# Patient Record
Sex: FEMALE | Race: WHITE | NOT HISPANIC OR LATINO | Employment: OTHER | URBAN - METROPOLITAN AREA
[De-identification: names, ages, dates, MRNs, and addresses within clinical notes are randomized per-mention and may not be internally consistent; named-entity substitution may affect disease eponyms.]

---

## 2018-04-09 ENCOUNTER — LAB VISIT (OUTPATIENT)
Dept: LAB | Facility: HOSPITAL | Age: 48
End: 2018-04-09
Attending: FAMILY MEDICINE
Payer: MEDICAID

## 2018-04-09 ENCOUNTER — OFFICE VISIT (OUTPATIENT)
Dept: PODIATRY | Facility: CLINIC | Age: 48
End: 2018-04-09
Payer: MEDICAID

## 2018-04-09 VITALS
HEART RATE: 87 BPM | RESPIRATION RATE: 16 BRPM | DIASTOLIC BLOOD PRESSURE: 77 MMHG | TEMPERATURE: 98 F | SYSTOLIC BLOOD PRESSURE: 147 MMHG | WEIGHT: 267 LBS

## 2018-04-09 DIAGNOSIS — F32.9 MAJOR DEPRESSIVE DISORDER, SINGLE EPISODE, UNSPECIFIED: ICD-10-CM

## 2018-04-09 DIAGNOSIS — I10 ESSENTIAL (PRIMARY) HYPERTENSION: Primary | ICD-10-CM

## 2018-04-09 DIAGNOSIS — E11.40 TYPE 2 DIABETES MELLITUS WITH DIABETIC NEUROPATHY: ICD-10-CM

## 2018-04-09 DIAGNOSIS — S91.302D OPEN WOUND OF LEFT FOOT, SUBSEQUENT ENCOUNTER: Primary | ICD-10-CM

## 2018-04-09 LAB
ALBUMIN SERPL BCP-MCNC: 4.5 G/DL
ALP SERPL-CCNC: 82 U/L
ALT SERPL W/O P-5'-P-CCNC: 23 U/L
ANION GAP SERPL CALC-SCNC: 9 MMOL/L
AST SERPL-CCNC: 20 U/L
BILIRUB SERPL-MCNC: 0.3 MG/DL
BUN SERPL-MCNC: 19 MG/DL
CALCIUM SERPL-MCNC: 9.8 MG/DL
CHLORIDE SERPL-SCNC: 102 MMOL/L
CHOLEST SERPL-MCNC: 190 MG/DL
CHOLEST/HDLC SERPL: 5 {RATIO}
CO2 SERPL-SCNC: 28 MMOL/L
CREAT SERPL-MCNC: 0.5 MG/DL
CREAT UR-MCNC: 27 MG/DL
ERYTHROCYTE [DISTWIDTH] IN BLOOD BY AUTOMATED COUNT: 13 %
EST. GFR  (AFRICAN AMERICAN): >60 ML/MIN/1.73 M^2
EST. GFR  (NON AFRICAN AMERICAN): >60 ML/MIN/1.73 M^2
ESTIMATED AVG GLUCOSE: 160 MG/DL
GLUCOSE SERPL-MCNC: 98 MG/DL
HBA1C MFR BLD HPLC: 7.2 %
HCT VFR BLD AUTO: 36.4 %
HDLC SERPL-MCNC: 38 MG/DL
HDLC SERPL: 20 %
HGB BLD-MCNC: 11.8 G/DL
LDLC SERPL CALC-MCNC: 92 MG/DL
MCH RBC QN AUTO: 29.4 PG
MCHC RBC AUTO-ENTMCNC: 32.4 G/DL
MCV RBC AUTO: 91 FL
MICROALBUMIN UR DL<=1MG/L-MCNC: 8 UG/ML
MICROALBUMIN/CREATININE RATIO: 29.6 UG/MG
NONHDLC SERPL-MCNC: 152 MG/DL
PLATELET # BLD AUTO: 246 K/UL
PMV BLD AUTO: 10.3 FL
POTASSIUM SERPL-SCNC: 3.6 MMOL/L
PROT SERPL-MCNC: 8.3 G/DL
RBC # BLD AUTO: 4.01 M/UL
SODIUM SERPL-SCNC: 139 MMOL/L
TRIGL SERPL-MCNC: 300 MG/DL
WBC # BLD AUTO: 7.24 K/UL

## 2018-04-09 PROCEDURE — 99999 PR PBB SHADOW E&M-EST. PATIENT-LVL III: CPT | Mod: PBBFAC,,, | Performed by: PODIATRIST

## 2018-04-09 PROCEDURE — 82043 UR ALBUMIN QUANTITATIVE: CPT

## 2018-04-09 PROCEDURE — 36415 COLL VENOUS BLD VENIPUNCTURE: CPT

## 2018-04-09 PROCEDURE — 99213 OFFICE O/P EST LOW 20 MIN: CPT | Mod: S$PBB,,, | Performed by: PODIATRIST

## 2018-04-09 PROCEDURE — 85027 COMPLETE CBC AUTOMATED: CPT

## 2018-04-09 PROCEDURE — 99213 OFFICE O/P EST LOW 20 MIN: CPT | Mod: PBBFAC | Performed by: PODIATRIST

## 2018-04-09 PROCEDURE — 83036 HEMOGLOBIN GLYCOSYLATED A1C: CPT

## 2018-04-09 PROCEDURE — 80061 LIPID PANEL: CPT

## 2018-04-09 PROCEDURE — 80053 COMPREHEN METABOLIC PANEL: CPT

## 2018-04-09 RX ORDER — POTASSIUM CHLORIDE 20 MEQ/1
TABLET, EXTENDED RELEASE ORAL
COMMUNITY
Start: 2018-01-08 | End: 2019-04-18 | Stop reason: SDUPTHER

## 2018-04-09 RX ORDER — HYDROCODONE BITARTRATE AND ACETAMINOPHEN 10; 325 MG/1; MG/1
TABLET ORAL
COMMUNITY
End: 2019-11-19

## 2018-04-09 RX ORDER — ZOLPIDEM TARTRATE 10 MG/1
TABLET ORAL
COMMUNITY
End: 2018-04-10 | Stop reason: SDUPTHER

## 2018-04-09 RX ORDER — ASPIRIN 81 MG/1
TABLET ORAL
COMMUNITY

## 2018-04-09 RX ORDER — BUPROPION HYDROCHLORIDE 150 MG/1
TABLET ORAL
COMMUNITY
Start: 2018-03-12 | End: 2018-07-13 | Stop reason: SDUPTHER

## 2018-04-09 RX ORDER — PANTOPRAZOLE SODIUM 40 MG/1
TABLET, DELAYED RELEASE ORAL
COMMUNITY
End: 2019-03-20 | Stop reason: SDUPTHER

## 2018-04-09 RX ORDER — SUCRALFATE 1 G/1
TABLET ORAL
COMMUNITY
End: 2018-07-13 | Stop reason: SDUPTHER

## 2018-04-09 RX ORDER — MELOXICAM 15 MG/1
15 TABLET ORAL DAILY
Refills: 10 | COMMUNITY
Start: 2018-01-08 | End: 2019-01-07 | Stop reason: ALTCHOICE

## 2018-04-09 RX ORDER — DOCUSATE SODIUM 100 MG/1
CAPSULE, LIQUID FILLED ORAL
COMMUNITY
End: 2021-04-07 | Stop reason: SDUPTHER

## 2018-04-09 RX ORDER — LISINOPRIL 20 MG/1
TABLET ORAL
COMMUNITY
End: 2019-03-15 | Stop reason: SDUPTHER

## 2018-04-09 RX ORDER — MONTELUKAST SODIUM 10 MG/1
TABLET ORAL
COMMUNITY
Start: 2018-03-12 | End: 2018-08-17 | Stop reason: SDUPTHER

## 2018-04-09 RX ORDER — PREGABALIN 200 MG/1
CAPSULE ORAL
COMMUNITY
End: 2018-09-14 | Stop reason: SDUPTHER

## 2018-04-09 RX ORDER — FUROSEMIDE 40 MG/1
40 TABLET ORAL 2 TIMES DAILY
Refills: 3 | COMMUNITY
Start: 2018-01-08 | End: 2019-01-16 | Stop reason: SDUPTHER

## 2018-04-09 RX ORDER — ALBUTEROL SULFATE 90 UG/1
AEROSOL, METERED RESPIRATORY (INHALATION)
COMMUNITY
End: 2018-10-11 | Stop reason: SDUPTHER

## 2018-04-09 RX ORDER — GLUCOSAM/CHONDRO/HERB 149/HYAL 750-100 MG
TABLET ORAL
COMMUNITY

## 2018-04-09 RX ORDER — VILAZODONE HYDROCHLORIDE 40 MG/1
TABLET ORAL
COMMUNITY
End: 2019-06-13 | Stop reason: SDUPTHER

## 2018-04-09 RX ORDER — INSULIN GLARGINE 100 [IU]/ML
INJECTION, SOLUTION SUBCUTANEOUS
COMMUNITY
End: 2018-06-06 | Stop reason: SDUPTHER

## 2018-04-09 RX ORDER — FLUTICASONE PROPIONATE AND SALMETEROL 500; 50 UG/1; UG/1
POWDER RESPIRATORY (INHALATION)
COMMUNITY
End: 2019-02-25 | Stop reason: SDUPTHER

## 2018-04-09 RX ORDER — ATORVASTATIN CALCIUM 40 MG/1
TABLET, FILM COATED ORAL
COMMUNITY
End: 2018-07-13 | Stop reason: SDUPTHER

## 2018-04-09 RX ORDER — INSULIN GLARGINE 100 [IU]/ML
INJECTION, SOLUTION SUBCUTANEOUS
COMMUNITY
Start: 2018-03-12 | End: 2018-11-15 | Stop reason: SDUPTHER

## 2018-04-09 RX ORDER — BUSPIRONE HYDROCHLORIDE 10 MG/1
TABLET ORAL
COMMUNITY

## 2018-04-09 NOTE — LETTER
April 10, 2018      Khanh Soriano MD  [address] [facility] [address]  [facility] [place] [address]           [facility] - Podiatry/Wound Care  [address] [facility] [place] [address]  Phone: [phone]  Fax: [phone]          Patient: Shweta Gupta   MR Number: [id number]   YOB: 1970   Date of Visit: 4/9/2018       Dear Dr. Khanh Soriano:    Thank you for referring Shweta Gupta to me for evaluation. Attached you will find relevant portions of my assessment and plan of care.    If you have questions, please do not hesitate to call me. I look forward to following Shweta Gupta along with you.    Sincerely,    Vijay Ramon, DPANTHONY    Enclosure  CC:  No Recipients    If you would like to receive this communication electronically, please contact [email or web address] or [phone] to request more information on [employer] Link access.    For providers and/or their staff who would like to refer a patient to [facility], please contact us through our one-stop-shop provider referral line, [facility], at [phone].    If you feel you have received this communication in error or would no longer like to receive these types of communications, please e-mail [email or web address]

## 2018-04-10 ENCOUNTER — TELEPHONE (OUTPATIENT)
Dept: FAMILY MEDICINE | Facility: CLINIC | Age: 48
End: 2018-04-10

## 2018-04-10 RX ORDER — ZOLPIDEM TARTRATE 5 MG/1
5 TABLET ORAL NIGHTLY PRN
Qty: 30 TABLET | Refills: 3 | Status: SHIPPED | OUTPATIENT
Start: 2018-04-10 | End: 2018-07-24 | Stop reason: DRUGHIGH

## 2018-04-10 NOTE — PROGRESS NOTES
Subjective:       Patient ID: Shweta Gupta is a 47 y.o. female.    Chief Complaint: Follow-up    HPI   patient presents for follow-up wound posterior left lower extremity.  Patient states the drainage has decreased quite a bit and it is feeling better.  Review of Systems    Objective:      Physical Exam  On evaluation the wound posterior aspect of the patient's left foot where she previously had Achilles tendon surgery looks very good it is well healing with well-formed scab overlying area previously noted infection appears resolved at this time.  Assessment:       1. Open wound of left foot, subsequent encounter        Plan:        The patient presents status post Achilles tendon repair lengthening and excision of posterior calcaneal exostosis left. on evaluation the patient's left heel looks much better it is not draining as much as it had been previously it's not as red not as swollen.  Patient has finished taking her antibiotics as prescribed she states the area feels better her left leg is still somewhat stiff but is less painful than it had been there is a well-formed scab overlying the area of previous infection no debridement was necessary today the place area was thoroughly cleaned with Dakin solution and Betadine soaked 2 x 2's placed over the area to maintain a dry environment plan follow-up will be 2 weeks I will likely allow the patient to start getting the area wet in 2 weeks and changes to the area the patient is to contact me immediately.  Patient's wound is not related to previous surgery the patient is outside the global period of treatment following Achilles tendon repair.  Total face-to-face time including discussion evaluation treatment and wound care equal 15 minutes.

## 2018-04-10 NOTE — TELEPHONE ENCOUNTER
----- Message from Klelee Caceres sent at 4/10/2018  8:24 AM CDT -----  Contact: Shweta  Calling to refill her Rx zolpidem (AMBIEN) 10 mg Tab 30 day qty  Please send to [facility] in [place]  Call her to advise if it can be escribed or not [phone] (home)   Thanks!

## 2018-04-11 ENCOUNTER — TELEPHONE (OUTPATIENT)
Dept: FAMILY MEDICINE | Facility: CLINIC | Age: 48
End: 2018-04-11

## 2018-04-18 ENCOUNTER — OFFICE VISIT (OUTPATIENT)
Dept: FAMILY MEDICINE | Facility: CLINIC | Age: 48
End: 2018-04-18
Payer: MEDICAID

## 2018-04-18 VITALS
RESPIRATION RATE: 18 BRPM | TEMPERATURE: 97 F | DIASTOLIC BLOOD PRESSURE: 73 MMHG | OXYGEN SATURATION: 96 % | HEART RATE: 80 BPM | SYSTOLIC BLOOD PRESSURE: 126 MMHG | HEIGHT: 64 IN | WEIGHT: 265 LBS | BODY MASS INDEX: 45.24 KG/M2

## 2018-04-18 DIAGNOSIS — F32.A DEPRESSIVE DISORDER: Primary | ICD-10-CM

## 2018-04-18 DIAGNOSIS — E11.9 TYPE 2 DIABETES MELLITUS WITHOUT COMPLICATION, WITH LONG-TERM CURRENT USE OF INSULIN: ICD-10-CM

## 2018-04-18 DIAGNOSIS — I10 ESSENTIAL HYPERTENSION: ICD-10-CM

## 2018-04-18 DIAGNOSIS — G47.00 INSOMNIA, UNSPECIFIED TYPE: ICD-10-CM

## 2018-04-18 DIAGNOSIS — E66.9 OBESITY, UNSPECIFIED CLASSIFICATION, UNSPECIFIED OBESITY TYPE, UNSPECIFIED WHETHER SERIOUS COMORBIDITY PRESENT: ICD-10-CM

## 2018-04-18 DIAGNOSIS — Z79.4 TYPE 2 DIABETES MELLITUS WITHOUT COMPLICATION, WITH LONG-TERM CURRENT USE OF INSULIN: ICD-10-CM

## 2018-04-18 PROBLEM — K57.92 DIVERTICULITIS: Status: ACTIVE | Noted: 2018-04-18

## 2018-04-18 PROCEDURE — 99214 OFFICE O/P EST MOD 30 MIN: CPT | Mod: S$GLB,,, | Performed by: FAMILY MEDICINE

## 2018-04-18 RX ORDER — PIOGLITAZONEHYDROCHLORIDE 45 MG/1
45 TABLET ORAL DAILY
Qty: 90 TABLET | Refills: 3 | Status: SHIPPED | OUTPATIENT
Start: 2018-04-18 | End: 2019-05-17 | Stop reason: SDUPTHER

## 2018-04-18 RX ORDER — ZOLPIDEM TARTRATE 6.25 MG/1
6.25 TABLET, FILM COATED, EXTENDED RELEASE ORAL NIGHTLY PRN
Qty: 90 TABLET | Refills: 3 | Status: SHIPPED | OUTPATIENT
Start: 2018-04-18 | End: 2018-07-17

## 2018-04-18 NOTE — PROGRESS NOTES
Subjective:       Patient ID: Shweta Gupta is a 47 y.o. female.    Chief Complaint: No chief complaint on file.    Anxiety/Depression  Reported by patient.  Quality: doesn't want to go outside, doesn't take care of herself well. crying often   Severity: denies suicidal ideations; able to maintain relationships; does not interfere with activities of daily living   Duration: symptoms lasting over 2 weeks   Onset/Timing: gradual; worsening   Context: major life stressors  Associated Symptoms: denies homicidal ideations; no significant weight gain; no significant weight loss; no visual/auditory hallucinations; no delusions; no shortness of breath; mood good; no anxiety; no crying spells; no panic; no isolation; sleeping well; appetite good; energy good; no apathy; maintaining functionality  Notes: buspar helps with the anxiety. Tolerating the paxil well.      Diabetes   She presents for her follow-up diabetic visit. She has type 2 diabetes mellitus. Pertinent negatives for hypoglycemia include no confusion, dizziness, headaches, pallor or tremors. Associated symptoms include blurred vision. Pertinent negatives for diabetes include no chest pain, no fatigue and no weakness. (History of retinalschisis.  Seeing her opthamologist every3 months. ) There are no hypoglycemic complications. Symptoms are worsening. Diabetic complications include nephropathy. Pertinent negatives for diabetic complications include no CVA, PVD or retinopathy. Risk factors for coronary artery disease include obesity and sedentary lifestyle. Current diabetic treatment includes diet, oral agent (dual therapy) and insulin pump. She is compliant with treatment all of the time. Her weight is increasing steadily. She has not had a previous visit with a dietitian. She participates in exercise weekly. Her home blood glucose trend is increasing steadily. Her breakfast blood glucose range is generally >200 mg/dl. She sees a podiatrist.Eye exam is current.    Hypertension   This is a chronic problem. The current episode started more than 1 year ago. The problem is unchanged. The problem is controlled. Associated symptoms include blurred vision. Pertinent negatives include no chest pain, headaches, neck pain, palpitations or shortness of breath. Risk factors for coronary artery disease include diabetes mellitus, obesity and sedentary lifestyle. Past treatments include ACE inhibitors. There are no compliance problems.  There is no history of angina, kidney disease, CAD/MI, CVA, heart failure, left ventricular hypertrophy, PVD or retinopathy.     Review of Systems   Constitutional: Negative for activity change, appetite change, chills and fatigue.   HENT: Negative for congestion, ear discharge, ear pain, rhinorrhea, sinus pain, sore throat and trouble swallowing.    Eyes: Positive for blurred vision. Negative for photophobia, pain, redness, itching and visual disturbance.   Respiratory: Negative for cough, chest tightness, shortness of breath and wheezing.    Cardiovascular: Negative for chest pain, palpitations and leg swelling.   Gastrointestinal: Negative for abdominal distention, abdominal pain, blood in stool, diarrhea, nausea and vomiting.   Genitourinary: Negative for dysuria, pelvic pain, vaginal bleeding, vaginal discharge and vaginal pain.   Musculoskeletal: Negative for arthralgias, back pain, gait problem and neck pain.   Skin: Negative for color change, pallor and rash.   Neurological: Negative for dizziness, tremors, weakness, light-headedness, numbness and headaches.   Psychiatric/Behavioral: Negative for agitation, behavioral problems, confusion and sleep disturbance.       Objective:      Physical Exam   Constitutional: She appears well-developed and well-nourished.   HENT:   Head: Normocephalic.   Right Ear: External ear normal.   Left Ear: External ear normal.   Mouth/Throat: Oropharynx is clear and moist.   Eyes: Conjunctivae and EOM are normal. Pupils  are equal, round, and reactive to light.   Neck: Normal range of motion. Neck supple.   Cardiovascular: Normal rate, regular rhythm, normal heart sounds and intact distal pulses.    Pulmonary/Chest: Effort normal and breath sounds normal.   Neurological: She is alert.   Skin: Skin is warm and dry.   Psychiatric: Her behavior is normal. Judgment normal.       Assessment:       1. Depressive disorder    2. Essential hypertension    3. Type 2 diabetes mellitus without complication, with long-term current use of insulin    4. Obesity, unspecified classification, unspecified obesity type, unspecified whether serious comorbidity present        Plan:         Depressive disorder    Essential hypertension    Type 2 diabetes mellitus without complication, with long-term current use of insulin  -     pioglitazone (ACTOS) 45 MG tablet; Take 1 tablet (45 mg total) by mouth once daily.  Dispense: 90 tablet; Refill: 3  -     Comprehensive metabolic panel; Future; Expected date: 07/18/2018  -     Hemoglobin A1c; Future; Expected date: 07/18/2018  -     Lipid panel; Future; Expected date: 07/18/2018  -     CBC auto differential; Future; Expected date: 07/18/2018  -adding actos today to bring sugars down a little further.  I would like to consider jardiance, but that will take a PA and failure of 2 medications.  Doesn't tolerate metformin.     Obesity, unspecified classification, unspecified obesity type, unspecified whether serious comorbidity present    Insomnia, unspecified type  -     zolpidem (AMBIEN CR) 6.25 MG CR tablet; Take 1 tablet (6.25 mg total) by mouth nightly as needed for Insomnia.  Dispense: 90 tablet; Refill: 3

## 2018-04-23 ENCOUNTER — OFFICE VISIT (OUTPATIENT)
Dept: PODIATRY | Facility: CLINIC | Age: 48
End: 2018-04-23
Payer: MEDICAID

## 2018-04-23 VITALS — TEMPERATURE: 97 F | SYSTOLIC BLOOD PRESSURE: 117 MMHG | HEART RATE: 78 BPM | DIASTOLIC BLOOD PRESSURE: 76 MMHG

## 2018-04-23 DIAGNOSIS — M76.62 ACHILLES TENDINITIS OF LEFT LOWER EXTREMITY: ICD-10-CM

## 2018-04-23 DIAGNOSIS — S91.302S WOUND, OPEN, FOOT, LEFT, SEQUELA: Primary | ICD-10-CM

## 2018-04-23 PROCEDURE — 99213 OFFICE O/P EST LOW 20 MIN: CPT | Mod: S$PBB,,, | Performed by: PODIATRIST

## 2018-04-23 PROCEDURE — 99999 PR PBB SHADOW E&M-EST. PATIENT-LVL III: CPT | Mod: PBBFAC,,, | Performed by: PODIATRIST

## 2018-04-23 PROCEDURE — 99213 OFFICE O/P EST LOW 20 MIN: CPT | Mod: PBBFAC | Performed by: PODIATRIST

## 2018-04-23 PROCEDURE — 87070 CULTURE OTHR SPECIMN AEROBIC: CPT

## 2018-04-23 NOTE — LETTER
April 23, 2018      Khanh Soriano MD  [address] [facility] [address]  [facility] [place] [address]           [facility] - Podiatry/Wound Care  [address] [facility] [place] [address]  Phone: [phone]  Fax: [phone]          Patient: Shweta Gupta   MR Number: [id number]   YOB: 1970   Date of Visit: 4/23/2018       Dear Dr. Khanh Soriano:    Thank you for referring Shweta Gupta to me for evaluation. Attached you will find relevant portions of my assessment and plan of care.    If you have questions, please do not hesitate to call me. I look forward to following Shweta Gupta along with you.    Sincerely,    Vijay Ramon, DPANTHONY    Enclosure  CC:  No Recipients    If you would like to receive this communication electronically, please contact [email or web address] or [phone] to request more information on [employer] Link access.    For providers and/or their staff who would like to refer a patient to [facility], please contact us through our one-stop-shop provider referral line, [facility], at [phone].    If you feel you have received this communication in error or would no longer like to receive these types of communications, please e-mail [email or web address]

## 2018-04-23 NOTE — PROGRESS NOTES
Subjective:       Patient ID: Shweta Gupta is a 47 y.o. female.    Chief Complaint: Follow-up    HPI   patient presents for follow-up wound posterior left lower extremity.  Patient states the drainage has decreased quite a bit and it is feeling better.  Review of Systems    Objective:      Physical Exam  On evaluation the wound posterior aspect of the patient's left foot where she previously had Achilles tendon surgery looks very good it is well healing with well-formed scab overlying area previously noted infection appears resolved at this time.  Assessment:       1. Wound, open, foot, left, sequela    2. Achilles tendinitis of left lower extremity        Plan:        The patient presents status post Achilles tendon repair lengthening and excision of posterior calcaneal exostosis left. on evaluation the patient's left heel looks much better it is not draining as much as it had been previously it's not as red not as swollen.  Patient has finished taking her antibiotics as prescribed she states the area feels better her left leg is still somewhat stiff but is less painful than it had been there is a well-formed scab overlying the area of previous infection no debridement was necessary today the place area was thoroughly cleaned with Dakin solution and silver alginate placed over the area to maintain a dry environment plan follow-up will be 10 days. Any changes to the area the patient is to contact me immediately.  Patient's wound is not related to previous surgery the patient is outside the global period of treatment following Achilles tendon repair.  Total face-to-face time including discussion evaluation treatment and wound care equal 15 minutes.  Patient was advised the area does continue to improve the opening is the size of a pinhole with only some minor drainage I did culture this area to ensure that there is no bacteria growing in the area and we'll place the patient on appropriate antibiotics pending the  culture.  I will consider application of Promogran to the area to facilitate healing as long as there is no bacterial contaminant in the area.

## 2018-04-26 ENCOUNTER — TELEPHONE (OUTPATIENT)
Dept: PODIATRY | Facility: CLINIC | Age: 48
End: 2018-04-26

## 2018-04-26 LAB — BACTERIA SPEC AEROBE CULT: NORMAL

## 2018-04-26 NOTE — TELEPHONE ENCOUNTER
----- Message from Vijay Ramon DPM sent at 4/25/2018  9:30 AM CDT -----  Please call the patient regarding her normal result.No active bacterial growth noted on C&S.

## 2018-04-26 NOTE — TELEPHONE ENCOUNTER
----- Message from Vijay Ramon DPM sent at 4/26/2018 11:18 AM CDT -----  Please call the patient regarding her normal result. No active bacterial growth.

## 2018-05-02 ENCOUNTER — OFFICE VISIT (OUTPATIENT)
Dept: PODIATRY | Facility: CLINIC | Age: 48
End: 2018-05-02
Payer: MEDICAID

## 2018-05-02 VITALS
HEART RATE: 78 BPM | DIASTOLIC BLOOD PRESSURE: 79 MMHG | SYSTOLIC BLOOD PRESSURE: 142 MMHG | RESPIRATION RATE: 18 BRPM | TEMPERATURE: 98 F

## 2018-05-02 DIAGNOSIS — S91.302S WOUND, OPEN, FOOT, LEFT, SEQUELA: Primary | ICD-10-CM

## 2018-05-02 PROCEDURE — 99213 OFFICE O/P EST LOW 20 MIN: CPT | Mod: S$PBB,,, | Performed by: PODIATRIST

## 2018-05-02 PROCEDURE — 99213 OFFICE O/P EST LOW 20 MIN: CPT | Mod: PBBFAC | Performed by: PODIATRIST

## 2018-05-02 PROCEDURE — 99999 PR PBB SHADOW E&M-EST. PATIENT-LVL III: CPT | Mod: PBBFAC,,, | Performed by: PODIATRIST

## 2018-05-02 NOTE — LETTER
May 5, 2018      Khanh Soriano MD  [address] [facility] [address]  [facility] [place] [address]           [facility] - Podiatry/Wound Care  [address] [facility] [place] [address]  Phone: [phone]  Fax: [phone]          Patient: Shweta Gupta   MR Number: [id number]   YOB: 1970   Date of Visit: 5/2/2018       Dear Dr. Khanh Soriano:    Thank you for referring Shweta Gupta to me for evaluation. Attached you will find relevant portions of my assessment and plan of care.    If you have questions, please do not hesitate to call me. I look forward to following Shweta Gupta along with you.    Sincerely,        Enclosure  CC:  No Recipients    If you would like to receive this communication electronically, please contact [email or web address] or [phone] to request more information on [employer] Link access.    For providers and/or their staff who would like to refer a patient to [facility], please contact us through our one-stop-shop provider referral line, [facility], at [phone].    If you feel you have received this communication in error or would no longer like to receive these types of communications, please e-mail [email or web address]

## 2018-05-06 NOTE — PROGRESS NOTES
Subjective:       Patient ID: Shweta Gupta is a 47 y.o. female.    Chief Complaint: Follow-up    HPI   patient presents for follow-up wound posterior left lower extremity.  Patient states the drainage has decreased quite a bit and it is feeling better.  Review of Systems   Skin: Positive for wound.   All other systems reviewed and are negative.      Objective:      Physical Exam   Constitutional: She appears well-developed and well-nourished.   Cardiovascular:   Pulses:       Dorsalis pedis pulses are 2+ on the right side, and 2+ on the left side.        Posterior tibial pulses are 2+ on the right side, and 2+ on the left side.   Musculoskeletal:        Left foot: There is decreased range of motion, tenderness and swelling.        Feet:    Feet:   Left Foot:   Skin Integrity: Positive for ulcer.   Neurological: She is alert.   Skin: Capillary refill takes 2 to 3 seconds.   Psychiatric: She has a normal mood and affect.     On evaluation the wound posterior aspect of the patient's left foot where she previously had Achilles tendon surgery looks very good it is well healing with well-formed scab overlying area previously noted infection appears resolved at this time.  Assessment:       1. Wound, open, foot, left, sequela        Plan:        The patient presents status post Achilles tendon repair lengthening and excision of posterior calcaneal exostosis left. on evaluation the patient's left heel looks much better it is not draining as much as it had been previously it's not as red not as swollen.  Patient has finished taking her antibiotics as prescribed she states the area feels better her left leg is still somewhat stiff but is less painful than it had been there is a well-formed scab overlying the area of previous infection no debridement was necessary today the place area was thoroughly cleaned with Dakin solution and silver alginate placed over the area to maintain a dry environment plan follow-up will be 10  days. Any changes to the area the patient is to contact me immediately.  Patient's wound is not related to previous surgery the patient is outside the global period of treatment following Achilles tendon repair.  Total face-to-face time including discussion evaluation treatment and wound care equal 15 minutes.  Patient was advised the area does continue to improve the opening is the size of a pinhole with only some minor drainage I did culture this area to ensure that there is no bacteria growing in the area and we'll place the patient on appropriate antibiotics pending the culture.  I will consider application of Promogran to the area to facilitate healing as long as there is no bacterial contaminant in the area.  Patient advised there is increased granular tissue noted so we'll continue our current care plan and possibly change it at the next visit.

## 2018-05-16 ENCOUNTER — OFFICE VISIT (OUTPATIENT)
Dept: PODIATRY | Facility: CLINIC | Age: 48
End: 2018-05-16
Payer: MEDICAID

## 2018-05-16 VITALS
WEIGHT: 267 LBS | BODY MASS INDEX: 45.58 KG/M2 | DIASTOLIC BLOOD PRESSURE: 60 MMHG | HEART RATE: 83 BPM | HEIGHT: 64 IN | TEMPERATURE: 97 F | SYSTOLIC BLOOD PRESSURE: 115 MMHG

## 2018-05-16 DIAGNOSIS — S91.302S WOUND, OPEN, FOOT, LEFT, SEQUELA: Primary | ICD-10-CM

## 2018-05-16 PROCEDURE — 99213 OFFICE O/P EST LOW 20 MIN: CPT | Mod: PBBFAC | Performed by: PODIATRIST

## 2018-05-16 PROCEDURE — 99213 OFFICE O/P EST LOW 20 MIN: CPT | Mod: S$PBB,,, | Performed by: PODIATRIST

## 2018-05-16 PROCEDURE — 99999 PR PBB SHADOW E&M-EST. PATIENT-LVL III: CPT | Mod: PBBFAC,,, | Performed by: PODIATRIST

## 2018-05-16 NOTE — LETTER
May 20, 2018      Khanh Soriano MD  [address] [facility] [address]  [facility] [place] [address]           [facility] - Podiatry/Wound Care  [address] [facility] [place] [address]  Phone: [phone]  Fax: [phone]          Patient: Shweta Gupta   MR Number: [id number]   YOB: 1970   Date of Visit: 5/16/2018       Dear Dr. Khanh Soriano:    Thank you for referring Shweta Gupta to me for evaluation. Attached you will find relevant portions of my assessment and plan of care.    If you have questions, please do not hesitate to call me. I look forward to following Shweta Gupta along with you.    Sincerely,    Vijay Ramon, DPANTHONY    Enclosure  CC:  No Recipients    If you would like to receive this communication electronically, please contact [email or web address] or [phone] to request more information on [employer] Link access.    For providers and/or their staff who would like to refer a patient to [facility], please contact us through our one-stop-shop provider referral line, [facility], at [phone].    If you feel you have received this communication in error or would no longer like to receive these types of communications, please e-mail [email or web address]

## 2018-05-20 NOTE — PROGRESS NOTES
Subjective:       Patient ID: Shweta Gupta is a 47 y.o. female.    Chief Complaint: Follow-up    HPI   patient presents for follow-up wound posterior left lower extremity.  Patient states the drainage has decreased quite a bit and it is feeling better.  Review of Systems   Skin: Positive for wound.   All other systems reviewed and are negative.      Objective:      Physical Exam   Constitutional: She appears well-developed and well-nourished.   Cardiovascular:   Pulses:       Dorsalis pedis pulses are 2+ on the right side, and 2+ on the left side.        Posterior tibial pulses are 2+ on the right side, and 2+ on the left side.   Musculoskeletal:        Left foot: There is decreased range of motion, tenderness and swelling.        Feet:    Feet:   Left Foot:   Skin Integrity: Positive for ulcer.   Neurological: She is alert.   Skin: Capillary refill takes 2 to 3 seconds.   Psychiatric: She has a normal mood and affect.     On evaluation the wound posterior aspect of the patient's left foot where she previously had Achilles tendon surgery looks very good it is well healing with well-formed scab overlying area previously noted infection appears resolved at this time.  Assessment:       1. Wound, open, foot, left, sequela        Plan:        The patient presents status post Achilles tendon repair lengthening and excision of posterior calcaneal exostosis left. on evaluation the patient's left heel looks much better it is not draining as much as it had been previously it's not as red not as swollen.  Patient has finished taking her antibiotics as prescribed she states the area feels better her left leg is still somewhat stiff but is less painful than it had been there is a well-formed scab overlying the area of previous infection no debridement was necessary today the place area was thoroughly cleaned with Dakin solution.  Any changes to the area the patient is to contact me immediately.  Patient's wound is not related  to previous surgery the patient is outside the global period of treatment following Achilles tendon repair.  Total face-to-face time including discussion evaluation treatment and wound care equal 15 minutes.  Patient was advised the area does continue to improve the opening is the size of a pinhole with only some minor drainage.  No signs of infection noted patient will begin application of medi honey which the patient was dispensed every 48-72 hours depending upon moisture content of the area with a light dressing over the area follow-up 3 weeks.

## 2018-06-06 ENCOUNTER — OFFICE VISIT (OUTPATIENT)
Dept: PODIATRY | Facility: CLINIC | Age: 48
End: 2018-06-06
Payer: MEDICAID

## 2018-06-06 VITALS
HEIGHT: 64 IN | DIASTOLIC BLOOD PRESSURE: 74 MMHG | TEMPERATURE: 98 F | SYSTOLIC BLOOD PRESSURE: 119 MMHG | HEART RATE: 74 BPM | WEIGHT: 278 LBS | RESPIRATION RATE: 16 BRPM | BODY MASS INDEX: 47.46 KG/M2

## 2018-06-06 DIAGNOSIS — E11.9 TYPE 2 DIABETES MELLITUS WITHOUT COMPLICATION, WITH LONG-TERM CURRENT USE OF INSULIN: Primary | ICD-10-CM

## 2018-06-06 DIAGNOSIS — S91.302S WOUND, OPEN, FOOT, LEFT, SEQUELA: ICD-10-CM

## 2018-06-06 DIAGNOSIS — Z79.4 TYPE 2 DIABETES MELLITUS WITHOUT COMPLICATION, WITH LONG-TERM CURRENT USE OF INSULIN: Primary | ICD-10-CM

## 2018-06-06 PROCEDURE — 99213 OFFICE O/P EST LOW 20 MIN: CPT | Mod: S$PBB,,, | Performed by: PODIATRIST

## 2018-06-06 PROCEDURE — 99999 PR PBB SHADOW E&M-EST. PATIENT-LVL III: CPT | Mod: PBBFAC,,, | Performed by: PODIATRIST

## 2018-06-06 PROCEDURE — 99213 OFFICE O/P EST LOW 20 MIN: CPT | Mod: PBBFAC | Performed by: PODIATRIST

## 2018-06-10 NOTE — PROGRESS NOTES
Subjective:       Patient ID: Shweta Gupta is a 48 y.o. female.    Chief Complaint: No chief complaint on file.    HPI   patient presents for follow-up wound posterior left lower extremity.  Patient states the drainage has decreased quite a bit and it is feeling better.  Review of Systems   Skin: Positive for wound.   All other systems reviewed and are negative.      Objective:      Physical Exam   Constitutional: She appears well-developed and well-nourished.   Cardiovascular:   Pulses:       Dorsalis pedis pulses are 2+ on the right side, and 2+ on the left side.        Posterior tibial pulses are 2+ on the right side, and 2+ on the left side.   Musculoskeletal:        Left foot: There is decreased range of motion, tenderness and swelling.        Feet:    Feet:   Left Foot:   Skin Integrity: Positive for ulcer.   Neurological: She is alert.   Skin: Capillary refill takes 2 to 3 seconds.   Psychiatric: She has a normal mood and affect.     On evaluation the wound posterior aspect of the patient's left foot where she previously had Achilles tendon surgery looks very good it is well healing with well-formed scab overlying area previously noted infection appears resolved at this time.  Assessment:       1. Type 2 diabetes mellitus without complication, with long-term current use of insulin    2. Wound, open, foot, left, sequela        Plan:        The patient presents status post Achilles tendon repair lengthening and excision of posterior calcaneal exostosis left. on evaluation the patient's left heel looks much better it is not draining as much as it had been previously it's not as red not as swollen.  Patient has finished taking her antibiotics as prescribed she states the area feels better her left leg is still somewhat stiff but is less painful than it had been there is a well-formed scab overlying the area of previous infection no debridement was necessary today the place area was thoroughly cleaned with Dakin  solution.  Any changes to the area the patient is to contact me immediately.  Patient's wound is not related to previous surgery the patient is outside the global period of treatment following Achilles tendon repair.  Total face-to-face time including discussion evaluation treatment and wound care equal 15 minutes.  Patient was advised the area does continue to improve the opening is the size of a pinhole with only some minor drainage.  No signs of infection noted patient will begin application of medi honey which the patient was dispensed every 48-72 hours depending upon moisture content of the area with a light dressing over the area follow-up 4 weeks.  Patient will continue the current treatment plan and dressing changes I am good allow her to get the area wet but she needs to clean the area immediately with Dakin solution after bathing area wound is getting progressively smaller and is now only the size of a pinhole with no signs of infection.

## 2018-06-25 ENCOUNTER — TELEPHONE (OUTPATIENT)
Dept: PODIATRY | Facility: CLINIC | Age: 48
End: 2018-06-25

## 2018-06-25 NOTE — TELEPHONE ENCOUNTER
Pt states she has been experiencing leg cramps at night.  Pt states she only experiences them on the side she had her foot surgery on and they keep her up at night because they hurt so bad. Pt states she is currently rx'd potassium and this is no longer helping. Please advised.

## 2018-06-26 NOTE — TELEPHONE ENCOUNTER
She is going to need to do some stretching exercises to keep the calf muscles stretched out. Pulling her toes to her nose and hold 30 second 3 sets 3 x / day will likely take care of the cramping hs.

## 2018-07-09 ENCOUNTER — OFFICE VISIT (OUTPATIENT)
Dept: PODIATRY | Facility: CLINIC | Age: 48
End: 2018-07-09
Payer: MEDICAID

## 2018-07-09 VITALS
OXYGEN SATURATION: 96 % | WEIGHT: 275 LBS | BODY MASS INDEX: 46.95 KG/M2 | DIASTOLIC BLOOD PRESSURE: 68 MMHG | HEIGHT: 64 IN | HEART RATE: 70 BPM | RESPIRATION RATE: 18 BRPM | TEMPERATURE: 97 F | SYSTOLIC BLOOD PRESSURE: 114 MMHG

## 2018-07-09 DIAGNOSIS — M76.62 ACHILLES TENDINITIS OF LEFT LOWER EXTREMITY: Primary | ICD-10-CM

## 2018-07-09 PROCEDURE — 99213 OFFICE O/P EST LOW 20 MIN: CPT | Mod: S$PBB,,, | Performed by: PODIATRIST

## 2018-07-09 PROCEDURE — 99213 OFFICE O/P EST LOW 20 MIN: CPT | Mod: PBBFAC | Performed by: PODIATRIST

## 2018-07-09 PROCEDURE — 99999 PR PBB SHADOW E&M-EST. PATIENT-LVL III: CPT | Mod: PBBFAC,,, | Performed by: PODIATRIST

## 2018-07-13 ENCOUNTER — TELEPHONE (OUTPATIENT)
Dept: FAMILY MEDICINE | Facility: CLINIC | Age: 48
End: 2018-07-13

## 2018-07-13 RX ORDER — ATORVASTATIN CALCIUM 40 MG/1
TABLET, FILM COATED ORAL
Qty: 30 TABLET | Refills: 2 | Status: SHIPPED | OUTPATIENT
Start: 2018-07-13 | End: 2018-12-05 | Stop reason: SDUPTHER

## 2018-07-13 RX ORDER — BUPROPION HYDROCHLORIDE 150 MG/1
150 TABLET ORAL DAILY
Qty: 30 TABLET | Refills: 2 | Status: SHIPPED | OUTPATIENT
Start: 2018-07-13 | End: 2019-02-25 | Stop reason: SDUPTHER

## 2018-07-13 RX ORDER — SUCRALFATE 1 G/1
TABLET ORAL
Qty: 120 TABLET | Refills: 2 | Status: SHIPPED | OUTPATIENT
Start: 2018-07-13 | End: 2019-09-23 | Stop reason: SDUPTHER

## 2018-07-13 NOTE — TELEPHONE ENCOUNTER
----- Message from Joy Reyes sent at 7/13/2018  9:41 AM CDT -----  Contact: Patient  Shweta, patient [phone] calling because she needs a refill on buPROPion (WELLBUTRIN XL) 150 MG TB24 tablet, atorvastatin (LIPITOR) 40 MG tablet, and sucralfate (CARAFATE) 1 gram tablet. Please advise. Thanks.    [facility]  [address] [place] [address]  Phone: [phone] Fax: [phone]

## 2018-07-13 NOTE — TELEPHONE ENCOUNTER
----- Message from Guerline Escobar sent at 7/13/2018 11:11 AM CDT -----   Type:  Pharmacy Calling to Clarify an RX    Name of Caller: haseeb Pharmacy Name:    [facility] - [place], [place] - [address]  [address]  [place] [address]  Phone: [phone] Fax: [phone]    Prescription Name: freddy   What do they need to clarify?: na

## 2018-07-14 NOTE — PROGRESS NOTES
Subjective:       Patient ID: Shweta Gupta is a 48 y.o. female.    Chief Complaint: Follow-up    HPI   patient presents for follow-up wound posterior left lower extremity.  Patient states the drainage has decreased quite a bit and it is feeling better.  Review of Systems   Skin: Positive for wound.   All other systems reviewed and are negative.      Objective:      Physical Exam   Constitutional: She appears well-developed and well-nourished.   Cardiovascular:   Pulses:       Dorsalis pedis pulses are 2+ on the right side, and 2+ on the left side.        Posterior tibial pulses are 2+ on the right side, and 2+ on the left side.   Musculoskeletal:        Left foot: There is decreased range of motion, tenderness and swelling.        Feet:    Feet:   Left Foot:   Skin Integrity: Positive for ulcer.   Neurological: She is alert.   Skin: Capillary refill takes 2 to 3 seconds.   Psychiatric: She has a normal mood and affect.     On evaluation the wound posterior aspect of the patient's left foot where she previously had Achilles tendon surgery looks very good it is well healing with well-formed scab overlying area previously noted infection appears resolved at this time.  Assessment:       1. Achilles tendinitis of left lower extremity        Plan:        The patient presents status post Achilles tendon repair lengthening and excision of posterior calcaneal exostosis left. on evaluation the patient's left heel looks much better it is not draining as much as it had been previously it's not as red not as swollen.  Patient has finished taking her antibiotics as prescribed she states the area feels better her left leg is still somewhat stiff but is less painful than it had been there is a well-formed scab overlying the area of previous infection no debridement was necessary today the place area was thoroughly cleaned with Dakin solution.  Any changes to the area the patient is to contact me immediately.  Patient's wound is  not related to previous surgery the patient is outside the global period of treatment following Achilles tendon repair.  Total face-to-face time including discussion evaluation treatment and wound care equal 15 minutes.   Previously noted ulceration is now completely closed and healed there is no open area no sign of infection noted patient does have significant tightness of the Achilles tendon I have advised the patient that I would recommend she start physical therapy going to rip her written referred the patient to physical therapy at this time I want to see her for follow-up in 1 month the physical therapy is to help stretch out the Achilles tendon breakdown scar tissue and restore range of motion without discomfort or tightness patient currently has very good range of motion but there is some tightness still follow-up 1 month.

## 2018-07-23 DIAGNOSIS — Z12.39 SCREENING BREAST EXAMINATION: Primary | ICD-10-CM

## 2018-07-24 ENCOUNTER — OFFICE VISIT (OUTPATIENT)
Dept: FAMILY MEDICINE | Facility: CLINIC | Age: 48
End: 2018-07-24
Payer: MEDICAID

## 2018-07-24 ENCOUNTER — LAB VISIT (OUTPATIENT)
Dept: LAB | Facility: HOSPITAL | Age: 48
End: 2018-07-24
Attending: FAMILY MEDICINE
Payer: MEDICAID

## 2018-07-24 VITALS
HEART RATE: 73 BPM | HEIGHT: 64 IN | DIASTOLIC BLOOD PRESSURE: 71 MMHG | BODY MASS INDEX: 46.95 KG/M2 | TEMPERATURE: 98 F | OXYGEN SATURATION: 98 % | WEIGHT: 275 LBS | SYSTOLIC BLOOD PRESSURE: 134 MMHG | RESPIRATION RATE: 18 BRPM

## 2018-07-24 DIAGNOSIS — G47.00 INSOMNIA, UNSPECIFIED TYPE: ICD-10-CM

## 2018-07-24 DIAGNOSIS — Z79.4 TYPE 2 DIABETES MELLITUS WITHOUT COMPLICATION, WITH LONG-TERM CURRENT USE OF INSULIN: Primary | ICD-10-CM

## 2018-07-24 DIAGNOSIS — E11.9 TYPE 2 DIABETES MELLITUS WITHOUT COMPLICATION, WITH LONG-TERM CURRENT USE OF INSULIN: Primary | ICD-10-CM

## 2018-07-24 DIAGNOSIS — E11.9 TYPE 2 DIABETES MELLITUS WITHOUT COMPLICATION, WITH LONG-TERM CURRENT USE OF INSULIN: ICD-10-CM

## 2018-07-24 DIAGNOSIS — Z79.4 TYPE 2 DIABETES MELLITUS WITHOUT COMPLICATION, WITH LONG-TERM CURRENT USE OF INSULIN: ICD-10-CM

## 2018-07-24 LAB
ALBUMIN SERPL BCP-MCNC: 4.4 G/DL
ALP SERPL-CCNC: 76 U/L
ALT SERPL W/O P-5'-P-CCNC: 22 U/L
ANION GAP SERPL CALC-SCNC: 7 MMOL/L
AST SERPL-CCNC: 18 U/L
BILIRUB SERPL-MCNC: 0.6 MG/DL
BUN SERPL-MCNC: 21 MG/DL
CALCIUM SERPL-MCNC: 9.5 MG/DL
CHLORIDE SERPL-SCNC: 105 MMOL/L
CO2 SERPL-SCNC: 29 MMOL/L
CREAT SERPL-MCNC: 0.8 MG/DL
EST. GFR  (AFRICAN AMERICAN): >60 ML/MIN/1.73 M^2
EST. GFR  (NON AFRICAN AMERICAN): >60 ML/MIN/1.73 M^2
ESTIMATED AVG GLUCOSE: 146 MG/DL
GLUCOSE SERPL-MCNC: 144 MG/DL
HBA1C MFR BLD HPLC: 6.7 %
POTASSIUM SERPL-SCNC: 3.7 MMOL/L
PROT SERPL-MCNC: 7.9 G/DL
SODIUM SERPL-SCNC: 141 MMOL/L

## 2018-07-24 PROCEDURE — 36415 COLL VENOUS BLD VENIPUNCTURE: CPT

## 2018-07-24 PROCEDURE — 99214 OFFICE O/P EST MOD 30 MIN: CPT | Mod: S$GLB,,, | Performed by: FAMILY MEDICINE

## 2018-07-24 PROCEDURE — 80053 COMPREHEN METABOLIC PANEL: CPT

## 2018-07-24 PROCEDURE — 83036 HEMOGLOBIN GLYCOSYLATED A1C: CPT

## 2018-07-24 RX ORDER — RANOLAZINE 500 MG/1
500 TABLET, EXTENDED RELEASE ORAL 2 TIMES DAILY
COMMUNITY
End: 2020-07-17 | Stop reason: SDUPTHER

## 2018-07-24 RX ORDER — ZOLPIDEM TARTRATE 6.25 MG/1
6.25 TABLET, FILM COATED, EXTENDED RELEASE ORAL NIGHTLY PRN
Qty: 30 TABLET | Refills: 2 | Status: SHIPPED | OUTPATIENT
Start: 2018-07-24 | End: 2018-08-07 | Stop reason: SDUPTHER

## 2018-07-24 NOTE — PROGRESS NOTES
Subjective:       Patient ID: Shweta Gupta is a 48 y.o. female.    Chief Complaint: Follow-up    Diabetes   She presents for her follow-up diabetic visit. She has type 2 diabetes mellitus. Pertinent negatives for hypoglycemia include no dizziness, headaches, seizures or tremors. Pertinent negatives for diabetes include no chest pain and no fatigue. There are no hypoglycemic complications. Risk factors for coronary artery disease include obesity and post-menopausal. Current diabetic treatment includes oral agent (monotherapy). She is compliant with treatment all of the time. Her weight is stable. An ACE inhibitor/angiotensin II receptor blocker is being taken.   Was changed to actos at her last visit approx 3 months ago. Due for A1c.    Also struggles with insomnia and needs refill of ambien; was supposed to try controlled release but did not get the rx filled since her pcp has since moved.    Review of Systems   Constitutional: Negative for chills, fatigue, fever and unexpected weight change.   Respiratory: Negative for cough, chest tightness, shortness of breath and wheezing.    Cardiovascular: Negative for chest pain, palpitations and leg swelling.   Gastrointestinal: Negative for abdominal pain, constipation, diarrhea, nausea and vomiting.   Neurological: Negative for dizziness, tremors, seizures and headaches.   Psychiatric/Behavioral: Negative for decreased concentration, dysphoric mood, hallucinations and suicidal ideas.       Past Medical History:   Diagnosis Date    Allergy     Anxiety     Asthma     Depression     Diabetes mellitus, type 2     GERD (gastroesophageal reflux disease)     Hyperlipidemia     Hypertension      Past Surgical History:   Procedure Laterality Date    ACHILLES TENDON SURGERY Left     CARPAL TUNNEL RELEASE Left      SECTION      CHOLECYSTECTOMY      COLON SURGERY      HERNIA REPAIR      HYSTERECTOMY      partial    KIDNEY STONE SURGERY      TRIGGER FINGER  "RELEASE Left      Social History     Social History    Marital status:      Spouse name: N/A    Number of children: N/A    Years of education: N/A     Occupational History    Not on file.     Social History Main Topics    Smoking status: Never Smoker    Smokeless tobacco: Never Used    Alcohol use No    Drug use: No    Sexual activity: Not Currently     Partners: Male     Other Topics Concern    Not on file     Social History Narrative    No narrative on file     History reviewed. No pertinent family history.    Objective:      /71 (BP Location: Left arm, Patient Position: Sitting)   Pulse 73   Temp 97.7 °F (36.5 °C) (Tympanic)   Resp 18   Ht 5' 4" (1.626 m)   Wt 124.7 kg (275 lb)   SpO2 98%   BMI 47.20 kg/m²   Physical Exam   Constitutional: She is oriented to person, place, and time. She appears well-developed and well-nourished. No distress.   obese   Eyes: Conjunctivae and EOM are normal. Pupils are equal, round, and reactive to light. No scleral icterus.   Cardiovascular: Normal rate, regular rhythm and normal heart sounds.    No murmur heard.  Pulmonary/Chest: Effort normal and breath sounds normal. No respiratory distress. She has no wheezes.   Neurological: She is alert and oriented to person, place, and time.   Skin: Skin is warm and dry. No rash noted. She is not diaphoretic.   Psychiatric: She has a normal mood and affect. Her behavior is normal. Judgment and thought content normal.   Vitals reviewed.      Assessment:       1. Type 2 diabetes mellitus without complication, with long-term current use of insulin    2. Insomnia, unspecified type        Plan:       Type 2 diabetes mellitus without complication, with long-term current use of insulin  -     Comprehensive metabolic panel; Future; Expected date: 07/24/2018  -     Hemoglobin A1c; Future; Expected date: 07/24/2018    Insomnia, unspecified type  -     zolpidem (AMBIEN CR) 6.25 MG CR tablet; Take 1 tablet (6.25 mg " total) by mouth nightly as needed for Insomnia.  Dispense: 30 tablet; Refill: 2            Risks, benefits, and side effects were discussed with the patient. All questions were answered to the fullest satisfaction of the patient, and pt verbalized understanding and agreement to treatment plan. Pt was to call with any new or worsening symptoms, or present to the ER.

## 2018-07-25 ENCOUNTER — TELEPHONE (OUTPATIENT)
Dept: FAMILY MEDICINE | Facility: CLINIC | Age: 48
End: 2018-07-25

## 2018-08-06 ENCOUNTER — OFFICE VISIT (OUTPATIENT)
Dept: PODIATRY | Facility: CLINIC | Age: 48
End: 2018-08-06
Payer: MEDICAID

## 2018-08-06 VITALS
WEIGHT: 280 LBS | HEIGHT: 64 IN | DIASTOLIC BLOOD PRESSURE: 57 MMHG | BODY MASS INDEX: 47.8 KG/M2 | HEART RATE: 83 BPM | SYSTOLIC BLOOD PRESSURE: 107 MMHG | TEMPERATURE: 97 F

## 2018-08-06 DIAGNOSIS — M76.61 ACHILLES TENDINITIS OF RIGHT LOWER EXTREMITY: Primary | ICD-10-CM

## 2018-08-06 DIAGNOSIS — M76.62 ACHILLES TENDINITIS OF LEFT LOWER EXTREMITY: ICD-10-CM

## 2018-08-06 DIAGNOSIS — Z79.4 TYPE 2 DIABETES MELLITUS WITHOUT COMPLICATION, WITH LONG-TERM CURRENT USE OF INSULIN: ICD-10-CM

## 2018-08-06 DIAGNOSIS — E11.9 TYPE 2 DIABETES MELLITUS WITHOUT COMPLICATION, WITH LONG-TERM CURRENT USE OF INSULIN: ICD-10-CM

## 2018-08-06 PROCEDURE — 99213 OFFICE O/P EST LOW 20 MIN: CPT | Mod: PBBFAC | Performed by: PODIATRIST

## 2018-08-06 PROCEDURE — 99213 OFFICE O/P EST LOW 20 MIN: CPT | Mod: S$PBB,,, | Performed by: PODIATRIST

## 2018-08-06 PROCEDURE — 99999 PR PBB SHADOW E&M-EST. PATIENT-LVL III: CPT | Mod: PBBFAC,,, | Performed by: PODIATRIST

## 2018-08-07 DIAGNOSIS — G47.00 INSOMNIA, UNSPECIFIED TYPE: ICD-10-CM

## 2018-08-07 NOTE — TELEPHONE ENCOUNTER
----- Message from Miriam Morales sent at 8/7/2018  4:19 PM CDT -----  Contact: Patient  Type:  RX Refill Request    Who Called:  Patient  Refill or New Rx:  Refill   RX Name and Strength:    zolpidem (AMBIEN CR) 6.25 MG CR tablet  How is the patient currently taking it? (ex. 1XDay):    Is this a 30 day or 90 day RX:    Preferred Pharmacy with phone number:    [facility]  [place], [place] - [address]  [address]  [place] [address]  Phone: [phone] Fax: [phone]  Local or Mail Order:  Local  Ordering Provider:  Be  Best Call Back Number:    Additional Information:  Patient called to let the office know that [facility] never received her refill request.

## 2018-08-08 ENCOUNTER — TELEPHONE (OUTPATIENT)
Dept: FAMILY MEDICINE | Facility: CLINIC | Age: 48
End: 2018-08-08

## 2018-08-08 DIAGNOSIS — G47.00 INSOMNIA, UNSPECIFIED TYPE: Primary | ICD-10-CM

## 2018-08-08 RX ORDER — ZOLPIDEM TARTRATE 6.25 MG/1
6.25 TABLET, FILM COATED, EXTENDED RELEASE ORAL NIGHTLY PRN
Qty: 30 TABLET | Refills: 2 | Status: SHIPPED | OUTPATIENT
Start: 2018-08-08 | End: 2018-08-08 | Stop reason: ALTCHOICE

## 2018-08-08 RX ORDER — ZOLPIDEM TARTRATE 10 MG/1
10 TABLET ORAL NIGHTLY PRN
Qty: 30 TABLET | Refills: 1 | Status: SHIPPED | OUTPATIENT
Start: 2018-08-08 | End: 2018-10-05 | Stop reason: SDUPTHER

## 2018-08-08 NOTE — TELEPHONE ENCOUNTER
Sure, I will send in that prescription for her now. Please cancel the ambien cr prescription and the PA for it. Thanks

## 2018-08-08 NOTE — TELEPHONE ENCOUNTER
Pt notified ambien 10 mg was called into [facility], pt notified I confirmed ambien 10mg was at [facility]

## 2018-08-08 NOTE — TELEPHONE ENCOUNTER
We have no knowledge of this, and no record that we were notified that a PA was required.  We will start one today.

## 2018-08-08 NOTE — TELEPHONE ENCOUNTER
Pt called and is asking if she can please get her 10mg Ambien back, she has tried 5 mg and only sleeps for about 3-4 hours a night. She believes since she was on the 10mg  For so long that the 5 mg is not effective Pa for the Ambien cr 6.25 is still pending and she feels like she really needs the 10 mg back because the 5mg is just uneffective.  Please advise thank you

## 2018-08-08 NOTE — TELEPHONE ENCOUNTER
[facility] for her ambien states she isnt sure if one was started can you ask Hanna if she knows anything about

## 2018-08-10 RX ORDER — MONTELUKAST SODIUM 10 MG/1
TABLET ORAL
Qty: 30 TABLET | Refills: 3 | OUTPATIENT
Start: 2018-08-10

## 2018-08-10 NOTE — TELEPHONE ENCOUNTER
----- Message from Aleida Ruiz sent at 8/10/2018  9:05 AM CDT -----  Contact: Patient  Type:  RX Refill Request    Who Called:  patient  Refill or New Rx:  refill  RX Name and Strength:  montelukast (SINGULAIR) 10 mg tablet  How is the patient currently taking it? (ex. 1XDay): 1x day  Is this a 30 day or 90 day RX:  30 day  Preferred Pharmacy with phone number:    [facility] - [place], [place] - [address]  [address]  [place] [address]  Phone: [phone] Fax: [phone]    Local or Mail Order:  local  Ordering Provider:  Be Mckoy Call Back Number:  [phone] (home)

## 2018-08-11 NOTE — PROGRESS NOTES
Subjective:       Patient ID: Shweta Gupta is a 48 y.o. female.    Chief Complaint: Follow-up; Foot Problem; and Diabetes Mellitus    HPI   patient presents for follow-up wound posterior left lower extremity.  Patient states the drainage has decreased quite a bit and it is feeling better.  Review of Systems   Skin: Positive for wound.   All other systems reviewed and are negative.      Objective:      Physical Exam   Constitutional: She appears well-developed and well-nourished.   Cardiovascular:   Pulses:       Dorsalis pedis pulses are 2+ on the right side, and 2+ on the left side.        Posterior tibial pulses are 2+ on the right side, and 2+ on the left side.   Musculoskeletal:        Left foot: There is decreased range of motion, tenderness and swelling.        Feet:    Feet:   Left Foot:   Skin Integrity: Positive for ulcer.   Neurological: She is alert.   Skin: Capillary refill takes 2 to 3 seconds.   Psychiatric: She has a normal mood and affect.     On evaluation the wound posterior aspect of the patient's left foot where she previously had Achilles tendon surgery looks very good it is well healing with well-formed scab overlying area previously noted infection appears resolved at this time.  Patient has findings consistent with Achilles tendinitis right.  Assessment:       1. Achilles tendinitis of right lower extremity    2. Type 2 diabetes mellitus without complication, with long-term current use of insulin    3. Achilles tendinitis of left lower extremity        Plan:          Patient presents for follow-up of excessive tightness of the Achilles tendon left patient had a previous Achilles tendon surgical procedure performed on this area she had an slow healing wound overlying this area this has been completely resolved since her last visit she is experiencing some tightness along the course of the Achilles tendon however she states physical therapy has helped quite a bit.  I have advised the patient  I would recommend continuing physical therapy ultimately I am going to recommend that they do some ultrasound to help break down scar tissue on the area and gave the patient a new referral to physical therapy she ciscurrently being seen by [facility] physical therapy in [facility].  Patient has a significant reduction in previously noted scar tissue patient will continue to gradually return to activity as tolerated plan follow-up will be 1 month.  Patient is having some symptoms of Achilles tendinitis right I have strongly encouraged the patient to pursue physical therapy on the right foot also orders have been written for physical therapy right foot additionally.  An evaluation was performed regarding Achilles tendinitis right patient was made aware she has no where near needing surgical intervention on the right foot it is not near severe as the left when she required surgical intervention and treatment.

## 2018-08-13 ENCOUNTER — TELEPHONE (OUTPATIENT)
Dept: FAMILY MEDICINE | Facility: CLINIC | Age: 48
End: 2018-08-13

## 2018-08-13 ENCOUNTER — TELEPHONE (OUTPATIENT)
Dept: SURGERY | Facility: CLINIC | Age: 48
End: 2018-08-13

## 2018-08-13 NOTE — TELEPHONE ENCOUNTER
----- Message from Mary Beth Connors sent at 8/13/2018 12:14 PM CDT -----  Type:  Same Day Appointment Request    Caller is requesting a same day appointment.      Name of Caller:  Shweta Gupta  When is the first available appointment?  Wednesday, August 15th  Symptoms:  Abdomen pain  Best Call Back Number:  [phone]  Additional Information:   Patient is experiencing Diverticulitis flare up with some blood in urine

## 2018-08-13 NOTE — TELEPHONE ENCOUNTER
Phoned pt. Pt states that she is having a flare up of her diverticulitis and would like to be seen by Dr. Soriano today.  No available appts today or Thursday this week.  Next available is Tuesday, 8-21-18.  Pt does not wish to wait this long.  Offered to schedule appt with Dr. Olivas for Wednesday.  Pt does not wish to do this and feels she needs to be seen sooner.  Instructed pt that she may need to go to the Emergency Room.  Pt verbalizes understanding.

## 2018-08-13 NOTE — TELEPHONE ENCOUNTER
----- Message from Mary Beth Connors sent at 8/13/2018 10:24 AM CDT -----  Type:  Same Day Appointment Request    Caller is requesting a same day appointment.     Name of Caller:  Shweta Gupta  When is the first available appointment?    Symptoms:  Abdomen pain  Best Call Back Number:  [phone]  Additional Information:   Diverticulitis flare up  Additional Information: Patient requesting appointment today for diverticulitis flare up.

## 2018-08-13 NOTE — TELEPHONE ENCOUNTER
Pt was contacted. Pt stated she is in a lot of pain and would need to see someone today. I told the pt we have nothing open that if she is in that much pain go to the Er or Urgent Care. Pt verbalized understanding.

## 2018-08-17 RX ORDER — MONTELUKAST SODIUM 10 MG/1
10 TABLET ORAL DAILY
Qty: 90 TABLET | Refills: 1 | Status: SHIPPED | OUTPATIENT
Start: 2018-08-17 | End: 2019-02-07 | Stop reason: SDUPTHER

## 2018-08-20 ENCOUNTER — TELEPHONE (OUTPATIENT)
Dept: FAMILY MEDICINE | Facility: CLINIC | Age: 48
End: 2018-08-20

## 2018-08-20 NOTE — TELEPHONE ENCOUNTER
----- Message from RT Zari sent at 8/20/2018 10:17 AM CDT -----  Contact: pt    pt , requesting to check the status of her diabetic supply form, she test twice daily and her handicapped tag renewal, thanks.

## 2018-08-20 NOTE — TELEPHONE ENCOUNTER
Pt contacted the office asking if you can send in refills for her diabetic supplies. She tests her sugar 3x daily. She uses [employer]. Pt also needs her disabled parking paper filled out that is in your box. Please advise

## 2018-09-10 ENCOUNTER — OFFICE VISIT (OUTPATIENT)
Dept: PODIATRY | Facility: CLINIC | Age: 48
End: 2018-09-10
Payer: MEDICAID

## 2018-09-10 ENCOUNTER — HOSPITAL ENCOUNTER (OUTPATIENT)
Dept: RADIOLOGY | Facility: HOSPITAL | Age: 48
Discharge: HOME | End: 2018-09-10
Attending: OBSTETRICS & GYNECOLOGY
Payer: MEDICAID

## 2018-09-10 VITALS — WEIGHT: 280 LBS | HEIGHT: 64 IN | BODY MASS INDEX: 47.8 KG/M2

## 2018-09-10 VITALS
DIASTOLIC BLOOD PRESSURE: 66 MMHG | SYSTOLIC BLOOD PRESSURE: 100 MMHG | BODY MASS INDEX: 48.32 KG/M2 | HEART RATE: 72 BPM | HEIGHT: 64 IN | WEIGHT: 283 LBS | TEMPERATURE: 97 F

## 2018-09-10 DIAGNOSIS — M72.2 PLANTAR FASCIITIS: ICD-10-CM

## 2018-09-10 DIAGNOSIS — Z12.39 SCREENING BREAST EXAMINATION: ICD-10-CM

## 2018-09-10 DIAGNOSIS — S90.415A: ICD-10-CM

## 2018-09-10 DIAGNOSIS — M76.62 ACHILLES TENDINITIS OF LEFT LOWER EXTREMITY: Primary | ICD-10-CM

## 2018-09-10 PROCEDURE — 99213 OFFICE O/P EST LOW 20 MIN: CPT | Mod: S$PBB,,, | Performed by: PODIATRIST

## 2018-09-10 PROCEDURE — 99213 OFFICE O/P EST LOW 20 MIN: CPT | Mod: PBBFAC | Performed by: PODIATRIST

## 2018-09-10 PROCEDURE — 77067 SCR MAMMO BI INCL CAD: CPT | Mod: TC

## 2018-09-10 PROCEDURE — 77067 SCR MAMMO BI INCL CAD: CPT | Mod: 26,,, | Performed by: RADIOLOGY

## 2018-09-10 PROCEDURE — 99999 PR PBB SHADOW E&M-EST. PATIENT-LVL III: CPT | Mod: PBBFAC,,, | Performed by: PODIATRIST

## 2018-09-11 ENCOUNTER — HOSPITAL ENCOUNTER (EMERGENCY)
Facility: HOSPITAL | Age: 48
Discharge: HOME | End: 2018-09-12
Attending: FAMILY MEDICINE
Payer: MEDICAID

## 2018-09-11 VITALS
SYSTOLIC BLOOD PRESSURE: 137 MMHG | OXYGEN SATURATION: 98 % | BODY MASS INDEX: 48.65 KG/M2 | DIASTOLIC BLOOD PRESSURE: 102 MMHG | WEIGHT: 285 LBS | HEIGHT: 64 IN | RESPIRATION RATE: 20 BRPM | TEMPERATURE: 98 F | HEART RATE: 95 BPM

## 2018-09-11 DIAGNOSIS — N20.1 URETERAL STONE: Primary | ICD-10-CM

## 2018-09-11 LAB
ALBUMIN SERPL BCP-MCNC: 4.3 G/DL
ALP SERPL-CCNC: 82 U/L
ALT SERPL W/O P-5'-P-CCNC: 24 U/L
ANION GAP SERPL CALC-SCNC: 9 MMOL/L
AST SERPL-CCNC: 22 U/L
BACTERIA #/AREA URNS HPF: ABNORMAL /HPF
BASOPHILS # BLD AUTO: 0.05 K/UL
BASOPHILS NFR BLD: 0.5 %
BILIRUB SERPL-MCNC: 0.5 MG/DL
BILIRUB UR QL STRIP: NEGATIVE
BUN SERPL-MCNC: 24 MG/DL
CALCIUM SERPL-MCNC: 9.4 MG/DL
CHLORIDE SERPL-SCNC: 107 MMOL/L
CLARITY UR: ABNORMAL
CO2 SERPL-SCNC: 27 MMOL/L
COLOR UR: YELLOW
CREAT SERPL-MCNC: 1.1 MG/DL
DIFFERENTIAL METHOD: ABNORMAL
EOSINOPHIL # BLD AUTO: 0.2 K/UL
EOSINOPHIL NFR BLD: 1.6 %
ERYTHROCYTE [DISTWIDTH] IN BLOOD BY AUTOMATED COUNT: 13.2 %
EST. GFR  (AFRICAN AMERICAN): >60 ML/MIN/1.73 M^2
EST. GFR  (NON AFRICAN AMERICAN): 59.1 ML/MIN/1.73 M^2
GLUCOSE SERPL-MCNC: 176 MG/DL
GLUCOSE UR QL STRIP: NEGATIVE
HCT VFR BLD AUTO: 36.3 %
HGB BLD-MCNC: 11.7 G/DL
HGB UR QL STRIP: ABNORMAL
HYALINE CASTS #/AREA URNS LPF: 0 /LPF
IMM GRANULOCYTES # BLD AUTO: 0.03 K/UL
IMM GRANULOCYTES NFR BLD AUTO: 0.3 %
KETONES UR QL STRIP: NEGATIVE
LEUKOCYTE ESTERASE UR QL STRIP: ABNORMAL
LIPASE SERPL-CCNC: 28 U/L
LYMPHOCYTES # BLD AUTO: 3.1 K/UL
LYMPHOCYTES NFR BLD: 28.6 %
MCH RBC QN AUTO: 29 PG
MCHC RBC AUTO-ENTMCNC: 32.2 G/DL
MCV RBC AUTO: 90 FL
MICROSCOPIC COMMENT: ABNORMAL
MONOCYTES # BLD AUTO: 0.7 K/UL
MONOCYTES NFR BLD: 6.2 %
NEUTROPHILS # BLD AUTO: 6.8 K/UL
NEUTROPHILS NFR BLD: 62.8 %
NITRITE UR QL STRIP: NEGATIVE
NRBC BLD-RTO: 0 /100 WBC
PH UR STRIP: 7 [PH] (ref 5–8)
PLATELET # BLD AUTO: 274 K/UL
PMV BLD AUTO: 10.2 FL
POTASSIUM SERPL-SCNC: 4.3 MMOL/L
PROT SERPL-MCNC: 7.9 G/DL
PROT UR QL STRIP: ABNORMAL
RBC # BLD AUTO: 4.03 M/UL
RBC #/AREA URNS HPF: 60 /HPF (ref 0–4)
SODIUM SERPL-SCNC: 143 MMOL/L
SP GR UR STRIP: 1.02 (ref 1–1.03)
URN SPEC COLLECT METH UR: ABNORMAL
UROBILINOGEN UR STRIP-ACNC: NEGATIVE EU/DL
WBC # BLD AUTO: 10.83 K/UL
WBC #/AREA URNS HPF: 5 /HPF (ref 0–5)

## 2018-09-11 PROCEDURE — 85025 COMPLETE CBC W/AUTO DIFF WBC: CPT

## 2018-09-11 PROCEDURE — 74176 CT ABD & PELVIS W/O CONTRAST: CPT | Mod: 26,,, | Performed by: RADIOLOGY

## 2018-09-11 PROCEDURE — 83690 ASSAY OF LIPASE: CPT

## 2018-09-11 PROCEDURE — 96374 THER/PROPH/DIAG INJ IV PUSH: CPT

## 2018-09-11 PROCEDURE — 63600175 PHARM REV CODE 636 W HCPCS: Performed by: FAMILY MEDICINE

## 2018-09-11 PROCEDURE — 96361 HYDRATE IV INFUSION ADD-ON: CPT

## 2018-09-11 PROCEDURE — 25000003 PHARM REV CODE 250: Performed by: FAMILY MEDICINE

## 2018-09-11 PROCEDURE — 99284 EMERGENCY DEPT VISIT MOD MDM: CPT | Mod: 25

## 2018-09-11 PROCEDURE — 96375 TX/PRO/DX INJ NEW DRUG ADDON: CPT

## 2018-09-11 PROCEDURE — 81000 URINALYSIS NONAUTO W/SCOPE: CPT

## 2018-09-11 PROCEDURE — 74176 CT ABD & PELVIS W/O CONTRAST: CPT | Mod: TC

## 2018-09-11 PROCEDURE — 80053 COMPREHEN METABOLIC PANEL: CPT

## 2018-09-11 RX ORDER — ONDANSETRON 2 MG/ML
4 INJECTION INTRAMUSCULAR; INTRAVENOUS
Status: COMPLETED | OUTPATIENT
Start: 2018-09-11 | End: 2018-09-11

## 2018-09-11 RX ORDER — HYDROMORPHONE HYDROCHLORIDE 2 MG/ML
1 INJECTION, SOLUTION INTRAMUSCULAR; INTRAVENOUS; SUBCUTANEOUS
Status: COMPLETED | OUTPATIENT
Start: 2018-09-11 | End: 2018-09-11

## 2018-09-11 RX ORDER — KETOROLAC TROMETHAMINE 30 MG/ML
30 INJECTION, SOLUTION INTRAMUSCULAR; INTRAVENOUS
Status: COMPLETED | OUTPATIENT
Start: 2018-09-11 | End: 2018-09-11

## 2018-09-11 RX ADMIN — KETOROLAC TROMETHAMINE 30 MG: 30 INJECTION, SOLUTION INTRAMUSCULAR; INTRAVENOUS at 10:09

## 2018-09-11 RX ADMIN — SODIUM CHLORIDE 1000 ML: 9 INJECTION, SOLUTION INTRAVENOUS at 10:09

## 2018-09-11 RX ADMIN — HYDROMORPHONE HYDROCHLORIDE 1 MG: 2 INJECTION, SOLUTION INTRAMUSCULAR; INTRAVENOUS; SUBCUTANEOUS at 10:09

## 2018-09-11 RX ADMIN — ONDANSETRON HYDROCHLORIDE 4 MG: 2 INJECTION, SOLUTION INTRAMUSCULAR; INTRAVENOUS at 10:09

## 2018-09-12 ENCOUNTER — TELEPHONE (OUTPATIENT)
Dept: FAMILY MEDICINE | Facility: CLINIC | Age: 48
End: 2018-09-12

## 2018-09-12 PROCEDURE — 63600175 PHARM REV CODE 636 W HCPCS: Performed by: FAMILY MEDICINE

## 2018-09-12 PROCEDURE — 96376 TX/PRO/DX INJ SAME DRUG ADON: CPT

## 2018-09-12 RX ORDER — HYDROCODONE BITARTRATE AND ACETAMINOPHEN 5; 325 MG/1; MG/1
2 TABLET ORAL EVERY 8 HOURS PRN
Qty: 18 TABLET | Refills: 0 | Status: SHIPPED | OUTPATIENT
Start: 2018-09-12 | End: 2018-11-01 | Stop reason: SDUPTHER

## 2018-09-12 RX ORDER — ONDANSETRON 4 MG/1
4 TABLET, FILM COATED ORAL EVERY 12 HOURS PRN
Qty: 12 TABLET | Refills: 0 | Status: SHIPPED | OUTPATIENT
Start: 2018-09-12 | End: 2019-09-03

## 2018-09-12 RX ORDER — HYDROMORPHONE HYDROCHLORIDE 2 MG/ML
0.5 INJECTION, SOLUTION INTRAMUSCULAR; INTRAVENOUS; SUBCUTANEOUS
Status: COMPLETED | OUTPATIENT
Start: 2018-09-12 | End: 2018-09-12

## 2018-09-12 RX ADMIN — HYDROMORPHONE HYDROCHLORIDE 0.5 MG: 2 INJECTION INTRAMUSCULAR; INTRAVENOUS; SUBCUTANEOUS at 12:09

## 2018-09-12 NOTE — TELEPHONE ENCOUNTER
Referral request.  Please advise.  Thank you.      ----- Message from Aleida Ruiz sent at 9/12/2018  8:46 AM CDT -----  Contact: Patient  Type:  Patient Requesting Referral    Who Called:  patient  Does the patient already have the specialty appointment scheduled?: no  If yes, what is the date of that appointment?:na  Referral to What Specialty:  Urology  Reason for Referral: Kidney stones  Does the patient want the referral with a specific physician?:  Dr. Edmonds  [facility]  Patient Requesting a Call Back?:  yes  Best Call Back Number:  [phone] (home)

## 2018-09-12 NOTE — ED PROVIDER NOTES
Encounter Date: 2018       History     Chief Complaint   Patient presents with    Abdominal Pain     diverticulitis     HPI  Review of patient's allergies indicates:  No Known Allergies  Past Medical History:   Diagnosis Date    Allergy     Anxiety     Asthma     Depression     Diabetes mellitus, type 2     GERD (gastroesophageal reflux disease)     Hyperlipidemia     Hypertension     Morbid obesity      Past Surgical History:   Procedure Laterality Date    ACHILLES TENDON SURGERY Left     CARPAL TUNNEL RELEASE Left      SECTION      CHOLECYSTECTOMY      COLON SURGERY      HERNIA REPAIR      HYSTERECTOMY      partial    KIDNEY STONE SURGERY      OOPHORECTOMY      TRIGGER FINGER RELEASE Left      Family History   Problem Relation Age of Onset    Breast cancer Neg Hx      Social History     Tobacco Use    Smoking status: Never Smoker    Smokeless tobacco: Never Used   Substance Use Topics    Alcohol use: No    Drug use: No     Review of Systems    Physical Exam     Initial Vitals [18 2155]   BP Pulse Resp Temp SpO2   (!) 137/102 95 20 98.3 °F (36.8 °C) 98 %      MAP       --         Physical Exam    ED Course   Procedures  Labs Reviewed   CBC W/ AUTO DIFFERENTIAL - Abnormal; Notable for the following components:       Result Value    Hemoglobin 11.7 (*)     Hematocrit 36.3 (*)     All other components within normal limits   COMPREHENSIVE METABOLIC PANEL - Abnormal; Notable for the following components:    Glucose 176 (*)     BUN, Bld 24 (*)     eGFR if non  59.1 (*)     All other components within normal limits   URINALYSIS, REFLEX TO URINE CULTURE - Abnormal; Notable for the following components:    Appearance, UA Hazy (*)     Protein, UA 1+ (*)     Occult Blood UA 3+ (*)     Leukocytes, UA 1+ (*)     All other components within normal limits    Narrative:     Preferred Collection Type->Urine, Clean Catch   URINALYSIS MICROSCOPIC - Abnormal; Notable for the  following components:    RBC, UA 60 (*)     All other components within normal limits    Narrative:     Preferred Collection Type->Urine, Clean Catch   LIPASE          Imaging Results          CT Renal Stone Study ABD Pelvis WO (In process)                                       Clinical Impression:   The encounter diagnosis was Ureteral stone.                             Fabrizio Petersen MD  09/12/18 0155

## 2018-09-12 NOTE — ED NOTES
DISCHARGE INSTRUCTIONS GIVEN, DISCUSSED MEDICATIONS AND FOLLOW UP CARE, PATIENT AND SPOUSE VERBALIZED UNDERSTANDING, NO QUESTIONS OR COMPLAINTS AT TIME, ENCOURAGED TO RETURN FOR NEW OR WORSENING SYMPTOMS. PATIENT ASSISTED TO CAR VIA GREYSON/JOYCE PEÑA RN

## 2018-09-12 NOTE — DISCHARGE INSTRUCTIONS
You are to strain the urine and retained stone you are to follow up with Dr. Edmonds the urologist here in [place] call [phone] for an appointment

## 2018-09-14 PROBLEM — M72.2 PLANTAR FASCIITIS: Status: ACTIVE | Noted: 2018-09-14

## 2018-09-14 PROBLEM — S90.415A: Status: ACTIVE | Noted: 2018-09-14

## 2018-09-14 PROBLEM — M76.62 ACHILLES TENDINITIS OF LEFT LOWER EXTREMITY: Status: ACTIVE | Noted: 2018-09-14

## 2018-09-14 RX ORDER — PREGABALIN 200 MG/1
200 CAPSULE ORAL 3 TIMES DAILY
Qty: 90 CAPSULE | Refills: 2 | Status: SHIPPED | OUTPATIENT
Start: 2018-09-14 | End: 2019-01-16 | Stop reason: SDUPTHER

## 2018-09-14 NOTE — TELEPHONE ENCOUNTER
Patient requesting refill.  consistent. Please advise. Thank you!        ----- Message from Keesha Hutchins sent at 9/14/2018  9:25 AM CDT -----  Contact: self  Patient [phone] is requesting a refill on Lyrica/please advise patient when sent to pharmacy      [facility] - [place], [place] - [address]  [address]  [place] [address]  Phone: [phone] Fax: [phone]

## 2018-09-15 NOTE — PROGRESS NOTES
Subjective:       Patient ID: Shweta Gupta is a 48 y.o. female.    Chief Complaint: Follow-up; Foot Pain; Foot Problem; and Diabetes Mellitus    HPI   patient presents for follow-up   Achilles tendon pain she has a new abrasion 3rd digit left.  Patient also relates heel pain.  Review of Systems   Skin: Positive for wound.   All other systems reviewed and are negative.      Objective:      Physical Exam   Constitutional: She appears well-developed and well-nourished.   Cardiovascular:   Pulses:       Dorsalis pedis pulses are 2+ on the right side, and 2+ on the left side.        Posterior tibial pulses are 2+ on the right side, and 2+ on the left side.   Musculoskeletal:        Left foot: There is decreased range of motion, tenderness and swelling.        Feet:    Feet:   Left Foot:   Skin Integrity: Positive for ulcer.   Neurological: She is alert.   Skin: Capillary refill takes 2 to 3 seconds.   Psychiatric: She has a normal mood and affect.     On evaluation the wound posterior aspect of the patient's left foot where she previously had Achilles tendon surgery looks very good it is well healing with well-formed scab overlying area previously noted infection appears resolved at this time.  Patient has findings consistent with Achilles tendinitis right.  Patient displays an abrasion 3rd digit left secondary to injury.  Assessment:       1. Achilles tendinitis of left lower extremity    2. Abrasion of third toe of left foot, initial encounter    3. Plantar fasciitis        Plan:          Patient presents for follow-up of excessive tightness of the Achilles tendon left patient had a previous Achilles tendon surgical procedure performed on this area she had an slow healing wound overlying this area this has been completely resolved since her last visit she is experiencing some tightness along the course of the Achilles tendon however she states physical therapy has helped quite a bit.  I have advised the patient I  would recommend continuing physical therapy ultimately I am going to recommend that they do some ultrasound to help break down scar tissue on the area and gave the patient a new referral to physical therapy she ciscurrently being seen by [facility] physical therapy in [facility].  Patient has a significant reduction in previously noted scar tissue patient will continue to gradually return to activity as tolerated plan follow-up will be 1 month.  Patient is having some symptoms of Achilles tendinitis right I have strongly encouraged the patient to pursue physical therapy on the right foot also orders have been written for physical therapy right foot additionally.  An evaluation was performed regarding Achilles tendinitis right patient was made aware she has no where near needing surgical intervention on the right foot it is not near severe as the left when she required surgical intervention and treatment.  Patient indicates that physical therapy has helped her quite a bit she is going to continue to do physical therapy I have also provided the patient some additional arch support for her left heel she is having heel pain consistent with plantar fasciitis.  Patient is to make sure that she wears her tennis shoes at all times I plan to see the patient for follow-up in 1 month she needs to monitor the 3rd toe on the left foot she has an abrasion where the dog scratched her certainly because of her history of diabetes this puts her at increased risk for infection.  Patient was very positive as to the result she has gotten from physical therapy.  Plantar fasciitis discussed with the patient follow-up 1 month additional support can be applied depending upon how the patient responds to the support provided today.

## 2018-09-19 ENCOUNTER — OFFICE VISIT (OUTPATIENT)
Dept: UROLOGY | Facility: CLINIC | Age: 48
End: 2018-09-19
Payer: MEDICAID

## 2018-09-19 ENCOUNTER — HOSPITAL ENCOUNTER (OUTPATIENT)
Dept: RADIOLOGY | Facility: HOSPITAL | Age: 48
Discharge: HOME | End: 2018-09-19
Attending: UROLOGY
Payer: MEDICAID

## 2018-09-19 VITALS
SYSTOLIC BLOOD PRESSURE: 135 MMHG | HEIGHT: 64 IN | DIASTOLIC BLOOD PRESSURE: 84 MMHG | HEART RATE: 83 BPM | BODY MASS INDEX: 47.8 KG/M2 | WEIGHT: 280 LBS

## 2018-09-19 DIAGNOSIS — N20.0 KIDNEY STONES: ICD-10-CM

## 2018-09-19 DIAGNOSIS — N20.0 KIDNEY STONES: Primary | ICD-10-CM

## 2018-09-19 PROCEDURE — 74018 RADEX ABDOMEN 1 VIEW: CPT | Mod: TC,FY

## 2018-09-19 PROCEDURE — 99204 OFFICE O/P NEW MOD 45 MIN: CPT | Mod: S$PBB,,, | Performed by: UROLOGY

## 2018-09-19 PROCEDURE — 99213 OFFICE O/P EST LOW 20 MIN: CPT | Mod: PBBFAC,25 | Performed by: UROLOGY

## 2018-09-19 PROCEDURE — 74018 RADEX ABDOMEN 1 VIEW: CPT | Mod: 26,,, | Performed by: RADIOLOGY

## 2018-09-19 PROCEDURE — 81002 URINALYSIS NONAUTO W/O SCOPE: CPT | Mod: PBBFAC | Performed by: UROLOGY

## 2018-09-19 PROCEDURE — 99999 PR PBB SHADOW E&M-EST. PATIENT-LVL III: CPT | Mod: PBBFAC,,, | Performed by: UROLOGY

## 2018-09-19 NOTE — PROGRESS NOTES
Subjective:       Patient ID: Shweta Gupta is a 48 y.o. female.    Chief Complaint:   DATE OF CONSULTATION:  09/19/2018.    This is a consultation with  ____ from the Emergency Room.    CHIEF COMPLAINT:  Kidney stones.    Ms. Gupta is a 48-year-old female who went to the Emergency Room on  09/12/2018 with a renal colic.  The patient was found to have a 6 mm stone  in the mid ureter on the left.  During her visit in the Emergency Room, the  pain subsided and have not recur.  Despite that she is straining the urine.   She has not found a kidney stone.  It is to be noted that she is a stone  former.  This is the third time to have kidney stones.  She refers that  approximately 10 years ago, underwent an extracorporeal shock wave  lithotripsy in [place], [place].  He had another episode of kidney  stones after the lithotripsy, but that stone passed spontaneously.  The  patient at the present time is free of any symptoms and her urinalysis is  completely clear.    FAMILY HISTORY:  Negative for kidney stones.    PAST MEDICAL AND SURGICAL HISTORY:  Very significant.  The patient has a  history of diabetes.  The patient is morbid obese and has multiple surgical  procedures.  The past medical and surgical history are well documented in  the medical record and all these were reviewed by me during this visit  including medications and allergies.    As noted before, the urinalysis is completely clear.        EOR/HN dd: 09/19/2018 16:22:38 (CDT)   td: 09/19/2018 22:30:45 (CDT)  Doc ID #[id number]   Job ID #[id number]    CC:            HPI  Review of Systems   Constitutional: Negative.  Negative for activity change.   HENT: Negative.  Negative for facial swelling.    Eyes: Negative for discharge.   Respiratory: Negative for cough and shortness of breath.    Cardiovascular: Negative for chest pain and palpitations.   Gastrointestinal: Negative for abdominal distention, blood in stool and constipation.   Genitourinary:  Negative for dysuria, flank pain, frequency, hematuria, urgency and vaginal pain.   Musculoskeletal: Positive for arthralgias.   Skin: Negative.    Neurological: Negative.  Negative for dizziness.   Hematological: Negative for adenopathy.   Psychiatric/Behavioral: The patient is not nervous/anxious.        Objective:      Physical Exam   Constitutional: She appears well-developed.   HENT:   Head: Normocephalic.   Eyes: Pupils are equal, round, and reactive to light.   Neck: Normal range of motion.   Cardiovascular: Normal rate.    Pulmonary/Chest: Effort normal.   Abdominal: Soft. She exhibits no distension and no mass. There is no tenderness.   Musculoskeletal: Normal range of motion.   Neurological: She is alert.   Skin: Skin is warm.     Psychiatric: She has a normal mood and affect.       Assessment:       1. Kidney stones        Plan:       Kidney stones  -     POCT URINE DIPSTICK WITHOUT MICROSCOPE  -     X-Ray Abdomen AP 1 View; Future; Expected date: 09/19/2018    KUB to be done today and patient will be informed of the results. 24 h urine chemistry analysis, RTC 6 weeks.

## 2018-09-20 LAB
BILIRUB SERPL-MCNC: NEGATIVE MG/DL
BLOOD URINE, POC: NEGATIVE
COLOR, POC UA: NORMAL
GLUCOSE UR QL STRIP: NEGATIVE
KETONES UR QL STRIP: NEGATIVE
LEUKOCYTE ESTERASE URINE, POC: NEGATIVE
NITRITE, POC UA: NEGATIVE
PH, POC UA: 8
PROTEIN, POC: NEGATIVE
SPECIFIC GRAVITY, POC UA: 1005
UROBILINOGEN, POC UA: NEGATIVE

## 2018-10-05 DIAGNOSIS — G47.00 INSOMNIA, UNSPECIFIED TYPE: ICD-10-CM

## 2018-10-05 RX ORDER — ZOLPIDEM TARTRATE 10 MG/1
10 TABLET ORAL NIGHTLY PRN
Qty: 30 TABLET | Refills: 1 | Status: SHIPPED | OUTPATIENT
Start: 2018-10-05 | End: 2018-12-05 | Stop reason: SDUPTHER

## 2018-10-05 NOTE — TELEPHONE ENCOUNTER
++Notified pt that rx was sent to pharm.      +Refill request(s).  Please advise.  Thank you.       ----- Message from Denia Street sent at 10/5/2018 11:01 AM CDT -----  Contact: self  Patient need a refill on zolpidem please send to [facility] pharmacy, any questions please call back at [phone] (home)     [facility] [place], [place] - [address]  [address]  [place] [address]  Phone: [phone] Fax: [phone]

## 2018-10-10 ENCOUNTER — TELEPHONE (OUTPATIENT)
Dept: FAMILY MEDICINE | Facility: CLINIC | Age: 48
End: 2018-10-10

## 2018-10-10 ENCOUNTER — OFFICE VISIT (OUTPATIENT)
Dept: FAMILY MEDICINE | Facility: CLINIC | Age: 48
End: 2018-10-10
Payer: MEDICAID

## 2018-10-10 VITALS
TEMPERATURE: 98 F | DIASTOLIC BLOOD PRESSURE: 81 MMHG | RESPIRATION RATE: 20 BRPM | SYSTOLIC BLOOD PRESSURE: 150 MMHG | BODY MASS INDEX: 48.14 KG/M2 | WEIGHT: 282 LBS | OXYGEN SATURATION: 97 % | HEIGHT: 64 IN | HEART RATE: 80 BPM

## 2018-10-10 DIAGNOSIS — J06.9 UPPER RESPIRATORY TRACT INFECTION, UNSPECIFIED TYPE: Primary | ICD-10-CM

## 2018-10-10 DIAGNOSIS — J02.9 ACUTE PHARYNGITIS, UNSPECIFIED ETIOLOGY: ICD-10-CM

## 2018-10-10 PROCEDURE — 96372 THER/PROPH/DIAG INJ SC/IM: CPT | Mod: PBBFAC,PN

## 2018-10-10 PROCEDURE — 99999 PR PBB SHADOW E&M-EST. PATIENT-LVL IV: CPT | Mod: PBBFAC,,, | Performed by: NURSE PRACTITIONER

## 2018-10-10 PROCEDURE — 99214 OFFICE O/P EST MOD 30 MIN: CPT | Mod: 25,S$PBB,, | Performed by: NURSE PRACTITIONER

## 2018-10-10 PROCEDURE — 99214 OFFICE O/P EST MOD 30 MIN: CPT | Mod: PBBFAC,PN,25 | Performed by: NURSE PRACTITIONER

## 2018-10-10 RX ORDER — BENZONATATE 100 MG/1
100 CAPSULE ORAL 3 TIMES DAILY PRN
Qty: 30 CAPSULE | Refills: 0 | Status: SHIPPED | OUTPATIENT
Start: 2018-10-10 | End: 2018-10-18 | Stop reason: ALTCHOICE

## 2018-10-10 RX ORDER — DEXAMETHASONE SODIUM PHOSPHATE 4 MG/ML
4 INJECTION, SOLUTION INTRA-ARTICULAR; INTRALESIONAL; INTRAMUSCULAR; INTRAVENOUS; SOFT TISSUE ONCE
Status: DISCONTINUED | OUTPATIENT
Start: 2018-10-10 | End: 2018-10-10

## 2018-10-10 RX ORDER — CEFTRIAXONE 1 G/1
1 INJECTION, POWDER, FOR SOLUTION INTRAMUSCULAR; INTRAVENOUS
Status: COMPLETED | OUTPATIENT
Start: 2018-10-12 | End: 2018-10-11

## 2018-10-10 RX ORDER — AMPICILLIN 500 MG/1
500 CAPSULE ORAL 3 TIMES DAILY
COMMUNITY
End: 2018-11-01 | Stop reason: ALTCHOICE

## 2018-10-10 RX ORDER — CEFTRIAXONE 1 G/1
1 INJECTION, POWDER, FOR SOLUTION INTRAMUSCULAR; INTRAVENOUS
Status: COMPLETED | OUTPATIENT
Start: 2018-10-10 | End: 2018-10-10

## 2018-10-10 RX ORDER — CLARITHROMYCIN 500 MG/1
500 TABLET, FILM COATED ORAL 2 TIMES DAILY
Qty: 20 TABLET | Refills: 0 | Status: SHIPPED | OUTPATIENT
Start: 2018-10-10 | End: 2018-10-10 | Stop reason: CLARIF

## 2018-10-10 RX ORDER — CEFTRIAXONE 1 G/1
1 INJECTION, POWDER, FOR SOLUTION INTRAMUSCULAR; INTRAVENOUS
Status: COMPLETED | OUTPATIENT
Start: 2018-10-11 | End: 2018-10-12

## 2018-10-10 RX ORDER — DEXAMETHASONE SODIUM PHOSPHATE 4 MG/ML
8 INJECTION, SOLUTION INTRA-ARTICULAR; INTRALESIONAL; INTRAMUSCULAR; INTRAVENOUS; SOFT TISSUE ONCE
Status: COMPLETED | OUTPATIENT
Start: 2018-10-10 | End: 2018-10-10

## 2018-10-10 RX ORDER — FLUCONAZOLE 150 MG/1
150 TABLET ORAL DAILY
Qty: 1 TABLET | Refills: 1 | Status: SHIPPED | OUTPATIENT
Start: 2018-10-10 | End: 2018-10-11

## 2018-10-10 RX ADMIN — DEXAMETHASONE SODIUM PHOSPHATE 8 MG: 4 INJECTION, SOLUTION INTRAMUSCULAR; INTRAVENOUS at 03:10

## 2018-10-10 RX ADMIN — CEFTRIAXONE SODIUM 1 G: 1 INJECTION, POWDER, FOR SOLUTION INTRAMUSCULAR; INTRAVENOUS at 03:10

## 2018-10-10 NOTE — TELEPHONE ENCOUNTER
Please let patient know she could possibly see Dr. Warren here tomorrow, who does a lot of our urgent care, or she could see Hanna Castellanos in [place] tomorrow. I have no other suggestions at this point. Thanks

## 2018-10-10 NOTE — PROGRESS NOTES
Subjective:       Patient ID: Shweta Gupta is a 48 y.o. female.    Chief Complaint: Sinus Problem; Chest Congestion; and Cough    Shweta Gupta is a 48 year old female who presents to the clinic today with complaints of upper respiratory infection symptoms and hx of asthma. She reports her symptoms started 2 days ago when she noticed she was short of breath and not feeling well. She reports she has had respiratory infections in the past and see Dr Aragon who has prescribed her Roceph IM 1g x3 doses outpatient and has been successful. Today, Shweta complains of her ears aching, a sore throat, intermittent shortness of breath, intermittent fever, and a continuous hacking cough.  She reports she has been giving herself breathing treatments 3x daily and taking her Ventolin inhaler PRN for shortness of breath.        Review of Systems   Constitutional: Positive for activity change, fatigue and fever. Negative for chills and diaphoresis.   HENT: Positive for congestion, ear pain, postnasal drip and sore throat. Negative for ear discharge, facial swelling, hearing loss, nosebleeds, sinus pressure, sinus pain, sneezing, tinnitus, trouble swallowing and voice change.    Eyes: Negative for photophobia, pain, redness, itching and visual disturbance.   Respiratory: Positive for cough and shortness of breath. Negative for apnea, choking, chest tightness, wheezing and stridor.    Cardiovascular: Negative for chest pain, palpitations and leg swelling.   Gastrointestinal: Negative for abdominal distention, abdominal pain, constipation, diarrhea, nausea and vomiting.   Musculoskeletal: Negative for arthralgias, back pain, gait problem, joint swelling, myalgias, neck pain and neck stiffness.   Skin: Negative for color change, pallor, rash and wound.   Neurological: Negative for dizziness, tremors, syncope, weakness, light-headedness, numbness and headaches.   Psychiatric/Behavioral: Negative.          Reviewed family, medical,  "surgical, and social history.    Objective:      BP (!) 150/81 (BP Location: Right arm, Patient Position: Sitting)   Pulse 80   Temp 98.4 °F (36.9 °C)   Resp 20   Ht 5' 4" (1.626 m)   Wt 127.9 kg (282 lb)   LMP 06/10/2002   SpO2 97%   BMI 48.41 kg/m²   Physical Exam   Constitutional: She is oriented to person, place, and time. She appears well-developed and well-nourished. She is not intubated.   HENT:   Head: Normocephalic.   Right Ear: Hearing, external ear and ear canal normal. Tympanic membrane is erythematous.   Left Ear: Hearing, external ear and ear canal normal. Tympanic membrane is not erythematous.   Nose: Rhinorrhea present. No sinus tenderness. Right sinus exhibits no maxillary sinus tenderness and no frontal sinus tenderness. Left sinus exhibits no maxillary sinus tenderness and no frontal sinus tenderness.   Mouth/Throat: Uvula is midline. Posterior oropharyngeal erythema present.       Eyes: Conjunctivae, EOM and lids are normal. Pupils are equal, round, and reactive to light.   Neck: Trachea normal and normal range of motion. No JVD present. Carotid bruit is not present.   Cardiovascular: Normal rate, regular rhythm, S1 normal, S2 normal, normal heart sounds and intact distal pulses. Exam reveals no gallop and no friction rub.   No murmur heard.  Pulmonary/Chest: Effort normal. No accessory muscle usage or stridor. No apnea, no tachypnea and no bradypnea. She is not intubated. No respiratory distress. She has no wheezes. She has rales (Noted to left lower quadrant - cleared with cough).         She exhibits no tenderness.   Abdominal: Soft. Bowel sounds are normal. She exhibits no distension and no mass. There is no tenderness. There is no guarding. No hernia.   Musculoskeletal: Normal range of motion.   Lymphadenopathy:        Head (right side): Submandibular adenopathy present.        Head (left side): Submandibular adenopathy present.   Neurological: She is alert and oriented to person, " place, and time.   Skin: Skin is warm and dry. Capillary refill takes less than 2 seconds. No cyanosis. Nails show no clubbing.   Psychiatric: She has a normal mood and affect. Her speech is normal and behavior is normal. Thought content normal.       Assessment:       1. Upper respiratory tract infection, unspecified type    2. Acute pharyngitis, unspecified etiology        Plan:       Upper respiratory tract infection, unspecified type  -     Ambulatory referral to Allergy  -     Discontinue: clarithromycin (BIAXIN) 500 MG tablet; Take 1 tablet (500 mg total) by mouth 2 (two) times daily. for 10 days  Dispense: 20 tablet; Refill: 0    Acute pharyngitis, unspecified etiology  -     Discontinue: clarithromycin (BIAXIN) 500 MG tablet; Take 1 tablet (500 mg total) by mouth 2 (two) times daily. for 10 days  Dispense: 20 tablet; Refill: 0    Other orders  -     benzonatate (TESSALON) 100 MG capsule; Take 1 capsule (100 mg total) by mouth 3 (three) times daily as needed for Cough.  Dispense: 30 capsule; Refill: 0  -     Discontinue: dexamethasone injection 4 mg; Inject 1 mL (4 mg total) into the muscle once.  -     dexamethasone injection 8 mg; Inject 2 mLs (8 mg total) into the muscle once.  -     fluconazole (DIFLUCAN) 150 MG Tab; Take 1 tablet (150 mg total) by mouth once daily. for 1 day  Dispense: 1 tablet; Refill: 1  -     cefTRIAXone injection 1 g; Inject 1 g into the muscle one time.  -     cefTRIAXone injection 1 g; Inject 1 g into the muscle one time.  -     cefTRIAXone injection 1 g; Inject 1 g into the muscle one time.          - Discussed with patient plan of care  - 8mg Decadron injection given IM in clinic.   - 1 G of Rocephin given IM in clinic today.   - Patient to return to clinic Thursday and Friday for Rocephin 1 g IM injection, which patient has helped her with similar symptoms in the past.  - Encouraged patient to continue breathing treatments at home and also use inhaler PRN for SOB.  - Medications  reviewed. Medication side effects discussed. Patient has no questions or concerns at this time. Informed patient to notify me regarding any concerns.   - Continue monitoring and documenting BP and CBGS at home.  - Diflucan sent in to pharmacy as patient reports she frequently gets yeast infection  -  Patient requesting referral to allergist at this time.   - Informed patient to please notify me with any questions or concerns at anytime  - Follow up as previously ordered by PCP and PRN for any concerns              Risks, benefits, and side effects were discussed with the patient. All questions were answered to the fullest satisfaction of the patient, and pt verbalized understanding and agreement to treatment plan. Pt was to call with any new or worsening symptoms, or present to the ER.

## 2018-10-10 NOTE — TELEPHONE ENCOUNTER
Spoke with pt, pt c/o SOB, ear pain, throat pain x 2 days.  Pt denies chest pain, fever.  Pt encouraged to go to ER if any worsening of ss.  Pt agree but request to be seen.  Pt offered appointments with Nps but declined.  Please advise.  Thank you,  Randi        ---- Message from Miriam Morales sent at 10/10/2018  7:51 AM CDT -----  Contact: Patient  Type:  Same Day Appointment Request    Caller is requesting a same day appointment.  Caller declined first available appointment listed below.      Name of Caller:  Patient   When is the first available appointment?  November  Symptoms:  Possible chest congestion, ears hurt, throat hurts and hard to breath  Best Call Back Number:    Additional Information:

## 2018-10-11 ENCOUNTER — CLINICAL SUPPORT (OUTPATIENT)
Dept: FAMILY MEDICINE | Facility: CLINIC | Age: 48
End: 2018-10-11
Payer: MEDICAID

## 2018-10-11 PROCEDURE — 96372 THER/PROPH/DIAG INJ SC/IM: CPT | Mod: PBBFAC,PN

## 2018-10-11 RX ORDER — ALBUTEROL SULFATE 90 UG/1
2 AEROSOL, METERED RESPIRATORY (INHALATION) EVERY 6 HOURS PRN
Qty: 18 G | Refills: 3 | Status: SHIPPED | OUTPATIENT
Start: 2018-10-11

## 2018-10-11 RX ADMIN — CEFTRIAXONE SODIUM 1 G: 1 INJECTION, POWDER, FOR SOLUTION INTRAMUSCULAR; INTRAVENOUS at 11:10

## 2018-10-11 NOTE — PROGRESS NOTES
Pt given 1 gram Rocephin diluted with 2.1 cc of 1% Xylocaine into right ventrogluteal area. Pt tolerated well. Pt will return tomorrow for the 3rd injection.  Opal

## 2018-10-11 NOTE — TELEPHONE ENCOUNTER
++Notified pt that Rx was sent to pharmacy. Pt verbalized an understanding.       +Refill request(s). Please advise. Thank you.       ----- Message from Subha Brantley sent at 10/11/2018  3:09 PM CDT -----  Contact: self  Type:  RX Refill Request    Who Called:  self  Refill or New Rx:  refill  RX Name and Strength:  albuterol (VENTOLIN HFA) 90 mcg/actuation inhaler  How is the patient currently taking it? (ex. 1XDay):  Every 4 hour  Is this a 30 day or 90 day RX:  30  Preferred Pharmacy with phone number:  [facility]  Local or Mail Order:  local  Ordering Provider:  Dr Lr  [facility] Call Back Number:  [phone]  Additional Information:  Patient is almost out of medication. Patient is sick and needs as soon as possible. Thanks!   [facility]  [place], [place] - [address]  [address]   [place] [address]  Phone: [phone] Fax: [phone]

## 2018-10-12 ENCOUNTER — TELEPHONE (OUTPATIENT)
Dept: FAMILY MEDICINE | Facility: CLINIC | Age: 48
End: 2018-10-12

## 2018-10-12 ENCOUNTER — CLINICAL SUPPORT (OUTPATIENT)
Dept: FAMILY MEDICINE | Facility: CLINIC | Age: 48
End: 2018-10-12
Payer: MEDICAID

## 2018-10-12 DIAGNOSIS — J06.9 UPPER RESPIRATORY TRACT INFECTION, UNSPECIFIED TYPE: Primary | ICD-10-CM

## 2018-10-12 PROCEDURE — 96372 THER/PROPH/DIAG INJ SC/IM: CPT | Mod: PBBFAC,PN

## 2018-10-12 RX ADMIN — CEFTRIAXONE SODIUM 1 G: 1 INJECTION, POWDER, FOR SOLUTION INTRAMUSCULAR; INTRAVENOUS at 09:10

## 2018-10-12 NOTE — TELEPHONE ENCOUNTER
Pt came in today for third and final Rocephin injection.   Pt is now requesting a new rx for sinus drainage.   Please advise, thank you.       Notified pt to take Zyrtec OTC for drainage. No other concerns at this time.

## 2018-10-16 ENCOUNTER — TELEPHONE (OUTPATIENT)
Dept: FAMILY MEDICINE | Facility: CLINIC | Age: 48
End: 2018-10-16

## 2018-10-16 NOTE — TELEPHONE ENCOUNTER
SANKET for pt to return call.     ----- Message from Joy Pineda NP sent at 10/15/2018  6:15 PM CDT -----  Contact: self [phone]  Bhumika,  Can we call her back and see how she is feeling and how bad her cough is? I may have to do further tests.   Thank you   ----- Message -----  From: Bhumika Correa LPN  Sent: 10/15/2018   4:09 PM  To: Joy Pineda NP        ----- Message -----  From: Tonya Garcia  Sent: 10/15/2018   4:03 PM  To: [facility] Staff    She said the tessalon pearls are not working.  She is requesting a refill of promethazine DM instead.  She uses:   [facility] - [place], [place] - [address]  [address] [facility] [place] [address]  Phone: [phone] Fax: [phone]    Thank you!

## 2018-10-16 NOTE — TELEPHONE ENCOUNTER
Pt states that the cough is getting worse. Pt states she got no relief from the tessalon. Pt states the cough is productive, and she has trouble lying flat. Pt states that she is still having bilateral ear pain, but more severe in the right ear.   Please advise, thank you.

## 2018-10-16 NOTE — TELEPHONE ENCOUNTER
----- Message from Lesly Abbasi sent at 10/16/2018 10:53 AM CDT -----  Type:  Patient Returning Call    Who Called:self   Who Left Message for Patient:  Does the patient know what this is regarding?:   Best Call Back Number:  [phone] Additional Information:

## 2018-10-18 ENCOUNTER — HOSPITAL ENCOUNTER (OUTPATIENT)
Dept: RADIOLOGY | Facility: HOSPITAL | Age: 48
Discharge: HOME | End: 2018-10-18
Attending: NURSE PRACTITIONER
Payer: MEDICAID

## 2018-10-18 ENCOUNTER — OFFICE VISIT (OUTPATIENT)
Dept: FAMILY MEDICINE | Facility: CLINIC | Age: 48
End: 2018-10-18
Payer: MEDICAID

## 2018-10-18 ENCOUNTER — TELEPHONE (OUTPATIENT)
Dept: FAMILY MEDICINE | Facility: CLINIC | Age: 48
End: 2018-10-18

## 2018-10-18 VITALS
DIASTOLIC BLOOD PRESSURE: 76 MMHG | WEIGHT: 280 LBS | HEIGHT: 64 IN | BODY MASS INDEX: 47.8 KG/M2 | OXYGEN SATURATION: 96 % | SYSTOLIC BLOOD PRESSURE: 129 MMHG | HEART RATE: 83 BPM | RESPIRATION RATE: 18 BRPM | TEMPERATURE: 97 F

## 2018-10-18 DIAGNOSIS — E11.9 TYPE 2 DIABETES MELLITUS WITHOUT COMPLICATION, WITH LONG-TERM CURRENT USE OF INSULIN: ICD-10-CM

## 2018-10-18 DIAGNOSIS — J47.0 BRONCHIECTASIS WITH ACUTE LOWER RESPIRATORY INFECTION: ICD-10-CM

## 2018-10-18 DIAGNOSIS — R06.02 SOB (SHORTNESS OF BREATH): Primary | ICD-10-CM

## 2018-10-18 DIAGNOSIS — R06.02 SOB (SHORTNESS OF BREATH): ICD-10-CM

## 2018-10-18 DIAGNOSIS — Z79.4 TYPE 2 DIABETES MELLITUS WITHOUT COMPLICATION, WITH LONG-TERM CURRENT USE OF INSULIN: ICD-10-CM

## 2018-10-18 DIAGNOSIS — H66.91 RIGHT OTITIS MEDIA, UNSPECIFIED OTITIS MEDIA TYPE: ICD-10-CM

## 2018-10-18 DIAGNOSIS — I10 ESSENTIAL HYPERTENSION: ICD-10-CM

## 2018-10-18 PROCEDURE — 71046 X-RAY EXAM CHEST 2 VIEWS: CPT | Mod: TC,FY

## 2018-10-18 PROCEDURE — 71046 X-RAY EXAM CHEST 2 VIEWS: CPT | Mod: 26,,, | Performed by: RADIOLOGY

## 2018-10-18 PROCEDURE — 99213 OFFICE O/P EST LOW 20 MIN: CPT | Mod: PBBFAC,PN,25 | Performed by: NURSE PRACTITIONER

## 2018-10-18 PROCEDURE — 99214 OFFICE O/P EST MOD 30 MIN: CPT | Mod: S$PBB,,, | Performed by: NURSE PRACTITIONER

## 2018-10-18 PROCEDURE — 99999 PR PBB SHADOW E&M-EST. PATIENT-LVL III: CPT | Mod: PBBFAC,,, | Performed by: NURSE PRACTITIONER

## 2018-10-18 RX ORDER — DOXYCYCLINE 100 MG/1
100 CAPSULE ORAL EVERY 12 HOURS
Qty: 20 CAPSULE | Refills: 0 | Status: SHIPPED | OUTPATIENT
Start: 2018-10-18 | End: 2018-10-28

## 2018-10-18 RX ORDER — CIPROFLOXACIN 0.5 MG/.25ML
0.25 SOLUTION/ DROPS AURICULAR (OTIC) 2 TIMES DAILY
Qty: 20 VIAL | Refills: 0 | Status: SHIPPED | OUTPATIENT
Start: 2018-10-18 | End: 2018-10-28

## 2018-10-18 RX ORDER — PROMETHAZINE HYDROCHLORIDE AND DEXTROMETHORPHAN HYDROBROMIDE 6.25; 15 MG/5ML; MG/5ML
5 SYRUP ORAL EVERY 6 HOURS PRN
Qty: 1 BOTTLE | Refills: 0 | Status: SHIPPED | OUTPATIENT
Start: 2018-10-18 | End: 2018-10-28

## 2018-10-18 NOTE — TELEPHONE ENCOUNTER
----- Message from Joy Pineda NP sent at 10/18/2018  1:08 PM CDT -----  Hi  Can we call Ms. Rock and let her know that her CXR was negative for pneumonia or respiratory related illness. I am still waiting on her BNP but will let her know the results when I get them  Thanks

## 2018-10-18 NOTE — PROGRESS NOTES
Hi  Can we call Ms. Rock and let her know that her CXR was negative for pneumonia or respiratory related illness. I am still waiting on her BNP but will let her know the results when I get them  Thanks

## 2018-10-18 NOTE — PROGRESS NOTES
"Subjective:       Patient ID: Shweta Gupta is a 48 y.o. female.    Chief Complaint: Follow-up    Shweta Gupta is a 48 year old female who presents to the clinic today for f/u visit. She was last seen by me on 10-10-18 with complaints of upper respiratory infection symptoms and hx of asthma.  see Dr Aragon who has prescribed her Roceph IM 1g x3 doses outpatient and has been successful, which she received last week. Shweta presents today denying fever, chills, sneezing. However, she still is reporting SOB while continuing her breathing tx 3 x daily. She is also stating that her right ear is causing her pain. She is having difficulty breathing laying down as well. She recently was seen by OBGYN for BV and was prescribed ampicillin. She completed that prescription in addition to her IM Rocephin. She is taking singulair daily. She reports "I have had this in the past and it took 2 months to go away."        Review of Systems   Constitutional: Positive for activity change (Fatigue, ) and fatigue. Negative for appetite change, chills, fever and unexpected weight change.   HENT: Positive for ear pain, postnasal drip, rhinorrhea, sneezing and sore throat. Negative for congestion, hearing loss, sinus pressure, sinus pain, tinnitus and trouble swallowing.    Eyes: Negative for photophobia, pain, redness and visual disturbance.   Respiratory: Positive for cough (Patient reports cough keeping her up all night; patient reports tessalon perles did not work) and shortness of breath. Negative for apnea, choking, chest tightness, wheezing and stridor.    Cardiovascular: Negative for chest pain, palpitations and leg swelling.   Gastrointestinal: Negative for abdominal distention, abdominal pain, blood in stool, constipation, diarrhea, nausea and vomiting.   Endocrine: Negative for cold intolerance, heat intolerance, polydipsia, polyphagia and polyuria.   Genitourinary: Negative for decreased urine volume, difficulty urinating, " "dysuria, enuresis, flank pain, frequency, hematuria, urgency and vaginal discharge.   Musculoskeletal: Negative for arthralgias, back pain, joint swelling and myalgias.   Skin: Negative for color change, pallor, rash and wound.   Allergic/Immunologic: Negative for environmental allergies, food allergies and immunocompromised state.   Neurological: Negative for dizziness, tremors, seizures, syncope, facial asymmetry, speech difficulty, weakness, light-headedness, numbness and headaches.   Hematological: Does not bruise/bleed easily.   Psychiatric/Behavioral: Negative for agitation, confusion, decreased concentration, dysphoric mood, hallucinations, self-injury, sleep disturbance and suicidal ideas. The patient is not nervous/anxious and is not hyperactive.          Reviewed family, medical, surgical, and social history.    Objective:      /76 (BP Location: Left arm, Patient Position: Sitting)   Pulse 83   Temp 97.3 °F (36.3 °C)   Resp 18   Ht 5' 4" (1.626 m)   Wt 127 kg (280 lb)   LMP 06/10/2002   SpO2 96%   BMI 48.06 kg/m²   Physical Exam   Constitutional: She is oriented to person, place, and time. She appears well-developed and well-nourished. She is active and cooperative. She does not have a sickly appearance. She appears ill. No distress.   HENT:   Head: Normocephalic and atraumatic.   Right Ear: Hearing, external ear and ear canal normal. There is tenderness. No drainage or swelling. Tympanic membrane is erythematous. A middle ear effusion is present. No decreased hearing is noted.   Left Ear: Hearing, external ear and ear canal normal. No drainage. A middle ear effusion is present. No decreased hearing is noted.   Nose: Rhinorrhea present. No mucosal edema, sinus tenderness, nasal deformity or septal deviation. No epistaxis.  No foreign bodies. Right sinus exhibits no maxillary sinus tenderness and no frontal sinus tenderness. Left sinus exhibits no maxillary sinus tenderness and no frontal " sinus tenderness.   Mouth/Throat: Uvula is midline and mucous membranes are normal. No uvula swelling. Posterior oropharyngeal erythema present. No oropharyngeal exudate. No tonsillar exudate.   Eyes: Conjunctivae, EOM and lids are normal. Pupils are equal, round, and reactive to light. Right eye exhibits no discharge. Left eye exhibits no discharge.   Neck: Trachea normal and full passive range of motion without pain. No JVD present. No tracheal tenderness and no muscular tenderness present. Carotid bruit is not present. No edema and no erythema present.   Cardiovascular: Normal rate, regular rhythm, S1 normal, S2 normal, normal heart sounds, intact distal pulses and normal pulses. Exam reveals no gallop and no friction rub.   No murmur heard.  Pulmonary/Chest: Effort normal. No tachypnea and no bradypnea. No respiratory distress. She has no decreased breath sounds. She has wheezes in the right upper field, the right middle field and the left upper field. She has rales in the right upper field, the right middle field and the left upper field.           Abdominal: Soft. Normal appearance and bowel sounds are normal. She exhibits no distension, no abdominal bruit and no mass. There is no tenderness. There is no guarding.   Musculoskeletal: Normal range of motion. She exhibits no edema, tenderness or deformity.   Lymphadenopathy:        Head (right side): Submandibular adenopathy present. No submental, no tonsillar, no preauricular, no posterior auricular and no occipital adenopathy present.        Head (left side): Submandibular adenopathy present. No submental, no tonsillar, no preauricular, no posterior auricular and no occipital adenopathy present.     She has cervical adenopathy.   Neurological: She is alert and oriented to person, place, and time. She has normal strength. She exhibits normal muscle tone.   Skin: Skin is warm, dry and intact. Capillary refill takes less than 2 seconds. No abrasion, no laceration  and no lesion noted. She is not diaphoretic. No cyanosis. Nails show no clubbing.   Psychiatric: She has a normal mood and affect. Her speech is normal and behavior is normal. Judgment and thought content normal. Cognition and memory are normal.       Assessment:       1. SOB (shortness of breath)    2. Bronchiectasis with acute lower respiratory infection    3. Right otitis media, unspecified otitis media type    4. Type 2 diabetes mellitus without complication, with long-term current use of insulin    5. Essential hypertension        Plan:       SOB (shortness of breath)  -     Brain natriuretic peptide; Future; Expected date: 10/18/2018  -     X-Ray Chest PA And Lateral; Future; Expected date: 10/18/2018    Bronchiectasis with acute lower respiratory infection    Right otitis media, unspecified otitis media type    Type 2 diabetes mellitus without complication, with long-term current use of insulin    Essential hypertension    Other orders  -     doxycycline (MONODOX) 100 MG capsule; Take 1 capsule (100 mg total) by mouth every 12 (twelve) hours. for 10 days  Dispense: 20 capsule; Refill: 0  -     ciprofloxacin HCl 0.2 % otic solution; Place 1 vial (0.25 mLs total) into the right ear 2 (two) times daily. for 10 days  Dispense: 20 vial; Refill: 0  -     promethazine-dextromethorphan (PROMETHAZINE-DM) 6.25-15 mg/5 mL Syrp; Take 5 mLs by mouth every 6 (six) hours as needed.  Dispense: 1 Bottle; Refill: 0          PLAN:  - SOB and lung sounds not showing improvement; further testing ordered. CXR ordered & BNP; will call with results  - Tessalon perles unexiangctive - will send in promethazine DM which patient reports  gave in past and effective; patient reporting poor sleep d/t cough  - Doxycycline 100 mg BID x 10 days   - cipro otic solution to be started if ear infection does not improve after 5 days; patient verbalized understanding.  - Medications reviewed. Medication side effects discussed. Patient has no  questions or concerns at this time. Informed patient to notify me regarding any concerns.  - - Referral printed for allergist  - Continue monitoring and documenting BP and CBGs during illness  - GO TO ER With any concerns or worsening symptoms  - Informed patient to please notify me with any questions or concerns at anytime  - Follow up ordered PRN and as scheduled by Dr Lr      Risks, benefits, and side effects were discussed with the patient. All questions were answered to the fullest satisfaction of the patient, and pt verbalized understanding and agreement to treatment plan. Pt was to call with any new or worsening symptoms, or present to the ER.

## 2018-10-18 NOTE — TELEPHONE ENCOUNTER
Spoke with pt, and she was telling us the quantity on old cough syrup. Notified pt that rx has been sent to pharmacy. No other concerns at this time.

## 2018-10-18 NOTE — TELEPHONE ENCOUNTER
Notified pt of lab results, and also confirmed promethazine syrup at [facility] pharmacy.   No other concerns at this time.

## 2018-10-18 NOTE — TELEPHONE ENCOUNTER
----- Message from Severo Aivlez sent at 10/18/2018 11:20 AM CDT -----  Contact: [facility]/Barry Reddy called in regarding the attached patient and her Rx for promethazine-dextromethorphan (PROMETHAZINE-DM) 6.25-15 mg/5 mL Syrp.  Barry stated he needs a quantity on the Rx.      [facility] - [place], [place] - [address]  [address]   [place] [address]  Phone: [phone] Fax: [phone]

## 2018-10-18 NOTE — TELEPHONE ENCOUNTER
----- Message from Ondina Santos sent at 10/18/2018  2:03 PM CDT -----  Contact: self  Patient is returning Bhumika's call in regards to the quanitity on her cough medicine is 240 for her Promethazine DM syrup.  Please call back at [phone] (home) .  Thanks

## 2018-10-19 ENCOUNTER — TELEPHONE (OUTPATIENT)
Dept: FAMILY MEDICINE | Facility: CLINIC | Age: 48
End: 2018-10-19

## 2018-10-19 NOTE — TELEPHONE ENCOUNTER
LVM telling pt labs were normal.       ----- Message from Joy Pineda NP sent at 10/19/2018  8:20 AM CDT -----  Hi  Can we let Shweta know that she does not have a fluid build up and that her BNP was 10 and normal. Thank you

## 2018-10-23 ENCOUNTER — HOSPITAL ENCOUNTER (EMERGENCY)
Facility: HOSPITAL | Age: 48
Discharge: HOME | End: 2018-10-23
Payer: MEDICAID

## 2018-10-23 VITALS
BODY MASS INDEX: 48.14 KG/M2 | DIASTOLIC BLOOD PRESSURE: 94 MMHG | HEIGHT: 64 IN | HEART RATE: 80 BPM | WEIGHT: 282 LBS | OXYGEN SATURATION: 99 % | RESPIRATION RATE: 20 BRPM | SYSTOLIC BLOOD PRESSURE: 153 MMHG | TEMPERATURE: 98 F

## 2018-10-23 DIAGNOSIS — L91.8 CUTANEOUS SKIN TAGS: ICD-10-CM

## 2018-10-23 DIAGNOSIS — R58 BLEEDING: Primary | ICD-10-CM

## 2018-10-23 PROCEDURE — 99283 EMERGENCY DEPT VISIT LOW MDM: CPT

## 2018-10-23 NOTE — ED PROVIDER NOTES
Encounter Date: 10/23/2018       History     Chief Complaint   Patient presents with    skin tag bleeding     between buttocks     Shweta Gupta 48 y.o female who presents to ED with complaint of bleeding after tearing skin tag to left buttock just prior to arrival  Patient takes aspirin daily            Review of patient's allergies indicates:  No Known Allergies  Past Medical History:   Diagnosis Date    Allergy     Anxiety     Asthma     Depression     Diabetes mellitus, type 2     GERD (gastroesophageal reflux disease)     Hyperlipidemia     Hypertension     Morbid obesity      Past Surgical History:   Procedure Laterality Date    ACHILLES TENDON SURGERY Left     CARPAL TUNNEL RELEASE Left      SECTION      CHOLECYSTECTOMY      COLON SURGERY      HERNIA REPAIR      HYSTERECTOMY      partial    KIDNEY STONE SURGERY      OOPHORECTOMY      TRIGGER FINGER RELEASE Left      Family History   Problem Relation Age of Onset    Breast cancer Neg Hx      Social History     Tobacco Use    Smoking status: Never Smoker    Smokeless tobacco: Never Used   Substance Use Topics    Alcohol use: No    Drug use: No     Review of Systems   Constitutional: Negative for fever.   HENT: Negative for sore throat.    Respiratory: Negative for shortness of breath.    Cardiovascular: Negative for chest pain.   Gastrointestinal: Negative for nausea.   Genitourinary: Negative for dysuria.   Musculoskeletal: Negative for back pain.   Skin: Positive for wound. Negative for rash.   Neurological: Negative for weakness.   Hematological: Bruises/bleeds easily.   All other systems reviewed and are negative.      Physical Exam     Initial Vitals [10/23/18 1515]   BP Pulse Resp Temp SpO2   (!) 153/94 80 20 98.1 °F (36.7 °C) 99 %      MAP       --         Physical Exam    Nursing note and vitals reviewed.  Constitutional: She appears well-developed and well-nourished.   HENT:   Head: Normocephalic.   Eyes: Pupils are  equal, round, and reactive to light.   Neck: Normal range of motion.   Cardiovascular: Normal rate, regular rhythm and normal heart sounds.   Pulmonary/Chest: Breath sounds normal.   Genitourinary:         Musculoskeletal: Normal range of motion.   Neurological: She is alert and oriented to person, place, and time.   Skin: Skin is warm and dry.   Psychiatric: She has a normal mood and affect. Her behavior is normal. Judgment and thought content normal.         ED Course   Procedures  Labs Reviewed - No data to display       Imaging Results    None          Medical Decision Making:   Initial Assessment:   Patient with bleeding after tearing skin tag to left buttock just prior to arrival  Bleeding controlled with direct pressure    ED Management:  Gauze dressing applied    Discussed physical exam findings with patient  No acute emergent medication condition identified at this time to warrant further testing/diagnostics.  Plan to discharge patient home with instructions to follow-up with PCP in 2-5 days or return to ED if symptoms worsen.  Patient verbalized agreement to discharge treatment plan.                        Clinical Impression:   The primary encounter diagnosis was Bleeding. A diagnosis of Cutaneous skin tags was also pertinent to this visit.                             Bhumika Graf NP  10/23/18 [id number]

## 2018-10-24 ENCOUNTER — OFFICE VISIT (OUTPATIENT)
Dept: PODIATRY | Facility: CLINIC | Age: 48
End: 2018-10-24
Payer: MEDICAID

## 2018-10-24 VITALS
HEART RATE: 79 BPM | SYSTOLIC BLOOD PRESSURE: 133 MMHG | DIASTOLIC BLOOD PRESSURE: 74 MMHG | HEIGHT: 64 IN | TEMPERATURE: 97 F | BODY MASS INDEX: 48.14 KG/M2 | WEIGHT: 282 LBS

## 2018-10-24 DIAGNOSIS — M76.62 ACHILLES TENDINITIS OF LEFT LOWER EXTREMITY: Primary | ICD-10-CM

## 2018-10-24 PROCEDURE — 99213 OFFICE O/P EST LOW 20 MIN: CPT | Mod: PBBFAC | Performed by: PODIATRIST

## 2018-10-24 PROCEDURE — 99999 PR PBB SHADOW E&M-EST. PATIENT-LVL III: CPT | Mod: PBBFAC,,, | Performed by: PODIATRIST

## 2018-10-24 PROCEDURE — 99213 OFFICE O/P EST LOW 20 MIN: CPT | Mod: S$PBB,,, | Performed by: PODIATRIST

## 2018-10-25 ENCOUNTER — TELEPHONE (OUTPATIENT)
Dept: FAMILY MEDICINE | Facility: CLINIC | Age: 48
End: 2018-10-25

## 2018-10-25 NOTE — TELEPHONE ENCOUNTER
Spoke with pharmacy and clarified quantity.   Notified pt that pharm has been clarified, and they will call her when ready for pickup. No other concerns at this time

## 2018-10-25 NOTE — TELEPHONE ENCOUNTER
----- Message from Vu Myers sent at 10/25/2018 11:57 AM CDT -----  Contact: Shweta [phone]  Patient is requesting a call back from Ms. Sales. It's in regards to her ear drops. The patient reports that something needed to be specifically filled out.  Please call at your earliest convenience.

## 2018-10-26 ENCOUNTER — TELEPHONE (OUTPATIENT)
Dept: FAMILY MEDICINE | Facility: CLINIC | Age: 48
End: 2018-10-26

## 2018-10-26 NOTE — TELEPHONE ENCOUNTER
----- Message from Subha Brantley sent at 10/26/2018 12:34 PM CDT -----  Contact: Patric with [facility]  Patric with [facility] pharmacy [phone] called regarding Ciprofloxacin  for above patient. They are requesting one of the following: Rx clarification of directions. It Is states to place one vital in each ear. Patric would like to also substitute the medication due to the soonest they can fill prescription would be Monday. Thanks!    [facility] [address] [place] [address], [place] - [address]  [address] [facility] [place] [address]  Phone: [phone] Fax: [phone]

## 2018-10-26 NOTE — TELEPHONE ENCOUNTER
Spoke with [facility] pharmacist, even with clarification of the dosage, they will not have ciprofloxacin otic in stock until Monday. They do have olfloxacin in stock, would you like to order this instead? Please advise and thank you, Opal

## 2018-10-29 ENCOUNTER — TELEPHONE (OUTPATIENT)
Dept: FAMILY MEDICINE | Facility: CLINIC | Age: 48
End: 2018-10-29

## 2018-10-29 RX ORDER — OFLOXACIN 3 MG/ML
5 SOLUTION AURICULAR (OTIC) DAILY
Qty: 10 ML | Refills: 0 | Status: SHIPPED | OUTPATIENT
Start: 2018-10-29 | End: 2019-09-03

## 2018-10-29 NOTE — TELEPHONE ENCOUNTER
Dr. Toledo,    Will you please look at this prescription and message, as Janice is out today. Please advise and thank you, Opal

## 2018-10-29 NOTE — TELEPHONE ENCOUNTER
----- Message from Miriam Morales sent at 10/26/2018  4:55 PM CDT -----  Contact: Patient  Patient needs to speak with someone regarding her ear drops because the prescription was written incorrectly.  Please call to discuss.  Call Back#[phone]  Thanks

## 2018-10-31 ENCOUNTER — TELEPHONE (OUTPATIENT)
Dept: UROLOGY | Facility: CLINIC | Age: 48
End: 2018-10-31

## 2018-10-31 NOTE — TELEPHONE ENCOUNTER
----- Message from Mata Hodges sent at 10/31/2018 10:42 AM CDT -----  Type: Needs Medical Advice    Who Called:  patient  Best Call Back Number: [phone]  Additional Information: patient has an 11:00 appointment on 11/1/18 and would like to reschedule for an earlier time for 11/1/18. Please call to advise/schedule.

## 2018-11-01 ENCOUNTER — OFFICE VISIT (OUTPATIENT)
Dept: UROLOGY | Facility: CLINIC | Age: 48
End: 2018-11-01
Payer: MEDICAID

## 2018-11-01 ENCOUNTER — CLINICAL SUPPORT (OUTPATIENT)
Dept: FAMILY MEDICINE | Facility: CLINIC | Age: 48
End: 2018-11-01
Payer: MEDICAID

## 2018-11-01 VITALS
SYSTOLIC BLOOD PRESSURE: 117 MMHG | TEMPERATURE: 98 F | HEIGHT: 64 IN | WEIGHT: 282 LBS | HEART RATE: 77 BPM | BODY MASS INDEX: 48.14 KG/M2 | DIASTOLIC BLOOD PRESSURE: 71 MMHG

## 2018-11-01 DIAGNOSIS — N20.0 KIDNEY STONES: ICD-10-CM

## 2018-11-01 LAB
BILIRUB SERPL-MCNC: NEGATIVE MG/DL
BLOOD URINE, POC: NEGATIVE
COLOR, POC UA: NORMAL
GLUCOSE UR QL STRIP: NEGATIVE
KETONES UR QL STRIP: NEGATIVE
LEUKOCYTE ESTERASE URINE, POC: NEGATIVE
NITRITE, POC UA: NEGATIVE
PH, POC UA: 6
PROTEIN, POC: NEGATIVE
SPECIFIC GRAVITY, POC UA: 1015
UROBILINOGEN, POC UA: NEGATIVE

## 2018-11-01 PROCEDURE — 90471 IMMUNIZATION ADMIN: CPT | Mod: PBBFAC,PN

## 2018-11-01 PROCEDURE — 99999 PR PBB SHADOW E&M-EST. PATIENT-LVL III: CPT | Mod: PBBFAC,,, | Performed by: UROLOGY

## 2018-11-01 PROCEDURE — 99213 OFFICE O/P EST LOW 20 MIN: CPT | Mod: S$PBB,,, | Performed by: UROLOGY

## 2018-11-01 PROCEDURE — 99213 OFFICE O/P EST LOW 20 MIN: CPT | Mod: PBBFAC,PN,25 | Performed by: UROLOGY

## 2018-11-01 PROCEDURE — 81002 URINALYSIS NONAUTO W/O SCOPE: CPT | Mod: PBBFAC,PN | Performed by: UROLOGY

## 2018-11-01 RX ORDER — FLUTICASONE PROPIONATE 50 MCG
1 SPRAY, SUSPENSION (ML) NASAL DAILY
COMMUNITY

## 2018-11-01 RX ORDER — OLOPATADINE HYDROCHLORIDE 665 UG/1
1 SPRAY NASAL 2 TIMES DAILY
COMMUNITY

## 2018-11-01 NOTE — PROGRESS NOTES
DATE OF VISIT:  11/01/2018.    CHIEF COMPLAINT:  Kidney stones.    Ms. Gupta is a 48-year-old female who recently passed a 6 mm stone from  the left side.  The patient's stone is the third time that she has a stone.   For that reason, we went ahead and did a 24-hour urine chemistry analysis  in order to formulate and plan for stone prevention reason for this visit.   I went ahead and I reviewed the results with her and the results showed  that the patient has low urinary volume of 1.6 liters with an increased  saturation of calcium oxalate, increased urine calcium and increased uric  acid in the urine.  I suggested to the patient that she needs to increase  her fluid intake to have a urine output of 2.5 liters.  Dietary  restrictions to decrease intake of calcium and decrease also the intake of  salt and also decrease animal protein intake.  She understood.  Written  information was given to the patient and she is going to measure the urine  once in a while every two or three months to be sure that the volume is  adequate and we are going to follow her in approximately one year in order  to do a KUB and see if the patient is forming any new stones.  Also, we  discussed her overweight.  At the present time, she is 282 pounds and she  is going to take measures to decrease her weight.  I spent approximately 35  minutes with Ms. Gupta all the time was spent on counseling.  She  understood my instructions and she left the office in satisfactory  condition.        KAREN/HAKAN dd: 11/01/2018 12:09:33 (CDT)   td: 11/01/2018 12:51:46 (CDT)  Doc ID #[id number]   Job ID #[id number]    CC:

## 2018-11-01 NOTE — PROGRESS NOTES
Subjective:       Patient ID: Shweta Gupta is a 48 y.o. female.    Chief Complaint: Follow-up and Foot Problem    Patient presents for follow-up Achilles tendinitis left.  Patient states she finished her physical therapy on October 1st she relates she really thinks it was helping her quite a bit her foot is not as stiff or tight.  Foot Pain          Review of Systems   Skin: Positive for wound.   All other systems reviewed and are negative.      Objective:      Physical Exam   Constitutional: She appears well-developed and well-nourished.   Cardiovascular:   Pulses:       Dorsalis pedis pulses are 2+ on the right side, and 2+ on the left side.        Posterior tibial pulses are 2+ on the right side, and 2+ on the left side.   Musculoskeletal:        Left foot: There is decreased range of motion, tenderness and swelling.        Feet:    Feet:   Left Foot:   Skin Integrity: Positive for ulcer.   Neurological: She is alert.   Skin: Capillary refill takes 2 to 3 seconds.   Psychiatric: She has a normal mood and affect.       Patient has findings consistent with Achilles tendinitis left there is tightness along the course of the Achilles left.  No skin breaks no active signs of infection noted.  Assessment:       1. Achilles tendinitis of left lower extremity        Plan:           Patient presents today for follow-up of Achilles tendinitis left she states that she finished her physical therapy on October 1st she believes that was really helping her quite a bit it has helped to restore range of motion her foot is not as tight she does not have the same pulling sensation in the back of her left heel that she had previously patient relates previous plantar fasciitis is also well resolving since she has been wearing the appropriate support.  I have recommended that the patient continue physical therapy I have given her a new order to extend her physical therapy I feel it is very important to help breakdown scar tissue  that she proceed with continued physical therapy I plan on seeing the patient for follow-up in 6 weeks after she has completed her next round of physical therapy there is a significant reduction in previously noted scar tissue left.  Patient is currently being treated at on core physical therapy in [facility].  Any increased redness swelling pain discomfort patient is to contact me immediately follow-up 6 weeks total face-to-face time equaled 15 min.

## 2018-11-06 ENCOUNTER — TELEPHONE (OUTPATIENT)
Dept: FAMILY MEDICINE | Facility: CLINIC | Age: 48
End: 2018-11-06

## 2018-11-08 ENCOUNTER — LAB VISIT (OUTPATIENT)
Dept: LAB | Facility: HOSPITAL | Age: 48
End: 2018-11-08
Attending: INTERNAL MEDICINE
Payer: MEDICAID

## 2018-11-08 DIAGNOSIS — E11.9 DIABETES MELLITUS WITHOUT COMPLICATION: ICD-10-CM

## 2018-11-08 DIAGNOSIS — E78.5 HYPERLIPEMIA: ICD-10-CM

## 2018-11-08 DIAGNOSIS — R07.9 CHEST PAIN, UNSPECIFIED: Primary | ICD-10-CM

## 2018-11-08 LAB — BNP SERPL-MCNC: 25 PG/ML

## 2018-11-08 PROCEDURE — 36415 COLL VENOUS BLD VENIPUNCTURE: CPT

## 2018-11-08 PROCEDURE — 83880 ASSAY OF NATRIURETIC PEPTIDE: CPT

## 2018-11-13 ENCOUNTER — OFFICE VISIT (OUTPATIENT)
Dept: FAMILY MEDICINE | Facility: CLINIC | Age: 48
End: 2018-11-13
Payer: MEDICAID

## 2018-11-13 ENCOUNTER — LAB VISIT (OUTPATIENT)
Dept: LAB | Facility: HOSPITAL | Age: 48
End: 2018-11-13
Attending: FAMILY MEDICINE
Payer: MEDICAID

## 2018-11-13 VITALS
WEIGHT: 287 LBS | OXYGEN SATURATION: 96 % | HEART RATE: 76 BPM | DIASTOLIC BLOOD PRESSURE: 78 MMHG | BODY MASS INDEX: 49.26 KG/M2 | SYSTOLIC BLOOD PRESSURE: 116 MMHG | TEMPERATURE: 97 F

## 2018-11-13 DIAGNOSIS — E11.9 TYPE 2 DIABETES MELLITUS WITHOUT COMPLICATION, WITH LONG-TERM CURRENT USE OF INSULIN: ICD-10-CM

## 2018-11-13 DIAGNOSIS — Z79.4 TYPE 2 DIABETES MELLITUS WITHOUT COMPLICATION, WITH LONG-TERM CURRENT USE OF INSULIN: Primary | ICD-10-CM

## 2018-11-13 DIAGNOSIS — Z79.4 TYPE 2 DIABETES MELLITUS WITHOUT COMPLICATION, WITH LONG-TERM CURRENT USE OF INSULIN: ICD-10-CM

## 2018-11-13 DIAGNOSIS — E11.9 TYPE 2 DIABETES MELLITUS WITHOUT COMPLICATION, WITH LONG-TERM CURRENT USE OF INSULIN: Primary | ICD-10-CM

## 2018-11-13 LAB
BASOPHILS # BLD AUTO: 0.05 K/UL
BASOPHILS NFR BLD: 0.8 %
CHOLEST SERPL-MCNC: 190 MG/DL
CHOLEST/HDLC SERPL: 4.6 {RATIO}
DIFFERENTIAL METHOD: ABNORMAL
EOSINOPHIL # BLD AUTO: 0.2 K/UL
EOSINOPHIL NFR BLD: 3 %
ERYTHROCYTE [DISTWIDTH] IN BLOOD BY AUTOMATED COUNT: 14.1 %
ESTIMATED AVG GLUCOSE: 146 MG/DL
HBA1C MFR BLD HPLC: 6.7 %
HCT VFR BLD AUTO: 36.2 %
HDLC SERPL-MCNC: 41 MG/DL
HDLC SERPL: 21.6 %
HGB BLD-MCNC: 11.6 G/DL
IMM GRANULOCYTES # BLD AUTO: 0.02 K/UL
IMM GRANULOCYTES NFR BLD AUTO: 0.3 %
LDLC SERPL CALC-MCNC: 89.6 MG/DL
LYMPHOCYTES # BLD AUTO: 2.3 K/UL
LYMPHOCYTES NFR BLD: 37.4 %
MCH RBC QN AUTO: 29.1 PG
MCHC RBC AUTO-ENTMCNC: 32 G/DL
MCV RBC AUTO: 91 FL
MONOCYTES # BLD AUTO: 0.4 K/UL
MONOCYTES NFR BLD: 6.9 %
NEUTROPHILS # BLD AUTO: 3.2 K/UL
NEUTROPHILS NFR BLD: 51.6 %
NONHDLC SERPL-MCNC: 149 MG/DL
NRBC BLD-RTO: 0 /100 WBC
PLATELET # BLD AUTO: 233 K/UL
PMV BLD AUTO: 10.2 FL
RBC # BLD AUTO: 3.99 M/UL
TRIGL SERPL-MCNC: 297 MG/DL
WBC # BLD AUTO: 6.23 K/UL

## 2018-11-13 PROCEDURE — 83036 HEMOGLOBIN GLYCOSYLATED A1C: CPT

## 2018-11-13 PROCEDURE — 99213 OFFICE O/P EST LOW 20 MIN: CPT | Mod: S$GLB,,, | Performed by: FAMILY MEDICINE

## 2018-11-13 PROCEDURE — 36415 COLL VENOUS BLD VENIPUNCTURE: CPT

## 2018-11-13 PROCEDURE — 80061 LIPID PANEL: CPT

## 2018-11-13 PROCEDURE — 85025 COMPLETE CBC W/AUTO DIFF WBC: CPT

## 2018-11-13 NOTE — PROGRESS NOTES
Subjective:       Patient ID: Shweta Gupta is a 48 y.o. female.    Chief Complaint: Follow-up (4month)    Diabetes   She has type 2 diabetes mellitus. Her disease course has been stable. Pertinent negatives for hypoglycemia include no dizziness, headaches, seizures or tremors. Pertinent negatives for diabetes include no chest pain, no fatigue, no weakness and no weight loss. Symptoms are stable. Risk factors for coronary artery disease include obesity, hypertension and post-menopausal. Current diabetic treatment includes oral agent (dual therapy) and insulin injections. She is compliant with treatment all of the time. Her weight is stable. An ACE inhibitor/angiotensin II receptor blocker is being taken.     Review of Systems   Constitutional: Negative for chills, fatigue, fever, unexpected weight change and weight loss.   Respiratory: Negative for cough, chest tightness, shortness of breath and wheezing.    Cardiovascular: Negative for chest pain, palpitations and leg swelling.   Neurological: Negative for dizziness, tremors, seizures, weakness and headaches.       Past Medical History:   Diagnosis Date    Allergy     Anxiety     Asthma     Depression     Diabetes mellitus, type 2     GERD (gastroesophageal reflux disease)     Hyperlipidemia     Hypertension     Morbid obesity      Past Surgical History:   Procedure Laterality Date    ACHILLES TENDON SURGERY Left     CARPAL TUNNEL RELEASE Left      SECTION      CHOLECYSTECTOMY      COLON SURGERY      HERNIA REPAIR      HYSTERECTOMY      partial    KIDNEY STONE SURGERY      OOPHORECTOMY      TRIGGER FINGER RELEASE Left      Social History     Socioeconomic History    Marital status:      Spouse name: Not on file    Number of children: Not on file    Years of education: Not on file    Highest education level: Not on file   Social Needs    Financial resource strain: Not on file    Food insecurity - worry: Not on file    Food  insecurity - inability: Not on file    Transportation needs - medical: Not on file    Transportation needs - non-medical: Not on file   Occupational History    Not on file   Tobacco Use    Smoking status: Never Smoker    Smokeless tobacco: Never Used   Substance and Sexual Activity    Alcohol use: No    Drug use: No    Sexual activity: Not Currently     Partners: Male   Other Topics Concern    Not on file   Social History Narrative    Not on file     Family History   Problem Relation Age of Onset    Breast cancer Neg Hx        Objective:      /78 (BP Location: Right arm, Patient Position: Sitting, BP Method: Large (Automatic))   Pulse 76   Temp 97 °F (36.1 °C) (Tympanic)   Wt 130.2 kg (287 lb)   LMP 06/10/2002   SpO2 96%   BMI 49.26 kg/m²   Physical Exam   Constitutional: She is oriented to person, place, and time. She appears well-developed and well-nourished. No distress.   obese   Cardiovascular: Normal rate, regular rhythm and normal heart sounds.   No murmur heard.  Pulmonary/Chest: Effort normal and breath sounds normal. No stridor. No respiratory distress.   Neurological: She is alert and oriented to person, place, and time.   Skin: She is not diaphoretic.   Psychiatric: She has a normal mood and affect. Her behavior is normal. Judgment and thought content normal.   Vitals reviewed.      Assessment:       1. Type 2 diabetes mellitus without complication, with long-term current use of insulin        Plan:       Type 2 diabetes mellitus without complication, with long-term current use of insulin  -     Hemoglobin A1c; Future; Expected date: 11/13/2018  -     Continue regimen of metformin, actos, and lantus            Risks, benefits, and side effects were discussed with the patient. All questions were answered to the fullest satisfaction of the patient, and pt verbalized understanding and agreement to treatment plan. Pt was to call with any new or worsening symptoms, or present to the  WH[facility]NEY

## 2018-11-15 RX ORDER — INSULIN GLARGINE 100 [IU]/ML
INJECTION, SOLUTION SUBCUTANEOUS
Qty: 18 ML | Refills: 3 | Status: SHIPPED | OUTPATIENT
Start: 2018-11-15 | End: 2019-11-19

## 2018-11-15 NOTE — TELEPHONE ENCOUNTER
Pt notified.      Pt request refill.  Please advise.  Thank you,  Randi      ----- Message from Pako Mujica sent at 11/15/2018 12:04 PM [facility] -----  Contact: BOB PATRICK [[id number]]      Can the clinic reply in [facility]: BOB PATRICK [[id number]]      Please refill the medication(s) listed below. Please call the patient when the prescription(s) is ready for  at this phone number   [phone]       Medication #1 LANTUS SOLOSTAR U-100 INSULIN 100 unit/mL (3 mL) InPn pen      Preferred Pharmacy: [facility] [address] [place] [address], [place] - [address]

## 2018-12-05 ENCOUNTER — OFFICE VISIT (OUTPATIENT)
Dept: PODIATRY | Facility: CLINIC | Age: 48
End: 2018-12-05
Payer: MEDICAID

## 2018-12-05 VITALS
TEMPERATURE: 98 F | BODY MASS INDEX: 49 KG/M2 | HEIGHT: 64 IN | HEART RATE: 75 BPM | SYSTOLIC BLOOD PRESSURE: 135 MMHG | WEIGHT: 287 LBS | DIASTOLIC BLOOD PRESSURE: 87 MMHG

## 2018-12-05 DIAGNOSIS — M76.62 ACHILLES TENDINITIS OF LEFT LOWER EXTREMITY: Primary | ICD-10-CM

## 2018-12-05 DIAGNOSIS — G47.00 INSOMNIA, UNSPECIFIED TYPE: ICD-10-CM

## 2018-12-05 PROCEDURE — 99999 PR PBB SHADOW E&M-EST. PATIENT-LVL III: CPT | Mod: PBBFAC,,, | Performed by: PODIATRIST

## 2018-12-05 PROCEDURE — 99213 OFFICE O/P EST LOW 20 MIN: CPT | Mod: S$PBB,,, | Performed by: PODIATRIST

## 2018-12-05 PROCEDURE — 99213 OFFICE O/P EST LOW 20 MIN: CPT | Mod: PBBFAC | Performed by: PODIATRIST

## 2018-12-05 RX ORDER — ZOLPIDEM TARTRATE 10 MG/1
10 TABLET ORAL NIGHTLY PRN
Qty: 30 TABLET | Refills: 1 | Status: SHIPPED | OUTPATIENT
Start: 2018-12-05 | End: 2019-02-07 | Stop reason: SDUPTHER

## 2018-12-05 RX ORDER — ATORVASTATIN CALCIUM 40 MG/1
TABLET, FILM COATED ORAL
Qty: 30 TABLET | Refills: 2 | Status: SHIPPED | OUTPATIENT
Start: 2018-12-05 | End: 2019-02-25 | Stop reason: SDUPTHER

## 2018-12-05 NOTE — TELEPHONE ENCOUNTER
Pt request refill.  Please advise.  Thank you,  Randi      ----- Message from Chelly Randolph sent at 12/5/2018  3:13 PM CST -----  Contact: [phone]  Patient requesting a refill on atorvastatin and zolpidem.    Patient will be using   [facility] [address] [facility] [place] [facility] [address]  [address]  [place] [address]  Phone: [phone] Fax: [phone]    Please call patient at [phone]. Thanks!

## 2018-12-06 ENCOUNTER — TELEPHONE (OUTPATIENT)
Dept: FAMILY MEDICINE | Facility: CLINIC | Age: 48
End: 2018-12-06

## 2018-12-06 NOTE — TELEPHONE ENCOUNTER
1 qd?  Please advise, thanks.      ----- Message from Keesha Randolph sent at 12/6/2018 11:14 AM CST -----  Contact:  from [facility] can be reached at [phone]  Pharmacy is calling for directions for atorvastatin (LIPITOR) 40 MG tablet      Thank you!

## 2018-12-09 NOTE — PROGRESS NOTES
Subjective:       Patient ID: Shweta Gupta is a 48 y.o. female.    Chief Complaint: Follow-up and Foot Problem    Patient presents for follow-up Achilles tendinitis left.  Patient states she finished her physical therapy on October 1st she relates she really thinks it was helping her quite a bit her foot is not as stiff or tight.  Foot Pain          Review of Systems   Skin: Positive for wound.   All other systems reviewed and are negative.      Objective:      Physical Exam   Constitutional: She appears well-developed and well-nourished.   Cardiovascular:   Pulses:       Dorsalis pedis pulses are 2+ on the right side, and 2+ on the left side.        Posterior tibial pulses are 2+ on the right side, and 2+ on the left side.   Musculoskeletal:        Left foot: There is decreased range of motion, tenderness and swelling.        Feet:    Feet:   Left Foot:   Skin Integrity: Positive for ulcer.   Neurological: She is alert.   Skin: Capillary refill takes 2 to 3 seconds.   Psychiatric: She has a normal mood and affect.       Patient has findings consistent with Achilles tendinitis left there is tightness along the course of the Achilles left.  No skin breaks no active signs of infection noted.  Assessment:       1. Achilles tendinitis of left lower extremity        Plan:       Patient indicates she is continuing to do a lot better but she feels as if she has plateaued with physical therapy she states initially it helped a lot now it is not really helping at this point I have advised her I am okay with her discontinuing physical therapy if she does not feel that it is helping her.  I did dispense diabetic insole to help give the patient better support in her shoes I believe this will help to make her left heel and Achilles tendon area feel better I am going to plan to see her for follow-up in about a month she is going to continue to do her physical therapy exercises at home.  Total face-to-face time equaled 15 min.

## 2018-12-13 ENCOUNTER — TELEPHONE (OUTPATIENT)
Dept: FAMILY MEDICINE | Facility: CLINIC | Age: 48
End: 2018-12-13

## 2018-12-13 NOTE — TELEPHONE ENCOUNTER
[facility] call stating there is a drug interaction between the Lyrica, Ambien, and the Herminie patient gets from Dr. Morgan. Please advise if it's ok to fill    ----- Message from Tonya Garcia sent at 12/13/2018  9:31 AM CST -----  Contact: Sa w/[facility] [phone]  Please call her regarding a drug interaction with lyrica, ambien and norco.  Thank you!

## 2019-01-07 ENCOUNTER — TELEPHONE (OUTPATIENT)
Dept: FAMILY MEDICINE | Facility: CLINIC | Age: 49
End: 2019-01-07

## 2019-01-07 ENCOUNTER — OFFICE VISIT (OUTPATIENT)
Dept: PODIATRY | Facility: CLINIC | Age: 49
End: 2019-01-07
Payer: MEDICAID

## 2019-01-07 VITALS
DIASTOLIC BLOOD PRESSURE: 66 MMHG | TEMPERATURE: 98 F | WEIGHT: 287 LBS | HEIGHT: 64 IN | SYSTOLIC BLOOD PRESSURE: 108 MMHG | BODY MASS INDEX: 49 KG/M2 | HEART RATE: 85 BPM

## 2019-01-07 DIAGNOSIS — M72.2 PLANTAR FASCIITIS: ICD-10-CM

## 2019-01-07 DIAGNOSIS — M76.62 ACHILLES TENDINITIS OF LEFT LOWER EXTREMITY: Primary | ICD-10-CM

## 2019-01-07 PROCEDURE — 99213 OFFICE O/P EST LOW 20 MIN: CPT | Mod: PBBFAC | Performed by: PODIATRIST

## 2019-01-07 PROCEDURE — 99999 PR PBB SHADOW E&M-EST. PATIENT-LVL III: ICD-10-PCS | Mod: PBBFAC,,, | Performed by: PODIATRIST

## 2019-01-07 PROCEDURE — 99999 PR PBB SHADOW E&M-EST. PATIENT-LVL III: CPT | Mod: PBBFAC,,, | Performed by: PODIATRIST

## 2019-01-07 PROCEDURE — 99213 PR OFFICE/OUTPT VISIT, EST, LEVL III, 20-29 MIN: ICD-10-PCS | Mod: S$PBB,,, | Performed by: PODIATRIST

## 2019-01-07 PROCEDURE — 99213 OFFICE O/P EST LOW 20 MIN: CPT | Mod: S$PBB,,, | Performed by: PODIATRIST

## 2019-01-07 RX ORDER — DICLOFENAC SODIUM 75 MG/1
75 TABLET, DELAYED RELEASE ORAL 2 TIMES DAILY
Qty: 60 TABLET | Refills: 6 | Status: SHIPPED | OUTPATIENT
Start: 2019-01-07 | End: 2019-02-06

## 2019-01-07 RX ORDER — PEN NEEDLE, DIABETIC 31 GX5/16"
NEEDLE, DISPOSABLE MISCELLANEOUS
Refills: 11 | COMMUNITY
Start: 2018-12-26

## 2019-01-07 NOTE — TELEPHONE ENCOUNTER
"+Spoke with pharmacist. States "She is taking ambien and Norco together. It can increas repertory depression. I just want to make sure it is okay to fill".   Please advise.     ----- Message from RT Zari sent at 1/7/2019  1:39 PM CST -----  Contact: Lita,[phone], [facility]  Lita,[phone], [facility], requesting to inform ambien was prescribed for the pt and the pt is also taking medication Bivins from another doctor,pt is currently at pharmacy, thanks.    "

## 2019-01-08 ENCOUNTER — LAB VISIT (OUTPATIENT)
Dept: LAB | Facility: HOSPITAL | Age: 49
End: 2019-01-08
Attending: INTERNAL MEDICINE
Payer: MEDICAID

## 2019-01-08 DIAGNOSIS — E78.5 HYPERLIPEMIA: Primary | ICD-10-CM

## 2019-01-08 LAB
CHOLEST SERPL-MCNC: 179 MG/DL
CHOLEST/HDLC SERPL: 4.5 {RATIO}
HDLC SERPL-MCNC: 40 MG/DL
HDLC SERPL: 22.3 %
LDLC SERPL CALC-MCNC: 93.8 MG/DL
NONHDLC SERPL-MCNC: 139 MG/DL
TRIGL SERPL-MCNC: 226 MG/DL

## 2019-01-08 PROCEDURE — 80061 LIPID PANEL: CPT

## 2019-01-08 PROCEDURE — 36415 COLL VENOUS BLD VENIPUNCTURE: CPT

## 2019-01-12 NOTE — PROGRESS NOTES
Subjective:       Patient ID: Shweta Gupta is a 48 y.o. female.    Chief Complaint: Follow-up; Foot Pain; and Foot Problem    Patient presents for follow-up Achilles tendinitis left.    Foot Pain          Review of Systems   Skin: Positive for wound.   All other systems reviewed and are negative.      Objective:      Physical Exam   Constitutional: She appears well-developed and well-nourished.   Cardiovascular:   Pulses:       Dorsalis pedis pulses are 2+ on the right side, and 2+ on the left side.        Posterior tibial pulses are 2+ on the right side, and 2+ on the left side.   Musculoskeletal:        Left foot: There is decreased range of motion, tenderness and swelling.        Feet:    Feet:   Left Foot:   Skin Integrity: Positive for ulcer.   Neurological: She is alert.   Skin: Capillary refill takes 2 to 3 seconds.   Psychiatric: She has a normal mood and affect.       Patient has findings consistent with Achilles tendinitis left there is tightness along the course of the Achilles left.  No skin breaks no active signs of infection noted.  Assessment:       1. Achilles tendinitis of left lower extremity    2. Plantar fasciitis        Plan:         Patient presents today for followup Achilles tendinitis left patient also has a history of plantar fasciitis she states she has worn the insoles that I dispensed to her however she recently purchased some new shoes that she feels has enough support and she does not have to use the support in these new shoes she relates she is having a lot less heel pain with the new support and overall she is doing better the patient had questioned me about some exercises she can do at home she has completed her physical therapy.  Patient was given a series of stretching exercises that she really needs to do 3 times a day breakfast lunch and dinner to help stretch her Achilles especially after she has been sitting for any period of time she was shown how to do these I have  recommended she do these as directed and get the habit of doing these on a daily basis I have also change the patient from Mobic to diclofenac to better help with inflammation patient also currently takes Lyrica 200 mg 3 times a day which she states helps quite a bit she has been experiencing some cramping at night I feel that the stretching and the changed anti-inflammatory will help her significantly I plan to follow up with the patient as needed she should continue to improve with the stretching if not her she if she starts to have increased discomfort she is to contact me immediately.  Patient has no open wounds no signs of infection.  Total face-to-face time including discussion evaluation and treatment equaled 20 min.

## 2019-01-16 RX ORDER — FUROSEMIDE 40 MG/1
40 TABLET ORAL 2 TIMES DAILY
Qty: 60 TABLET | Refills: 1 | Status: SHIPPED | OUTPATIENT
Start: 2019-01-16 | End: 2019-02-25 | Stop reason: SDUPTHER

## 2019-01-16 RX ORDER — FUROSEMIDE 40 MG/1
TABLET ORAL
Qty: 60 TABLET | Refills: 3 | OUTPATIENT
Start: 2019-01-16

## 2019-01-16 RX ORDER — PREGABALIN 200 MG/1
200 CAPSULE ORAL 3 TIMES DAILY
Qty: 90 CAPSULE | Refills: 1 | Status: SHIPPED | OUTPATIENT
Start: 2019-01-16

## 2019-01-16 NOTE — TELEPHONE ENCOUNTER
----- Message from Richard Kolb sent at 1/16/2019 12:49 PM CST -----  Contact: Patient  Type:  RX Refill Request    Who Called:  Patient  Refill or New Rx:  Refill  RX Name and Strength:    1. furosemide (LASIX) 40 MG tablet  2. lyrica (unlisted)  How is the patient currently taking it? (ex. 1XDay):  As ordered  Is this a 30 day or 90 day RX:  90  Preferred Pharmacy with phone number:    [facility], [place] - [address]  [address]  [place] [address]  Phone: [phone] Fax: [phone]  Local or Mail Order:  Local  Ordering Provider:  Dr. Be Mckoy Call Back Number:  [phone]  Additional Information:  [place]

## 2019-01-17 NOTE — TELEPHONE ENCOUNTER
"+Spoke with pt. Stated "I used to see Dr. MOTLEY before Dr. MARIN went into office. On my insurance I only get 6 for medicaid. I am on more medications than 6. Is there something that can be done on your end? To get me more slots so they can pay for my medication? I might have to call the insurance company and you might have to call too. It is hard to explain. Just let her know that we have done that before and Dr. MOTLEY approved it. Whatever she can do to help me get more slots.".  Informed pt I would relay information to DO. Verbalized an understanding.     ----- Message from Yessi Brower sent at 1/17/2019  4:10 PM CST -----  Contact: pt  ...Type: Needs Medical Advice    Who Called:  pt  Best Call Back Number:   Additional Information: Pt would like to speak with a nurse in regards to her medications  Please call to advise  Thanks    "

## 2019-01-18 NOTE — TELEPHONE ENCOUNTER
"+++Spoke with pt. Informed of DO communication. Verbalized an understanding. States "I will call my insurance company".       ++[id number] to call pt. LVM x2      +@[phone] Attempted to call pt. LVM x1    "

## 2019-02-07 ENCOUNTER — TELEPHONE (OUTPATIENT)
Dept: FAMILY MEDICINE | Facility: CLINIC | Age: 49
End: 2019-02-07

## 2019-02-07 DIAGNOSIS — G47.00 INSOMNIA, UNSPECIFIED TYPE: ICD-10-CM

## 2019-02-07 RX ORDER — MONTELUKAST SODIUM 10 MG/1
10 TABLET ORAL DAILY
Qty: 90 TABLET | Refills: 1 | Status: SHIPPED | OUTPATIENT
Start: 2019-02-07 | End: 2019-08-23 | Stop reason: SDUPTHER

## 2019-02-07 RX ORDER — ZOLPIDEM TARTRATE 10 MG/1
10 TABLET ORAL NIGHTLY PRN
Qty: 30 TABLET | Refills: 1 | Status: SHIPPED | OUTPATIENT
Start: 2019-02-07 | End: 2019-04-18 | Stop reason: SDUPTHER

## 2019-02-07 NOTE — TELEPHONE ENCOUNTER
Pt notified      Pt request refill.  Pt last seen 11/13/2019.  Please advise.  Thank you,  Randi      ----- Message from Severo Avilez sent at 2/7/2019 10:07 AM [facility] [[email or web address]] -----  Contact: same  Patient called in and is requesting refills on the following Rx's:    1.  zolpidem (AMBIEN) 10 mg Tab, Take 1 tablet (10 mg total) by mouth nightly as needed. - Oral, 30 tablets with 1 refill last filled on 12/5/18    2.  montelukast (SINGULAIR) 10 mg tablet, Take 1 tablet (10 mg total) by mouth once daily. - Oral, 90 tablets with 1 refill last filled on 8/17/18      [facility] [place], [place] - [address]  [address]  [place] [address]  Phone: [phone] Fax: [phone]    Patient call back number is [phone]

## 2019-02-07 NOTE — TELEPHONE ENCOUNTER
"+Spoke with pt. States "I don't know if it is a PA. This could be a new thing. They said they sent something". Informed pt that we will received a fax from pharmacy and it will be given to DO. Pt states "Okay, great. I don't know how it was sent but they told me they sent it".       ----- Message from Arlette Romero sent at 2/7/2019 12:36 PM CST -----  Contact: pt  Calling in regards to [facility] pharmacy need authorization on medicine Zolpidem  to make sure it is okay for it to interact with one another and please advise [phone]    "

## 2019-02-08 ENCOUNTER — TELEPHONE (OUTPATIENT)
Dept: FAMILY MEDICINE | Facility: CLINIC | Age: 49
End: 2019-02-08

## 2019-02-08 NOTE — TELEPHONE ENCOUNTER
++Informed pharmacy we have not received PA. States they will fax it to clinic.     +Informed pt we have not received PA for Rx. Informed pt that clinic will call pharmacy to confirm fax number; without PA request form we cannot start PA. Verbalized an understanding.     ----- Message from Morro Koo sent at 2/8/2019 10:50 AM CST -----  Contact: Patient  Advised pharmacy is requiring authorization for her ambien prescription since she also is prescribed hydrocodone. This is her second request and she is completely out of medication.  Please call [phone]

## 2019-02-11 ENCOUNTER — TELEPHONE (OUTPATIENT)
Dept: FAMILY MEDICINE | Facility: CLINIC | Age: 49
End: 2019-02-11

## 2019-02-11 NOTE — TELEPHONE ENCOUNTER
Attempted to call pt, no answer.      Spoke with pharmacist assistant, told pt pays cash for medication.  Pharmacist report med is $43.      Spoke with pt, pt reports she pays cash for her ambien.  No PA is needed.    Pt ask staff to notify pharmacy so she may obtain her medication.        ----- Message from Tonya Garcia sent at 2/11/2019  4:39 PM CST -----  Contact: self [phone]  She would like to know what the delay is for the prior authorization for the Zopidem?  She is completely out of the medication. Please call her.  Thank you!

## 2019-02-12 ENCOUNTER — TELEPHONE (OUTPATIENT)
Dept: FAMILY MEDICINE | Facility: CLINIC | Age: 49
End: 2019-02-12

## 2019-02-12 NOTE — TELEPHONE ENCOUNTER
Information shared with Dr. Espinosa:    yes the allergy department at [facility] is [phone], Mary Rolle , Dr Mariana Lester is our lead physcian and Mami Ruiz is the lead nurse for the injections. We saved this conversation. You'll see it soon in the Conversations tab in [employer] and in the Conversation History folder in [employer].   [2/12/2019 1:43 PM]    Thank you           [2/12/2019 1:00 PM]    Dr. Espinosa called the clinic for Dr. Lr which is away at a meeting.  Dr. Espinosa from [place] ask if we administer allergy shot in clinic.  I informed Dr. Espinosa we are not allowed to keep pts medication in our refrigerator and pts are not allowed to bring outside medication into clinic for administration.  Or do we administer allergy shots?  Dr. Espinosa is concerned with travel for pt and evaluation for allergic reaction if one were to happen after administration.  Please advise.     To office management      ----- Message from Joselin Romero sent at 2/12/2019 12:46 PM CST -----  Please call Dr Espinosa / allergy  in [place] ...or nurse Talisha [phone] asking for  /  or nurse

## 2019-02-19 ENCOUNTER — TELEPHONE (OUTPATIENT)
Dept: FAMILY MEDICINE | Facility: CLINIC | Age: 49
End: 2019-02-19

## 2019-02-19 NOTE — TELEPHONE ENCOUNTER
Pt and brother scheduled.        ----- Message from Morro Koo sent at 2/19/2019 11:17 AM CST -----  Contact: Patient  Advised needs to speak with nurse regarding her upcoming appnt on 02-21-19. Would like to swap appnts woth her brother Alex MRN [id number]  Please call [phone]

## 2019-02-22 ENCOUNTER — TELEPHONE (OUTPATIENT)
Dept: FAMILY MEDICINE | Facility: CLINIC | Age: 49
End: 2019-02-22

## 2019-02-22 NOTE — TELEPHONE ENCOUNTER
----- Message from Joselin Romero sent at 2/22/2019  7:57 AM CST -----  Pt was sent to an allergy Dr / he is prescribing allergy injections/ instead of going all the way to [place] / asking if Dr can send meds to office and have nurse administer injections ?   Call pt at [phone]

## 2019-02-22 NOTE — TELEPHONE ENCOUNTER
I returned patient's call and advised that she should go to the allergist for the allergy shots.  Opal

## 2019-02-22 NOTE — TELEPHONE ENCOUNTER
Can we do this, or should I call pt and notify that she needs to let the allergist do this for their documentation and monitoring?  Please advise and thank you, Opal

## 2019-02-25 ENCOUNTER — OFFICE VISIT (OUTPATIENT)
Dept: FAMILY MEDICINE | Facility: CLINIC | Age: 49
End: 2019-02-25
Payer: MEDICAID

## 2019-02-25 VITALS
HEIGHT: 64 IN | BODY MASS INDEX: 48.42 KG/M2 | HEART RATE: 79 BPM | RESPIRATION RATE: 18 BRPM | SYSTOLIC BLOOD PRESSURE: 109 MMHG | TEMPERATURE: 97 F | DIASTOLIC BLOOD PRESSURE: 80 MMHG | WEIGHT: 283.63 LBS | OXYGEN SATURATION: 95 %

## 2019-02-25 DIAGNOSIS — Z88.9 MULTIPLE ALLERGIES: ICD-10-CM

## 2019-02-25 DIAGNOSIS — I10 ESSENTIAL HYPERTENSION: ICD-10-CM

## 2019-02-25 DIAGNOSIS — R06.02 SOB (SHORTNESS OF BREATH): ICD-10-CM

## 2019-02-25 DIAGNOSIS — Z79.4 TYPE 2 DIABETES MELLITUS WITHOUT COMPLICATION, WITH LONG-TERM CURRENT USE OF INSULIN: ICD-10-CM

## 2019-02-25 DIAGNOSIS — Z09 FOLLOW UP: Primary | ICD-10-CM

## 2019-02-25 DIAGNOSIS — E11.9 TYPE 2 DIABETES MELLITUS WITHOUT COMPLICATION, WITH LONG-TERM CURRENT USE OF INSULIN: ICD-10-CM

## 2019-02-25 DIAGNOSIS — E66.9 OBESITY, UNSPECIFIED CLASSIFICATION, UNSPECIFIED OBESITY TYPE, UNSPECIFIED WHETHER SERIOUS COMORBIDITY PRESENT: ICD-10-CM

## 2019-02-25 PROCEDURE — 99213 PR OFFICE/OUTPT VISIT, EST, LEVL III, 20-29 MIN: ICD-10-PCS | Mod: S$PBB,,, | Performed by: NURSE PRACTITIONER

## 2019-02-25 PROCEDURE — 99213 OFFICE O/P EST LOW 20 MIN: CPT | Mod: S$PBB,,, | Performed by: NURSE PRACTITIONER

## 2019-02-25 PROCEDURE — 99999 PR PBB SHADOW E&M-EST. PATIENT-LVL III: CPT | Mod: PBBFAC,,, | Performed by: NURSE PRACTITIONER

## 2019-02-25 PROCEDURE — 99213 OFFICE O/P EST LOW 20 MIN: CPT | Mod: PBBFAC,PN | Performed by: NURSE PRACTITIONER

## 2019-02-25 PROCEDURE — 99999 PR PBB SHADOW E&M-EST. PATIENT-LVL III: ICD-10-PCS | Mod: PBBFAC,,, | Performed by: NURSE PRACTITIONER

## 2019-02-25 RX ORDER — FLUTICASONE PROPIONATE AND SALMETEROL 500; 50 UG/1; UG/1
1 POWDER RESPIRATORY (INHALATION) 2 TIMES DAILY
Qty: 1 INHALER | Refills: 2 | Status: SHIPPED | OUTPATIENT
Start: 2019-02-25 | End: 2023-05-12 | Stop reason: SDUPTHER

## 2019-02-25 RX ORDER — BUPROPION HYDROCHLORIDE 150 MG/1
150 TABLET ORAL DAILY
Qty: 90 TABLET | Refills: 2 | Status: SHIPPED | OUTPATIENT
Start: 2019-02-25 | End: 2019-06-13 | Stop reason: SDUPTHER

## 2019-02-25 RX ORDER — FUROSEMIDE 40 MG/1
40 TABLET ORAL 2 TIMES DAILY
Qty: 120 TABLET | Refills: 2 | Status: SHIPPED | OUTPATIENT
Start: 2019-02-25 | End: 2019-05-17 | Stop reason: SDUPTHER

## 2019-02-25 RX ORDER — ATORVASTATIN CALCIUM 40 MG/1
TABLET, FILM COATED ORAL
Qty: 90 TABLET | Refills: 2 | Status: SHIPPED | OUTPATIENT
Start: 2019-02-25 | End: 2019-03-20 | Stop reason: SDUPTHER

## 2019-02-25 NOTE — PROGRESS NOTES
Subjective:       Patient ID: Shweta Gupta is a 48 y.o. female.    Chief Complaint: Follow-up (3 month)    Shweta Gupta is a 48 year old female who presents to the clinic today for 3 month follow up. She has been seen by Allergist and scheduled to have weekly allergen injections. Overall, she reports her allergies and asthma is taking a toil on her. She is currently taking the albuterol inhaler PRN, and not taking her advair because she stated the nose sprays were helping, but now she feels like they are not as effective. Overall, she is requesting 3 month refills on her medication for insurance purposes. She denies any cp, fever, chills, edema, n/v/d. She does report intermittent SOB at rest and with activity, and allergy related symptoms.   In regards to the patient's hypertension, patient denies chest pain/sob, and has been compliant with the medication regimen.  Hemoglobin A1C       Date                     Value               Ref Range           Status                11/13/2018               6.7 (H)             4.5 - 6.2 %         Final              Comment:    According to ADA guidelines, hemoglobin A1C <7.0% represents  optimal control in non-pregnant diabetic patients.  Different  metrics may apply to specific populations.   Standards of Medical Care in Diabetes - 2016.  For the purpose of screening for the presence of diabetes:  <5.7%     Consistent with the absence of diabetes  5.7-6.4%  Consistent with increasing risk for diabetes   (prediabetes)  >or=6.5%  Consistent with diabetes  Currently no consensus exists for use of hemoglobin A1C  for diagnosis of diabetes for children.         07/24/2018               6.7 (H)             4.5 - 6.2 %         Final              Comment:    According to ADA guidelines, hemoglobin A1C <7.0% represents  optimal control in non-pregnant diabetic patients.  Different  metrics may apply to specific populations.   Standards of Medical Care in Diabetes - 2016.  For the  purpose of screening for the presence of diabetes:  <5.7%     Consistent with the absence of diabetes  5.7-6.4%  Consistent with increasing risk for diabetes   (prediabetes)  >or=6.5%  Consistent with diabetes  Currently no consensus exists for use of hemoglobin A1C  for diagnosis of diabetes for children.         04/09/2018               7.2 (H)             4.5 - 6.2 %         Final              Comment:    According to ADA guidelines, hemoglobin A1C <7.0% represents  optimal control in non-pregnant diabetic patients.  Different  metrics may apply to specific populations.   Standards of Medical Care in Diabetes - 2016.  For the purpose of screening for the presence of diabetes:  <5.7%     Consistent with the absence of diabetes  5.7-6.4%  Consistent with increasing risk for diabetes   (prediabetes)  >or=6.5%  Consistent with diabetes  Currently no consensus exists for use of hemoglobin A1C  for diagnosis of diabetes for children.    ----------  Goal of < 7  Meds and labs as per orders           Review of Systems   Constitutional: Negative for activity change, appetite change, chills, fatigue, fever and unexpected weight change.   HENT: Positive for postnasal drip, rhinorrhea and sneezing. Negative for congestion, drooling, ear discharge, ear pain, facial swelling, hearing loss, mouth sores, sinus pressure, sinus pain, sore throat and tinnitus.    Eyes: Negative for photophobia, pain, redness and visual disturbance.   Respiratory: Positive for cough and shortness of breath. Negative for apnea, choking, chest tightness, wheezing and stridor.    Cardiovascular: Negative for chest pain, palpitations and leg swelling.   Gastrointestinal: Negative for abdominal distention, abdominal pain, blood in stool, constipation, diarrhea, nausea and vomiting.   Endocrine: Negative for cold intolerance, heat intolerance, polydipsia, polyphagia and polyuria.   Genitourinary: Negative for decreased urine volume, difficulty urinating,  "dysuria, enuresis, flank pain, frequency, hematuria and urgency.   Musculoskeletal: Negative for arthralgias, back pain, gait problem, joint swelling, myalgias, neck pain and neck stiffness.   Skin: Negative for color change, pallor, rash and wound.   Allergic/Immunologic: Negative for environmental allergies, food allergies and immunocompromised state.   Neurological: Negative for dizziness, tremors, seizures, syncope, facial asymmetry, speech difficulty, weakness, light-headedness, numbness and headaches.   Hematological: Does not bruise/bleed easily.   Psychiatric/Behavioral: Negative for agitation, confusion, decreased concentration, dysphoric mood, hallucinations, self-injury, sleep disturbance and suicidal ideas. The patient is not nervous/anxious and is not hyperactive.          Reviewed family, medical, surgical, and social history.    Objective:      /80 (BP Location: Right arm, Patient Position: Sitting, BP Method: Medium (Automatic))   Pulse 79   Temp 97.4 °F (36.3 °C) (Tympanic)   Resp 18   Ht 5' 4" (1.626 m)   Wt 128.6 kg (283 lb 9.6 oz)   LMP 06/10/2002   SpO2 95%   BMI 48.68 kg/m²   Physical Exam   Constitutional: She is oriented to person, place, and time. She appears well-developed and well-nourished. She is not intubated.   HENT:   Head: Normocephalic.   Right Ear: Hearing, external ear and ear canal normal. No swelling or tenderness. Tympanic membrane is not erythematous.   Left Ear: Hearing, external ear and ear canal normal. No swelling or tenderness. Tympanic membrane is not erythematous.   Nose: Rhinorrhea present. No sinus tenderness. Right sinus exhibits no maxillary sinus tenderness and no frontal sinus tenderness. Left sinus exhibits no maxillary sinus tenderness and no frontal sinus tenderness.   Mouth/Throat: Uvula is midline. No uvula swelling. No posterior oropharyngeal edema or posterior oropharyngeal erythema.       Eyes: Conjunctivae, EOM and lids are normal. Pupils " are equal, round, and reactive to light.   Neck: Trachea normal and normal range of motion. No JVD present. Carotid bruit is not present.   Cardiovascular: Normal rate, regular rhythm, S1 normal, S2 normal, normal heart sounds and intact distal pulses. Exam reveals no gallop and no friction rub.   No murmur heard.  Pulmonary/Chest: Effort normal. No accessory muscle usage or stridor. No apnea, no tachypnea and no bradypnea. She is not intubated. No respiratory distress. She has no decreased breath sounds. She has no wheezes. She has no rhonchi. She has no rales (Noted to left lower quadrant - cleared with cough).         She exhibits no tenderness.   Abdominal: Soft. Bowel sounds are normal. She exhibits no distension and no mass. There is no tenderness. There is no guarding. No hernia.   Musculoskeletal: Normal range of motion.   Lymphadenopathy:        Head (right side): No submandibular, no tonsillar, no preauricular and no posterior auricular adenopathy present.        Head (left side): No submandibular, no tonsillar, no preauricular and no posterior auricular adenopathy present.     She has no cervical adenopathy.   Neurological: She is alert and oriented to person, place, and time.   Skin: Skin is warm and dry. Capillary refill takes less than 2 seconds. No cyanosis. Nails show no clubbing.   Psychiatric: She has a normal mood and affect. Her speech is normal and behavior is normal. Thought content normal.       Assessment:       1. Follow up    2. Type 2 diabetes mellitus without complication, with long-term current use of insulin    3. Essential hypertension    4. Obesity, unspecified classification, unspecified obesity type, unspecified whether serious comorbidity present    5. SOB (shortness of breath)    6. Multiple allergies        Plan:       Follow up    Type 2 diabetes mellitus without complication, with long-term current use of insulin    Essential hypertension    Obesity, unspecified classification,  unspecified obesity type, unspecified whether serious comorbidity present    SOB (shortness of breath)    Multiple allergies    Other orders  -     atorvastatin (LIPITOR) 40 MG tablet; atorvastatin 40 mg tablet  Dispense: 90 tablet; Refill: 2  -     buPROPion (WELLBUTRIN XL) 150 MG TB24 tablet; Take 1 tablet (150 mg total) by mouth once daily.  Dispense: 90 tablet; Refill: 2  -     fluticasone-salmeterol 500-50 mcg/dose (ADVAIR) 500-50 mcg/dose DsDv diskus inhaler; Inhale 1 puff into the lungs 2 (two) times daily. Controller  Dispense: 1 Inhaler; Refill: 2  -     furosemide (LASIX) 40 MG tablet; Take 1 tablet (40 mg total) by mouth 2 (two) times daily.  Dispense: 120 tablet; Refill: 2        PLAN:  - Discussed with patient the plan of care  - Medications reviewed. Medication side effects discussed. Patient has no questions or concerns at this time. Informed patient to notify me regarding any concerns.   - Patient to notify me when she needs ambein  - Continue seeing allergist as ordered  - Patient seeing pain management as ordered   - Continue monitoring and documenting BP and CBG in log book   - Informed patient to please notify me with any questions or concerns at anytime  - Follow up ordered for 3 months                  Risks, benefits, and side effects were discussed with the patient. All questions were answered to the fullest satisfaction of the patient, and pt verbalized understanding and agreement to treatment plan. Pt was to call with any new or worsening symptoms, or present to the ER.

## 2019-03-12 ENCOUNTER — TELEPHONE (OUTPATIENT)
Dept: FAMILY MEDICINE | Facility: CLINIC | Age: 49
End: 2019-03-12

## 2019-03-12 NOTE — TELEPHONE ENCOUNTER
----- Message from Ondina Santos sent at 3/12/2019  4:32 PM CDT -----  Contact: Self  Type:  Same Day Appointment Request    Caller is requesting a same day appointment.  Caller declined first available appointment listed below.      Name of Caller:  patient  When is the first available appointment?  3/18  Symptoms: Rash in fold of her stomach, hurts, infected and has it smells  Best Call Back Number: [phone] (home)   Additional Information:   Needs to be seen tomorrow    
I returned patient's call.  I advised that there is no availability tomorrow and urged pt to go to urgent care or the ER, which pt has agreed to.  Opal    
never intubated

## 2019-03-15 RX ORDER — LISINOPRIL 20 MG/1
20 TABLET ORAL DAILY
Qty: 90 TABLET | Refills: 3 | Status: SHIPPED | OUTPATIENT
Start: 2019-03-15 | End: 2020-04-23 | Stop reason: SDUPTHER

## 2019-03-15 NOTE — TELEPHONE ENCOUNTER
----- Message from Darya Lomax sent at 3/15/2019 10:47 AM CDT -----  Contact: self   Type:  RX Refill Request    Who Called: patient   Refill or New Rx:  Refill   RX Name and Strength:  lisinopril (PRINIVIL,ZESTRIL) 20 MG tablet  Preferred Pharmacy with phone number:    [facility] - [place] - [address]  [address]  [place] [address]  Phone: [phone] Fax: [phone]  Best Call Back Number: [phone] (home)

## 2019-03-16 RX ORDER — PANTOPRAZOLE SODIUM 40 MG/1
TABLET, DELAYED RELEASE ORAL
Qty: 90 TABLET | Refills: 3 | OUTPATIENT
Start: 2019-03-16

## 2019-03-20 RX ORDER — PANTOPRAZOLE SODIUM 40 MG/1
40 TABLET, DELAYED RELEASE ORAL DAILY
Qty: 90 TABLET | Refills: 3 | Status: SHIPPED | OUTPATIENT
Start: 2019-03-20 | End: 2019-06-13 | Stop reason: SDUPTHER

## 2019-03-20 RX ORDER — ATORVASTATIN CALCIUM 40 MG/1
TABLET, FILM COATED ORAL
Qty: 90 TABLET | Refills: 3 | Status: SHIPPED | OUTPATIENT
Start: 2019-03-20 | End: 2020-03-17 | Stop reason: SDUPTHER

## 2019-03-20 NOTE — TELEPHONE ENCOUNTER
----- Message from Keesha Hutchins sent at 3/20/2019  8:29 AM CDT -----  Contact: self  Patient [phone] is requesting refills on Atorvastatin 40mg - takes one daily/Pantoprazole 40mg - takes one daily/patient did call to pharmacy      [facility] - [place] - [address]  [address]  [place] [address]  Phone: [phone] Fax: [phone]

## 2019-03-22 ENCOUNTER — TELEPHONE (OUTPATIENT)
Dept: FAMILY MEDICINE | Facility: CLINIC | Age: 49
End: 2019-03-22

## 2019-03-22 NOTE — TELEPHONE ENCOUNTER
----- Message from Chelly Randolph sent at 3/22/2019  9:10 AM CDT -----  Contact: [phone]  Patient is requesting a call back from the nurse stated pharmacy need directions on prescription that was sent over.  Please call the patient upon request at phone number [phone].

## 2019-03-22 NOTE — TELEPHONE ENCOUNTER
I have spoken with pharmacist, Brit, and given directions for atorvastatin of take one tablet hs.  Opal

## 2019-04-18 ENCOUNTER — TELEPHONE (OUTPATIENT)
Dept: FAMILY MEDICINE | Facility: CLINIC | Age: 49
End: 2019-04-18

## 2019-04-18 DIAGNOSIS — G47.00 INSOMNIA, UNSPECIFIED TYPE: ICD-10-CM

## 2019-04-18 LAB
LEFT EYE DM RETINOPATHY: NEGATIVE
RIGHT EYE DM RETINOPATHY: NEGATIVE

## 2019-04-18 RX ORDER — ZOLPIDEM TARTRATE 10 MG/1
10 TABLET ORAL NIGHTLY PRN
Qty: 30 TABLET | Refills: 1 | Status: SHIPPED | OUTPATIENT
Start: 2019-04-18 | End: 2019-06-13 | Stop reason: SDUPTHER

## 2019-04-18 RX ORDER — POTASSIUM CHLORIDE 20 MEQ/1
20 TABLET, EXTENDED RELEASE ORAL DAILY
Qty: 30 TABLET | Refills: 11 | Status: SHIPPED | OUTPATIENT
Start: 2019-04-18 | End: 2019-05-29 | Stop reason: SDUPTHER

## 2019-04-18 NOTE — TELEPHONE ENCOUNTER
PA's will be completed as received.         ----- Message from Naomie Red sent at 4/18/2019 10:19 AM CDT -----  Contact: Patient  Type: Needs Medical Advice    Who Called:  Patient  Best Call Back Number:   Additional Information: Calling to request a prior authorization for the zolpidem (AMBIEN) 10 mg Tab. She will be completely out of medication after tomorrow. Please advise.

## 2019-04-18 NOTE — TELEPHONE ENCOUNTER
----- Message from Joselin Romero sent at 4/18/2019  8:08 AM CDT -----  Type:  RX Refill Request    Who Called:  self  Refill or New Rx:  refill  RX Name and Strength:  Potassium / Zolpidem   How is the patient currently taking it? (ex. 1XDay):     Is this a 30 day or 90 day RX:     Preferred Pharmacy with phone number:    [facility] - [place], [place] - [address]  [address]  [place] [address]  Phone: [phone] Fax: [phone]    Local or Mail Order:  local  Ordering Provider:  Edwin Mckoy Call Back Number:  [phone]   Additional Information:

## 2019-05-08 ENCOUNTER — TELEPHONE (OUTPATIENT)
Dept: PODIATRY | Facility: CLINIC | Age: 49
End: 2019-05-08

## 2019-05-08 NOTE — TELEPHONE ENCOUNTER
----- Message from Aleida Ruiz sent at 5/8/2019  2:42 PM CDT -----  Contact: Patient  Type:  Sooner Apoointment Request    Caller is requesting a sooner appointment.  Caller declined first available appointment listed below.  Caller will not accept being placed on the waitlist and is requesting a message be sent to doctor.    Name of Caller:  Patient  When is the first available appointment?  5/20/19  Symptoms:  Feet pain-both feet  Best Call Back Number:  [phone] (home)    Additional Information:  na

## 2019-05-09 LAB
LEFT EYE DM RETINOPATHY: NEGATIVE
RIGHT EYE DM RETINOPATHY: NEGATIVE

## 2019-05-17 DIAGNOSIS — E11.9 TYPE 2 DIABETES MELLITUS WITHOUT COMPLICATION, WITH LONG-TERM CURRENT USE OF INSULIN: ICD-10-CM

## 2019-05-17 DIAGNOSIS — Z79.4 TYPE 2 DIABETES MELLITUS WITHOUT COMPLICATION, WITH LONG-TERM CURRENT USE OF INSULIN: ICD-10-CM

## 2019-05-17 RX ORDER — PIOGLITAZONEHYDROCHLORIDE 45 MG/1
45 TABLET ORAL DAILY
Qty: 90 TABLET | Refills: 3 | Status: SHIPPED | OUTPATIENT
Start: 2019-05-17 | End: 2019-05-21 | Stop reason: SDUPTHER

## 2019-05-17 RX ORDER — FUROSEMIDE 40 MG/1
40 TABLET ORAL 2 TIMES DAILY
Qty: 120 TABLET | Refills: 2 | Status: SHIPPED | OUTPATIENT
Start: 2019-05-17 | End: 2020-08-24 | Stop reason: SDUPTHER

## 2019-05-17 NOTE — TELEPHONE ENCOUNTER
----- Message from Richard Kolb sent at 5/17/2019  9:43 AM CDT -----  Contact: Patient  Type:  RX Refill Request    Who Called:  Patient  Refill or New Rx:  Refill  RX Name and Strength:    1. pioglitazone (ACTOS) 45 MG tablet  2. furosemide (LASIX) 40 MG tablet  How is the patient currently taking it? (ex. 1XDay):  As ordered  Is this a 30 day or 90 day RX:  90  Preferred Pharmacy with phone number:    [facility] - [place], [place] - [address]  [address]  [place] [address]  Phone: [phone] Fax: [phone]  Local or Mail Order:  Local  Ordering Provider:  Dr. Be Mckoy Call Back Number:  [phone]  Additional Information:  [place]

## 2019-05-21 ENCOUNTER — TELEPHONE (OUTPATIENT)
Dept: SURGERY | Facility: CLINIC | Age: 49
End: 2019-05-21

## 2019-05-21 DIAGNOSIS — Z79.4 TYPE 2 DIABETES MELLITUS WITHOUT COMPLICATION, WITH LONG-TERM CURRENT USE OF INSULIN: ICD-10-CM

## 2019-05-21 DIAGNOSIS — E11.9 TYPE 2 DIABETES MELLITUS WITHOUT COMPLICATION, WITH LONG-TERM CURRENT USE OF INSULIN: ICD-10-CM

## 2019-05-21 RX ORDER — PIOGLITAZONEHYDROCHLORIDE 45 MG/1
45 TABLET ORAL DAILY
Qty: 90 TABLET | Refills: 3 | Status: SHIPPED | OUTPATIENT
Start: 2019-05-21 | End: 2019-05-29 | Stop reason: SDUPTHER

## 2019-05-21 NOTE — TELEPHONE ENCOUNTER
----- Message from [place] Vazquez sent at 5/21/2019  1:03 PM CDT -----  Contact: Self/[phone]  Type: RX Refill Request    Who Called: patient    Refill or New Rx:refill    RX Name and Strength:pioglitazone (ACTOS) 45 MG tablet    Preferred Pharmacy with phone number:[facility] [address] [place] [address], [place] - [address] [phone] (Phone)  [phone] (Fax)    Local or Mail Order:Local    Ordering Provider:Dr. Soriano    Would the patient rather a call back or a response via [facility]? Call back    Best Call Back Number:[phone]      Thank you

## 2019-05-21 NOTE — TELEPHONE ENCOUNTER
----- Message from Joellen Wheeler RN sent at 5/21/2019  1:31 PM CDT -----  Contact: Self/[phone]  Good afternoon,     This message was sent to our office by mistake. I called Ms. Rock, and she is requesting a refill for her Actos.  I advised her that I would be sending this message to your office.     Thanks,     Joellen :)    ----- Message -----  From: [place] Vazquez  Sent: 5/21/2019   1:03 PM  To: Bo Cassidy Staff    Type: RX Refill Request    Who Called: patient    Refill or New Rx:refill    RX Name and Strength:pioglitazone (ACTOS) 45 MG tablet    Preferred Pharmacy with phone number:[facility] [address] [place] [address], [place] - [address] [phone] (Phone)  [phone] (Fax)    Local or Mail Order:Local    Ordering Provider:Dr. Soriano    Would the patient rather a call back or a response via [facility]? Call back    Best Call Back Number:[phone]      Thank you

## 2019-05-21 NOTE — TELEPHONE ENCOUNTER
Phoned pt and advised her that her PCP would need to refill this prescription.  She was aware of this and states the message was sent to our office by mistake.  Forwarded message with refill request to KARI Pineda NP's office.

## 2019-05-22 ENCOUNTER — TELEPHONE (OUTPATIENT)
Dept: FAMILY MEDICINE | Facility: CLINIC | Age: 49
End: 2019-05-22

## 2019-05-22 NOTE — TELEPHONE ENCOUNTER
Confirmed the pharmacy received rx. Patient notified        ----- Message from Keesha Hutchins sent at 5/22/2019  1:30 PM CDT -----  Contact: self  Patient [phone] has been calling since last week about having her Actos sent to her pharmacy and as of today 05 22 19 [facility] still does not have this prescription/please send to [facility] as soon as possible and advise patient when sent      [facility] - [place] - [address]  [address]  [place] [address]  Phone: [phone] Fax: [phone]

## 2019-05-29 ENCOUNTER — OFFICE VISIT (OUTPATIENT)
Dept: FAMILY MEDICINE | Facility: CLINIC | Age: 49
End: 2019-05-29
Payer: MEDICAID

## 2019-05-29 VITALS
HEART RATE: 81 BPM | BODY MASS INDEX: 46.18 KG/M2 | RESPIRATION RATE: 18 BRPM | SYSTOLIC BLOOD PRESSURE: 133 MMHG | HEIGHT: 64 IN | WEIGHT: 270.5 LBS | OXYGEN SATURATION: 96 % | DIASTOLIC BLOOD PRESSURE: 75 MMHG | TEMPERATURE: 98 F

## 2019-05-29 DIAGNOSIS — E66.9 OBESITY, UNSPECIFIED CLASSIFICATION, UNSPECIFIED OBESITY TYPE, UNSPECIFIED WHETHER SERIOUS COMORBIDITY PRESENT: ICD-10-CM

## 2019-05-29 DIAGNOSIS — E78.5 HYPERLIPIDEMIA, UNSPECIFIED HYPERLIPIDEMIA TYPE: ICD-10-CM

## 2019-05-29 DIAGNOSIS — E11.9 TYPE 2 DIABETES MELLITUS WITHOUT COMPLICATION, WITH LONG-TERM CURRENT USE OF INSULIN: Primary | ICD-10-CM

## 2019-05-29 DIAGNOSIS — Z79.4 TYPE 2 DIABETES MELLITUS WITHOUT COMPLICATION, WITH LONG-TERM CURRENT USE OF INSULIN: Primary | ICD-10-CM

## 2019-05-29 DIAGNOSIS — F32.A DEPRESSIVE DISORDER: ICD-10-CM

## 2019-05-29 DIAGNOSIS — I10 ESSENTIAL HYPERTENSION: ICD-10-CM

## 2019-05-29 DIAGNOSIS — Z88.9 MULTIPLE ALLERGIES: ICD-10-CM

## 2019-05-29 PROCEDURE — 99999 PR PBB SHADOW E&M-EST. PATIENT-LVL III: CPT | Mod: PBBFAC,,, | Performed by: NURSE PRACTITIONER

## 2019-05-29 PROCEDURE — 99213 OFFICE O/P EST LOW 20 MIN: CPT | Mod: PBBFAC,PN | Performed by: NURSE PRACTITIONER

## 2019-05-29 PROCEDURE — 99214 OFFICE O/P EST MOD 30 MIN: CPT | Mod: S$PBB,,, | Performed by: NURSE PRACTITIONER

## 2019-05-29 PROCEDURE — 99999 PR PBB SHADOW E&M-EST. PATIENT-LVL III: ICD-10-PCS | Mod: PBBFAC,,, | Performed by: NURSE PRACTITIONER

## 2019-05-29 PROCEDURE — 99214 PR OFFICE/OUTPT VISIT, EST, LEVL IV, 30-39 MIN: ICD-10-PCS | Mod: S$PBB,,, | Performed by: NURSE PRACTITIONER

## 2019-05-29 RX ORDER — POTASSIUM CHLORIDE 20 MEQ/1
20 TABLET, EXTENDED RELEASE ORAL DAILY
Qty: 90 TABLET | Refills: 3 | Status: SHIPPED | OUTPATIENT
Start: 2019-05-29 | End: 2019-06-13 | Stop reason: SDUPTHER

## 2019-05-29 RX ORDER — PIOGLITAZONEHYDROCHLORIDE 45 MG/1
45 TABLET ORAL DAILY
Qty: 90 TABLET | Refills: 3 | Status: SHIPPED | OUTPATIENT
Start: 2019-05-29 | End: 2019-06-13 | Stop reason: SDUPTHER

## 2019-05-29 RX ORDER — BACLOFEN 20 MG/1
20 TABLET ORAL 3 TIMES DAILY
COMMUNITY
End: 2019-11-19

## 2019-05-29 NOTE — PROGRESS NOTES
Subjective:       Patient ID: Shweta Gupta is a 49 y.o. female.    Chief Complaint: Follow-up (3 month)    Ms. Gupta is a 49 year old female who presents to the clinic today for a 3 month follow up. Today, she has no questions or concerns. She is still seeing allergist and having injections 2x a week. She reports they are helping and improving her life. In regards to the patient's hypertension, patient denies chest pain/sob, and has been compliant with the medication regimen. hypertension well controlled. Goal of <130/80. Meds and labs as per orders. In regards to the patient's dyslipidemia, patient reports has been compliant with the medication regimen  without side effects. In regard to the patient's diabetes, patient reports that blood sugars have been Good control. Reports CBGs around 124.  Patient denies hypoglycemia. Patient is currently on insulin therapy. Takes 60 units in evening of lantus.  Patient is on ACE/ARB and is on statin. Last a1c was Hemoglobin A1C       Date                     Value               Ref Range           Status                11/13/2018               6.7 (H)             4.5 - 6.2 %         Final                  07/24/2018               6.7 (H)             4.5 - 6.2 %         Final                  04/09/2018               7.2 (H)             4.5 - 6.2 %         Final            Comment:  According to ADA guidelines, hemoglobin A1C <7.0% represents optimal control in non-pregnant diabetic patients.  Different metrics may apply to specific populations. Standards of Medical Care in Diabetes - 2016.For the purpose of screening for the presence of diabetes:<5.7%   Consistent with the absence of diabetes 5.7-6.4%  Consistent with increasing risk for diabetes (prediabetes)>or=6.5%  Consistent with diabetes Currently no consensus exists for use of hemoglobin A1C for diagnosis of diabetes for children.    ----------      Review of Systems   Constitutional: Negative for activity change,  appetite change, chills, fatigue, fever and unexpected weight change.   HENT: Negative for congestion, drooling, ear discharge, ear pain, facial swelling, hearing loss, mouth sores, postnasal drip, rhinorrhea, sinus pressure, sinus pain, sneezing, sore throat and tinnitus.         Seeing allergist; injections 2x weekly   Eyes: Negative for photophobia, pain, redness and visual disturbance.   Respiratory: Negative for apnea, cough, choking, chest tightness, shortness of breath, wheezing and stridor.    Cardiovascular: Negative for chest pain, palpitations and leg swelling.   Gastrointestinal: Negative for abdominal distention, abdominal pain, blood in stool, constipation, diarrhea, nausea and vomiting.   Endocrine: Negative for cold intolerance, heat intolerance, polydipsia, polyphagia and polyuria.        CBGS approx 124 per patient   Genitourinary: Negative for decreased urine volume, difficulty urinating, dysuria, enuresis, flank pain, frequency, hematuria and urgency.   Musculoskeletal: Negative for arthralgias, back pain, gait problem, joint swelling, myalgias, neck pain and neck stiffness.   Skin: Negative for color change, pallor, rash and wound.   Allergic/Immunologic: Negative for environmental allergies, food allergies and immunocompromised state.   Neurological: Negative for dizziness, tremors, seizures, syncope, facial asymmetry, speech difficulty, weakness, light-headedness, numbness and headaches.   Hematological: Does not bruise/bleed easily.   Psychiatric/Behavioral: Negative for agitation, confusion, decreased concentration, dysphoric mood, hallucinations, self-injury, sleep disturbance and suicidal ideas. The patient is not nervous/anxious and is not hyperactive.          Reviewed family, medical, surgical, and social history.    Objective:      /75 (BP Location: Left arm, Patient Position: Sitting, BP Method: Large (Automatic))   Pulse 81   Temp 97.6 °F (36.4 °C) (Tympanic)   Resp 18   Ht  "5' 4" (1.626 m)   Wt 122.7 kg (270 lb 8 oz)   LMP 06/10/2002   SpO2 96%   BMI 46.43 kg/m²   Physical Exam   Constitutional: She is oriented to person, place, and time. She appears well-developed and well-nourished. She is not intubated.   HENT:   Head: Normocephalic.   Right Ear: Hearing, external ear and ear canal normal. No swelling or tenderness. Tympanic membrane is not erythematous.   Left Ear: Hearing, external ear and ear canal normal. No swelling or tenderness. Tympanic membrane is not erythematous.   Nose: Rhinorrhea present. No sinus tenderness. Right sinus exhibits no maxillary sinus tenderness and no frontal sinus tenderness. Left sinus exhibits no maxillary sinus tenderness and no frontal sinus tenderness.   Mouth/Throat: Uvula is midline. No uvula swelling. No posterior oropharyngeal edema or posterior oropharyngeal erythema.       Eyes: Pupils are equal, round, and reactive to light. Conjunctivae, EOM and lids are normal.   Neck: Trachea normal and normal range of motion. No JVD present. Carotid bruit is not present.   Cardiovascular: Normal rate, regular rhythm, S1 normal, S2 normal, normal heart sounds and intact distal pulses. Exam reveals no gallop and no friction rub.   No murmur heard.  Pulmonary/Chest: Effort normal. No accessory muscle usage or stridor. No apnea, no tachypnea and no bradypnea. She is not intubated. No respiratory distress. She has no decreased breath sounds. She has no wheezes. She has no rhonchi. She has no rales (Noted to left lower quadrant - cleared with cough).         She exhibits no tenderness.   Abdominal: Soft. Bowel sounds are normal. She exhibits no distension and no mass. There is no tenderness. There is no guarding. No hernia.   Musculoskeletal: Normal range of motion.   Lymphadenopathy:        Head (right side): No submandibular, no tonsillar, no preauricular and no posterior auricular adenopathy present.        Head (left side): No submandibular, no " tonsillar, no preauricular and no posterior auricular adenopathy present.     She has no cervical adenopathy.   Neurological: She is alert and oriented to person, place, and time.   Skin: Skin is warm and dry. Capillary refill takes less than 2 seconds. No cyanosis. Nails show no clubbing.   Psychiatric: She has a normal mood and affect. Her speech is normal and behavior is normal. Thought content normal.       Assessment:       1. Type 2 diabetes mellitus without complication, with long-term current use of insulin    2. Hyperlipidemia, unspecified hyperlipidemia type    3. Depressive disorder    4. Multiple allergies    5. Obesity, unspecified classification, unspecified obesity type, unspecified whether serious comorbidity present    6. Essential hypertension        Plan:       Type 2 diabetes mellitus without complication, with long-term current use of insulin  -     CBC auto differential; Future; Expected date: 05/29/2019  -     Comprehensive metabolic panel; Future; Expected date: 05/29/2019  -     Hemoglobin A1c; Future; Expected date: 05/29/2019  -     pioglitazone (ACTOS) 45 MG tablet; Take 1 tablet (45 mg total) by mouth once daily.  Dispense: 90 tablet; Refill: 3    Hyperlipidemia, unspecified hyperlipidemia type  -     Lipid panel; Future; Expected date: 05/29/2019    Depressive disorder    Multiple allergies    Obesity, unspecified classification, unspecified obesity type, unspecified whether serious comorbidity present    Essential hypertension  -     potassium chloride SA (K-DUR,KLOR-CON) 20 MEQ tablet; Take 1 tablet (20 mEq total) by mouth once daily.  Dispense: 90 tablet; Refill: 3          PLAN:  - Discussed with patient the plan of care  - Medications reviewed. Medication side effects discussed. Patient has no questions or concerns at this time. Informed patient to notify me regarding any concerns.   - Continue monitoring CBGs and BP and documenting in log book   - patient lost 13 lbs; continue  diet/exercise  - F/U with Dr Govea as scheduled   - Labs to be completed; will notify of results  - Continue to see allergist as ordered  - Informed patient to please notify me with any questions or concerns at anytime  - Follow up ordered for 3 months          Risks, benefits, and side effects were discussed with the patient. All questions were answered to the fullest satisfaction of the patient, and pt verbalized understanding and agreement to treatment plan. Pt was to call with any new or worsening symptoms, or present to the ER.

## 2019-06-13 DIAGNOSIS — Z79.4 TYPE 2 DIABETES MELLITUS WITHOUT COMPLICATION, WITH LONG-TERM CURRENT USE OF INSULIN: ICD-10-CM

## 2019-06-13 DIAGNOSIS — K21.9 GASTROESOPHAGEAL REFLUX DISEASE, ESOPHAGITIS PRESENCE NOT SPECIFIED: Primary | ICD-10-CM

## 2019-06-13 DIAGNOSIS — I10 ESSENTIAL HYPERTENSION: ICD-10-CM

## 2019-06-13 DIAGNOSIS — E11.9 TYPE 2 DIABETES MELLITUS WITHOUT COMPLICATION, WITH LONG-TERM CURRENT USE OF INSULIN: ICD-10-CM

## 2019-06-13 DIAGNOSIS — G47.00 INSOMNIA, UNSPECIFIED TYPE: ICD-10-CM

## 2019-06-13 RX ORDER — VILAZODONE HYDROCHLORIDE 40 MG/1
TABLET ORAL
Qty: 90 TABLET | Refills: 0 | Status: SHIPPED | OUTPATIENT
Start: 2019-06-13 | End: 2019-06-13 | Stop reason: SDUPTHER

## 2019-06-13 RX ORDER — PIOGLITAZONEHYDROCHLORIDE 45 MG/1
45 TABLET ORAL DAILY
Qty: 90 TABLET | Refills: 0 | Status: SHIPPED | OUTPATIENT
Start: 2019-06-13 | End: 2020-03-17 | Stop reason: SDUPTHER

## 2019-06-13 RX ORDER — ZOLPIDEM TARTRATE 10 MG/1
10 TABLET ORAL NIGHTLY PRN
Qty: 30 TABLET | Refills: 0 | Status: SHIPPED | OUTPATIENT
Start: 2019-06-13 | End: 2019-07-11 | Stop reason: SDUPTHER

## 2019-06-13 RX ORDER — VILAZODONE HYDROCHLORIDE 40 MG/1
40 TABLET ORAL DAILY
Qty: 90 TABLET | Refills: 0 | Status: SHIPPED | OUTPATIENT
Start: 2019-06-13 | End: 2019-11-19

## 2019-06-13 RX ORDER — PANTOPRAZOLE SODIUM 40 MG/1
40 TABLET, DELAYED RELEASE ORAL DAILY
Qty: 90 TABLET | Refills: 0 | Status: SHIPPED | OUTPATIENT
Start: 2019-06-13 | End: 2020-06-25

## 2019-06-13 RX ORDER — POTASSIUM CHLORIDE 20 MEQ/1
20 TABLET, EXTENDED RELEASE ORAL DAILY
Qty: 90 TABLET | Refills: 0 | Status: SHIPPED | OUTPATIENT
Start: 2019-06-13 | End: 2019-08-23 | Stop reason: SDUPTHER

## 2019-06-13 RX ORDER — BUPROPION HYDROCHLORIDE 150 MG/1
150 TABLET ORAL DAILY
Qty: 90 TABLET | Refills: 0 | Status: SHIPPED | OUTPATIENT
Start: 2019-06-13 | End: 2020-08-24 | Stop reason: SDUPTHER

## 2019-06-13 NOTE — TELEPHONE ENCOUNTER
----- Message from Darya Lomax sent at 6/13/2019 11:47 AM CDT -----  Contact: self   Type:  RX Refill Request    Who Called:  Self   Refill or New Rx: refill   RX Name and Strength: pioglitazone (ACTOS) 45 MG tablet, pantoprazole (PROTONIX) 40 MG tablet   Preferred Pharmacy with phone number:   [facility]  [place], [place] - [address]  [address]  [place] [address]  Phone: [phone] Fax: [phone]  Best Call Back Number: [phone] (home)   Patient is requesting a 90 day supply

## 2019-06-13 NOTE — TELEPHONE ENCOUNTER
----- Message from Shelby Grande sent at 6/13/2019 11:39 AM CDT -----  Contact: Patient  Type:  RX Refill Request    Who Called:  Patient  Refill or New Rx:  refill  RX Name and Strength:  zolpidem (AMBIEN) 10 mg Tab,buPROPion (WELLBUTRIN XL) 150 MG TB24 tablet,vilazodone (VIIBRYD) 40 mg Tab tablet,and potassium chloride SA (K-DUR,KLOR-CON) 20 MEQ tablet  How is the patient currently taking it? (ex. 1XDay):  na  Is this a 30 day or 90 day RX:  90 day  Preferred Pharmacy with phone number:   [facility] - [place], [place] - [address]  [address]  [place] [address]  Phone: [phone] Fax: [phone]      Local or Mail Order:  local  Ordering Provider:  Edwin Mckoy Call Back Number:  [phone]

## 2019-06-13 NOTE — TELEPHONE ENCOUNTER
----- Message from Chelly Randolph sent at 6/13/2019  2:21 PM CDT -----  Contact: Mima amato/ [facility] ph#[phone]  Mima amato/ [facility] ph#[phone]  Need directions on the prescription vilazodone (VIIBRYD)

## 2019-06-14 ENCOUNTER — TELEPHONE (OUTPATIENT)
Dept: FAMILY MEDICINE | Facility: CLINIC | Age: 49
End: 2019-06-14

## 2019-06-14 NOTE — TELEPHONE ENCOUNTER
----- Message from Roxanne Redman sent at 6/14/2019  2:05 PM CDT -----  [facility]  Type:  Pharmacy Calling to Clarify an RX    Name of Caller:    Pharmacy Name:    [facility] - [place] - [address]  [address]  [place] [address]  Phone: [phone] Fax: [phone]      Prescription Name:  vilazodone (VIIBRYD) 40 mg Tab tablet  What do they need to clarify?:  Clarify directions

## 2019-06-14 NOTE — TELEPHONE ENCOUNTER
I have returned pharmacy's call and spoke with Sa.  Clarified sig:  Take one tablet by mouth once daily.  Opal

## 2019-07-05 ENCOUNTER — LAB VISIT (OUTPATIENT)
Dept: LAB | Facility: HOSPITAL | Age: 49
End: 2019-07-05
Attending: NURSE PRACTITIONER
Payer: MEDICAID

## 2019-07-05 DIAGNOSIS — E11.9 TYPE 2 DIABETES MELLITUS WITHOUT COMPLICATION, WITH LONG-TERM CURRENT USE OF INSULIN: ICD-10-CM

## 2019-07-05 DIAGNOSIS — E78.5 HYPERLIPIDEMIA, UNSPECIFIED HYPERLIPIDEMIA TYPE: ICD-10-CM

## 2019-07-05 DIAGNOSIS — Z79.4 TYPE 2 DIABETES MELLITUS WITHOUT COMPLICATION, WITH LONG-TERM CURRENT USE OF INSULIN: ICD-10-CM

## 2019-07-05 LAB
ALBUMIN SERPL BCP-MCNC: 4.4 G/DL (ref 3.5–5.2)
ALP SERPL-CCNC: 84 U/L (ref 55–135)
ALT SERPL W/O P-5'-P-CCNC: 19 U/L (ref 10–44)
ANION GAP SERPL CALC-SCNC: 9 MMOL/L (ref 8–16)
AST SERPL-CCNC: 16 U/L (ref 10–40)
BASOPHILS # BLD AUTO: 0.06 K/UL (ref 0–0.2)
BASOPHILS NFR BLD: 0.8 % (ref 0–1.9)
BILIRUB SERPL-MCNC: 0.7 MG/DL (ref 0.1–1)
BUN SERPL-MCNC: 22 MG/DL (ref 6–20)
CALCIUM SERPL-MCNC: 9.3 MG/DL (ref 8.7–10.5)
CHLORIDE SERPL-SCNC: 103 MMOL/L (ref 95–110)
CHOLEST SERPL-MCNC: 180 MG/DL (ref 120–199)
CHOLEST/HDLC SERPL: 5.3 {RATIO} (ref 2–5)
CO2 SERPL-SCNC: 25 MMOL/L (ref 23–29)
CREAT SERPL-MCNC: 0.7 MG/DL (ref 0.5–1.4)
DIFFERENTIAL METHOD: ABNORMAL
EOSINOPHIL # BLD AUTO: 0.2 K/UL (ref 0–0.5)
EOSINOPHIL NFR BLD: 2.2 % (ref 0–8)
ERYTHROCYTE [DISTWIDTH] IN BLOOD BY AUTOMATED COUNT: 13.7 % (ref 11.5–14.5)
EST. GFR  (AFRICAN AMERICAN): >60 ML/MIN/1.73 M^2
EST. GFR  (NON AFRICAN AMERICAN): >60 ML/MIN/1.73 M^2
ESTIMATED AVG GLUCOSE: 134 MG/DL (ref 68–131)
GLUCOSE SERPL-MCNC: 144 MG/DL (ref 70–110)
HBA1C MFR BLD HPLC: 6.3 % (ref 4.5–6.2)
HCT VFR BLD AUTO: 37.2 % (ref 37–48.5)
HDLC SERPL-MCNC: 34 MG/DL (ref 40–75)
HDLC SERPL: 18.9 % (ref 20–50)
HGB BLD-MCNC: 11.8 G/DL (ref 12–16)
IMM GRANULOCYTES # BLD AUTO: 0.01 K/UL (ref 0–0.04)
IMM GRANULOCYTES NFR BLD AUTO: 0.1 % (ref 0–0.5)
LDLC SERPL CALC-MCNC: 101 MG/DL (ref 63–159)
LYMPHOCYTES # BLD AUTO: 2.6 K/UL (ref 1–4.8)
LYMPHOCYTES NFR BLD: 35.1 % (ref 18–48)
MCH RBC QN AUTO: 29.4 PG (ref 27–31)
MCHC RBC AUTO-ENTMCNC: 31.7 G/DL (ref 32–36)
MCV RBC AUTO: 93 FL (ref 82–98)
MONOCYTES # BLD AUTO: 0.6 K/UL (ref 0.3–1)
MONOCYTES NFR BLD: 7.7 % (ref 4–15)
NEUTROPHILS # BLD AUTO: 4 K/UL (ref 1.8–7.7)
NEUTROPHILS NFR BLD: 54.1 % (ref 38–73)
NONHDLC SERPL-MCNC: 146 MG/DL
NRBC BLD-RTO: 0 /100 WBC
PLATELET # BLD AUTO: 255 K/UL (ref 150–350)
PMV BLD AUTO: 10.3 FL (ref 9.2–12.9)
POTASSIUM SERPL-SCNC: 4.2 MMOL/L (ref 3.5–5.1)
PROT SERPL-MCNC: 8 G/DL (ref 6–8.4)
RBC # BLD AUTO: 4.02 M/UL (ref 4–5.4)
SODIUM SERPL-SCNC: 137 MMOL/L (ref 136–145)
TRIGL SERPL-MCNC: 225 MG/DL (ref 30–150)
WBC # BLD AUTO: 7.3 K/UL (ref 3.9–12.7)

## 2019-07-05 PROCEDURE — 85025 COMPLETE CBC W/AUTO DIFF WBC: CPT

## 2019-07-05 PROCEDURE — 36415 COLL VENOUS BLD VENIPUNCTURE: CPT

## 2019-07-05 PROCEDURE — 80061 LIPID PANEL: CPT

## 2019-07-05 PROCEDURE — 83036 HEMOGLOBIN GLYCOSYLATED A1C: CPT

## 2019-07-05 PROCEDURE — 80053 COMPREHEN METABOLIC PANEL: CPT

## 2019-07-08 ENCOUNTER — TELEPHONE (OUTPATIENT)
Dept: FAMILY MEDICINE | Facility: CLINIC | Age: 49
End: 2019-07-08

## 2019-07-08 NOTE — PROGRESS NOTES
Please let Ms. Rock know I received her labs. Her a1c has decreased to 6.3, which is very good. Her triglycerides have decreased to 225, but still remain elevated. My suggestion is to change to crestor to see if this improves. I am sending in prescription. Take at night. It is a stronger statin. Anemia noted on labs. I recommend she start take Multivitamin with iron daily. Recheck anemia and cholesterol in 3 months  She has appt with me in 4 weeks  Thanks

## 2019-07-08 NOTE — TELEPHONE ENCOUNTER
See lab result note.     ----- Message from Morro Koo sent at 7/8/2019  9:13 AM CDT -----  Contact: Patient [phone]  Type:  Test Results    Who Called:  Patient [phone]    Name of Test (Lab/Mammo/Etc):  Lab  Date of Test:  07-05-19    Ordering Provider:  Joy santana    Where the test was performed:  [address] [place] [address].    Best Call Back Number:  Patient [phone]    Additional Information:  Advised returning call to Rosa bush regarding lab results.

## 2019-07-11 DIAGNOSIS — G47.00 INSOMNIA, UNSPECIFIED TYPE: ICD-10-CM

## 2019-07-11 RX ORDER — ZOLPIDEM TARTRATE 10 MG/1
10 TABLET ORAL NIGHTLY PRN
Qty: 30 TABLET | Refills: 1 | Status: SHIPPED | OUTPATIENT
Start: 2019-07-11 | End: 2019-09-13 | Stop reason: SDUPTHER

## 2019-07-11 NOTE — TELEPHONE ENCOUNTER
----- Message from Mary Beth Tillman sent at 7/11/2019 10:37 AM CDT -----  Type:  RX Refill Request    Who Called:  Patient   Refill or New Rx:  refill  RX Name and Strength:  zolpidem (AMBIEN) 10 mg Tab  How is the patient currently taking it? (ex. 1XDay):    Is this a 30 day or 90 day RX:  30  Preferred Pharmacy with phone number:    [facility] - [place] - [address]  [address]  [place] [address]  Phone: [phone] Fax: [phone]  Local or Mail Order:  Local  Ordering Provider:  Joy Pineda  Best Call Back Number:  [phone]  Additional Information:  She is also requesting additional refills

## 2019-08-01 ENCOUNTER — TELEPHONE (OUTPATIENT)
Dept: FAMILY MEDICINE | Facility: CLINIC | Age: 49
End: 2019-08-01

## 2019-08-01 NOTE — TELEPHONE ENCOUNTER
----- Message from Nakia Rao sent at 8/1/2019  3:00 PM CDT -----  Type: Needs Medical Advice    Who Called: Lashon with [facility]     Best Call Back Number:  [phone] just ask for Lashon   Additional Information: Lashon is calling to check on the status of the fax they sent on 7/25 requesting refill on her diabetic supplies - just checking to see if you got the fax - please contact Lashon directly

## 2019-08-01 NOTE — TELEPHONE ENCOUNTER
I have returned Lashon's call and informed that we have not received the fax.  They were given the correct fax number of [phone].   Opal

## 2019-08-06 DIAGNOSIS — Z12.39 SCREENING BREAST EXAMINATION: Primary | ICD-10-CM

## 2019-08-08 ENCOUNTER — TELEPHONE (OUTPATIENT)
Dept: FAMILY MEDICINE | Facility: CLINIC | Age: 49
End: 2019-08-08

## 2019-08-08 NOTE — TELEPHONE ENCOUNTER
Fax number given for paperwork to be sent again, as we have not received it yet.      ----- Message from Dannielle Antonio sent at 8/8/2019  2:25 PM CDT -----  Contact: Lashon with [facility]  Type: Needs Medical Advice    Who Called:  Lashon  Best Call Back Number:   Additional Information: Wanted to make sure that fax sent regarding a diabetic script was received for all diabetic supplies. Please have provider sign and fax back. Please return call. Thank you

## 2019-08-13 ENCOUNTER — PATIENT OUTREACH (OUTPATIENT)
Dept: ADMINISTRATIVE | Facility: HOSPITAL | Age: 49
End: 2019-08-13

## 2019-08-13 NOTE — LETTER
August 22, 2019    Shweta Gupta  [address] [place] [address]             [facility]  [address]  [facility] [address]  Phone: [phone] Dear Lisa [facility] is committed to your overall health and would like to ensure that you are up to date on your recommended test and/or procedures.   Joy Pineda NP  has found that your chart shows you may be due for the following:     YEARLY DIABETIC EYE EXAM   COLORECTAL CANCER SCREENING     If you have had any of the above done at another facility, please let us know so that we may obtain copies from that facility.  If you have a copy of these records, please provide a copy for us to scan into your chart.  You are welcome to request that the report be faxed to us at  ([phone]).     Otherwise, please schedule these appointments at your earliest convenience by calling [phone] or going to [email or web address].     If you have an upcoming scheduled appointment for the item above, please disregard this letter.     Sincerely,   Your [facility]   ASHVIN Beatty L.P.N. Clinical Care Coordinator   [address] [place] [address], [place] [address] [phone]

## 2019-08-15 ENCOUNTER — TELEPHONE (OUTPATIENT)
Dept: FAMILY MEDICINE | Facility: CLINIC | Age: 49
End: 2019-08-15

## 2019-08-15 NOTE — TELEPHONE ENCOUNTER
----- Message from Ewelina Zuleta sent at 8/15/2019  1:34 PM CDT -----  Type: Needs Medical Advice    Who Called:  [facility]/Lashon Mckoy Call Back Number: [phone]  Additional Information: Needs to talk to office concerning patient's diabetic and testing supplies. Please call for details.   '

## 2019-08-15 NOTE — TELEPHONE ENCOUNTER
Lashon from [facility] is calling to f/u on diabetic prescription that was faxed over on the pt. On August 1, 2019 and August 8, 2019 for pt diabetic supplies. This nurse verified the fax number with Lashon. Nurse gave her [phone], to fax the paperwork to this office.

## 2019-08-21 ENCOUNTER — PATIENT OUTREACH (OUTPATIENT)
Dept: ADMINISTRATIVE | Facility: HOSPITAL | Age: 49
End: 2019-08-21

## 2019-08-22 ENCOUNTER — PATIENT OUTREACH (OUTPATIENT)
Dept: ADMINISTRATIVE | Facility: HOSPITAL | Age: 49
End: 2019-08-22

## 2019-08-22 NOTE — PROGRESS NOTES
"Attempted to outreach patient for pre-visit via "[employer]", no answer after a week. Sending outreach via Mail Out Letter now.    "

## 2019-08-23 ENCOUNTER — TELEPHONE (OUTPATIENT)
Dept: FAMILY MEDICINE | Facility: CLINIC | Age: 49
End: 2019-08-23

## 2019-08-23 DIAGNOSIS — I10 ESSENTIAL HYPERTENSION: ICD-10-CM

## 2019-08-23 RX ORDER — MONTELUKAST SODIUM 10 MG/1
TABLET ORAL
Qty: 90 TABLET | Refills: 1 | Status: SHIPPED | OUTPATIENT
Start: 2019-08-23 | End: 2019-08-23 | Stop reason: SDUPTHER

## 2019-08-23 NOTE — TELEPHONE ENCOUNTER
Rx sent to pharmacy as pt requested.        ----- Message from Mirta Ennis sent at 8/23/2019 11:49 AM CDT -----  Contact: Shweta pt  Type:  RX Refill Request    Who Called:  Shweta  Refill or New Rx:  refill  RX Name and Strength:  montelukast (SINGULAIR) 10 mg tablet  How is the patient currently taking it? (ex. 1XDay):  qd  Is this a 30 day or 90 day RX:  90  Preferred Pharmacy with phone number:    [facility] - [place], [place] - [address]  [address]  [place] [address]  Phone: [phone] Fax: [phone]      Local or Mail Order:  local  Ordering Provider:  Edwin Mckoy Call Back Number:  [phone]  Additional Information:  Pls call pt if any issues w/ refill

## 2019-08-26 RX ORDER — MONTELUKAST SODIUM 10 MG/1
10 TABLET ORAL DAILY
Qty: 90 TABLET | Refills: 3 | Status: SHIPPED | OUTPATIENT
Start: 2019-08-26 | End: 2020-02-28 | Stop reason: SDUPTHER

## 2019-08-26 RX ORDER — POTASSIUM CHLORIDE 20 MEQ/1
20 TABLET, EXTENDED RELEASE ORAL 2 TIMES DAILY
Qty: 180 TABLET | Refills: 3 | Status: SHIPPED | OUTPATIENT
Start: 2019-08-26 | End: 2020-10-19

## 2019-09-03 ENCOUNTER — OFFICE VISIT (OUTPATIENT)
Dept: FAMILY MEDICINE | Facility: CLINIC | Age: 49
End: 2019-09-03
Payer: MEDICAID

## 2019-09-03 VITALS
SYSTOLIC BLOOD PRESSURE: 127 MMHG | HEART RATE: 88 BPM | BODY MASS INDEX: 47.8 KG/M2 | DIASTOLIC BLOOD PRESSURE: 76 MMHG | OXYGEN SATURATION: 96 % | RESPIRATION RATE: 19 BRPM | WEIGHT: 280 LBS | HEIGHT: 64 IN

## 2019-09-03 DIAGNOSIS — I10 ESSENTIAL HYPERTENSION: ICD-10-CM

## 2019-09-03 DIAGNOSIS — E78.5 HYPERLIPIDEMIA, UNSPECIFIED HYPERLIPIDEMIA TYPE: ICD-10-CM

## 2019-09-03 DIAGNOSIS — E11.9 TYPE 2 DIABETES MELLITUS WITHOUT COMPLICATION, WITH LONG-TERM CURRENT USE OF INSULIN: Primary | ICD-10-CM

## 2019-09-03 DIAGNOSIS — Z79.4 TYPE 2 DIABETES MELLITUS WITHOUT COMPLICATION, WITH LONG-TERM CURRENT USE OF INSULIN: Primary | ICD-10-CM

## 2019-09-03 PROCEDURE — 99213 PR OFFICE/OUTPT VISIT, EST, LEVL III, 20-29 MIN: ICD-10-PCS | Mod: S$PBB,,, | Performed by: NURSE PRACTITIONER

## 2019-09-03 PROCEDURE — 99213 OFFICE O/P EST LOW 20 MIN: CPT | Mod: PBBFAC,PN | Performed by: NURSE PRACTITIONER

## 2019-09-03 PROCEDURE — 99999 PR PBB SHADOW E&M-EST. PATIENT-LVL III: CPT | Mod: PBBFAC,,, | Performed by: NURSE PRACTITIONER

## 2019-09-03 PROCEDURE — 99999 PR PBB SHADOW E&M-EST. PATIENT-LVL III: ICD-10-PCS | Mod: PBBFAC,,, | Performed by: NURSE PRACTITIONER

## 2019-09-03 PROCEDURE — 99213 OFFICE O/P EST LOW 20 MIN: CPT | Mod: S$PBB,,, | Performed by: NURSE PRACTITIONER

## 2019-09-03 RX ORDER — EPINEPHRINE 0.3 MG/.3ML
0.3 INJECTION INTRAMUSCULAR
COMMUNITY
Start: 2019-04-11 | End: 2024-02-26

## 2019-09-03 RX ORDER — CETIRIZINE HYDROCHLORIDE 10 MG/1
10 TABLET ORAL DAILY
Refills: 5 | COMMUNITY
Start: 2019-08-29

## 2019-09-03 RX ORDER — IPRATROPIUM BROMIDE 42 UG/1
2 SPRAY, METERED NASAL
COMMUNITY
Start: 2019-03-12 | End: 2020-03-11

## 2019-09-03 RX ORDER — BUDESONIDE AND FORMOTEROL FUMARATE DIHYDRATE 160; 4.5 UG/1; UG/1
2 AEROSOL RESPIRATORY (INHALATION)
COMMUNITY
Start: 2019-03-12 | End: 2024-02-26

## 2019-09-03 NOTE — PROGRESS NOTES
Subjective:       Patient ID: Shweta Gupta is a 49 y.o. female.    Chief Complaint: Follow-up (3 month)    Ms. Gupta is a 49 year old female who presents to the clinic today for a 3 month follow up. Today, she has no questions or concerns. She continues to see allergist 2x weekly for injections. She reports they are helping and improving her life. She has no new concerns today. Reports she is feeling much better.   In regards to the patient's hypertension, patient denies chest pain/sob, and has been compliant with the medication regimen. hypertension well controlled. Goal of <130/80. Meds and labs as per orders. In regards to the patient's dyslipidemia, patient reports has been compliant with the medication regimen  without side effects. In regard to the patient's diabetes, patient reports that blood sugars have been Good control. Reports CBGs around 124.   A1c results        Date                     Value               Ref Range           Status                07/05/2019               6.3 (H)             4.5 - 6.2 %         Final                     11/13/2018               6.7 (H)             4.5 - 6.2 %         Final                    07/24/2018               6.7 (H)             4.5 - 6.2 %         Final            Comment:  According to ADA guidelines, hemoglobin A1C <7.0% represents optimal control in non-pregnant diabetic patients.  Different metrics may apply to specific populations.  Standards of Medical Care in Diabetes - 2016. For the purpose of screening for the presence of diabetes: <5.7%     Consistent with the absence of diabetes 5.7-6.4%  Consistent with increasing risk for diabetes (prediabetes) >or=6.5%  Consistent with diabetes Currently no consensus exists for use of hemoglobin A1C for diagnosis of diabetes for children.    ----------. Goal of < 7. Meds and labs as per orders         Review of Systems   Constitutional: Negative for activity change, appetite change, chills, fatigue, fever and  "unexpected weight change.   HENT: Negative for congestion, drooling, ear discharge, ear pain, facial swelling, hearing loss, mouth sores, postnasal drip, rhinorrhea, sinus pressure, sinus pain, sneezing, sore throat and tinnitus.         Seeing allergist; injections 2x weekly   Eyes: Negative for photophobia, pain, redness and visual disturbance.   Respiratory: Negative for apnea, cough, choking, chest tightness, shortness of breath, wheezing and stridor.    Cardiovascular: Negative for chest pain, palpitations and leg swelling.   Gastrointestinal: Negative for abdominal distention, abdominal pain, blood in stool, constipation, diarrhea, nausea and vomiting.   Endocrine: Negative for cold intolerance, heat intolerance, polydipsia, polyphagia and polyuria.        CBGS approx 124 per patient   Genitourinary: Negative for decreased urine volume, difficulty urinating, dysuria, enuresis, flank pain, frequency, hematuria and urgency.   Musculoskeletal: Negative for arthralgias, back pain, gait problem, joint swelling, myalgias, neck pain and neck stiffness.   Skin: Negative for color change, pallor, rash and wound.   Allergic/Immunologic: Negative for environmental allergies, food allergies and immunocompromised state.   Neurological: Negative for dizziness, tremors, seizures, syncope, facial asymmetry, speech difficulty, weakness, light-headedness, numbness and headaches.   Hematological: Does not bruise/bleed easily.   Psychiatric/Behavioral: Negative for agitation, confusion, decreased concentration, dysphoric mood, hallucinations, self-injury, sleep disturbance and suicidal ideas. The patient is not nervous/anxious and is not hyperactive.          Reviewed family, medical, surgical, and social history.    Objective:      /76 (BP Location: Left arm, Patient Position: Sitting, BP Method: Large (Automatic))   Pulse 88   Resp 19   Ht 5' 4" (1.626 m)   Wt 127 kg (280 lb)   LMP 06/10/2002   SpO2 96%   BMI 48.06 " kg/m²   Physical Exam   Constitutional: She is oriented to person, place, and time. She appears well-developed and well-nourished. She is cooperative. No distress.   HENT:   Head: Normocephalic and atraumatic.   Right Ear: Hearing, tympanic membrane, external ear and ear canal normal.   Left Ear: Hearing, tympanic membrane, external ear and ear canal normal.   Nose: Nose normal.   Mouth/Throat: Uvula is midline, oropharynx is clear and moist and mucous membranes are normal. No uvula swelling.   Eyes: Pupils are equal, round, and reactive to light. Conjunctivae, EOM and lids are normal.   Neck: Trachea normal and full passive range of motion without pain. No tracheal tenderness present. No neck rigidity. No thyroid mass present.   Cardiovascular: Normal rate, regular rhythm, S1 normal, S2 normal, normal heart sounds, intact distal pulses and normal pulses.   No murmur heard.  Pulmonary/Chest: Effort normal and breath sounds normal. No respiratory distress. She has no decreased breath sounds. She has no wheezes.   Abdominal: Soft. Normal appearance and bowel sounds are normal. She exhibits no distension and no abdominal bruit. There is no guarding and no CVA tenderness.   Musculoskeletal: She exhibits no edema, tenderness or deformity.   Lymphadenopathy:     She has no cervical adenopathy.   Neurological: She is alert and oriented to person, place, and time. She has normal strength. She exhibits normal muscle tone.   Skin: Skin is warm, dry and intact. Capillary refill takes less than 2 seconds. No abrasion, no laceration, no lesion and no rash noted. She is not diaphoretic. No cyanosis. Nails show no clubbing.   Psychiatric: She has a normal mood and affect. Her speech is normal and behavior is normal. Judgment and thought content normal. Cognition and memory are normal.       Assessment:       1. Type 2 diabetes mellitus without complication, with long-term current use of insulin    2. Hyperlipidemia, unspecified  hyperlipidemia type    3. Essential hypertension        Plan:       Type 2 diabetes mellitus without complication, with long-term current use of insulin    Hyperlipidemia, unspecified hyperlipidemia type    Essential hypertension          PLAN:  - Discussed with patient the plan of care  - labs to be repeated nov 2019  - flu shot oct 2019 nurse visit per patient   - Medications reviewed. Medication side effects discussed. Patient has no questions or concerns at this time. Informed patient to notify me regarding any concerns.   - Continue monitoring bp and cbg  - Informed patient to please notify me with any questions or concerns at anytime  - Follow up ordered for 3 months        Risks, benefits, and side effects were discussed with the patient. All questions were answered to the fullest satisfaction of the patient, and pt verbalized understanding and agreement to treatment plan. Pt was to call with any new or worsening symptoms, or present to the ER.

## 2019-09-11 ENCOUNTER — TELEPHONE (OUTPATIENT)
Dept: FAMILY MEDICINE | Facility: CLINIC | Age: 49
End: 2019-09-11

## 2019-09-11 DIAGNOSIS — E11.9 TYPE 2 DIABETES MELLITUS WITHOUT COMPLICATION, WITH LONG-TERM CURRENT USE OF INSULIN: Primary | ICD-10-CM

## 2019-09-11 DIAGNOSIS — Z79.4 TYPE 2 DIABETES MELLITUS WITHOUT COMPLICATION, WITH LONG-TERM CURRENT USE OF INSULIN: Primary | ICD-10-CM

## 2019-09-11 NOTE — TELEPHONE ENCOUNTER
----- Message from Ewelina Zuleta sent at 9/11/2019  2:12 PM CDT -----  Type: Needs Medical Advice    Who Called:  Patient     Best Call Back Number: [phone] (home)     Additional Information: Patient needs a new glucose meter. She does not want one of those new ones with a digital touch screen. She just wants a basic meter. Please call to advise.

## 2019-09-13 DIAGNOSIS — Z79.4 TYPE 2 DIABETES MELLITUS WITHOUT COMPLICATION, WITH LONG-TERM CURRENT USE OF INSULIN: Primary | ICD-10-CM

## 2019-09-13 DIAGNOSIS — G47.00 INSOMNIA, UNSPECIFIED TYPE: ICD-10-CM

## 2019-09-13 DIAGNOSIS — E11.9 TYPE 2 DIABETES MELLITUS WITHOUT COMPLICATION, WITH LONG-TERM CURRENT USE OF INSULIN: Primary | ICD-10-CM

## 2019-09-13 NOTE — TELEPHONE ENCOUNTER
----- Message from Ondina Santos sent at 9/13/2019 11:04 AM CDT -----  Contact: Self  Type:  RX Refill Request    Who Called:  patient  Refill or New Rx:  refill  RX Name and Strength:  zolpidem (AMBIEN) 10 mg Tab  How is the patient currently taking it? (ex. 1XDay):  1Xday  Is this a 30 day or 90 day RX:  30  Preferred Pharmacy with phone number:    [facility] [place] [facility] [address]  [address]  [place] [address]  Phone: [phone] Fax: [phone]  Local or Mail Order:  local  Ordering Provider:  Joy Pineda  Best Call Back Number:  [phone] (home)   Additional Information:  She is out of them completely please get it done today please.

## 2019-09-13 NOTE — TELEPHONE ENCOUNTER
"----- Message from Severo MUNSON Frisard sent at 9/13/2019 11:25 AM CDT -----  Contact: Patric/[facility]  Patric stated they received an "order" for diabetic supplies yesterday 9/12/19 but needs an actual Rx.      Patient is also requesting a refill on her Rx for zolpidem (AMBIEN) 10 mg Tab      [facility] [place], [place] - [address]  [address]  [place] [address]  Phone: [phone] Fax: [phone]      "

## 2019-09-13 NOTE — TELEPHONE ENCOUNTER
----- Message from Chelly Randolph sent at 9/13/2019 11:55 AM CDT -----  Contact: [phone]  Patient is requesting a call back from the nurse stated she need a glucose meter, please call so patient can tell the nurse where to send prescription/order to.  Do not send [facility] to [facility].  Patient stated she been trying to get this resolved all week.  Please call the patient upon request at phone number [phone].

## 2019-09-13 NOTE — TELEPHONE ENCOUNTER
Spoke with pt, she needs the Glucose meter and supplies sent [facility], in [place], [place].

## 2019-09-15 RX ORDER — INSULIN PUMP SYRINGE, 3 ML
EACH MISCELLANEOUS
Qty: 1 EACH | Refills: 0 | Status: SHIPPED | OUTPATIENT
Start: 2019-09-15 | End: 2019-11-19

## 2019-09-15 RX ORDER — LANCETS
EACH MISCELLANEOUS
Qty: 100 EACH | Refills: 0 | Status: SHIPPED | OUTPATIENT
Start: 2019-09-15 | End: 2020-12-03 | Stop reason: SDUPTHER

## 2019-09-15 RX ORDER — ZOLPIDEM TARTRATE 10 MG/1
10 TABLET ORAL NIGHTLY PRN
Qty: 30 TABLET | Refills: 0 | Status: SHIPPED | OUTPATIENT
Start: 2019-09-15 | End: 2019-10-16 | Stop reason: SDUPTHER

## 2019-09-23 RX ORDER — SUCRALFATE 1 G/1
TABLET ORAL
Qty: 120 TABLET | Refills: 2 | Status: SHIPPED | OUTPATIENT
Start: 2019-09-23 | End: 2020-08-24 | Stop reason: SDUPTHER

## 2019-09-30 ENCOUNTER — PATIENT OUTREACH (OUTPATIENT)
Dept: ADMINISTRATIVE | Facility: HOSPITAL | Age: 49
End: 2019-09-30

## 2019-10-01 ENCOUNTER — HOSPITAL ENCOUNTER (OUTPATIENT)
Dept: RADIOLOGY | Facility: HOSPITAL | Age: 49
Discharge: HOME | End: 2019-10-01
Attending: NURSE PRACTITIONER
Payer: MEDICAID

## 2019-10-01 VITALS — HEIGHT: 64 IN | BODY MASS INDEX: 47.8 KG/M2 | WEIGHT: 280 LBS

## 2019-10-01 DIAGNOSIS — Z12.39 SCREENING BREAST EXAMINATION: ICD-10-CM

## 2019-10-01 PROCEDURE — 77067 SCR MAMMO BI INCL CAD: CPT | Mod: 26,,, | Performed by: RADIOLOGY

## 2019-10-01 PROCEDURE — 77067 MAMMO DIGITAL SCREENING BILAT WITH TOMOSYNTHESIS_CAD: ICD-10-PCS | Mod: 26,,, | Performed by: RADIOLOGY

## 2019-10-01 PROCEDURE — 77063 MAMMO DIGITAL SCREENING BILAT WITH TOMOSYNTHESIS_CAD: ICD-10-PCS | Mod: 26,,, | Performed by: RADIOLOGY

## 2019-10-01 PROCEDURE — 77067 SCR MAMMO BI INCL CAD: CPT | Mod: TC

## 2019-10-01 PROCEDURE — 77063 BREAST TOMOSYNTHESIS BI: CPT | Mod: 26,,, | Performed by: RADIOLOGY

## 2019-10-15 ENCOUNTER — OFFICE VISIT (OUTPATIENT)
Dept: FAMILY MEDICINE | Facility: CLINIC | Age: 49
End: 2019-10-15
Payer: MEDICAID

## 2019-10-15 VITALS
DIASTOLIC BLOOD PRESSURE: 76 MMHG | HEART RATE: 73 BPM | BODY MASS INDEX: 45.55 KG/M2 | SYSTOLIC BLOOD PRESSURE: 122 MMHG | OXYGEN SATURATION: 97 % | HEIGHT: 64 IN | TEMPERATURE: 98 F | RESPIRATION RATE: 17 BRPM | WEIGHT: 266.81 LBS

## 2019-10-15 DIAGNOSIS — B37.2 YEAST DERMATITIS: ICD-10-CM

## 2019-10-15 DIAGNOSIS — H65.193 ACUTE EFFUSION OF BOTH MIDDLE EARS: Primary | ICD-10-CM

## 2019-10-15 PROCEDURE — 99213 OFFICE O/P EST LOW 20 MIN: CPT | Mod: S$PBB,,, | Performed by: NURSE PRACTITIONER

## 2019-10-15 PROCEDURE — 99999 PR PBB SHADOW E&M-EST. PATIENT-LVL III: ICD-10-PCS | Mod: PBBFAC,,, | Performed by: NURSE PRACTITIONER

## 2019-10-15 PROCEDURE — 99213 PR OFFICE/OUTPT VISIT, EST, LEVL III, 20-29 MIN: ICD-10-PCS | Mod: S$PBB,,, | Performed by: NURSE PRACTITIONER

## 2019-10-15 PROCEDURE — 90471 IMMUNIZATION ADMIN: CPT | Mod: PBBFAC,PN

## 2019-10-15 PROCEDURE — 99213 OFFICE O/P EST LOW 20 MIN: CPT | Mod: PBBFAC,PN,25 | Performed by: NURSE PRACTITIONER

## 2019-10-15 PROCEDURE — 99999 PR PBB SHADOW E&M-EST. PATIENT-LVL III: CPT | Mod: PBBFAC,,, | Performed by: NURSE PRACTITIONER

## 2019-10-15 RX ORDER — NYSTATIN 100000 [USP'U]/G
POWDER TOPICAL 2 TIMES DAILY
Qty: 1 BOTTLE | Refills: 2 | Status: SHIPPED | OUTPATIENT
Start: 2019-10-15 | End: 2019-11-19

## 2019-10-15 RX ORDER — METHYLPREDNISOLONE 4 MG/1
TABLET ORAL
Qty: 1 PACKAGE | Refills: 0 | Status: SHIPPED | OUTPATIENT
Start: 2019-10-15 | End: 2019-11-05

## 2019-10-15 NOTE — PROGRESS NOTES
Subjective:       Patient ID: Shweta Gupta is a 49 y.o. female.    Chief Complaint: Otalgia (right ear pain. Pain comes and goes.) and Flu Vaccine    Mrs. Shweta Gupta is a 49 year old female who presents to the clinic today for right ear pressure and pain. Reports it started approx 2 months ago. Patient continues to see allergist and had last injection yesterday. Patient denies fever, chills, cough or chills. Reports nothing is improving her symptoms.   Patient requesting refill of nystatin powder as this helps prevent yeast under her breasts and groin. Patient has hx of type 2 diabetes, sugars well controlled. Denies any hypoglycemia.     Review of Systems   Constitutional: Negative for activity change, appetite change, chills, diaphoresis, fatigue, fever and unexpected weight change.   HENT: Positive for congestion and ear pain. Negative for drooling, ear discharge, facial swelling, hearing loss, mouth sores, postnasal drip, rhinorrhea, sinus pressure, sinus pain, sneezing, sore throat, tinnitus and trouble swallowing.         Seeing allergist; injections 2x weekly   Eyes: Negative for photophobia, pain, redness, itching and visual disturbance.   Respiratory: Negative for apnea, cough, choking, chest tightness, shortness of breath, wheezing and stridor.    Cardiovascular: Negative for chest pain, palpitations and leg swelling.   Gastrointestinal: Negative for abdominal distention, abdominal pain, blood in stool, constipation, diarrhea, nausea and vomiting.   Endocrine: Negative for cold intolerance, heat intolerance, polydipsia, polyphagia and polyuria.   Genitourinary: Negative for decreased urine volume, difficulty urinating, dysuria, enuresis, flank pain, frequency, hematuria and urgency.   Musculoskeletal: Negative for arthralgias, back pain, gait problem, joint swelling, myalgias, neck pain and neck stiffness.   Skin: Negative for color change, pallor, rash and wound.   Allergic/Immunologic: Negative for  "environmental allergies, food allergies and immunocompromised state.   Neurological: Negative for dizziness, tremors, seizures, syncope, facial asymmetry, speech difficulty, weakness, light-headedness, numbness and headaches.   Hematological: Does not bruise/bleed easily.   Psychiatric/Behavioral: Negative for agitation, behavioral problems, confusion, decreased concentration, dysphoric mood, hallucinations, self-injury, sleep disturbance and suicidal ideas. The patient is not nervous/anxious and is not hyperactive.          Reviewed family, medical, surgical, and social history.    Objective:      /76 (BP Location: Left arm, Patient Position: Sitting, BP Method: Large (Automatic))   Pulse 73   Temp 98.3 °F (36.8 °C) (Tympanic)   Resp 17   Ht 5' 4" (1.626 m)   Wt 121 kg (266 lb 12.8 oz)   LMP 06/10/2002   SpO2 97%   BMI 45.80 kg/m²   Physical Exam   Constitutional: She is oriented to person, place, and time. She appears well-developed and well-nourished. She is cooperative. No distress.   HENT:   Head: Normocephalic and atraumatic.   Right Ear: Hearing, external ear and ear canal normal. A middle ear effusion is present.   Left Ear: Hearing, external ear and ear canal normal. A middle ear effusion is present.   Nose: Nose normal.   Mouth/Throat: Uvula is midline, oropharynx is clear and moist and mucous membranes are normal. No uvula swelling.   Eyes: Pupils are equal, round, and reactive to light. Conjunctivae, EOM and lids are normal.   Neck: Trachea normal and full passive range of motion without pain. No tracheal tenderness present. No neck rigidity. No thyroid mass present.   Cardiovascular: Normal rate, regular rhythm, S1 normal, S2 normal, normal heart sounds, intact distal pulses and normal pulses.   No murmur heard.  Pulmonary/Chest: Effort normal and breath sounds normal. No respiratory distress. She has no decreased breath sounds. She has no wheezes.   Abdominal: Soft. Normal appearance and " bowel sounds are normal. She exhibits no distension and no abdominal bruit. There is no guarding and no CVA tenderness.   Musculoskeletal: She exhibits no edema, tenderness or deformity.   Lymphadenopathy:     She has no cervical adenopathy.   Neurological: She is alert and oriented to person, place, and time. She has normal strength. She exhibits normal muscle tone.   Skin: Skin is warm, dry and intact. Capillary refill takes less than 2 seconds. No abrasion, no laceration, no lesion and no rash noted. She is not diaphoretic. No cyanosis. Nails show no clubbing.   Psychiatric: She has a normal mood and affect. Her speech is normal and behavior is normal. Judgment and thought content normal. Cognition and memory are normal.       Assessment:       1. Acute effusion of both middle ears    2. Yeast dermatitis        Plan:       Acute effusion of both middle ears  -     methylPREDNISolone (MEDROL DOSEPACK) 4 mg tablet; use as directed  Dispense: 1 Package; Refill: 0    Yeast dermatitis  -     nystatin (MYCOSTATIN) powder; Apply topically 2 (two) times daily.  Dispense: 1 Bottle; Refill: 2    Other orders  -     Influenza - Quadrivalent (6 months+) (PF)          PLAN:  - Discussed with patient the plan of care  - Flu shot given  - Possibly refer to ENT if no improvement   - Medications reviewed. Medication side effects discussed. Patient has no questions or concerns at this time. Informed patient to notify me regarding any concerns.   - Informed patient to please notify me with any questions or concerns at anytime  - Follow up as ordered     Risks, benefits, and side effects were discussed with the patient. All questions were answered to the fullest satisfaction of the patient, and pt verbalized understanding and agreement to treatment plan. Pt was to call with any new or worsening symptoms, or present to the ER.

## 2019-10-16 DIAGNOSIS — G47.00 INSOMNIA, UNSPECIFIED TYPE: ICD-10-CM

## 2019-10-16 NOTE — TELEPHONE ENCOUNTER
----- Message from Ewelina Zuleta sent at 10/16/2019 10:06 AM CDT -----  Type:  RX Refill Request    Who Called:  Patient  Refill or New Rx:  Refill  RX Name and Strength: zolpidem (AMBIEN) 10 mg Tab    How is the patient currently taking it? (ex. 1XDay):  1xday  Is this a 30 day or 90 day RX:  30 pills  Preferred Pharmacy with phone number:    [facility] - [place], [place] - [address]  [address]  [place] [address]  Phone: [phone] Fax: [phone]  Local or Mail Order:  local  Ordering Provider:  Tre Mckoy Call Back Number:  [phone] (home)     Additional Information:  Please call when completed.

## 2019-10-17 RX ORDER — ZOLPIDEM TARTRATE 10 MG/1
10 TABLET ORAL NIGHTLY PRN
Qty: 30 TABLET | Refills: 0 | Status: SHIPPED | OUTPATIENT
Start: 2019-10-18 | End: 2019-11-17

## 2019-11-04 ENCOUNTER — TELEPHONE (OUTPATIENT)
Dept: INTERNAL MEDICINE | Facility: CLINIC | Age: 49
End: 2019-11-04

## 2019-11-04 DIAGNOSIS — H65.193 ACUTE EFFUSION OF BOTH MIDDLE EARS: Primary | ICD-10-CM

## 2019-11-04 NOTE — TELEPHONE ENCOUNTER
----- Message from Ondina Santos sent at 11/4/2019  8:10 AM CST -----  Contact: Self  Type: Needs Medical Advice    Who Called:  patient  Best Call Back Number: [phone] (home)   Additional Information: Janice asked her to let her know if the medicine worked and it has not, she was going to refer her to an ENT.  Call back at advise.

## 2019-11-07 ENCOUNTER — TELEPHONE (OUTPATIENT)
Dept: FAMILY MEDICINE | Facility: CLINIC | Age: 49
End: 2019-11-07

## 2019-11-07 DIAGNOSIS — H66.91 RIGHT OTITIS MEDIA, UNSPECIFIED OTITIS MEDIA TYPE: Primary | ICD-10-CM

## 2019-11-07 NOTE — TELEPHONE ENCOUNTER
Pt is requesting ENT referral changed to be sent to Dr. Mullins in [place].  Referral pended as requested.  (Fax: [phone])  Please advise, thank you.      ----- Message from Mirta Ennis sent at 11/7/2019  9:46 AM CST -----  Contact: Shweta kelly  Type: Needs Medical Advice    Who Called:  Shweta Mckoy Call Back Number: [phone]  Additional Information: Pls call pt regarding referral to  in [place].

## 2019-11-18 DIAGNOSIS — G47.00 INSOMNIA, UNSPECIFIED TYPE: ICD-10-CM

## 2019-11-18 RX ORDER — ZOLPIDEM TARTRATE 10 MG/1
TABLET ORAL
Qty: 30 TABLET | Refills: 2 | Status: SHIPPED | OUTPATIENT
Start: 2019-11-18 | End: 2020-02-12 | Stop reason: SDUPTHER

## 2019-11-18 NOTE — TELEPHONE ENCOUNTER
----- Message from Mirta Ennis sent at 2019 12:05 PM CST -----  Contact: Shweta pt  Type:  RX Refill Request    Who Called:  Shweta  Refill or New Rx:  Refill  RX Name and Strength:  zolpidem (AMBIEN) 10 mg Tab ()  How is the patient currently taking it? (ex. 1XDay):  qd  Is this a 30 day or 90 day RX:  90  Preferred Pharmacy with phone number:    [facility] [place], [place] - [address]  [address]  [place] [address]  Phone: [phone] Fax: [phone]    Local or Mail Order:  local  Ordering Provider:  Edwin Mckoy Call Back Number:  [phone]  Additional Information:  Pls call pt to adv if any issues

## 2019-11-19 ENCOUNTER — OFFICE VISIT (OUTPATIENT)
Dept: FAMILY MEDICINE | Facility: CLINIC | Age: 49
End: 2019-11-19
Payer: MEDICAID

## 2019-11-19 VITALS
BODY MASS INDEX: 46.61 KG/M2 | WEIGHT: 273 LBS | DIASTOLIC BLOOD PRESSURE: 83 MMHG | OXYGEN SATURATION: 97 % | HEIGHT: 64 IN | SYSTOLIC BLOOD PRESSURE: 135 MMHG | HEART RATE: 70 BPM

## 2019-11-19 DIAGNOSIS — G47.00 INSOMNIA, UNSPECIFIED TYPE: ICD-10-CM

## 2019-11-19 DIAGNOSIS — E11.9 TYPE 2 DIABETES MELLITUS WITHOUT COMPLICATION, WITH LONG-TERM CURRENT USE OF INSULIN: Primary | ICD-10-CM

## 2019-11-19 DIAGNOSIS — Z79.4 TYPE 2 DIABETES MELLITUS WITHOUT COMPLICATION, WITH LONG-TERM CURRENT USE OF INSULIN: Primary | ICD-10-CM

## 2019-11-19 DIAGNOSIS — H65.193 ACUTE EFFUSION OF BOTH MIDDLE EARS: ICD-10-CM

## 2019-11-19 DIAGNOSIS — E78.5 HYPERLIPIDEMIA, UNSPECIFIED HYPERLIPIDEMIA TYPE: ICD-10-CM

## 2019-11-19 DIAGNOSIS — I10 ESSENTIAL HYPERTENSION: ICD-10-CM

## 2019-11-19 DIAGNOSIS — F32.A DEPRESSIVE DISORDER: ICD-10-CM

## 2019-11-19 PROCEDURE — 99999 PR PBB SHADOW E&M-EST. PATIENT-LVL III: CPT | Mod: PBBFAC,,, | Performed by: NURSE PRACTITIONER

## 2019-11-19 PROCEDURE — 90471 IMMUNIZATION ADMIN: CPT | Mod: PBBFAC,PN

## 2019-11-19 PROCEDURE — 99213 OFFICE O/P EST LOW 20 MIN: CPT | Mod: PBBFAC,PN | Performed by: NURSE PRACTITIONER

## 2019-11-19 PROCEDURE — 99999 PR PBB SHADOW E&M-EST. PATIENT-LVL III: ICD-10-PCS | Mod: PBBFAC,,, | Performed by: NURSE PRACTITIONER

## 2019-11-19 PROCEDURE — 99214 PR OFFICE/OUTPT VISIT, EST, LEVL IV, 30-39 MIN: ICD-10-PCS | Mod: S$PBB,,, | Performed by: NURSE PRACTITIONER

## 2019-11-19 PROCEDURE — 99214 OFFICE O/P EST MOD 30 MIN: CPT | Mod: S$PBB,,, | Performed by: NURSE PRACTITIONER

## 2019-11-19 RX ORDER — ISOPROPYL ALCOHOL 70 ML/100ML
SWAB TOPICAL
COMMUNITY
Start: 2019-09-26

## 2019-11-19 RX ORDER — DOCUSATE SODIUM 100 MG/1
CAPSULE, LIQUID FILLED ORAL
COMMUNITY

## 2019-11-19 RX ORDER — INSULIN PUMP SYRINGE, 3 ML
EACH MISCELLANEOUS
COMMUNITY
Start: 2019-09-15 | End: 2024-02-26

## 2019-11-19 RX ORDER — BACLOFEN 10 MG/1
TABLET ORAL
COMMUNITY
Start: 2019-09-23

## 2019-11-19 RX ORDER — INSULIN GLARGINE 100 [IU]/ML
INJECTION, SOLUTION SUBCUTANEOUS
COMMUNITY
Start: 2018-11-15 | End: 2019-12-31

## 2019-11-19 RX ORDER — HYDROCODONE BITARTRATE AND ACETAMINOPHEN 5; 325 MG/1; MG/1
TABLET ORAL
Refills: 0 | COMMUNITY
Start: 2019-10-26 | End: 2024-02-13 | Stop reason: ALTCHOICE

## 2019-11-19 RX ORDER — NYSTATIN 100000 [USP'U]/G
POWDER TOPICAL
COMMUNITY
Start: 2019-10-15 | End: 2020-05-21 | Stop reason: SDUPTHER

## 2019-11-19 RX ORDER — VILAZODONE HYDROCHLORIDE 40 MG/1
40 TABLET ORAL DAILY
Refills: 0 | COMMUNITY
Start: 2019-10-25 | End: 2020-08-24 | Stop reason: SDUPTHER

## 2019-11-19 NOTE — PROGRESS NOTES
Subjective:       Patient ID: Shweta Gupta is a 49 y.o. female.    Chief Complaint: No chief complaint on file.    Ms. Gupta is a 49 year old female who presents to the clinic today for a 3 month follow up. Today, she has no questions or concerns. She has decreased her visits to 1 x  weekly for injections. She reports they are helping and improving her life. She has no new concerns today. Reports she is feeling much better. She is scheduled to see ENT for acute effusions up coming.   In regards to the patient's hypertension, patient denies chest pain/sob, and has been compliant with the medication regimen. hypertension well controlled. Goal of <130/80. Meds and labs as per orders. In regards to the patient's dyslipidemia, patient reports has been compliant with the medication regimen  without side effects. Last labs 7/2019.  In regard to the patient's diabetes, patient reports that blood sugars have been Good control. In regard to the patient's diabetes, patient reports that blood sugars have been Good control. Patient denieshypoglycemia. Patient is not currently on insulin therapy. Patient is on ACE/ARB and is on statin. Last a1c was Hemoglobin A1C       Date                     Value               Ref Range           Status                07/05/2019               6.3 (H)             4.5 - 6.2 %         Final                  11/13/2018               6.7 (H)             4.5 - 6.2 %         Final                 07/24/2018               6.7 (H)             4.5 - 6.2 %         Final            Ms. Rock reports her mood is great. She takes her medication as ordered, which is effective. She is compliant and denies any s/e. She denies any si/hi.   She continues to take her protonix every morning for GERD, which helps control her symptoms and improve her life.   In regards to her sleep, she takes ambien for many years which is effective and controlled. She continues to see pain management for chronic pain.     -  "TDAP: 11-19-19    Review of Systems   Constitutional: Negative for activity change, appetite change, chills, fatigue, fever and unexpected weight change.   HENT: Negative for congestion, drooling, ear discharge, ear pain, facial swelling, hearing loss, mouth sores, postnasal drip, rhinorrhea, sinus pressure, sinus pain, sneezing, sore throat and tinnitus.         Seeing allergist; injections 1x weekly  Dizziness and pressure in ears   Eyes: Negative for photophobia, pain, redness and visual disturbance.   Respiratory: Negative for apnea, cough, choking, chest tightness, shortness of breath, wheezing and stridor.    Cardiovascular: Negative for chest pain, palpitations and leg swelling.   Gastrointestinal: Negative for abdominal distention, abdominal pain, blood in stool, constipation, diarrhea, nausea and vomiting.   Endocrine: Negative for cold intolerance, heat intolerance, polydipsia, polyphagia and polyuria.   Genitourinary: Negative for decreased urine volume, difficulty urinating, dysuria, enuresis, flank pain, frequency, hematuria and urgency.   Musculoskeletal: Negative for arthralgias, back pain, gait problem, joint swelling, myalgias, neck pain and neck stiffness.   Skin: Negative for color change, pallor, rash and wound.   Allergic/Immunologic: Negative for environmental allergies, food allergies and immunocompromised state.   Neurological: Negative for dizziness, tremors, seizures, syncope, facial asymmetry, speech difficulty, weakness, light-headedness, numbness and headaches.   Hematological: Does not bruise/bleed easily.   Psychiatric/Behavioral: Negative for agitation, confusion, decreased concentration, dysphoric mood, hallucinations, self-injury, sleep disturbance and suicidal ideas. The patient is not nervous/anxious and is not hyperactive.          Reviewed family, medical, surgical, and social history.    Objective:      /83   Pulse 70   Ht 5' 4" (1.626 m)   Wt 123.8 kg (273 lb)   LMP " 06/10/2002   SpO2 97%   BMI 46.86 kg/m²   Physical Exam   Constitutional: She is oriented to person, place, and time. She appears well-developed and well-nourished. She is cooperative. No distress.   HENT:   Head: Normocephalic and atraumatic.   Right Ear: Hearing, external ear and ear canal normal. A middle ear effusion is present.   Left Ear: Hearing, external ear and ear canal normal. A middle ear effusion is present.   Nose: Nose normal.   Mouth/Throat: Uvula is midline, oropharynx is clear and moist and mucous membranes are normal. No uvula swelling.   Eyes: Pupils are equal, round, and reactive to light. Conjunctivae, EOM and lids are normal.   Neck: Trachea normal and full passive range of motion without pain. No tracheal tenderness present. No neck rigidity. No thyroid mass present.   Cardiovascular: Normal rate, regular rhythm, S1 normal, S2 normal, normal heart sounds, intact distal pulses and normal pulses.   No murmur heard.  Pulmonary/Chest: Effort normal and breath sounds normal. No respiratory distress. She has no decreased breath sounds. She has no wheezes.   Abdominal: Soft. Normal appearance and bowel sounds are normal. She exhibits no distension and no abdominal bruit. There is no guarding and no CVA tenderness.   Musculoskeletal: She exhibits no edema, tenderness or deformity.   Lymphadenopathy:     She has no cervical adenopathy.   Neurological: She is alert and oriented to person, place, and time. She has normal strength. She exhibits normal muscle tone.   Skin: Skin is warm, dry and intact. Capillary refill takes less than 2 seconds. No abrasion, no laceration, no lesion and no rash noted. She is not diaphoretic. No cyanosis. Nails show no clubbing.   Psychiatric: She has a normal mood and affect. Her speech is normal and behavior is normal. Judgment and thought content normal. Cognition and memory are normal.       Assessment:       1. Type 2 diabetes mellitus without complication, with  long-term current use of insulin    2. Hyperlipidemia, unspecified hyperlipidemia type    3. Acute effusion of both middle ears    4. Essential hypertension    5. Depressive disorder    6. Insomnia, unspecified type        Plan:       Type 2 diabetes mellitus without complication, with long-term current use of insulin  -     Hemoglobin A1c; Future; Expected date: 11/19/2019    Hyperlipidemia, unspecified hyperlipidemia type  -     Lipid panel; Future; Expected date: 11/19/2019    Acute effusion of both middle ears    Essential hypertension    Depressive disorder    Insomnia, unspecified type    Other orders  -     (In Office Administered) Tdap Vaccine          PLAN:  - Discussed with patient the plan of care  - Labs ordered and printed  - TDAP given  - Medications reviewed. Medication side effects discussed. Patient has no questions or concerns at this time. Informed patient to notify me regarding any concerns.   - Informed patient to please notify me with any questions or concerns at anytime  - Follow up ordered for 3 months       Risks, benefits, and side effects were discussed with the patient. All questions were answered to the fullest satisfaction of the patient, and pt verbalized understanding and agreement to treatment plan. Pt was to call with any new or worsening symptoms, or present to the ER.

## 2019-11-25 RX ORDER — CALCIUM CITRATE/VITAMIN D3 200MG-6.25
TABLET ORAL
Qty: 2 STRIP | Refills: 10 | Status: SHIPPED | OUTPATIENT
Start: 2019-11-25 | End: 2020-12-03 | Stop reason: SDUPTHER

## 2019-11-25 RX ORDER — LANCETS 30 GAUGE
EACH MISCELLANEOUS
Qty: 90 EACH | Refills: 10 | Status: SHIPPED | OUTPATIENT
Start: 2019-11-25 | End: 2021-06-07 | Stop reason: SDUPTHER

## 2019-12-30 DIAGNOSIS — Z79.4 TYPE 2 DIABETES MELLITUS WITHOUT COMPLICATION, WITH LONG-TERM CURRENT USE OF INSULIN: Primary | ICD-10-CM

## 2019-12-30 DIAGNOSIS — E11.9 TYPE 2 DIABETES MELLITUS WITHOUT COMPLICATION, WITH LONG-TERM CURRENT USE OF INSULIN: Primary | ICD-10-CM

## 2019-12-30 RX ORDER — INSULIN GLARGINE 100 [IU]/ML
60 INJECTION, SOLUTION SUBCUTANEOUS DAILY
Qty: 15 ML | Refills: 6 | Status: SHIPPED | OUTPATIENT
Start: 2019-12-30 | End: 2021-04-15 | Stop reason: SDUPTHER

## 2019-12-30 NOTE — TELEPHONE ENCOUNTER
----- Message from Jovany Martinez sent at 12/30/2019  3:41 PM CST -----  Contact: pt  Type:  RX Refill Request    Who Called:  pt  Refill or New Rx:  refill  RX Name and Strength:  [place] solostar   How is the patient currently taking it? (ex. 1XDay):  60 units daily  Is this a 30 day or 90 day RX:  90  Preferred Pharmacy with phone number:    [facility] - [place], [place] - [address]  [address]  [place] [address]  Phone: [phone] Fax: [phone]    Local or Mail Order:    Ordering Provider:    Best Call Back Number:  [phone]  Additional Information:  Pt is almost out

## 2019-12-31 RX ORDER — INSULIN GLARGINE 100 [IU]/ML
INJECTION, SOLUTION SUBCUTANEOUS
Qty: 18 ML | Refills: 0 | Status: SHIPPED | OUTPATIENT
Start: 2019-12-31 | End: 2021-04-07 | Stop reason: SDUPTHER

## 2020-01-07 ENCOUNTER — OFFICE VISIT (OUTPATIENT)
Dept: SURGERY | Facility: CLINIC | Age: 50
End: 2020-01-07
Payer: MEDICAID

## 2020-01-07 ENCOUNTER — TELEPHONE (OUTPATIENT)
Dept: FAMILY MEDICINE | Facility: CLINIC | Age: 50
End: 2020-01-07

## 2020-01-07 VITALS
WEIGHT: 281 LBS | OXYGEN SATURATION: 98 % | SYSTOLIC BLOOD PRESSURE: 133 MMHG | DIASTOLIC BLOOD PRESSURE: 82 MMHG | HEIGHT: 64 IN | RESPIRATION RATE: 16 BRPM | BODY MASS INDEX: 47.97 KG/M2 | TEMPERATURE: 99 F | HEART RATE: 77 BPM

## 2020-01-07 DIAGNOSIS — Z12.11 ENCOUNTER FOR SCREENING COLONOSCOPY: Primary | ICD-10-CM

## 2020-01-07 DIAGNOSIS — D64.9 NORMOCHROMIC NORMOCYTIC ANEMIA: ICD-10-CM

## 2020-01-07 PROCEDURE — 99205 PR OFFICE/OUTPT VISIT, NEW, LEVL V, 60-74 MIN: ICD-10-PCS | Mod: S$GLB,,, | Performed by: SURGERY

## 2020-01-07 PROCEDURE — 99205 OFFICE O/P NEW HI 60 MIN: CPT | Mod: S$GLB,,, | Performed by: SURGERY

## 2020-01-07 RX ORDER — SODIUM, POTASSIUM,MAG SULFATES 17.5-3.13G
SOLUTION, RECONSTITUTED, ORAL ORAL
Qty: 2 BOTTLE | Refills: 0 | Status: SHIPPED | OUTPATIENT
Start: 2020-01-07 | End: 2021-09-30

## 2020-01-07 RX ORDER — PIOGLITAZONEHYDROCHLORIDE 45 MG/1
45 TABLET ORAL
COMMUNITY
End: 2020-03-18 | Stop reason: SDUPTHER

## 2020-01-07 RX ORDER — LIDOCAINE HYDROCHLORIDE 10 MG/ML
1 INJECTION, SOLUTION EPIDURAL; INFILTRATION; INTRACAUDAL; PERINEURAL ONCE
Status: CANCELLED | OUTPATIENT
Start: 2020-01-07 | End: 2020-01-07

## 2020-01-07 RX ORDER — SODIUM CHLORIDE, SODIUM LACTATE, POTASSIUM CHLORIDE, CALCIUM CHLORIDE 600; 310; 30; 20 MG/100ML; MG/100ML; MG/100ML; MG/100ML
INJECTION, SOLUTION INTRAVENOUS CONTINUOUS
Status: CANCELLED | OUTPATIENT
Start: 2020-01-07

## 2020-01-07 NOTE — TELEPHONE ENCOUNTER
Pt called she has an appt with Dr. Soriano in a half hour and wanted to see if we had labs done in December at [facility], We do not. She will call them

## 2020-01-07 NOTE — PROGRESS NOTES
Subjective:       Patient ID: Shweta Gupta     Chief Complaint:  Consult (Anemia)      HPI:  Ms. Gupta presents today as a consult from Dr. Govea for evaluation of anemia.  Her PCP is Joy BRITO.  She has a long history of a very mild normochromic normocytic anemia.  Iron studies collected about a year ago showed no evidence of deficiency.  She has no complaints. No abdominal pain, nausea, vomiting, diarrhea, constipation, melena, hematochezia, change in bowel habits, change in stool characteristics, weight loss, decrease in caliber of stool, etc.  No chest pain, shortness breath, palpitations, diaphoresis, dyspnea, dizziness, lightheadedness, orthostatics symptoms, etc.  No family history of colon cancer. No aggravating or alleviating factors. No other associated symptoms.  Previous colonoscopy over 10 years ago.  She is followed by Dr. Govea for MVP      Allergies & Meds:  Review of patient's allergies indicates:  No Known Allergies    Current Outpatient Medications   Medication Sig Dispense Refill    albuterol (VENTOLIN HFA) 90 mcg/actuation inhaler Inhale 2 puffs into the lungs every 6 (six) hours as needed for Wheezing. Rescue 18 g 3    alcohol swabs (EASY TOUCH ALCOHOL PREP PADS) PadM USE AS DIRECTEDC      aspirin (ECOTRIN) 81 MG EC tablet aspirin 81 mg tablet,delayed release   Take 1 tablet every day by oral route.      atorvastatin (LIPITOR) 40 MG tablet atorvastatin 40 mg tablet 90 tablet 3    baclofen (LIORESAL) 10 MG tablet TAKE 1 TABLET BY MOUTH THREE TIMES DAILY      blood sugar diagnostic (PRECISION Q-I-D TEST) Strp Use as directed to check blood sugar TID and PRN. E 11.9      blood sugar diagnostic Strp Use as directed to check blood sugar TID and PRN. E 11.9 100 each 0    blood-glucose meter kit Use as instructed      budesonide-formoterol 160-4.5 mcg (SYMBICORT) 160-4.5 mcg/actuation HFAA Inhale 2 puffs into the lungs.      buPROPion (WELLBUTRIN XL) 150 MG TB24 tablet Take  "1 tablet (150 mg total) by mouth once daily. 90 tablet 0    busPIRone (BUSPAR) 10 MG tablet buspirone 10 mg tablet   TAKE ONE TABLET BY MOUTH five times a day AS NEEDED      cetirizine (ZYRTEC) 10 MG tablet Take 10 mg by mouth once daily.  5    COMFORT EZ PEN NEEDLES 31 gauge x 3/16" Ndle   11    docusate sodium (COLACE) 100 MG capsule Stool Softener   1 tab 4 times a day      docusate sodium (STOOL SOFTENER) 100 MG capsule Stool Softener   1 tab 4 times a day      EASY TOUCH TWIST LANCETS 30 gauge Misc Use to check blood glucose three times daily and as needed 90 each 10    EPINEPHrine (EPIPEN 2-NAKUL) 0.3 mg/0.3 mL AtIn Inject 0.3 mg into the muscle.       fluticasone (FLONASE) 50 mcg/actuation nasal spray 1 spray by Each Nare route once daily.      fluticasone-salmeterol 500-50 mcg/dose (ADVAIR) 500-50 mcg/dose DsDv diskus inhaler Inhale 1 puff into the lungs 2 (two) times daily. Controller 1 Inhaler 2    furosemide (LASIX) 40 MG tablet Take 1 tablet (40 mg total) by mouth 2 (two) times daily. 120 tablet 2    HYDROcodone-acetaminophen (NORCO) 5-325 mg per tablet TAKE 1 TABLET BY MOUTH 4 TIMES DAILY AS NEEDED FOR PAIN  0    insulin (LANTUS SOLOSTAR U-100 INSULIN) glargine 100 units/mL (3mL) SubQ pen Inject 60 Units into the skin once daily. 15 mL 6    ipratropium (ATROVENT) 42 mcg (0.06 %) nasal spray 2 sprays by Nasal route.      lancets (EASY TOUCH LANCETS) 26 gauge Misc Use as directed to check blood sugar TID and PRN. E 11.9 100 each 0    LANTUS SOLOSTAR U-100 INSULIN glargine 100 units/mL (3mL) SubQ pen INJECT 60 UNITS SUBCUTANEOUSLY DAILY 18 mL 0    montelukast (SINGULAIR) 10 mg tablet Take 1 tablet (10 mg total) by mouth once daily. 90 tablet 3    nystatin (MYCOSTATIN) powder Apply topically.      olopatadine (PATANASE) 0.6 % nasal spray 1 spray by Each Nare route 2 (two) times daily.      omega 3-dha-epa-fish oil (FISH OIL) 1,000 mg (120 mg-180 mg) Cap Fish Oil 1,000 mg (120 mg-180 mg) " capsule   Take 1 capsule twice a day by oral route.      pantoprazole (PROTONIX) 40 MG tablet Take 1 tablet (40 mg total) by mouth once daily. 90 tablet 0    pioglitazone (ACTOS) 45 MG tablet Take 1 tablet (45 mg total) by mouth once daily. 90 tablet 0    pioglitazone (ACTOS) 45 MG tablet Take 45 mg by mouth.      potassium chloride SA (K-DUR,KLOR-CON) 20 MEQ tablet Take 1 tablet (20 mEq total) by mouth 2 (two) times daily. 180 tablet 3    pregabalin (LYRICA) 200 MG Cap Take 1 capsule (200 mg total) by mouth 3 (three) times daily. 90 capsule 1    ranolazine (RANEXA) 500 MG Tb12 Take 500 mg by mouth 2 (two) times daily.      sucralfate (CARAFATE) 1 gram tablet TAKE 1 TABLET BY MOUTH EVERY 6 HOURS 120 tablet 2    TRUE METRIX GLUCOSE TEST STRIP Strp Use to check blood glucose three times per day and as needed 2 strip 10    VIIBRYD 40 mg Tab tablet Take 40 mg by mouth once daily.  0    zolpidem (AMBIEN) 10 mg Tab TAKE 1 TABLET BY MOUTH NIGHTLY AS NEEDED 30 tablet 2    lisinopril (PRINIVIL,ZESTRIL) 20 MG tablet Take 1 tablet (20 mg total) by mouth once daily. 90 tablet 3    sodium,potassium,mag sulfates (SUPREP BOWEL PREP KIT) 17.5-3.13-1.6 gram SolR Follow written instructions provided by Clinic 2 Bottle 0     No current facility-administered medications for this visit.        PMFSHx:  Past Medical History:   Diagnosis Date    Allergy     Anxiety     Asthma     Depression     Diabetes mellitus, type 2     GERD (gastroesophageal reflux disease)     Hyperlipidemia     Hypertension     Morbid obesity        Past Surgical History:   Procedure Laterality Date    ACHILLES TENDON SURGERY Left     CARPAL TUNNEL RELEASE Left      SECTION      CHOLECYSTECTOMY      COLON SURGERY      HERNIA REPAIR      HYSTERECTOMY      partial    KIDNEY STONE SURGERY      OOPHORECTOMY      TRIGGER FINGER RELEASE Left        Family History   Problem Relation Age of Onset    Cancer Mother     Lung cancer  Mother     Hypertension Mother     Cancer Father     Lung cancer Father     Hypertension Father     Breast cancer Neg Hx        Social History     Tobacco Use    Smoking status: Never Smoker    Smokeless tobacco: Never Used   Substance Use Topics    Alcohol use: No    Drug use: No       Review of Systems   Constitutional: Negative for appetite change, chills, fatigue, fever and unexpected weight change.   HENT: Negative for congestion, dental problem, ear pain, mouth sores, postnasal drip, rhinorrhea, sore throat, tinnitus, trouble swallowing and voice change.    Eyes: Negative for photophobia, pain, discharge and visual disturbance.   Respiratory: Negative for cough, chest tightness, shortness of breath and wheezing.    Cardiovascular: Negative for chest pain, palpitations and leg swelling.   Gastrointestinal: Negative for abdominal pain, blood in stool, constipation, diarrhea, nausea and vomiting.   Endocrine: Negative for cold intolerance, heat intolerance, polydipsia, polyphagia and polyuria.   Genitourinary: Negative for difficulty urinating, dysuria, flank pain, frequency, hematuria and urgency.   Musculoskeletal: Negative for arthralgias, joint swelling and myalgias.   Skin: Negative for color change and rash.   Allergic/Immunologic: Negative for immunocompromised state.   Neurological: Negative for dizziness, tremors, seizures, syncope, speech difficulty, weakness, numbness and headaches.   Hematological: Negative for adenopathy. Does not bruise/bleed easily.   Psychiatric/Behavioral: Negative for agitation, confusion, hallucinations, self-injury and suicidal ideas. The patient is not nervous/anxious.             Physical Exam   Constitutional: She is oriented to person, place, and time. She appears well-developed and well-nourished. She is active.  Non-toxic appearance. No distress.   Body mass index is 48.23 kg/m².     HENT:   Head: Normocephalic and atraumatic.   Right Ear: Hearing and external  ear normal. No drainage or tenderness.   Left Ear: Hearing and external ear normal. No drainage or tenderness.   Nose: Nose normal. No rhinorrhea. No epistaxis.   Mouth/Throat: Uvula is midline, oropharynx is clear and moist and mucous membranes are normal. Mucous membranes are not pale, not dry and not cyanotic. No oropharyngeal exudate.   Eyes: Pupils are equal, round, and reactive to light. Conjunctivae and lids are normal. Right eye exhibits no discharge and no exudate. Left eye exhibits no discharge and no exudate. Right conjunctiva is not injected. Right eye exhibits no nystagmus. Left eye exhibits no nystagmus.   Neck: Trachea normal, full passive range of motion without pain and phonation normal. No JVD present. Carotid bruit is not present. No tracheal deviation present. No thyroid mass and no thyromegaly present.   Cardiovascular: Normal rate, regular rhythm, S1 normal, S2 normal, normal heart sounds and intact distal pulses. PMI is not displaced. Exam reveals no gallop and no friction rub.   No murmur heard.  Pulmonary/Chest: Effort normal and breath sounds normal. No accessory muscle usage. No respiratory distress. She exhibits no mass, no tenderness and no crepitus. Right breast exhibits no inverted nipple, no mass, no nipple discharge, no skin change and no tenderness. Left breast exhibits no inverted nipple, no mass, no nipple discharge, no skin change and no tenderness. Breasts are symmetrical.   Abdominal: Soft. Normal appearance and bowel sounds are normal. She exhibits no distension, no fluid wave, no abdominal bruit and no mass. There is no hepatosplenomegaly. There is no tenderness. There is no rebound, no guarding and negative Gorman's sign. No hernia. Hernia confirmed negative in the right inguinal area and confirmed negative in the left inguinal area.   Genitourinary: Rectum normal and vagina normal. There is no rash or lesion on the right labia. There is no rash or lesion on the left labia.  Right adnexum displays no mass. Left adnexum displays no mass. No vaginal discharge found.   Musculoskeletal:        Cervical back: Normal.        Thoracic back: Normal.        Lumbar back: Normal.        Right upper arm: Normal.        Left upper arm: Normal.        Right forearm: Normal.        Left forearm: Normal.        Right hand: Normal.        Left hand: Normal.        Right upper leg: Normal.        Left upper leg: Normal.        Right lower leg: Normal.        Left lower leg: Normal.        Right foot: Normal.        Left foot: Normal.   Lymphadenopathy:        Head (right side): No submental, no submandibular and no posterior auricular adenopathy present.        Head (left side): No submental, no submandibular and no posterior auricular adenopathy present.     She has no cervical adenopathy.     She has no axillary adenopathy.        Right: No inguinal and no supraclavicular adenopathy present.        Left: No inguinal and no supraclavicular adenopathy present.   Neurological: She is alert and oriented to person, place, and time. She has normal strength. No cranial nerve deficit or sensory deficit. Coordination and gait normal. GCS eye subscore is 4. GCS verbal subscore is 5. GCS motor subscore is 6.   Skin: Skin is warm, dry and intact. No rash noted. No cyanosis. Nails show no clubbing.   Psychiatric: She has a normal mood and affect. Her speech is normal. Judgment and thought content normal. Her mood appears not anxious. Her affect is not inappropriate. She is not actively hallucinating. She does not exhibit a depressed mood.           Medical Records Review:  Lab results reviewed from 11/13/2018.  CBC showed no evidence of leukocytosis or thrombocytopenia, very mild normochromic normocytic anemia was present with a hemoglobin of 11.6 grams/deciliter.  Serum iron was in the range of normal, 71 mcg/dL    Lab results reviewed from 7/5/2019.  CBC again showed no leukocytosis or thrombocytopenia, a mild  normochromic normocytic anemia was present but improved, hemoglobin 11.8 grams/deciliter.    Assessment:       Due for screening colonoscopy.  Very mild but improving normochromic normocytic anemia.  Differential diagnosis includes but is not limited to esophageal neoplasm, esophagitis, esophageal ulcer, gastroesophageal reflux disease, gastritis, gastric ulcer, gastric cancer, duodenitis, duodenal ulcer, inflammatory bowel disease, diverticulosis, hemorrhoids, gastrointestinal angiodysplasias, benign gastrointestinal neoplasm, colon cancer, etc.  Options at this time include but are not limited to endoscopy, second opinion, capsule endoscopy, radiologic studies, observation, etc.  Multiple comorbid issues ( MVP followed by cardiology, anemia ) increase the complexity of required perioperative evaluation & management, risks of complications, and likely will increase the difficulty and complexity of postoperative care, etc.  These issues must be factored in to preoperative evaluation and perioperative management.    1. Encounter for screening colonoscopy    2. Normochromic normocytic anemia          Plan:   Encounter for screening colonoscopy  -     Case Request Operating Room: COLONOSCOPY, ESOPHAGOGASTRODUODENOSCOPY (EGD)  -     Comprehensive metabolic panel; Future; Expected date: 01/07/2020  -     CBC auto differential; Future; Expected date: 01/07/2020  -     Pregnancy, urine rapid; Future; Expected date: 01/07/2020  -     EKG 12-lead; Future; Expected date: 01/07/2020    Normochromic normocytic anemia    Other orders  -     sodium,potassium,mag sulfates (SUPREP BOWEL PREP KIT) 17.5-3.13-1.6 gram SolR; Follow written instructions provided by Clinic  Dispense: 2 Bottle; Refill: 0        Follow up in about 7 weeks (around 2/24/2020) for routine post-endosocpy visit.    Counseling/Medical Decision Making:  Shweta Gupta was counseled regarding the results of the evaluation thus far and the differential diagnosis.   Diagnostic and therapeutic options were discussed in detail along with the risks, benefits, possible complications, details of, and indications for each option.  Diagnoses discussed included but were not limited to: GB disease, GERD, hiatal hernia, PUD, gastritis, duodenitis, Sphincter of Oddi dysfunction, hemorrhoids, diverticulosis, cancer, IBD, IBS, benign tumors, angiodysplasias, ischemic disease.  Options discussed included but were not limited to: EGD, colonoscopy, proctoscopy, anoscopy, sigmoidoscopy, radiologic studies, PillCam, empiric therapy, second opinion, etc. Possible complications of endoscopy discussed included but were not limited to: bleeding, infections, endocarditis, injury to internal organs, incomplete exam, failure to diagnose cancer or other serious condition, need for emergency surgery, need for a temporary colostomy, need for additional operations or procedures, etc.  Entire conversation was held in layman's terms.  All unfamiliar terms were defined.  Questions solicited and answered.  I read the consent form to her verbatim.  Krames booklets were provided on EGD, GERD, GB disease / surgery, colonoscopy, polyps, diverticulosis, hemorrhoids, cancer, etc.  A copy of the consent form was provided for her review outside the office / hospital prior to the procedures.  At the conclusion of the conversation she voiced complete understanding of all we discussed and satisfaction that all of her questions had been answered to her full understanding.  She stated that she felt fully informed and completely capable of making an informed decision.  She requests and desires to proceed with EGD and colonoscopy.    Total face-to-face encounter time was 60 minutes, 40 minutes spent counseling as outlined/summarized above.

## 2020-01-07 NOTE — TELEPHONE ENCOUNTER
----- Message from Rafiq Wing sent at 1/7/2020  2:38 PM CST -----  Contact: pt  Type: Needs Medical Advice    Who Called:  pt    Best Call Back Number: [phone]  Additional Information: Needs to discuss her labs. Please call to advise.

## 2020-01-07 NOTE — H&P
[facility] - General Surgery  General Surgery  H&P      Patient Name: Shweta Gupta  MRN: [id number]     Chief Complaint:  I am here to be scoped    HPI:  Ms. Gupta presents today as a consult from Dr. Govea for evaluation of anemia.  Her PCP is Joy BRITO.  She has a long history of a very mild normochromic normocytic anemia.  Iron studies collected about a year ago showed no evidence of deficiency.  She has no complaints. No abdominal pain, nausea, vomiting, diarrhea, constipation, melena, hematochezia, change in bowel habits, change in stool characteristics, weight loss, decrease in caliber of stool, etc.  No chest pain, shortness breath, palpitations, diaphoresis, dyspnea, dizziness, lightheadedness, orthostatics symptoms, etc.  No family history of colon cancer. No aggravating or alleviating factors. No other associated symptoms.  Previous colonoscopy over 10 years ago.  She is followed by Dr. Govea for MVP      Allergies & Meds:    No Known Allergies    Current Outpatient Medications   Medication Sig Dispense Refill    albuterol (VENTOLIN HFA) 90 mcg/actuation inhaler Inhale 2 puffs into the lungs every 6 (six) hours as needed for Wheezing. Rescue 18 g 3    alcohol swabs (EASY TOUCH ALCOHOL PREP PADS) PadM USE AS DIRECTEDC      aspirin (ECOTRIN) 81 MG EC tablet aspirin 81 mg tablet,delayed release   Take 1 tablet every day by oral route.      atorvastatin (LIPITOR) 40 MG tablet atorvastatin 40 mg tablet 90 tablet 3    baclofen (LIORESAL) 10 MG tablet TAKE 1 TABLET BY MOUTH THREE TIMES DAILY      blood sugar diagnostic (PRECISION Q-I-D TEST) Strp Use as directed to check blood sugar TID and PRN. E 11.9      blood sugar diagnostic Strp Use as directed to check blood sugar TID and PRN. E 11.9 100 each 0    blood-glucose meter kit Use as instructed      budesonide-formoterol 160-4.5 mcg (SYMBICORT) 160-4.5 mcg/actuation HFAA Inhale 2 puffs into the lungs.       "buPROPion (WELLBUTRIN XL) 150 MG TB24 tablet Take 1 tablet (150 mg total) by mouth once daily. 90 tablet 0    busPIRone (BUSPAR) 10 MG tablet buspirone 10 mg tablet   TAKE ONE TABLET BY MOUTH five times a day AS NEEDED      cetirizine (ZYRTEC) 10 MG tablet Take 10 mg by mouth once daily.  5    COMFORT EZ PEN NEEDLES 31 gauge x 3/16" Ndle   11    docusate sodium (COLACE) 100 MG capsule Stool Softener   1 tab 4 times a day      docusate sodium (STOOL SOFTENER) 100 MG capsule Stool Softener   1 tab 4 times a day      EASY TOUCH TWIST LANCETS 30 gauge Misc Use to check blood glucose three times daily and as needed 90 each 10    EPINEPHrine (EPIPEN 2-NAKUL) 0.3 mg/0.3 mL AtIn Inject 0.3 mg into the muscle.       fluticasone (FLONASE) 50 mcg/actuation nasal spray 1 spray by Each Nare route once daily.      fluticasone-salmeterol 500-50 mcg/dose (ADVAIR) 500-50 mcg/dose DsDv diskus inhaler Inhale 1 puff into the lungs 2 (two) times daily. Controller 1 Inhaler 2    furosemide (LASIX) 40 MG tablet Take 1 tablet (40 mg total) by mouth 2 (two) times daily. 120 tablet 2    HYDROcodone-acetaminophen (NORCO) 5-325 mg per tablet TAKE 1 TABLET BY MOUTH 4 TIMES DAILY AS NEEDED FOR PAIN  0    insulin (LANTUS SOLOSTAR U-100 INSULIN) glargine 100 units/mL (3mL) SubQ pen Inject 60 Units into the skin once daily. 15 mL 6    ipratropium (ATROVENT) 42 mcg (0.06 %) nasal spray 2 sprays by Nasal route.      lancets (EASY TOUCH LANCETS) 26 gauge Misc Use as directed to check blood sugar TID and PRN. E 11.9 100 each 0    LANTUS SOLOSTAR U-100 INSULIN glargine 100 units/mL (3mL) SubQ pen INJECT 60 UNITS SUBCUTANEOUSLY DAILY 18 mL 0    montelukast (SINGULAIR) 10 mg tablet Take 1 tablet (10 mg total) by mouth once daily. 90 tablet 3    nystatin (MYCOSTATIN) powder Apply topically.      olopatadine (PATANASE) 0.6 % nasal spray 1 spray by Each Nare route 2 (two) times daily.      omega 3-dha-epa-fish oil (FISH OIL) 1,000 mg (120 " mg-180 mg) Cap Fish Oil 1,000 mg (120 mg-180 mg) capsule   Take 1 capsule twice a day by oral route.      pantoprazole (PROTONIX) 40 MG tablet Take 1 tablet (40 mg total) by mouth once daily. 90 tablet 0    pioglitazone (ACTOS) 45 MG tablet Take 1 tablet (45 mg total) by mouth once daily. 90 tablet 0    pioglitazone (ACTOS) 45 MG tablet Take 45 mg by mouth.      potassium chloride SA (K-DUR,KLOR-CON) 20 MEQ tablet Take 1 tablet (20 mEq total) by mouth 2 (two) times daily. 180 tablet 3    pregabalin (LYRICA) 200 MG Cap Take 1 capsule (200 mg total) by mouth 3 (three) times daily. 90 capsule 1    ranolazine (RANEXA) 500 MG Tb12 Take 500 mg by mouth 2 (two) times daily.      sucralfate (CARAFATE) 1 gram tablet TAKE 1 TABLET BY MOUTH EVERY 6 HOURS 120 tablet 2    TRUE METRIX GLUCOSE TEST STRIP Strp Use to check blood glucose three times per day and as needed 2 strip 10    VIIBRYD 40 mg Tab tablet Take 40 mg by mouth once daily.  0    zolpidem (AMBIEN) 10 mg Tab TAKE 1 TABLET BY MOUTH NIGHTLY AS NEEDED 30 tablet 2    lisinopril (PRINIVIL,ZESTRIL) 20 MG tablet Take 1 tablet (20 mg total) by mouth once daily. 90 tablet 3    sodium,potassium,mag sulfates (SUPREP BOWEL PREP KIT) 17.5-3.13-1.6 gram SolR Follow written instructions provided by Clinic 2 Bottle 0         PMFSHx:  Past Medical History:   Diagnosis Date    Allergy     Anxiety     Asthma     Depression     Diabetes mellitus, type 2     GERD (gastroesophageal reflux disease)     Hyperlipidemia     Hypertension     Morbid obesity        Past Surgical History:   Procedure Laterality Date    ACHILLES TENDON SURGERY Left     CARPAL TUNNEL RELEASE Left      SECTION      CHOLECYSTECTOMY      COLON SURGERY      HERNIA REPAIR      HYSTERECTOMY      partial    KIDNEY STONE SURGERY      OOPHORECTOMY      TRIGGER FINGER RELEASE Left        Family History   Problem Relation Age of Onset    Cancer Mother     Lung cancer Mother      Hypertension Mother     Cancer Father     Lung cancer Father     Hypertension Father     Breast cancer Neg Hx        Social History     Tobacco Use    Smoking status: Never Smoker    Smokeless tobacco: Never Used   Substance Use Topics    Alcohol use: No    Drug use: No       Review of Systems   Constitutional: Negative for appetite change, chills, fatigue, fever and unexpected weight change.   HENT: Negative for congestion, dental problem, ear pain, mouth sores, postnasal drip, rhinorrhea, sore throat, tinnitus, trouble swallowing and voice change.    Eyes: Negative for photophobia, pain, discharge and visual disturbance.   Respiratory: Negative for cough, chest tightness, shortness of breath and wheezing.    Cardiovascular: Negative for chest pain, palpitations and leg swelling.   Gastrointestinal: Negative for abdominal pain, blood in stool, constipation, diarrhea, nausea and vomiting.   Endocrine: Negative for cold intolerance, heat intolerance, polydipsia, polyphagia and polyuria.   Genitourinary: Negative for difficulty urinating, dysuria, flank pain, frequency, hematuria and urgency.   Musculoskeletal: Negative for arthralgias, joint swelling and myalgias.   Skin: Negative for color change and rash.   Allergic/Immunologic: Negative for immunocompromised state.   Neurological: Negative for dizziness, tremors, seizures, syncope, speech difficulty, weakness, numbness and headaches.   Hematological: Negative for adenopathy. Does not bruise/bleed easily.   Psychiatric/Behavioral: Negative for agitation, confusion, hallucinations, self-injury and suicidal ideas. The patient is not nervous/anxious.             Physical Exam   Constitutional: She is oriented to person, place, and time. She appears well-developed and well-nourished. She is active.  Non-toxic appearance. No distress. Body mass index is 48.23 kg/m².   HENT: Head: Normocephalic and atraumatic.   Right Ear: Hearing and external ear normal. No  drainage or tenderness.   Left Ear: Hearing and external ear normal. No drainage or tenderness.   Nose: Nose normal. No rhinorrhea. No epistaxis.   Mouth/Throat: Uvula is midline, oropharynx is clear and moist and mucous membranes are normal. Mucous membranes are not pale, not dry and not cyanotic. No oropharyngeal exudate.   Eyes: Pupils are equal, round, and reactive to light. Conjunctivae and lids are normal. Right eye exhibits no discharge and no exudate. Left eye exhibits no discharge and no exudate. Right conjunctiva is not injected. Right eye exhibits no nystagmus. Left eye exhibits no nystagmus.   Neck: Trachea normal, full passive range of motion without pain and phonation normal. No JVD present. Carotid bruit is not present. No tracheal deviation present. No thyroid mass and no thyromegaly present.   Cardiovascular: Normal rate, regular rhythm, S1 normal, S2 normal, normal heart sounds and intact distal pulses. PMI is not displaced. Exam reveals no gallop and no friction rub.   No murmur heard.  Pulmonary/Chest: Effort normal and breath sounds normal. No accessory muscle usage. No respiratory distress. She exhibits no mass, no tenderness and no crepitus. Right breast exhibits no inverted nipple, no mass, no nipple discharge, no skin change and no tenderness. Left breast exhibits no inverted nipple, no mass, no nipple discharge, no skin change and no tenderness. Breasts are symmetrical.   Abdominal: Soft. Normal appearance and bowel sounds are normal. She exhibits no distension, no fluid wave, no abdominal bruit and no mass. There is no hepatosplenomegaly. There is no tenderness. There is no rebound, no guarding and negative Gorman's sign. No hernia. Hernia confirmed negative in the right inguinal area and confirmed negative in the left inguinal area.   Genitourinary: Rectum normal and vagina normal. There is no rash or lesion on the right labia. There is no rash or lesion on the left labia. Right adnexum  displays no mass. Left adnexum displays no mass. No vaginal discharge found.   Musculoskeletal:        Cervical back: Normal.        Thoracic back: Normal.        Lumbar back: Normal.        Right upper arm: Normal.        Left upper arm: Normal.        Right forearm: Normal.        Left forearm: Normal.        Right hand: Normal.        Left hand: Normal.        Right upper leg: Normal.        Left upper leg: Normal.        Right lower leg: Normal.        Left lower leg: Normal.        Right foot: Normal.        Left foot: Normal.   Lymphadenopathy:        Head (right side): No submental, no submandibular and no posterior auricular adenopathy present.        Head (left side): No submental, no submandibular and no posterior auricular adenopathy present.     She has no cervical adenopathy.     She has no axillary adenopathy.        Right: No inguinal and no supraclavicular adenopathy present.        Left: No inguinal and no supraclavicular adenopathy present.   Neurological: She is alert and oriented to person, place, and time. She has normal strength. No cranial nerve deficit or sensory deficit. Coordination and gait normal. GCS eye subscore is 4. GCS verbal subscore is 5. GCS motor subscore is 6.   Skin: Skin is warm, dry and intact. No rash noted. No cyanosis. Nails show no clubbing.   Psychiatric: She has a normal mood and affect. Her speech is normal. Judgment and thought content normal. Her mood appears not anxious. Her affect is not inappropriate. She is not actively hallucinating. She does not exhibit a depressed mood.           Medical Records Review:  Lab results reviewed from 11/13/2018.  CBC showed no evidence of leukocytosis or thrombocytopenia, very mild normochromic normocytic anemia was present with a hemoglobin of 11.6 grams/deciliter.  Serum iron was in the range of normal, 71 mcg/dL    Lab results reviewed from 7/5/2019.  CBC again showed no leukocytosis or thrombocytopenia, a mild normochromic  normocytic anemia was present but improved, hemoglobin 11.8 grams/deciliter.    Assessment:       Due for screening colonoscopy.  Very mild but improving normochromic normocytic anemia.  Differential diagnosis includes but is not limited to esophageal neoplasm, esophagitis, esophageal ulcer, gastroesophageal reflux disease, gastritis, gastric ulcer, gastric cancer, duodenitis, duodenal ulcer, inflammatory bowel disease, diverticulosis, hemorrhoids, gastrointestinal angiodysplasias, benign gastrointestinal neoplasm, colon cancer, etc.  Options at this time include but are not limited to endoscopy, second opinion, capsule endoscopy, radiologic studies, observation, etc.  Multiple comorbid issues ( MVP followed by cardiology, anemia ) increase the complexity of required perioperative evaluation & management, risks of complications, and likely will increase the difficulty and complexity of postoperative care, etc.  These issues must be factored in to preoperative evaluation and perioperative management.    1. Encounter for screening colonoscopy    2. Normochromic normocytic anemia          Plan:   Encounter for screening colonoscopy  -     Case Request Operating Room: COLONOSCOPY, ESOPHAGOGASTRODUODENOSCOPY (EGD)  -     Comprehensive metabolic panel; Future; Expected date: 01/07/2020  -     CBC auto differential; Future; Expected date: 01/07/2020  -     Pregnancy, urine rapid; Future; Expected date: 01/07/2020  -     EKG 12-lead; Future; Expected date: 01/07/2020    Normochromic normocytic anemia    Other orders  -     sodium,potassium,mag sulfates (SUPREP BOWEL PREP KIT) 17.5-3.13-1.6 gram SolR; Follow written instructions provided by Clinic  Dispense: 2 Bottle; Refill: 0        Follow up around 2/24/2020 for routine post-endosocpy visit.    Counseling/Medical Decision Making:  Shweta Gupta was counseled regarding the results of the evaluation thus far and the differential diagnosis.  Diagnostic and therapeutic  options were discussed in detail along with the risks, benefits, possible complications, details of, and indications for each option.  Diagnoses discussed included but were not limited to: GB disease, GERD, hiatal hernia, PUD, gastritis, duodenitis, Sphincter of Oddi dysfunction, hemorrhoids, diverticulosis, cancer, IBD, IBS, benign tumors, angiodysplasias, ischemic disease.  Options discussed included but were not limited to: EGD, colonoscopy, proctoscopy, anoscopy, sigmoidoscopy, radiologic studies, PillCam, empiric therapy, second opinion, etc. Possible complications of endoscopy discussed included but were not limited to: bleeding, infections, endocarditis, injury to internal organs, incomplete exam, failure to diagnose cancer or other serious condition, need for emergency surgery, need for a temporary colostomy, need for additional operations or procedures, etc.  Entire conversation was held in layman's terms.  All unfamiliar terms were defined.  Questions solicited and answered.  I read the consent form to her verbatim.  Krames booklets were provided on EGD, GERD, GB disease / surgery, colonoscopy, polyps, diverticulosis, hemorrhoids, cancer, etc.  A copy of the consent form was provided for her review outside the office / hospital prior to the procedures.  At the conclusion of the conversation she voiced complete understanding of all we discussed and satisfaction that all of her questions had been answered to her full understanding.  She stated that she felt fully informed and completely capable of making an informed decision.  She requests and desires to proceed with EGD and colonoscopy.

## 2020-02-04 ENCOUNTER — HOSPITAL ENCOUNTER (OUTPATIENT)
Dept: CARDIOLOGY | Facility: HOSPITAL | Age: 50
Discharge: HOME | End: 2020-02-04
Attending: SURGERY
Payer: MEDICAID

## 2020-02-04 ENCOUNTER — PATIENT OUTREACH (OUTPATIENT)
Dept: ADMINISTRATIVE | Facility: HOSPITAL | Age: 50
End: 2020-02-04

## 2020-02-04 DIAGNOSIS — Z12.11 ENCOUNTER FOR SCREENING COLONOSCOPY: ICD-10-CM

## 2020-02-04 PROCEDURE — 93005 ELECTROCARDIOGRAM TRACING: CPT

## 2020-02-04 NOTE — LETTER
February 12, 2020    Shweta Gupta  [address] [place] [address]             [facility]  [address]  [facility] [address]  Phone: [phone] Dear Lisa [facility] is committed to your overall health and would like to ensure that you are up to date on your recommended test and/or procedures.   Joy Pineda NP  has found that your chart shows you may be due for the following:     Shingles Vaccine(1 of 2) due on 05/27/2019   Hemoglobin A1c due on 01/05/2020   Foot Exam due on 01/07/2020     Joy Pineda NP WOULD LIKE YOUR LAB WORK DONE PRIOR TO NEXT APPOINTMENT. PLEASE CALL [phone] TO SCHEDULE.     If you have had any of the above done at another facility, please let us know so that we may obtain copies from that facility.  If you have a copy of these records, please provide a copy for us to scan into your chart.  You are welcome to request that the report be faxed to us at  ([phone]).     Otherwise, please schedule these appointments at your earliest convenience by calling [phone] or going to [email or web address].     If you have an upcoming scheduled appointment for the item above, please disregard this letter.     Sincerely,   Your [facility]   ASHVIN Beatty L.P.N. Clinical Care Coordinator   [address] [place] [address], [place] [address] [phone]

## 2020-02-07 ENCOUNTER — ANESTHESIA EVENT (OUTPATIENT)
Dept: SURGERY | Facility: HOSPITAL | Age: 50
End: 2020-02-07
Payer: MEDICAID

## 2020-02-07 ENCOUNTER — HOSPITAL ENCOUNTER (OUTPATIENT)
Facility: HOSPITAL | Age: 50
Discharge: HOME | End: 2020-02-07
Attending: SURGERY | Admitting: SURGERY
Payer: MEDICAID

## 2020-02-07 ENCOUNTER — ANESTHESIA (OUTPATIENT)
Dept: SURGERY | Facility: HOSPITAL | Age: 50
End: 2020-02-07
Payer: MEDICAID

## 2020-02-07 VITALS
WEIGHT: 281 LBS | DIASTOLIC BLOOD PRESSURE: 71 MMHG | RESPIRATION RATE: 13 BRPM | OXYGEN SATURATION: 96 % | TEMPERATURE: 98 F | HEIGHT: 64 IN | SYSTOLIC BLOOD PRESSURE: 124 MMHG | HEART RATE: 78 BPM | BODY MASS INDEX: 47.97 KG/M2

## 2020-02-07 DIAGNOSIS — Z12.11 ENCOUNTER FOR SCREENING COLONOSCOPY: ICD-10-CM

## 2020-02-07 PROBLEM — D64.9 ANEMIA: Status: ACTIVE | Noted: 2020-02-07

## 2020-02-07 PROBLEM — K57.92 DIVERTICULITIS: Status: RESOLVED | Noted: 2018-04-18 | Resolved: 2020-02-07

## 2020-02-07 PROBLEM — K22.81 ESOPHAGEAL POLYP: Status: ACTIVE | Noted: 2020-02-07

## 2020-02-07 PROBLEM — K29.31 CHRONIC SUPERFICIAL GASTRITIS WITH BLEEDING: Status: ACTIVE | Noted: 2020-02-07

## 2020-02-07 PROBLEM — K57.30 DIVERTICULOSIS OF COLON: Status: ACTIVE | Noted: 2020-02-07

## 2020-02-07 LAB
POCT GLUCOSE: 112 MG/DL (ref 70–110)
POCT GLUCOSE: 114 MG/DL (ref 70–110)

## 2020-02-07 PROCEDURE — 43239 EGD BIOPSY SINGLE/MULTIPLE: CPT | Mod: 59 | Performed by: SURGERY

## 2020-02-07 PROCEDURE — 27201012 HC FORCEPS, HOT/COLD, DISP: Performed by: SURGERY

## 2020-02-07 PROCEDURE — 00813 ANES UPR LWR GI NDSC PX: CPT | Performed by: SURGERY

## 2020-02-07 PROCEDURE — 88305 TISSUE EXAM BY PATHOLOGIST: CPT | Performed by: PATHOLOGY

## 2020-02-07 PROCEDURE — 37000008 HC ANESTHESIA 1ST 15 MINUTES: Performed by: SURGERY

## 2020-02-07 PROCEDURE — 88342 IMHCHEM/IMCYTCHM 1ST ANTB: CPT | Mod: 26,,, | Performed by: PATHOLOGY

## 2020-02-07 PROCEDURE — 43239 EGD BIOPSY SINGLE/MULTIPLE: CPT | Mod: 59,,, | Performed by: SURGERY

## 2020-02-07 PROCEDURE — 45378 DIAGNOSTIC COLONOSCOPY: CPT | Mod: ,,, | Performed by: SURGERY

## 2020-02-07 PROCEDURE — 43250 EGD CAUTERY TUMOR POLYP: CPT

## 2020-02-07 PROCEDURE — D9220A PRA ANESTHESIA: Mod: ANES,,, | Performed by: ANESTHESIOLOGY

## 2020-02-07 PROCEDURE — 43239 PR EGD, FLEX, W/BIOPSY, SGL/MULTI: ICD-10-PCS | Mod: 59,,, | Performed by: SURGERY

## 2020-02-07 PROCEDURE — D9220A PRA ANESTHESIA: Mod: CRNA,,, | Performed by: NURSE ANESTHETIST, CERTIFIED REGISTERED

## 2020-02-07 PROCEDURE — D9220A PRA ANESTHESIA: ICD-10-PCS | Mod: CRNA,,, | Performed by: NURSE ANESTHETIST, CERTIFIED REGISTERED

## 2020-02-07 PROCEDURE — 63600175 PHARM REV CODE 636 W HCPCS: Performed by: SURGERY

## 2020-02-07 PROCEDURE — 43250 PR EGD, FLEX, W/REMOVAL, TUMOR/POLYP/LESION(S), HOT BIOPSY FORCEPS: ICD-10-PCS | Mod: 51,,, | Performed by: SURGERY

## 2020-02-07 PROCEDURE — 37000009 HC ANESTHESIA EA ADD 15 MINS: Performed by: SURGERY

## 2020-02-07 PROCEDURE — G0121 COLON CA SCRN NOT HI RSK IND: HCPCS | Performed by: SURGERY

## 2020-02-07 PROCEDURE — 45378 PR COLONOSCOPY,DIAGNOSTIC: ICD-10-PCS | Mod: ,,, | Performed by: SURGERY

## 2020-02-07 PROCEDURE — 43250 EGD CAUTERY TUMOR POLYP: CPT | Mod: 51,,, | Performed by: SURGERY

## 2020-02-07 PROCEDURE — S0073 INJECTION, AZTREONAM, 500 MG: HCPCS | Performed by: SURGERY

## 2020-02-07 PROCEDURE — 45378 DIAGNOSTIC COLONOSCOPY: CPT | Performed by: SURGERY

## 2020-02-07 PROCEDURE — 25000003 PHARM REV CODE 250: Performed by: SURGERY

## 2020-02-07 PROCEDURE — 63600175 PHARM REV CODE 636 W HCPCS: Performed by: NURSE ANESTHETIST, CERTIFIED REGISTERED

## 2020-02-07 PROCEDURE — 88342 IMHCHEM/IMCYTCHM 1ST ANTB: CPT | Performed by: PATHOLOGY

## 2020-02-07 PROCEDURE — 88342 CHG IMMUNOCYTOCHEMISTRY: ICD-10-PCS | Mod: 26,,, | Performed by: PATHOLOGY

## 2020-02-07 PROCEDURE — D9220A PRA ANESTHESIA: ICD-10-PCS | Mod: ANES,,, | Performed by: ANESTHESIOLOGY

## 2020-02-07 PROCEDURE — 88305 TISSUE EXAM BY PATHOLOGIST: ICD-10-PCS | Mod: 26,,, | Performed by: PATHOLOGY

## 2020-02-07 PROCEDURE — 88305 TISSUE EXAM BY PATHOLOGIST: CPT | Mod: 26,,, | Performed by: PATHOLOGY

## 2020-02-07 RX ORDER — LIDOCAINE HYDROCHLORIDE 10 MG/ML
1 INJECTION, SOLUTION EPIDURAL; INFILTRATION; INTRACAUDAL; PERINEURAL ONCE
Status: CANCELLED | OUTPATIENT
Start: 2020-02-07 | End: 2020-02-07

## 2020-02-07 RX ORDER — SODIUM CHLORIDE, SODIUM LACTATE, POTASSIUM CHLORIDE, CALCIUM CHLORIDE 600; 310; 30; 20 MG/100ML; MG/100ML; MG/100ML; MG/100ML
INJECTION, SOLUTION INTRAVENOUS CONTINUOUS
Status: DISCONTINUED | OUTPATIENT
Start: 2020-02-07 | End: 2020-02-07 | Stop reason: HOSPADM

## 2020-02-07 RX ORDER — SODIUM CHLORIDE, SODIUM LACTATE, POTASSIUM CHLORIDE, CALCIUM CHLORIDE 600; 310; 30; 20 MG/100ML; MG/100ML; MG/100ML; MG/100ML
INJECTION, SOLUTION INTRAVENOUS CONTINUOUS
Status: CANCELLED | OUTPATIENT
Start: 2020-02-07

## 2020-02-07 RX ORDER — ONDANSETRON 2 MG/ML
4 INJECTION INTRAMUSCULAR; INTRAVENOUS DAILY PRN
Status: DISCONTINUED | OUTPATIENT
Start: 2020-02-07 | End: 2020-02-07 | Stop reason: HOSPADM

## 2020-02-07 RX ORDER — SODIUM CHLORIDE, SODIUM LACTATE, POTASSIUM CHLORIDE, CALCIUM CHLORIDE 600; 310; 30; 20 MG/100ML; MG/100ML; MG/100ML; MG/100ML
125 INJECTION, SOLUTION INTRAVENOUS CONTINUOUS
Status: DISCONTINUED | OUTPATIENT
Start: 2020-02-07 | End: 2020-02-07 | Stop reason: HOSPADM

## 2020-02-07 RX ORDER — PROPOFOL 10 MG/ML
VIAL (ML) INTRAVENOUS
Status: DISCONTINUED | OUTPATIENT
Start: 2020-02-07 | End: 2020-02-07

## 2020-02-07 RX ORDER — LIDOCAINE HYDROCHLORIDE 10 MG/ML
1 INJECTION, SOLUTION EPIDURAL; INFILTRATION; INTRACAUDAL; PERINEURAL ONCE
Status: DISCONTINUED | OUTPATIENT
Start: 2020-02-07 | End: 2020-02-07 | Stop reason: HOSPADM

## 2020-02-07 RX ORDER — MIDAZOLAM HYDROCHLORIDE 1 MG/ML
INJECTION, SOLUTION INTRAMUSCULAR; INTRAVENOUS
Status: DISCONTINUED | OUTPATIENT
Start: 2020-02-07 | End: 2020-02-07

## 2020-02-07 RX ADMIN — PROPOFOL 40 MG: 10 INJECTION, EMULSION INTRAVENOUS at 07:02

## 2020-02-07 RX ADMIN — PROPOFOL 20 MG: 10 INJECTION, EMULSION INTRAVENOUS at 08:02

## 2020-02-07 RX ADMIN — PROPOFOL 30 MG: 10 INJECTION, EMULSION INTRAVENOUS at 07:02

## 2020-02-07 RX ADMIN — PROPOFOL 20 MG: 10 INJECTION, EMULSION INTRAVENOUS at 07:02

## 2020-02-07 RX ADMIN — SODIUM CHLORIDE, POTASSIUM CHLORIDE, SODIUM LACTATE AND CALCIUM CHLORIDE: 600; 310; 30; 20 INJECTION, SOLUTION INTRAVENOUS at 07:02

## 2020-02-07 RX ADMIN — PIPERACILLIN AND TAZOBACTAM 3.38 G: 3; .375 INJECTION, POWDER, LYOPHILIZED, FOR SOLUTION INTRAVENOUS; PARENTERAL at 06:02

## 2020-02-07 RX ADMIN — AZTREONAM 1 G: 1 INJECTION, POWDER, LYOPHILIZED, FOR SOLUTION INTRAMUSCULAR; INTRAVENOUS at 07:02

## 2020-02-07 RX ADMIN — MIDAZOLAM HYDROCHLORIDE 1 MG: 1 INJECTION, SOLUTION INTRAMUSCULAR; INTRAVENOUS at 07:02

## 2020-02-07 NOTE — PLAN OF CARE
Pt arrives to PACU via stretcher awake and alert, able to follow simple commands. PIV intact and infusing LR'S to gravity. VSS, no complaints will continue to monitor.

## 2020-02-07 NOTE — H&P
[facility] - General Surgery  General Surgery  H&P  2/7/2020      Patient Name: Shweta Gupta  MRN: [id number]     Chief Complaint:  I am here to be scoped    HPI:  Ms. Gupta presents today to proceed with an EGD and colonoscopy.  She was seen recently in clinic  as a consult from Dr. Govea for evaluation of anemia.  Her PCP is Joy BRITO.  She has a long history of a very mild normochromic normocytic anemia.  Iron studies collected about a year ago showed no evidence of deficiency.  She has no complaints. No abdominal pain, nausea, vomiting, diarrhea, constipation, melena, hematochezia, change in bowel habits, change in stool characteristics, weight loss, decrease in caliber of stool, etc.  No chest pain, shortness breath, palpitations, diaphoresis, dyspnea, dizziness, lightheadedness, orthostatics symptoms, etc.  No family history of colon cancer. No aggravating or alleviating factors. No other associated symptoms.  Previous colonoscopy over 10 years ago.  She is followed by Dr. Govea for MVP      Allergies & Meds:    No Known Allergies    Current Outpatient Medications   Medication Sig Dispense Refill    albuterol (VENTOLIN HFA) 90 mcg/actuation inhaler Inhale 2 puffs into the lungs every 6 (six) hours as needed for Wheezing. Rescue 18 g 3    alcohol swabs (EASY TOUCH ALCOHOL PREP PADS) PadM USE AS DIRECTEDC      aspirin (ECOTRIN) 81 MG EC tablet aspirin 81 mg tablet,delayed release   Take 1 tablet every day by oral route.      atorvastatin (LIPITOR) 40 MG tablet atorvastatin 40 mg tablet 90 tablet 3    baclofen (LIORESAL) 10 MG tablet TAKE 1 TABLET BY MOUTH THREE TIMES DAILY      blood sugar diagnostic (PRECISION Q-I-D TEST) Strp Use as directed to check blood sugar TID and PRN. E 11.9      blood sugar diagnostic Strp Use as directed to check blood sugar TID and PRN. E 11.9 100 each 0    blood-glucose meter kit Use as instructed      budesonide-formoterol  "160-4.5 mcg (SYMBICORT) 160-4.5 mcg/actuation HFAA Inhale 2 puffs into the lungs.      buPROPion (WELLBUTRIN XL) 150 MG TB24 tablet Take 1 tablet (150 mg total) by mouth once daily. 90 tablet 0    busPIRone (BUSPAR) 10 MG tablet buspirone 10 mg tablet   TAKE ONE TABLET BY MOUTH five times a day AS NEEDED      cetirizine (ZYRTEC) 10 MG tablet Take 10 mg by mouth once daily.  5    COMFORT EZ PEN NEEDLES 31 gauge x 3/16" Ndle   11    docusate sodium (COLACE) 100 MG capsule Stool Softener   1 tab 4 times a day      docusate sodium (STOOL SOFTENER) 100 MG capsule Stool Softener   1 tab 4 times a day      EASY TOUCH TWIST LANCETS 30 gauge Misc Use to check blood glucose three times daily and as needed 90 each 10    EPINEPHrine (EPIPEN 2-NAKUL) 0.3 mg/0.3 mL AtIn Inject 0.3 mg into the muscle.       fluticasone (FLONASE) 50 mcg/actuation nasal spray 1 spray by Each Nare route once daily.      fluticasone-salmeterol 500-50 mcg/dose (ADVAIR) 500-50 mcg/dose DsDv diskus inhaler Inhale 1 puff into the lungs 2 (two) times daily. Controller 1 Inhaler 2    furosemide (LASIX) 40 MG tablet Take 1 tablet (40 mg total) by mouth 2 (two) times daily. 120 tablet 2    HYDROcodone-acetaminophen (NORCO) 5-325 mg per tablet TAKE 1 TABLET BY MOUTH 4 TIMES DAILY AS NEEDED FOR PAIN  0    insulin (LANTUS SOLOSTAR U-100 INSULIN) glargine 100 units/mL (3mL) SubQ pen Inject 60 Units into the skin once daily. 15 mL 6    ipratropium (ATROVENT) 42 mcg (0.06 %) nasal spray 2 sprays by Nasal route.      lancets (EASY TOUCH LANCETS) 26 gauge Misc Use as directed to check blood sugar TID and PRN. E 11.9 100 each 0    LANTUS SOLOSTAR U-100 INSULIN glargine 100 units/mL (3mL) SubQ pen INJECT 60 UNITS SUBCUTANEOUSLY DAILY 18 mL 0    montelukast (SINGULAIR) 10 mg tablet Take 1 tablet (10 mg total) by mouth once daily. 90 tablet 3    nystatin (MYCOSTATIN) powder Apply topically.      olopatadine (PATANASE) 0.6 % nasal spray 1 spray by Each " Nare route 2 (two) times daily.      omega 3-dha-epa-fish oil (FISH OIL) 1,000 mg (120 mg-180 mg) Cap Fish Oil 1,000 mg (120 mg-180 mg) capsule   Take 1 capsule twice a day by oral route.      pantoprazole (PROTONIX) 40 MG tablet Take 1 tablet (40 mg total) by mouth once daily. 90 tablet 0    pioglitazone (ACTOS) 45 MG tablet Take 1 tablet (45 mg total) by mouth once daily. 90 tablet 0    pioglitazone (ACTOS) 45 MG tablet Take 45 mg by mouth.      potassium chloride SA (K-DUR,KLOR-CON) 20 MEQ tablet Take 1 tablet (20 mEq total) by mouth 2 (two) times daily. 180 tablet 3    pregabalin (LYRICA) 200 MG Cap Take 1 capsule (200 mg total) by mouth 3 (three) times daily. 90 capsule 1    ranolazine (RANEXA) 500 MG Tb12 Take 500 mg by mouth 2 (two) times daily.      sucralfate (CARAFATE) 1 gram tablet TAKE 1 TABLET BY MOUTH EVERY 6 HOURS 120 tablet 2    TRUE METRIX GLUCOSE TEST STRIP Strp Use to check blood glucose three times per day and as needed 2 strip 10    VIIBRYD 40 mg Tab tablet Take 40 mg by mouth once daily.  0    zolpidem (AMBIEN) 10 mg Tab TAKE 1 TABLET BY MOUTH NIGHTLY AS NEEDED 30 tablet 2    lisinopril (PRINIVIL,ZESTRIL) 20 MG tablet Take 1 tablet (20 mg total) by mouth once daily. 90 tablet 3    sodium,potassium,mag sulfates (SUPREP BOWEL PREP KIT) 17.5-3.13-1.6 gram SolR Follow written instructions provided by Clinic 2 Bottle 0         PMFSHx:  Past Medical History:   Diagnosis Date    Allergy     Anxiety     Asthma     Depression     Diabetes mellitus, type 2     GERD (gastroesophageal reflux disease)     Hyperlipidemia     Hypertension     Morbid obesity        Past Surgical History:   Procedure Laterality Date    ACHILLES TENDON SURGERY Left     CARPAL TUNNEL RELEASE Left      SECTION      CHOLECYSTECTOMY      COLON SURGERY      HERNIA REPAIR      HYSTERECTOMY      partial    KIDNEY STONE SURGERY      OOPHORECTOMY      TRIGGER FINGER RELEASE Left        Family  History   Problem Relation Age of Onset    Cancer Mother     Lung cancer Mother     Hypertension Mother     Cancer Father     Lung cancer Father     Hypertension Father     Breast cancer Neg Hx        Social History     Tobacco Use    Smoking status: Never Smoker    Smokeless tobacco: Never Used   Substance Use Topics    Alcohol use: No    Drug use: No       Review of Systems   Constitutional: Negative for appetite change, chills, fatigue, fever and unexpected weight change.   HENT: Negative for congestion, dental problem, ear pain, mouth sores, postnasal drip, rhinorrhea, sore throat, tinnitus, trouble swallowing and voice change.    Eyes: Negative for photophobia, pain, discharge and visual disturbance.   Respiratory: Negative for cough, chest tightness, shortness of breath and wheezing.    Cardiovascular: Negative for chest pain, palpitations and leg swelling.   Gastrointestinal: Negative for abdominal pain, blood in stool, constipation, diarrhea, nausea and vomiting.   Endocrine: Negative for cold intolerance, heat intolerance, polydipsia, polyphagia and polyuria.   Genitourinary: Negative for difficulty urinating, dysuria, flank pain, frequency, hematuria and urgency.   Musculoskeletal: Negative for arthralgias, joint swelling and myalgias.   Skin: Negative for color change and rash.   Allergic/Immunologic: Negative for immunocompromised state.   Neurological: Negative for dizziness, tremors, seizures, syncope, speech difficulty, weakness, numbness and headaches.   Hematological: Negative for adenopathy. Does not bruise/bleed easily.   Psychiatric/Behavioral: Negative for agitation, confusion, hallucinations, self-injury and suicidal ideas. The patient is not nervous/anxious.             Physical Exam   Constitutional: She is oriented to person, place, and time. She appears well-developed and well-nourished. She is active.  Non-toxic appearance. No distress. Body mass index is 48.23 kg/m².   HENT:  Head: Normocephalic and atraumatic.   Right Ear: Hearing and external ear normal. No drainage or tenderness.   Left Ear: Hearing and external ear normal. No drainage or tenderness.   Nose: Nose normal. No rhinorrhea. No epistaxis.   Mouth/Throat: Uvula is midline, oropharynx is clear and moist and mucous membranes are normal. Mucous membranes are not pale, not dry and not cyanotic. No oropharyngeal exudate.   Eyes: Pupils are equal, round, and reactive to light. Conjunctivae and lids are normal. Right eye exhibits no discharge and no exudate. Left eye exhibits no discharge and no exudate. Right conjunctiva is not injected. Right eye exhibits no nystagmus. Left eye exhibits no nystagmus.   Neck: Trachea normal, full passive range of motion without pain and phonation normal. No JVD present. Carotid bruit is not present. No tracheal deviation present. No thyroid mass and no thyromegaly present.   Cardiovascular: Normal rate, regular rhythm, S1 normal, S2 normal, normal heart sounds and intact distal pulses. PMI is not displaced. Exam reveals no gallop and no friction rub.   No murmur heard.  Pulmonary/Chest: Effort normal and breath sounds normal. No accessory muscle usage. No respiratory distress. She exhibits no mass, no tenderness and no crepitus. Right breast exhibits no inverted nipple, no mass, no nipple discharge, no skin change and no tenderness. Left breast exhibits no inverted nipple, no mass, no nipple discharge, no skin change and no tenderness. Breasts are symmetrical.   Abdominal: Soft. Normal appearance and bowel sounds are normal. She exhibits no distension, no fluid wave, no abdominal bruit and no mass. There is no hepatosplenomegaly. There is no tenderness. There is no rebound, no guarding and negative Gorman's sign. No hernia. Hernia confirmed negative in the right inguinal area and confirmed negative in the left inguinal area.   Genitourinary: Rectum normal and vagina normal. There is no rash or  lesion on the right labia. There is no rash or lesion on the left labia. Right adnexum displays no mass. Left adnexum displays no mass. No vaginal discharge found.   Musculoskeletal:        Cervical back: Normal.        Thoracic back: Normal.        Lumbar back: Normal.        Right upper arm: Normal.        Left upper arm: Normal.        Right forearm: Normal.        Left forearm: Normal.        Right hand: Normal.        Left hand: Normal.        Right upper leg: Normal.        Left upper leg: Normal.        Right lower leg: Normal.        Left lower leg: Normal.        Right foot: Normal.        Left foot: Normal.   Lymphadenopathy:        Head (right side): No submental, no submandibular and no posterior auricular adenopathy present.        Head (left side): No submental, no submandibular and no posterior auricular adenopathy present.     She has no cervical adenopathy.     She has no axillary adenopathy.        Right: No inguinal and no supraclavicular adenopathy present.        Left: No inguinal and no supraclavicular adenopathy present.   Neurological: She is alert and oriented to person, place, and time. She has normal strength. No cranial nerve deficit or sensory deficit. Coordination and gait normal. GCS eye subscore is 4. GCS verbal subscore is 5. GCS motor subscore is 6.   Skin: Skin is warm, dry and intact. No rash noted. No cyanosis. Nails show no clubbing.   Psychiatric: She has a normal mood and affect. Her speech is normal. Judgment and thought content normal. Her mood appears not anxious. Her affect is not inappropriate. She is not actively hallucinating. She does not exhibit a depressed mood.           Medical Records Review:  Lab results reviewed from 11/13/2018.  CBC showed no evidence of leukocytosis or thrombocytopenia, very mild normochromic normocytic anemia was present with a hemoglobin of 11.6 grams/deciliter.  Serum iron was in the range of normal, 71 mcg/dL    Lab results reviewed from  7/5/2019.  CBC again showed no leukocytosis or thrombocytopenia, a mild normochromic normocytic anemia was present but improved, hemoglobin 11.8 grams/deciliter.    Lab results reviewed from 2/4/2020.  CBC shows a mild normochromic normocytic anemia with a hemoglobin of 11.5 grams/deciliter, no leukocytosis or thrombocytopenia.  Electrolytes are all in the range of normal.  BUN and creatinine shows no evidence of renal dysfunction.  Glucose is minimally elevated.  Liver profile shows no evidence of hepatocellular disease or biliary obstruction. Pregnancy test is negative.    EKG reviewed from 2/4/2020.  Twelve lead tracing showed normal sinus rhythm and no evidence of any ischemic changes.    Assessment:       Due for screening colonoscopy.  Very mild but improving normochromic normocytic anemia.  Differential diagnosis includes but is not limited to esophageal neoplasm, esophagitis, esophageal ulcer, gastroesophageal reflux disease, gastritis, gastric ulcer, gastric cancer, duodenitis, duodenal ulcer, inflammatory bowel disease, diverticulosis, hemorrhoids, gastrointestinal angiodysplasias, benign gastrointestinal neoplasm, colon cancer, etc.  Options at this time include but are not limited to endoscopy, second opinion, capsule endoscopy, radiologic studies, observation, etc.  Multiple comorbid issues ( MVP followed by cardiology, anemia ) increase the complexity of required perioperative evaluation & management, risks of complications, and likely will increase the difficulty and complexity of postoperative care, etc.  These issues must be factored in to preoperative evaluation and perioperative management.    1. Encounter for screening colonoscopy    2. Normochromic normocytic anemia          Plan:   Encounter for screening colonoscopy  -     Case Request Operating Room: COLONOSCOPY, ESOPHAGOGASTRODUODENOSCOPY (EGD)    Normochromic normocytic anemia        Follow up around 2/24/2020 for routine post-endosocpy  visit.    Counseling/Medical Decision Making:  Shweta Gupta was counseled regarding the results of the evaluation thus far and the differential diagnosis.  Diagnostic and therapeutic options were discussed in detail along with the risks, benefits, possible complications, details of, and indications for each option.  Diagnoses discussed included but were not limited to: GB disease, GERD, hiatal hernia, PUD, gastritis, duodenitis, Sphincter of Oddi dysfunction, hemorrhoids, diverticulosis, cancer, IBD, IBS, benign tumors, angiodysplasias, ischemic disease.  Options discussed included but were not limited to: EGD, colonoscopy, proctoscopy, anoscopy, sigmoidoscopy, radiologic studies, PillCam, empiric therapy, second opinion, etc. Possible complications of endoscopy discussed included but were not limited to: bleeding, infections, endocarditis, injury to internal organs, incomplete exam, failure to diagnose cancer or other serious condition, need for emergency surgery, need for a temporary colostomy, need for additional operations or procedures, etc.  Entire conversation was held in layman's terms.  All unfamiliar terms were defined.  Questions solicited and answered.  I read the consent form to her verbatim.  Krames booklets were provided on EGD, GERD, GB disease / surgery, colonoscopy, polyps, diverticulosis, hemorrhoids, cancer, etc.  A copy of the consent form was provided for her review outside the office / hospital prior to the procedures.  At the conclusion of the conversation she voiced complete understanding of all we discussed and satisfaction that all of her questions had been answered to her full understanding.  She stated that she felt fully informed and completely capable of making an informed decision.  She requests and desires to proceed with EGD and colonoscopy.    No evident contraindication to proceeding with planned endoscopy today.    Shweta Gupta was interviewed and examined again today  preoperatively, H&P updated, consent completed, and she is ready to roll into the operating/procedure room at 0725 on 2/7/2020

## 2020-02-07 NOTE — [employer] PATIENT INSTRUCTIONS
Discharge Summary/Instructions after an Endoscopic Procedure  Patient Name: Shweta Gupta  Patient MRN: [id number]  Patient YOB: 1970 Friday, February 07, 2020  Khanh Soriano MD  RESTRICTIONS:  During your procedure today, you received medications for sedation.  These   medications may affect your judgment, balance and coordination.  Therefore,   for 24 hours, you have the following restrictions:   - DO NOT drive a car, operate machinery, make legal/financial decisions,   sign important papers or drink alcohol.    ACTIVITY:  Today: no heavy lifting, straining or running due to procedural   sedation/anesthesia.  The following day: return to full activity including work.  DIET:  Eat and drink normally unless instructed otherwise.     TREATMENT FOR COMMON SIDE EFFECTS:  - Mild abdominal pain, nausea, belching, bloating or excessive gas:  rest,   eat lightly and use a heating pad.  - Sore Throat: treat with throat lozenges and/or gargle with warm salt   water.  - Because air was used during the procedure, expelling large amounts of air   from your rectum or belching is normal.  - If a bowel prep was taken, you may not have a bowel movement for 1-3 days.    This is normal.  SYMPTOMS TO WATCH FOR AND REPORT TO YOUR PHYSICIAN:  1. Abdominal pain or bloating, other than gas cramps.  2. Chest pain.  3. Back pain.  4. Signs of infection such as: chills or fever occurring within 24 hours   after the procedure.  5. Rectal bleeding, which would show as bright red, maroon, or black stools.   (A tablespoon of blood from the rectum is not serious, especially if   hemorrhoids are present.)  6. Vomiting.  7. Weakness or dizziness.  GO DIRECTLY TO THE NEAREST EMERGENCY ROOM IF YOU HAVE ANY OF THE FOLLOWING:      Difficulty breathing              Chills and/or fever over 101 F   Persistent vomiting and/or vomiting blood   Severe abdominal pain   Severe chest pain   Black, tarry stools   Bleeding- more than one  tablespoon   Any other symptom or condition that you feel may need urgent attention  Your doctor recommends these additional instructions:  If any biopsies were taken, your doctors clinic will contact you in 1 to 2   weeks with any results.  - Discharge patient to home.   - Diabetic (ADA) diet.   - Continue present medications.   - Continue Protonix and Carafate.  - Await pathology results.   - Treat Helicobacter if present  - Review Colonoscopy Report  - Further recommendations will depend on clinical course.  - Return to my office in 2 weeks.   - Patient has a contact number available for emergencies.  The signs and   symptoms of potential delayed complications were discussed with the   patient.  Return to normal activities tomorrow.  Written discharge   instructions were provided to the patient.  For questions, problems or results please call your physician - Khanh Soriano MD at Work:  [phone].  [facility] EMERGENCY ROOM PHONE NUMBER: [phone]  IF A COMPLICATION OR EMERGENCY SITUATION ARISES AND YOU ARE UNABLE TO REACH   YOUR PHYSICIAN - GO DIRECTLY TO THE EMERGENCY ROOM.  MD Khanh Venegas MD  2/7/2020 8:25:31 AM  This report has been verified and signed electronically.  [employer]

## 2020-02-07 NOTE — PROVATION PATIENT INSTRUCTIONS
Discharge Summary/Instructions after an Endoscopic Procedure  Patient Name: Shweta Gupta  Patient MRN: [id number]  Patient YOB: 1970 Friday, February 07, 2020  Khanh Soriano MD  RESTRICTIONS:  During your procedure today, you received medications for sedation.  These   medications may affect your judgment, balance and coordination.  Therefore,   for 24 hours, you have the following restrictions:   - DO NOT drive a car, operate machinery, make legal/financial decisions,   sign important papers or drink alcohol.    ACTIVITY:  Today: no heavy lifting, straining or running due to procedural   sedation/anesthesia.  The following day: return to full activity including work.  DIET:  Eat and drink normally unless instructed otherwise.     TREATMENT FOR COMMON SIDE EFFECTS:  - Mild abdominal pain, nausea, belching, bloating or excessive gas:  rest,   eat lightly and use a heating pad.  - Sore Throat: treat with throat lozenges and/or gargle with warm salt   water.  - Because air was used during the procedure, expelling large amounts of air   from your rectum or belching is normal.  - If a bowel prep was taken, you may not have a bowel movement for 1-3 days.    This is normal.  SYMPTOMS TO WATCH FOR AND REPORT TO YOUR PHYSICIAN:  1. Abdominal pain or bloating, other than gas cramps.  2. Chest pain.  3. Back pain.  4. Signs of infection such as: chills or fever occurring within 24 hours   after the procedure.  5. Rectal bleeding, which would show as bright red, maroon, or black stools.   (A tablespoon of blood from the rectum is not serious, especially if   hemorrhoids are present.)  6. Vomiting.  7. Weakness or dizziness.  GO DIRECTLY TO THE NEAREST EMERGENCY ROOM IF YOU HAVE ANY OF THE FOLLOWING:      Difficulty breathing              Chills and/or fever over 101 F   Persistent vomiting and/or vomiting blood   Severe abdominal pain   Severe chest pain   Black, tarry stools   Bleeding- more than one  tablespoon   Any other symptom or condition that you feel may need urgent attention  Your doctor recommends these additional instructions:  If any biopsies were taken, your doctors clinic will contact you in 1 to 2   weeks with any results.  - Discharge patient to home.   - High fiber diet and diabetic (ADA) diet.   - Continue present medications.   - Repeat colonoscopy in 10 years for screening purposes.   - Return to my office in 2 weeks.   - Diverticulosis and high-fiber diet educational information provided to   patient prior to discharge  - See EGD Report  - Further recommendations will depend on clinical course  - Patient has a contact number available for emergencies.  The signs and   symptoms of potential delayed complications were discussed with the   patient.  Return to normal activities tomorrow.  Written discharge   instructions were provided to the patient.  For questions, problems or results please call your physician - Khanh Soriano MD at Work:  [phone].  [facility] EMERGENCY ROOM PHONE NUMBER: [phone]  IF A COMPLICATION OR EMERGENCY SITUATION ARISES AND YOU ARE UNABLE TO REACH   YOUR PHYSICIAN - GO DIRECTLY TO THE EMERGENCY ROOM.  MD Khanh Venegas MD  2/7/2020 10:12:40 AM  This report has been verified and signed electronically.  [employer]

## 2020-02-07 NOTE — TRANSFER OF CARE
"Anesthesia Transfer of Care Note    Patient: Shweta Gupta    Procedure(s) Performed: Procedure(s) (LRB):  COLONOSCOPY (N/A)  ESOPHAGOGASTRODUODENOSCOPY (EGD) (N/A)    Patient location: PACU    Anesthesia Type: general    Transport from OR: Transported from OR on room air with adequate spontaneous ventilation    Post pain: adequate analgesia    Post assessment: no apparent anesthetic complications and tolerated procedure well    Post vital signs: stable    Level of consciousness: awake, alert and oriented    Nausea/Vomiting: no nausea/vomiting    Complications: none    Transfer of care protocol was followed      Last vitals:   Visit Vitals  /82 (BP Location: Right arm, Patient Position: Lying)   Pulse 80   Temp 36.9 °C (98.4 °F) (Oral)   Resp 18   Ht 5' 4" (1.626 m)   Wt 127.5 kg (281 lb)   LMP 06/10/2002   SpO2 97%   Breastfeeding? No   BMI 48.23 kg/m²     "

## 2020-02-07 NOTE — ANESTHESIA POSTPROCEDURE EVALUATION
Anesthesia Post Evaluation    Patient: Shweta Gupta    Procedure(s) Performed: Procedure(s) (LRB):  COLONOSCOPY (N/A)  ESOPHAGOGASTRODUODENOSCOPY (EGD) (N/A)    Final Anesthesia Type: general    Patient location during evaluation: PACU  Patient participation: Yes- Able to Participate  Level of consciousness: awake and awake and alert  Post-procedure vital signs: reviewed and stable  Pain management: adequate  Airway patency: patent    PONV status at discharge: No PONV  Anesthetic complications: no      Cardiovascular status: blood pressure returned to baseline  Respiratory status: unassisted and spontaneous ventilation  Hydration status: euvolemic  Follow-up not needed.          Vitals Value Taken Time   /72 2/7/2020  8:41 AM   Temp 36.9 °C (98.4 °F) 2/7/2020  6:50 AM   Pulse 76 2/7/2020  8:39 AM   Resp 17 2/7/2020  8:39 AM   SpO2 94 % 2/7/2020  8:40 AM   Vitals shown include unvalidated device data.      Event Time     Out of Recovery 08:46:40          Pain/Brittney Score: Brittney Score: 10 (2/7/2020  8:40 AM)  Modified Brittney Score: 20 (2/7/2020  8:40 AM)

## 2020-02-07 NOTE — ANESTHESIA PREPROCEDURE EVALUATION
02/07/2020  Shweta Gupta is a 49 y.o., female.    Pre-op Assessment    I have reviewed the Patient Summary Reports.    I have reviewed the Nursing Notes.   I have reviewed the Medications.     Review of Systems  Anesthesia Hx:  No problems with previous Anesthesia  Neg history of prior surgery. Denies Family Hx of Anesthesia complications.   Denies Personal Hx of Anesthesia complications.   Social:  Non-Smoker    Hematology/Oncology:  Hematology Normal   Oncology Normal     EENT/Dental:EENT/Dental Normal   Cardiovascular:   Hypertension, well controlled    Pulmonary:   Asthma asymptomatic Shortness of breath    Renal/:   renal calculi    Hepatic/GI:   GERD    Musculoskeletal:  Musculoskeletal Normal    Neurological:  Neurology Normal    Endocrine:   Diabetes, well controlled, type 2    Dermatological:  Skin Normal    Psych:   Psychiatric History depression          Physical Exam  General:  Morbid Obesity    Airway/Jaw/Neck:  Airway Findings: Mouth Opening: Normal Tongue: Normal  General Airway Assessment: Adult  Mallampati: II        Eyes/Ears/Nose:  EYES/EARS/NOSE FINDINGS: Normal   Dental:  DENTAL FINDINGS: Normal   Chest/Lungs:  Chest/Lungs Clear    Heart/Vascular:  Heart Findings: Normal Heart murmur: negative Vascular Findings: Normal    Abdomen:  Abdomen Findings: Normal    Musculoskeletal:  Musculoskeletal Findings: Normal   Skin:  Skin Findings: Normal    Mental Status:  Mental Status Findings: Normal        Anesthesia Plan  Type of Anesthesia, risks & benefits discussed:  Anesthesia Type:  general  Patient's Preference:   Intra-op Monitoring Plan: standard ASA monitors  Intra-op Monitoring Plan Comments:   Post Op Pain Control Plan:   Post Op Pain Control Plan Comments:   Induction:   IV  Beta Blocker:  Patient is not currently on a Beta-Blocker (No further documentation required).       Informed  Consent: Patient understands risks and agrees with Anesthesia plan.  Questions answered. Anesthesia consent signed with patient.  ASA Score: 3     Day of Surgery Review of History & Physical:    H&P update referred to the provider.

## 2020-02-07 NOTE — DISCHARGE SUMMARY
[facility]  Discharge Note  Short Stay    Procedure(s) (LRB):  COLONOSCOPY (N/A)  ESOPHAGOGASTRODUODENOSCOPY (EGD) (N/A)    OUTCOME: Patient tolerated treatment/procedure well without complication and is now ready for discharge.    DISPOSITION: Home or Self Care    FINAL DIAGNOSIS:  Encounter for screening colonoscopy.  Diverticulosis of colon.  Hemorrhagic gastritis.  Esophageal polyp.  Anemia.    FOLLOWUP: In clinic    DISCHARGE INSTRUCTIONS:    Discharge Procedure Orders   Diet diabetic     No driving until:     Order Specific Question Answer Comments   Date: 2/8/2020

## 2020-02-12 DIAGNOSIS — G47.00 INSOMNIA, UNSPECIFIED TYPE: ICD-10-CM

## 2020-02-12 NOTE — PROGRESS NOTES
"Attempted to outreach patient for pre-visit via "IceBreaker", no answer after a week. Sending outreach via Mail Out Letter now.    "

## 2020-02-12 NOTE — TELEPHONE ENCOUNTER
----- Message from Lesly Abbasi sent at 2/12/2020 12:38 PM CST -----  Type:  RX Refill Request    Who Called: self   Refill or New Rx:  Refill   RX Name and Strength:  Zolpidem   How is the patient currently taking it? (ex. 1XDay):  1 x day  Is this a 30 day or 90 day RX:  30 day supply Preferred [facility] [place] [place]  Local or Mail Order: local   Ordering Provider:  oJy Pineda  Best Call Back Number:  [phone]   Additional Information:

## 2020-02-13 RX ORDER — ZOLPIDEM TARTRATE 10 MG/1
10 TABLET ORAL NIGHTLY PRN
Qty: 30 TABLET | Refills: 2 | Status: SHIPPED | OUTPATIENT
Start: 2020-02-13 | End: 2020-04-15 | Stop reason: SDUPTHER

## 2020-02-14 ENCOUNTER — TELEPHONE (OUTPATIENT)
Dept: FAMILY MEDICINE | Facility: CLINIC | Age: 50
End: 2020-02-14

## 2020-02-14 LAB
FINAL PATHOLOGIC DIAGNOSIS: NORMAL
GROSS: NORMAL

## 2020-02-15 NOTE — TELEPHONE ENCOUNTER
----- Message from Lesly Abbasi sent at 2/14/2020  2:48 PM CST -----  Type: Needs Medical Advice    Who Called: self   Symptoms (please be specific):  NA How long has patient had these symptoms: NA   [facility]  Best Call Back Number: [phone]   Additional Information: Patient states she need PA for rx  norco, ambien

## 2020-02-17 ENCOUNTER — TELEPHONE (OUTPATIENT)
Dept: INTERNAL MEDICINE | Facility: CLINIC | Age: 50
End: 2020-02-17

## 2020-02-17 NOTE — TELEPHONE ENCOUNTER
"Spoke with "Lucinda" and approved per Provider that pt has been ok to be on [place] & Ambien at this time. No other concerns at this time.will attempt PA's.  "

## 2020-02-17 NOTE — TELEPHONE ENCOUNTER
----- Message from Stella De Jesus sent at 2/17/2020 11:59 AM CST -----  Contact: Lucinda from [facility]  Type: Needs Medical Advice    Who Called: Lucinda  Best Call Back Number: [phone]  Additional Information: Lucinda states she calling regarding a drug interaction between zolpidem (AMBIEN) 10 mg Tab and Norco that pt is receiving from a different Dr. Please call Lucinda and advise.

## 2020-02-18 ENCOUNTER — OFFICE VISIT (OUTPATIENT)
Dept: FAMILY MEDICINE | Facility: CLINIC | Age: 50
End: 2020-02-18
Payer: MEDICAID

## 2020-02-18 ENCOUNTER — TELEPHONE (OUTPATIENT)
Dept: FAMILY MEDICINE | Facility: CLINIC | Age: 50
End: 2020-02-18

## 2020-02-18 VITALS
HEART RATE: 68 BPM | OXYGEN SATURATION: 95 % | DIASTOLIC BLOOD PRESSURE: 71 MMHG | BODY MASS INDEX: 48.11 KG/M2 | SYSTOLIC BLOOD PRESSURE: 121 MMHG | RESPIRATION RATE: 18 BRPM | WEIGHT: 281.81 LBS | HEIGHT: 64 IN

## 2020-02-18 DIAGNOSIS — G47.00 INSOMNIA, UNSPECIFIED TYPE: ICD-10-CM

## 2020-02-18 DIAGNOSIS — E78.5 HYPERLIPIDEMIA, UNSPECIFIED HYPERLIPIDEMIA TYPE: ICD-10-CM

## 2020-02-18 DIAGNOSIS — I10 ESSENTIAL HYPERTENSION: ICD-10-CM

## 2020-02-18 DIAGNOSIS — Z79.4 TYPE 2 DIABETES MELLITUS WITHOUT COMPLICATION, WITH LONG-TERM CURRENT USE OF INSULIN: Primary | ICD-10-CM

## 2020-02-18 DIAGNOSIS — F32.A DEPRESSIVE DISORDER: ICD-10-CM

## 2020-02-18 DIAGNOSIS — E11.9 TYPE 2 DIABETES MELLITUS WITHOUT COMPLICATION, WITH LONG-TERM CURRENT USE OF INSULIN: Primary | ICD-10-CM

## 2020-02-18 PROCEDURE — 99999 PR PBB SHADOW E&M-EST. PATIENT-LVL V: CPT | Mod: PBBFAC,,, | Performed by: NURSE PRACTITIONER

## 2020-02-18 PROCEDURE — 99215 OFFICE O/P EST HI 40 MIN: CPT | Mod: PBBFAC,PN | Performed by: NURSE PRACTITIONER

## 2020-02-18 PROCEDURE — 99214 PR OFFICE/OUTPT VISIT, EST, LEVL IV, 30-39 MIN: ICD-10-PCS | Mod: S$PBB,,, | Performed by: NURSE PRACTITIONER

## 2020-02-18 PROCEDURE — 99214 OFFICE O/P EST MOD 30 MIN: CPT | Mod: S$PBB,,, | Performed by: NURSE PRACTITIONER

## 2020-02-18 PROCEDURE — 99999 PR PBB SHADOW E&M-EST. PATIENT-LVL V: ICD-10-PCS | Mod: PBBFAC,,, | Performed by: NURSE PRACTITIONER

## 2020-02-18 NOTE — TELEPHONE ENCOUNTER
----- Message from Kimberly Sands sent at 2/18/2020  1:22 PM CST -----  Contact: patient  Patient called to state that she is running late due to another appt that the dr is running late at      appt is at 2:00 but she was told to come at 1 [phone]    #call pt to let he rknow if this is ok#

## 2020-02-18 NOTE — PROGRESS NOTES
Subjective:       Patient ID: Shweta Gupta is a 49 y.o. female.    Chief Complaint: Follow-up (request foot exam); Chest Congestion; and Rash (R arm)    Ms. Shweta Gupat is a 49 year old female who presents to the clinic today for a 3 month follow up. She had allergy injections today and has a red [place] where her injection was given. Denies any difficulty breathing, denies itching or pain. Reports injections are still effective and improve her quality of life. She continues to see pain management as ordered. Mrs. Rock is happy today. She continues to take her medications as ordered for mood. Denies si/hi. She continues to take ambien for sleep. Denies any s/e reports the medication improves her sleep significantly.   In regards to the patient's hypertension, patient denies chest pain/sob, and has been compliant with the medication regimen. hypertension well controlled. Goal of <130/80. Meds and labs as per orders. In regards to the patient's dyslipidemia, patient reports has been compliant with the medication regimen  without side effects. Last Lipid in media. Triglycerides 198.  In regard to the patient's diabetes, patient reports that blood sugars have been Good control. In regard to the patient's diabetes, patient reports that blood sugars have been Good control. Patient denies hypoglycemia. Patient is not currently on insulin therapy. Patient is on ACE/ARB and is on statin. Last a1c was Hemoglobin A1C       Date                     Value               Ref Range           Status       01/2020                6.5       07/05/2019               6.3 (H)             4.5 - 6.2 %         Final                             11/13/2018               6.7 (H)             4.5 - 6.2 %         Final                 07/24/2018               6.7 (H)             4.5 - 6.2 %         Final                   Review of Systems      Reviewed family, medical, surgical, and social history.    Objective:      /71 (BP Location:  "Left arm, Patient Position: Sitting, BP Method: Medium (Automatic))   Pulse 68   Resp 18   Ht 5' 4" (1.626 m)   Wt 127.8 kg (281 lb 12.8 oz)   LMP 06/10/2002   SpO2 95%   BMI 48.37 kg/m²   Physical Exam   Constitutional: She is oriented to person, place, and time. She appears well-developed and well-nourished. She is cooperative. No distress.   HENT:   Head: Normocephalic and atraumatic.   Nose: Nose normal.   Mouth/Throat: Uvula is midline, oropharynx is clear and moist and mucous membranes are normal. No uvula swelling.   Eyes: Pupils are equal, round, and reactive to light. Conjunctivae, EOM and lids are normal.   Neck: Trachea normal and full passive range of motion without pain. No tracheal tenderness present. No neck rigidity. No thyroid mass present.   Cardiovascular: Normal rate, regular rhythm, S1 normal, S2 normal, normal heart sounds, intact distal pulses and normal pulses.   No murmur heard.  Pulses:       Dorsalis pedis pulses are 2+ on the right side, and 2+ on the left side.        Posterior tibial pulses are 2+ on the right side, and 2+ on the left side.   Pulmonary/Chest: Effort normal and breath sounds normal. No respiratory distress. She has no decreased breath sounds. She has no wheezes.   Abdominal: Soft. Normal appearance and bowel sounds are normal. She exhibits no distension and no abdominal bruit. There is no guarding and no CVA tenderness.   Musculoskeletal: She exhibits no edema, tenderness or deformity.        Right foot: There is no deformity.        Left foot: There is normal range of motion and no deformity.   Feet:   Right Foot:   Protective Sensation: 10 sites tested. 9 sites sensed.   Skin Integrity: Negative for ulcer, blister, skin breakdown, erythema, warmth, callus or dry skin.   Left Foot:   Protective Sensation: 10 sites tested. 10 sites sensed.   Skin Integrity: Negative for ulcer, blister, skin breakdown, erythema, warmth, callus or dry skin.   Lymphadenopathy:     " She has no cervical adenopathy.   Neurological: She is alert and oriented to person, place, and time. She has normal strength. She exhibits normal muscle tone.   Skin: Skin is warm, dry and intact. Capillary refill takes less than 2 seconds. No abrasion, no laceration, no lesion and no rash noted. She is not diaphoretic. No cyanosis. Nails show no clubbing.   Psychiatric: She has a normal mood and affect. Her speech is normal and behavior is normal. Judgment and thought content normal. Cognition and memory are normal.     Dictation #1  MRN:[id number]  CSN:[id number]     Assessment:       1. Type 2 diabetes mellitus without complication, with long-term current use of insulin    2. Hyperlipidemia, unspecified hyperlipidemia type    3. Essential hypertension    4. Depressive disorder    5. Insomnia, unspecified type        Plan:       Type 2 diabetes mellitus without complication, with long-term current use of insulin  -     MICROALBUMIN / CREATININE RATIO URINE    Hyperlipidemia, unspecified hyperlipidemia type    Essential hypertension    Depressive disorder    Insomnia, unspecified type    PLAN:  - Discussed with patient and  the plan of care  - encouraged benadryl TID while reaction on rash persists and contact allergist  - Labs were reviewed  -  reviewed  - Diet and exercise reviewed  - foot exam completed   - Medications reviewed. Medication side effects discussed. Patient has no questions or concerns at this time. Informed patient to notify me regarding any concerns.   - Continue monitoring CBG and BP   - Informed patient to please notify me with any questions or concerns at anytime  - Follow up ordered for 3 months            Risks, benefits, and side effects were discussed with the patient. All questions were answered to the fullest satisfaction of the patient, and pt verbalized understanding and agreement to treatment plan. Pt was to call with any new or worsening symptoms, or present to the ER.

## 2020-02-19 ENCOUNTER — PATIENT OUTREACH (OUTPATIENT)
Dept: ADMINISTRATIVE | Facility: OTHER | Age: 50
End: 2020-02-19

## 2020-02-20 ENCOUNTER — PATIENT MESSAGE (OUTPATIENT)
Dept: FAMILY MEDICINE | Facility: CLINIC | Age: 50
End: 2020-02-20

## 2020-02-20 ENCOUNTER — TELEPHONE (OUTPATIENT)
Dept: FAMILY MEDICINE | Facility: CLINIC | Age: 50
End: 2020-02-20

## 2020-02-20 RX ORDER — AZITHROMYCIN 250 MG/1
TABLET, FILM COATED ORAL
Qty: 6 TABLET | Refills: 0 | Status: SHIPPED | OUTPATIENT
Start: 2020-02-20 | End: 2020-02-25

## 2020-02-20 NOTE — TELEPHONE ENCOUNTER
----- Message from Ewelina Zuleta sent at 2/20/2020  9:27 AM CST -----  Type: Needs Medical Advice    Who Called:  Patient  Symptoms (please be specific):  Ears and throat hurting worse  How long has patient had these symptoms:  Worse since appointment  Pharmacy name and phone #:    [facility] - [place], [place] - [address]  [address]  [place] [address]  Phone: [phone] Fax: [phone]    Best Call Back Number: [phone] (home)     Additional Information: States that office told her that if she got worse to let you know and something will be called in for her. Please call to advise.

## 2020-02-28 ENCOUNTER — PATIENT MESSAGE (OUTPATIENT)
Dept: FAMILY MEDICINE | Facility: CLINIC | Age: 50
End: 2020-02-28

## 2020-02-28 RX ORDER — MONTELUKAST SODIUM 10 MG/1
10 TABLET ORAL DAILY
Qty: 90 TABLET | Refills: 3 | Status: SHIPPED | OUTPATIENT
Start: 2020-02-28 | End: 2021-02-05 | Stop reason: SDUPTHER

## 2020-03-16 ENCOUNTER — PATIENT MESSAGE (OUTPATIENT)
Dept: FAMILY MEDICINE | Facility: CLINIC | Age: 50
End: 2020-03-16

## 2020-03-17 DIAGNOSIS — Z79.4 TYPE 2 DIABETES MELLITUS WITHOUT COMPLICATION, WITH LONG-TERM CURRENT USE OF INSULIN: ICD-10-CM

## 2020-03-17 DIAGNOSIS — E11.9 TYPE 2 DIABETES MELLITUS WITHOUT COMPLICATION, WITH LONG-TERM CURRENT USE OF INSULIN: ICD-10-CM

## 2020-03-17 RX ORDER — PIOGLITAZONEHYDROCHLORIDE 45 MG/1
45 TABLET ORAL DAILY
Qty: 90 TABLET | Refills: 0 | Status: SHIPPED | OUTPATIENT
Start: 2020-03-17 | End: 2020-03-18 | Stop reason: SDUPTHER

## 2020-03-17 RX ORDER — ATORVASTATIN CALCIUM 40 MG/1
TABLET, FILM COATED ORAL
Qty: 90 TABLET | Refills: 3 | Status: SHIPPED | OUTPATIENT
Start: 2020-03-17 | End: 2020-03-18 | Stop reason: SDUPTHER

## 2020-03-17 NOTE — TELEPHONE ENCOUNTER
Pt sent a message requesting the following meds to be refilled and to please make them a 90 day supply

## 2020-03-18 ENCOUNTER — PATIENT MESSAGE (OUTPATIENT)
Dept: FAMILY MEDICINE | Facility: CLINIC | Age: 50
End: 2020-03-18

## 2020-03-18 DIAGNOSIS — E11.9 TYPE 2 DIABETES MELLITUS WITHOUT COMPLICATION, WITH LONG-TERM CURRENT USE OF INSULIN: ICD-10-CM

## 2020-03-18 DIAGNOSIS — Z79.4 TYPE 2 DIABETES MELLITUS WITHOUT COMPLICATION, WITH LONG-TERM CURRENT USE OF INSULIN: ICD-10-CM

## 2020-03-18 RX ORDER — PIOGLITAZONEHYDROCHLORIDE 45 MG/1
45 TABLET ORAL DAILY
Qty: 90 TABLET | Refills: 0 | Status: CANCELLED | OUTPATIENT
Start: 2020-03-18 | End: 2021-03-18

## 2020-03-18 NOTE — TELEPHONE ENCOUNTER
----- Message from Elise Green sent at 3/18/2020  8:53 AM CDT -----  Contact: Patient  Type: Needs Medical Advice    Who Called:  Patient  Pharmacy name and phone #:    [facility] - [place], [place] - [address]  [address]  [place] [address]  Phone: [phone] Fax: [phone]  Best Call Back Number:   Additional Information: Calling to request that her medication pioglitazone (ACTOS) 45 MG tablet be resent to her pharmacy

## 2020-03-19 RX ORDER — PIOGLITAZONEHYDROCHLORIDE 45 MG/1
45 TABLET ORAL DAILY
Qty: 90 TABLET | Refills: 0 | Status: SHIPPED | OUTPATIENT
Start: 2020-03-19 | End: 2020-05-18 | Stop reason: SDUPTHER

## 2020-03-19 RX ORDER — ATORVASTATIN CALCIUM 40 MG/1
40 TABLET, FILM COATED ORAL DAILY
Qty: 90 TABLET | Refills: 3 | Status: SHIPPED | OUTPATIENT
Start: 2020-03-19 | End: 2021-04-19 | Stop reason: SDUPTHER

## 2020-04-15 ENCOUNTER — PATIENT MESSAGE (OUTPATIENT)
Dept: FAMILY MEDICINE | Facility: CLINIC | Age: 50
End: 2020-04-15

## 2020-04-15 DIAGNOSIS — G47.00 INSOMNIA, UNSPECIFIED TYPE: ICD-10-CM

## 2020-04-17 RX ORDER — ZOLPIDEM TARTRATE 10 MG/1
10 TABLET ORAL NIGHTLY
Qty: 30 TABLET | Refills: 2 | Status: SHIPPED | OUTPATIENT
Start: 2020-04-17 | End: 2020-07-17 | Stop reason: SDUPTHER

## 2020-04-23 ENCOUNTER — PATIENT MESSAGE (OUTPATIENT)
Dept: FAMILY MEDICINE | Facility: CLINIC | Age: 50
End: 2020-04-23

## 2020-04-23 RX ORDER — LISINOPRIL 20 MG/1
TABLET ORAL
Qty: 90 TABLET | Refills: 0 | Status: SHIPPED | OUTPATIENT
Start: 2020-04-23 | End: 2021-04-26

## 2020-04-23 RX ORDER — LISINOPRIL 20 MG/1
20 TABLET ORAL DAILY
Qty: 90 TABLET | Refills: 3 | Status: SHIPPED | OUTPATIENT
Start: 2020-04-23 | End: 2020-04-23

## 2020-05-05 ENCOUNTER — PATIENT OUTREACH (OUTPATIENT)
Dept: ADMINISTRATIVE | Facility: HOSPITAL | Age: 50
End: 2020-05-05

## 2020-05-05 ENCOUNTER — PATIENT MESSAGE (OUTPATIENT)
Dept: ADMINISTRATIVE | Facility: HOSPITAL | Age: 50
End: 2020-05-05

## 2020-05-05 DIAGNOSIS — Z11.4 SCREENING FOR HIV (HUMAN IMMUNODEFICIENCY VIRUS): Primary | ICD-10-CM

## 2020-05-05 NOTE — PROGRESS NOTES
Pre-visit Chart review notification  Spoke with pt about overdue a1c draw. Lab appt mad for 05/07/20. Pt stated that she had her eye appt scheduled for this month.

## 2020-05-07 LAB — A1C EXTERNAL: 6.6

## 2020-05-11 LAB
LEFT EYE DM RETINOPATHY: NEGATIVE
RIGHT EYE DM RETINOPATHY: NEGATIVE

## 2020-05-18 ENCOUNTER — PATIENT MESSAGE (OUTPATIENT)
Dept: FAMILY MEDICINE | Facility: CLINIC | Age: 50
End: 2020-05-18

## 2020-05-18 DIAGNOSIS — E11.9 TYPE 2 DIABETES MELLITUS WITHOUT COMPLICATION, WITH LONG-TERM CURRENT USE OF INSULIN: ICD-10-CM

## 2020-05-18 DIAGNOSIS — Z79.4 TYPE 2 DIABETES MELLITUS WITHOUT COMPLICATION, WITH LONG-TERM CURRENT USE OF INSULIN: ICD-10-CM

## 2020-05-18 RX ORDER — PIOGLITAZONEHYDROCHLORIDE 45 MG/1
45 TABLET ORAL DAILY
Qty: 90 TABLET | Refills: 0 | Status: SHIPPED | OUTPATIENT
Start: 2020-05-18 | End: 2020-08-24 | Stop reason: SDUPTHER

## 2020-05-20 ENCOUNTER — PATIENT MESSAGE (OUTPATIENT)
Dept: ADMINISTRATIVE | Facility: HOSPITAL | Age: 50
End: 2020-05-20

## 2020-05-20 ENCOUNTER — PATIENT MESSAGE (OUTPATIENT)
Dept: FAMILY MEDICINE | Facility: CLINIC | Age: 50
End: 2020-05-20

## 2020-05-21 ENCOUNTER — OFFICE VISIT (OUTPATIENT)
Dept: FAMILY MEDICINE | Facility: CLINIC | Age: 50
End: 2020-05-21
Payer: MEDICAID

## 2020-05-21 DIAGNOSIS — G47.00 INSOMNIA, UNSPECIFIED TYPE: ICD-10-CM

## 2020-05-21 DIAGNOSIS — B37.31 YEAST VAGINITIS: ICD-10-CM

## 2020-05-21 DIAGNOSIS — E11.9 TYPE 2 DIABETES MELLITUS WITHOUT COMPLICATION, WITH LONG-TERM CURRENT USE OF INSULIN: ICD-10-CM

## 2020-05-21 DIAGNOSIS — E78.5 HYPERLIPIDEMIA, UNSPECIFIED HYPERLIPIDEMIA TYPE: ICD-10-CM

## 2020-05-21 DIAGNOSIS — F32.A DEPRESSIVE DISORDER: ICD-10-CM

## 2020-05-21 DIAGNOSIS — I10 ESSENTIAL HYPERTENSION: Primary | ICD-10-CM

## 2020-05-21 DIAGNOSIS — Z79.4 TYPE 2 DIABETES MELLITUS WITHOUT COMPLICATION, WITH LONG-TERM CURRENT USE OF INSULIN: ICD-10-CM

## 2020-05-21 PROCEDURE — 99214 OFFICE O/P EST MOD 30 MIN: CPT | Mod: GT,,, | Performed by: NURSE PRACTITIONER

## 2020-05-21 PROCEDURE — 99214 PR OFFICE/OUTPT VISIT, EST, LEVL IV, 30-39 MIN: ICD-10-PCS | Mod: GT,,, | Performed by: NURSE PRACTITIONER

## 2020-05-21 RX ORDER — NYSTATIN 100000 [USP'U]/G
POWDER TOPICAL 2 TIMES DAILY
Qty: 15 G | Refills: 0 | Status: SHIPPED | OUTPATIENT
Start: 2020-05-21 | End: 2020-06-25

## 2020-05-21 NOTE — PROGRESS NOTES
Subjective:       Patient ID: Shweta Gupta is a 49 y.o. female.    Chief Complaint: No chief complaint on file.    The patient location is: home  The chief complaint leading to consultation is: follow up     Visit type: audiovisual    Face to Face time with patient: 10 minutes  10 minutes of total time spent on the encounter, which includes face to face time and non-face to face time preparing to see the patient (eg, review of tests), Obtaining and/or reviewing separately obtained history, Documenting clinical information in the electronic or other health record, Independently interpreting results (not separately reported) and communicating results to the patient/family/caregiver, or Care coordination (not separately reported).     Each patient to whom he or she provides medical services by telemedicine is:  (1) informed of the relationship between the physician and patient and the respective role of any other health care provider with respect to management of the patient; and (2) notified that he or she may decline to receive medical services by telemedicine and may withdraw from such care at any time.    Notes:   Ms. Shweta Gupta is a 49 year old female who presents to the clinic today for a 3 month follow up via virtual visit. She currently has yeast infection and requesting a  Refill of her nystatin. She has increased her allergy injections to every 2 weeks with allergist. Denies any difficulty breathing, denies itching or pain. Reports injections are still effective and improve her quality of life. She continues to see pain management as ordered. No mood concerns today. She continues to take her medications as ordered for mood. Denies si/hi. She continues to take ambien for sleep. Denies any s/e reports the medication improves her sleep significantly.   In regards to the patient's hypertension, patient denies chest pain/sob, and has been compliant with the medication regimen. hypertension well controlled.  Goal of <130/80. Meds and labs as per orders. In regards to the patient's dyslipidemia, patient reports has been compliant with the medication regimen  without side effects..  In regard to the patient's diabetes, patient reports that blood sugars have been suboptimal control. Patient denies hypoglycemia. Patient is not currently on insulin therapy. Patient is on ACE/ARB and is on statin. Last a1c was Hemoglobin A1C at [facility], will request the records.        Date                     Value               Ref Range           Status       01/2020                6.5       07/05/2019               6.3 (H)             4.5 - 6.2 %         Final                             11/13/2018               6.7 (H)             4.5 - 6.2 %         Final                 07/24/2018               6.7 (H)             4.5 - 6.2 %         Final    Review of Systems   Constitutional: Negative for activity change, chills, diaphoresis and unexpected weight change.   HENT: Negative for congestion, hearing loss, rhinorrhea and trouble swallowing.    Eyes: Negative for pain, discharge, redness and visual disturbance.   Respiratory: Negative for cough, chest tightness, shortness of breath and wheezing.    Cardiovascular: Negative for chest pain and palpitations.   Gastrointestinal: Negative for abdominal pain, blood in stool, constipation, diarrhea and vomiting.   Endocrine: Negative for polydipsia and polyuria.   Genitourinary: Negative for difficulty urinating, dysuria, hematuria and menstrual problem.   Musculoskeletal: Negative for arthralgias, joint swelling and neck pain.   Skin: Negative for color change.   Neurological: Negative for dizziness, tremors, syncope, weakness, numbness and headaches.   Psychiatric/Behavioral: Negative for confusion, dysphoric mood and sleep disturbance. The patient is not nervous/anxious.          Reviewed family, medical, surgical, and social history.    Objective:      LMP 06/10/2002   Physical Exam    Constitutional: She is oriented to person, place, and time. She appears well-developed.   HENT:   Head: Normocephalic.   Eyes: Pupils are equal, round, and reactive to light.   Neck: Normal range of motion.   Pulmonary/Chest: Effort normal. No respiratory distress.   Neurological: She is alert and oriented to person, place, and time.   Psychiatric: She has a normal mood and affect. Her behavior is normal. Judgment and thought content normal.       Assessment:       1. Essential hypertension    2. Insomnia, unspecified type    3. Depressive disorder    4. Type 2 diabetes mellitus without complication, with long-term current use of insulin    5. Hyperlipidemia, unspecified hyperlipidemia type    6. Yeast vaginitis        Plan:       Essential hypertension    Insomnia, unspecified type    Depressive disorder    Type 2 diabetes mellitus without complication, with long-term current use of insulin    Hyperlipidemia, unspecified hyperlipidemia type    Yeast vaginitis  -     nystatin (MYCOSTATIN) powder; Apply topically 2 (two) times daily.  Dispense: 15 g; Refill: 0          PLAN:  - Discussed with patient the plan of care  - Obtain labs from [facility]   - Medications reviewed. Medication side effects discussed. Patient has no questions or concerns at this time. Informed patient to notify me regarding any concerns.   - Continue monitoring BP and CBG  - Informed patient to please notify me with any questions or concerns at anytime  - Follow up ordered for 3 months      Risks, benefits, and side effects were discussed with the patient. All questions were answered to the fullest satisfaction of the patient, and pt verbalized understanding and agreement to treatment plan. Pt was to call with any new or worsening symptoms, or present to the ER.

## 2020-06-24 ENCOUNTER — TELEPHONE (OUTPATIENT)
Dept: FAMILY MEDICINE | Facility: CLINIC | Age: 50
End: 2020-06-24

## 2020-06-24 ENCOUNTER — PATIENT MESSAGE (OUTPATIENT)
Dept: FAMILY MEDICINE | Facility: CLINIC | Age: 50
End: 2020-06-24

## 2020-06-24 DIAGNOSIS — B37.31 YEAST VAGINITIS: ICD-10-CM

## 2020-06-24 DIAGNOSIS — K21.9 GASTROESOPHAGEAL REFLUX DISEASE, ESOPHAGITIS PRESENCE NOT SPECIFIED: ICD-10-CM

## 2020-06-25 RX ORDER — NYSTATIN 100000 [USP'U]/G
POWDER TOPICAL 2 TIMES DAILY
Qty: 15 G | Refills: 0 | Status: SHIPPED | OUTPATIENT
Start: 2020-06-25 | End: 2020-06-25

## 2020-06-25 RX ORDER — NYSTATIN 100000 [USP'U]/G
POWDER TOPICAL
Qty: 15 G | Refills: 0 | Status: SHIPPED | OUTPATIENT
Start: 2020-06-25 | End: 2020-06-25 | Stop reason: SDUPTHER

## 2020-06-25 RX ORDER — NYSTATIN 100000 [USP'U]/G
POWDER TOPICAL 3 TIMES DAILY
Qty: 60 G | Refills: 0 | Status: SHIPPED | OUTPATIENT
Start: 2020-06-25 | End: 2020-08-24 | Stop reason: SDUPTHER

## 2020-06-25 RX ORDER — PANTOPRAZOLE SODIUM 40 MG/1
40 TABLET, DELAYED RELEASE ORAL DAILY
Qty: 90 TABLET | Refills: 0 | Status: SHIPPED | OUTPATIENT
Start: 2020-06-25 | End: 2020-09-02 | Stop reason: SDUPTHER

## 2020-06-25 RX ORDER — PANTOPRAZOLE SODIUM 40 MG/1
TABLET, DELAYED RELEASE ORAL
Qty: 90 TABLET | Refills: 0 | Status: SHIPPED | OUTPATIENT
Start: 2020-06-25 | End: 2020-08-24 | Stop reason: SDUPTHER

## 2020-06-25 NOTE — TELEPHONE ENCOUNTER
----- Message from Chelly Randolph sent at 6/25/2020 12:43 PM CDT -----  Regarding: checking on rx  Contact: BOB PATRICK [[id number]]  BOB PATRICK [[id number]]  Contact #[phone]  Checking the status of nystop powder, patient stated the pharmacy have not received rx and she need a big powder   Please call upon request

## 2020-06-25 NOTE — TELEPHONE ENCOUNTER
Pt requests a 60 gm bottle of the Nystop powder. Held off on getting the 15 gm bottle because it won't last long. Please advise. Thanks.

## 2020-07-17 ENCOUNTER — PATIENT MESSAGE (OUTPATIENT)
Dept: FAMILY MEDICINE | Facility: CLINIC | Age: 50
End: 2020-07-17

## 2020-07-17 DIAGNOSIS — G47.00 INSOMNIA, UNSPECIFIED TYPE: ICD-10-CM

## 2020-07-17 RX ORDER — RANOLAZINE 500 MG/1
500 TABLET, EXTENDED RELEASE ORAL 2 TIMES DAILY
Qty: 60 TABLET | Refills: 5 | Status: SHIPPED | OUTPATIENT
Start: 2020-07-17 | End: 2021-07-23

## 2020-07-17 RX ORDER — ZOLPIDEM TARTRATE 10 MG/1
10 TABLET ORAL NIGHTLY
Qty: 30 TABLET | Refills: 2 | Status: SHIPPED | OUTPATIENT
Start: 2020-07-17 | End: 2020-11-19

## 2020-08-24 ENCOUNTER — PATIENT MESSAGE (OUTPATIENT)
Dept: FAMILY MEDICINE | Facility: CLINIC | Age: 50
End: 2020-08-24

## 2020-08-24 DIAGNOSIS — Z79.4 TYPE 2 DIABETES MELLITUS WITHOUT COMPLICATION, WITH LONG-TERM CURRENT USE OF INSULIN: ICD-10-CM

## 2020-08-24 DIAGNOSIS — E11.9 TYPE 2 DIABETES MELLITUS WITHOUT COMPLICATION, WITH LONG-TERM CURRENT USE OF INSULIN: ICD-10-CM

## 2020-08-24 DIAGNOSIS — K21.9 GASTROESOPHAGEAL REFLUX DISEASE, ESOPHAGITIS PRESENCE NOT SPECIFIED: ICD-10-CM

## 2020-08-24 DIAGNOSIS — B37.31 YEAST VAGINITIS: ICD-10-CM

## 2020-08-24 RX ORDER — VILAZODONE HYDROCHLORIDE 40 MG/1
40 TABLET ORAL DAILY
Qty: 90 TABLET | Refills: 2 | Status: SHIPPED | OUTPATIENT
Start: 2020-08-24 | End: 2020-09-02 | Stop reason: SDUPTHER

## 2020-08-24 RX ORDER — NYSTATIN 100000 [USP'U]/G
POWDER TOPICAL 3 TIMES DAILY
Qty: 60 G | Refills: 0 | Status: SHIPPED | OUTPATIENT
Start: 2020-08-24 | End: 2020-09-28 | Stop reason: SDUPTHER

## 2020-08-24 RX ORDER — BUPROPION HYDROCHLORIDE 150 MG/1
150 TABLET ORAL DAILY
Qty: 90 TABLET | Refills: 0 | Status: SHIPPED | OUTPATIENT
Start: 2020-08-24 | End: 2020-09-02 | Stop reason: SDUPTHER

## 2020-08-24 RX ORDER — SUCRALFATE 1 G/1
TABLET ORAL
Qty: 120 TABLET | Refills: 2 | Status: SHIPPED | OUTPATIENT
Start: 2020-08-24 | End: 2020-09-02 | Stop reason: SDUPTHER

## 2020-08-24 RX ORDER — FUROSEMIDE 40 MG/1
40 TABLET ORAL 2 TIMES DAILY
Qty: 120 TABLET | Refills: 2 | Status: SHIPPED | OUTPATIENT
Start: 2020-08-24 | End: 2020-09-02 | Stop reason: SDUPTHER

## 2020-08-24 RX ORDER — PIOGLITAZONEHYDROCHLORIDE 45 MG/1
45 TABLET ORAL DAILY
Qty: 90 TABLET | Refills: 0 | Status: SHIPPED | OUTPATIENT
Start: 2020-08-24 | End: 2020-09-02 | Stop reason: SDUPTHER

## 2020-08-24 RX ORDER — PANTOPRAZOLE SODIUM 40 MG/1
40 TABLET, DELAYED RELEASE ORAL DAILY
Qty: 90 TABLET | Refills: 2 | Status: SHIPPED | OUTPATIENT
Start: 2020-08-24 | End: 2020-09-02 | Stop reason: SDUPTHER

## 2020-08-24 NOTE — TELEPHONE ENCOUNTER
Please send refills to [facility] in [place] for the following medications:  Pantoprazole  Pioglitazone   Furosemide   Sucralfate   Bupropion XL  Viibryd   Nystop powder

## 2020-09-01 DIAGNOSIS — E11.9 TYPE 2 DIABETES MELLITUS WITHOUT COMPLICATION, WITH LONG-TERM CURRENT USE OF INSULIN: ICD-10-CM

## 2020-09-01 DIAGNOSIS — K21.9 GASTROESOPHAGEAL REFLUX DISEASE, ESOPHAGITIS PRESENCE NOT SPECIFIED: ICD-10-CM

## 2020-09-01 DIAGNOSIS — Z79.4 TYPE 2 DIABETES MELLITUS WITHOUT COMPLICATION, WITH LONG-TERM CURRENT USE OF INSULIN: ICD-10-CM

## 2020-09-01 NOTE — TELEPHONE ENCOUNTER
Please advise.              ----- Message from Swathi Barlow sent at 9/1/2020  4:26 PM CDT -----  Regarding: Questions Rx  Contact: [phone]  Type: Needs Medical Advice  Who Called:  Pt  Best Call Back Number: [phone]  Additional Information: pt is requesting a call back in regards to meds that were supposed to be 90 days but received 30 day supply. Please and thank you

## 2020-09-03 RX ORDER — BUPROPION HYDROCHLORIDE 150 MG/1
150 TABLET ORAL DAILY
Qty: 90 TABLET | Refills: 1 | Status: SHIPPED | OUTPATIENT
Start: 2020-09-03 | End: 2021-06-10 | Stop reason: SDUPTHER

## 2020-09-03 RX ORDER — PANTOPRAZOLE SODIUM 40 MG/1
40 TABLET, DELAYED RELEASE ORAL DAILY
Qty: 90 TABLET | Refills: 1 | Status: SHIPPED | OUTPATIENT
Start: 2020-09-03 | End: 2021-06-10 | Stop reason: SDUPTHER

## 2020-09-03 RX ORDER — PIOGLITAZONEHYDROCHLORIDE 45 MG/1
45 TABLET ORAL DAILY
Qty: 90 TABLET | Refills: 1 | Status: SHIPPED | OUTPATIENT
Start: 2020-09-03 | End: 2020-11-06 | Stop reason: SDUPTHER

## 2020-09-03 RX ORDER — SUCRALFATE 1 G/1
TABLET ORAL
Qty: 360 TABLET | Refills: 1 | Status: SHIPPED | OUTPATIENT
Start: 2020-09-03 | End: 2021-06-10 | Stop reason: SDUPTHER

## 2020-09-03 RX ORDER — FUROSEMIDE 40 MG/1
40 TABLET ORAL 2 TIMES DAILY
Qty: 180 TABLET | Refills: 1 | Status: SHIPPED | OUTPATIENT
Start: 2020-09-03 | End: 2021-06-10 | Stop reason: SDUPTHER

## 2020-09-03 RX ORDER — VILAZODONE HYDROCHLORIDE 40 MG/1
40 TABLET ORAL DAILY
Qty: 90 TABLET | Refills: 1 | Status: SHIPPED | OUTPATIENT
Start: 2020-09-03 | End: 2023-11-06

## 2020-09-25 ENCOUNTER — TELEPHONE (OUTPATIENT)
Dept: FAMILY MEDICINE | Facility: CLINIC | Age: 50
End: 2020-09-25

## 2020-09-25 NOTE — TELEPHONE ENCOUNTER
Attempted to call Yakelin amato/[facility] to fax Diabetic supply paperwork to be filled out. SANKET w/fax number to send paperwork.

## 2020-09-25 NOTE — TELEPHONE ENCOUNTER
----- Message from Mirta Ennis sent at 9/25/2020  1:48 PM CDT -----  Contact: florentino amato/ [facility] diabetic supply  Type: Needs Medical Advice  Who Called:  Shweta Mckoy Call Back Number: [phone]  Additional Information: Pls call pt adv if paperwork was received for pt's diabetic supplies

## 2020-09-28 ENCOUNTER — PATIENT MESSAGE (OUTPATIENT)
Dept: FAMILY MEDICINE | Facility: CLINIC | Age: 50
End: 2020-09-28

## 2020-09-28 DIAGNOSIS — B37.31 YEAST VAGINITIS: ICD-10-CM

## 2020-09-28 RX ORDER — NYSTATIN 100000 [USP'U]/G
POWDER TOPICAL 3 TIMES DAILY
Qty: 60 G | Refills: 0 | Status: SHIPPED | OUTPATIENT
Start: 2020-09-28 | End: 2020-10-30 | Stop reason: SDUPTHER

## 2020-09-29 ENCOUNTER — PATIENT MESSAGE (OUTPATIENT)
Dept: FAMILY MEDICINE | Facility: CLINIC | Age: 50
End: 2020-09-29

## 2020-09-30 ENCOUNTER — PATIENT MESSAGE (OUTPATIENT)
Dept: FAMILY MEDICINE | Facility: CLINIC | Age: 50
End: 2020-09-30

## 2020-10-01 ENCOUNTER — PATIENT MESSAGE (OUTPATIENT)
Dept: FAMILY MEDICINE | Facility: CLINIC | Age: 50
End: 2020-10-01

## 2020-10-05 ENCOUNTER — PATIENT MESSAGE (OUTPATIENT)
Dept: ADMINISTRATIVE | Facility: HOSPITAL | Age: 50
End: 2020-10-05

## 2020-10-13 ENCOUNTER — PATIENT MESSAGE (OUTPATIENT)
Dept: FAMILY MEDICINE | Facility: CLINIC | Age: 50
End: 2020-10-13

## 2020-10-15 ENCOUNTER — CLINICAL SUPPORT (OUTPATIENT)
Dept: FAMILY MEDICINE | Facility: CLINIC | Age: 50
End: 2020-10-15
Payer: MEDICAID

## 2020-10-15 DIAGNOSIS — E11.9 TYPE 2 DIABETES MELLITUS WITHOUT COMPLICATION, UNSPECIFIED WHETHER LONG TERM INSULIN USE: ICD-10-CM

## 2020-10-15 PROCEDURE — 90471 IMMUNIZATION ADMIN: CPT | Mod: PBBFAC,PN

## 2020-10-19 ENCOUNTER — PATIENT OUTREACH (OUTPATIENT)
Dept: ADMINISTRATIVE | Facility: HOSPITAL | Age: 50
End: 2020-10-19

## 2020-10-19 ENCOUNTER — PATIENT MESSAGE (OUTPATIENT)
Dept: FAMILY MEDICINE | Facility: CLINIC | Age: 50
End: 2020-10-19

## 2020-10-19 DIAGNOSIS — I10 ESSENTIAL HYPERTENSION: ICD-10-CM

## 2020-10-19 RX ORDER — POTASSIUM CHLORIDE 20 MEQ/1
TABLET, EXTENDED RELEASE ORAL
Qty: 60 TABLET | Refills: 0 | Status: SHIPPED | OUTPATIENT
Start: 2020-10-19 | End: 2020-10-20 | Stop reason: SDUPTHER

## 2020-10-20 RX ORDER — POTASSIUM CHLORIDE 20 MEQ/1
20 TABLET, EXTENDED RELEASE ORAL 2 TIMES DAILY
Qty: 180 TABLET | Refills: 1 | Status: SHIPPED | OUTPATIENT
Start: 2020-10-20 | End: 2021-06-10 | Stop reason: SDUPTHER

## 2020-10-20 NOTE — TELEPHONE ENCOUNTER
----- Message from Subha Brantley sent at 10/20/2020  3:28 PM CDT -----  Regarding: advice  Contact: patient  Type: Needs Medical Advice  Who Called:  patient  Symptoms (please be specific):    How long has patient had these symptoms:    Pharmacy name and phone #:    [facility] - [place], [place] - [address]  [address]  [place] [address]  Phone: [phone] Fax: [phone]  Best Call Back Number: [phone] (home)   Additional Information: Patient is at the pharmacy and she is very upset. Patient has been trying to get all of her refills to be 90 day supplies. She states the potassium chloride SA (K-DUR,KLOR-CON) 20 MEQ tablet is at the pharmacy for only a 30 day supply ,it cost her more to get it this way. Please resend with a 90 day plus refills. Please call patient to advise. Thanks!

## 2020-10-30 ENCOUNTER — PATIENT MESSAGE (OUTPATIENT)
Dept: FAMILY MEDICINE | Facility: CLINIC | Age: 50
End: 2020-10-30

## 2020-10-30 DIAGNOSIS — B37.31 YEAST VAGINITIS: ICD-10-CM

## 2020-10-30 RX ORDER — NYSTATIN 100000 [USP'U]/G
POWDER TOPICAL 3 TIMES DAILY
Qty: 60 G | Refills: 1 | Status: SHIPPED | OUTPATIENT
Start: 2020-10-30 | End: 2020-12-21 | Stop reason: SDUPTHER

## 2020-11-06 ENCOUNTER — PATIENT MESSAGE (OUTPATIENT)
Dept: FAMILY MEDICINE | Facility: CLINIC | Age: 50
End: 2020-11-06

## 2020-11-06 DIAGNOSIS — Z79.4 TYPE 2 DIABETES MELLITUS WITHOUT COMPLICATION, WITH LONG-TERM CURRENT USE OF INSULIN: ICD-10-CM

## 2020-11-06 DIAGNOSIS — E11.9 TYPE 2 DIABETES MELLITUS WITHOUT COMPLICATION, WITH LONG-TERM CURRENT USE OF INSULIN: ICD-10-CM

## 2020-11-06 RX ORDER — PIOGLITAZONEHYDROCHLORIDE 45 MG/1
45 TABLET ORAL DAILY
Qty: 90 TABLET | Refills: 1 | Status: SHIPPED | OUTPATIENT
Start: 2020-11-06 | End: 2021-06-10 | Stop reason: SDUPTHER

## 2020-11-06 NOTE — TELEPHONE ENCOUNTER
Please Advise--  Please send refills for my prescription Pioglitazone (90 DAY SUPPLY) to [facility] in [place] as soon as possible. Thanks.

## 2020-11-08 ENCOUNTER — PATIENT MESSAGE (OUTPATIENT)
Dept: FAMILY MEDICINE | Facility: CLINIC | Age: 50
End: 2020-11-08

## 2020-11-09 ENCOUNTER — PATIENT MESSAGE (OUTPATIENT)
Dept: FAMILY MEDICINE | Facility: CLINIC | Age: 50
End: 2020-11-09

## 2020-11-09 RX ORDER — FLUCONAZOLE 150 MG/1
150 TABLET ORAL DAILY
Qty: 2 TABLET | Refills: 0 | Status: SHIPPED | OUTPATIENT
Start: 2020-11-09 | End: 2020-11-10

## 2020-11-09 RX ORDER — FLUCONAZOLE 150 MG/1
150 TABLET ORAL DAILY
Qty: 1 TABLET | Refills: 0 | Status: SHIPPED | OUTPATIENT
Start: 2020-11-09 | End: 2020-11-09 | Stop reason: SDUPTHER

## 2020-11-09 NOTE — TELEPHONE ENCOUNTER
Please Advise-  Please send a prescription for diflucan to [facility] in [place]. Thank you very much.

## 2020-11-18 ENCOUNTER — PATIENT MESSAGE (OUTPATIENT)
Dept: FAMILY MEDICINE | Facility: CLINIC | Age: 50
End: 2020-11-18

## 2020-11-18 DIAGNOSIS — G47.00 INSOMNIA, UNSPECIFIED TYPE: ICD-10-CM

## 2020-11-19 RX ORDER — ZOLPIDEM TARTRATE 10 MG/1
10 TABLET ORAL NIGHTLY
Qty: 30 TABLET | Refills: 2 | OUTPATIENT
Start: 2020-11-19

## 2020-11-22 ENCOUNTER — PATIENT OUTREACH (OUTPATIENT)
Dept: ADMINISTRATIVE | Facility: HOSPITAL | Age: 50
End: 2020-11-22

## 2020-11-22 NOTE — PROGRESS NOTES
Immunizations reviewed. Legacy reviewed. Care Everywhere reviewed.  [facility], [facility], and DIS reviewed w/ no applicable results yielded.  Labs from 05.2020 located in Media tab which contains HIV Ab Screen, A1c, and Lipid Panel which appears to be most recent.   I have hyperlinked the above to .   Portal message sent to patient.    Chart review completed.               THO

## 2020-11-23 ENCOUNTER — HOSPITAL ENCOUNTER (OUTPATIENT)
Dept: RADIOLOGY | Facility: HOSPITAL | Age: 50
Discharge: HOME | End: 2020-11-23
Attending: NURSE PRACTITIONER
Payer: MEDICAID

## 2020-11-23 VITALS — HEIGHT: 64 IN | BODY MASS INDEX: 47.27 KG/M2 | WEIGHT: 276.88 LBS

## 2020-11-23 DIAGNOSIS — Z12.31 ENCOUNTER FOR SCREENING MAMMOGRAM FOR BREAST CANCER: ICD-10-CM

## 2020-11-23 DIAGNOSIS — M85.80 OSTEOPENIA, UNSPECIFIED LOCATION: ICD-10-CM

## 2020-11-23 PROCEDURE — 77067 SCR MAMMO BI INCL CAD: CPT | Mod: 26,,, | Performed by: RADIOLOGY

## 2020-11-23 PROCEDURE — 77080 DXA BONE DENSITY AXIAL: CPT | Mod: TC

## 2020-11-23 PROCEDURE — 77063 MAMMO DIGITAL SCREENING BILAT WITH TOMO: ICD-10-PCS | Mod: 26,,, | Performed by: RADIOLOGY

## 2020-11-23 PROCEDURE — 77080 DEXA BONE DENSITY SPINE HIP: ICD-10-PCS | Mod: 26,,, | Performed by: RADIOLOGY

## 2020-11-23 PROCEDURE — 77067 MAMMO DIGITAL SCREENING BILAT WITH TOMO: ICD-10-PCS | Mod: 26,,, | Performed by: RADIOLOGY

## 2020-11-23 PROCEDURE — 77067 SCR MAMMO BI INCL CAD: CPT | Mod: TC

## 2020-11-23 PROCEDURE — 77080 DXA BONE DENSITY AXIAL: CPT | Mod: 26,,, | Performed by: RADIOLOGY

## 2020-11-23 PROCEDURE — 77063 BREAST TOMOSYNTHESIS BI: CPT | Mod: 26,,, | Performed by: RADIOLOGY

## 2020-12-03 ENCOUNTER — TELEPHONE (OUTPATIENT)
Dept: FAMILY MEDICINE | Facility: CLINIC | Age: 50
End: 2020-12-03

## 2020-12-03 DIAGNOSIS — E11.9 TYPE 2 DIABETES MELLITUS WITHOUT COMPLICATION, WITH LONG-TERM CURRENT USE OF INSULIN: ICD-10-CM

## 2020-12-03 DIAGNOSIS — Z79.4 TYPE 2 DIABETES MELLITUS WITHOUT COMPLICATION, WITH LONG-TERM CURRENT USE OF INSULIN: ICD-10-CM

## 2020-12-03 RX ORDER — CALCIUM CITRATE/VITAMIN D3 200MG-6.25
TABLET ORAL
Qty: 2 STRIP | Refills: 10 | Status: SHIPPED | OUTPATIENT
Start: 2020-12-03 | End: 2020-12-07 | Stop reason: SDUPTHER

## 2020-12-03 RX ORDER — LANCETS
EACH MISCELLANEOUS
Qty: 100 EACH | Refills: 0 | Status: SHIPPED | OUTPATIENT
Start: 2020-12-03 | End: 2022-08-09 | Stop reason: SDUPTHER

## 2020-12-03 NOTE — TELEPHONE ENCOUNTER
----- Message from Giorgio Kim sent at 12/3/2020  3:21 PM CST -----  Regarding: [facility]  Type:  RX Refill Request    Who Called:  brennan (pharmacist)  Refill or New Rx:  refill  RX Name and Strength:    1. blood sugar diagnostic Strp        Sig: Use as directed to check blood sugar TID and PRN      2. lancets (EASY TOUCH LANCETS) 26 gauge Misc      Sig: Use as directed to check blood sugar TID and PRN     How is the patient currently taking it? (ex. 1XDay):  see above  Is this a 30 day or 90 day RX:  90  Preferred Pharmacy with phone number:    [facility] - [place], [place] - [address]  [address] [address]  Phone: [phone] Fax: [phone]    Local or Mail Order:  local  Ordering Provider:  Edwin Mckoy Call Back Number: [phone]   Additional Information:  pt is out please refill today. Pharm calling for pt.

## 2020-12-04 NOTE — TELEPHONE ENCOUNTER
----- Message from Ingris Aburto sent at 12/4/2020  4:00 PM CST -----  Regarding: Jaycob with [facility] [phone]  Type:  Diabetic/Medical Supplies Request    Name of Caller:  Jaycob  What supplies are needed:  test strips  What is the brand of the supplies: True metrix  Refill or New Rx:  new script  If checking glucose, how many times do they check it?:  3 x day  Who prescribed the original supplies:  NP Deschamp  Pharmacy/Company Name, Phone #, Location:  [facility]  Requesting a Call Back:  [phone]  Best Call Back Number:  [phone] (home)     Additional Information:

## 2020-12-04 NOTE — TELEPHONE ENCOUNTER
Representative with [facility] requesting rx for test trips for pt. Please advise. Thank you.

## 2020-12-07 RX ORDER — CALCIUM CITRATE/VITAMIN D3 200MG-6.25
TABLET ORAL
Qty: 200 STRIP | Refills: 10 | Status: SHIPPED | OUTPATIENT
Start: 2020-12-07 | End: 2022-08-09 | Stop reason: SDUPTHER

## 2020-12-17 ENCOUNTER — PATIENT MESSAGE (OUTPATIENT)
Dept: FAMILY MEDICINE | Facility: CLINIC | Age: 50
End: 2020-12-17

## 2020-12-17 DIAGNOSIS — G47.00 INSOMNIA, UNSPECIFIED TYPE: ICD-10-CM

## 2020-12-17 RX ORDER — ZOLPIDEM TARTRATE 10 MG/1
TABLET ORAL
Qty: 30 TABLET | Refills: 0 | OUTPATIENT
Start: 2020-12-17

## 2020-12-19 DIAGNOSIS — G47.00 INSOMNIA, UNSPECIFIED TYPE: ICD-10-CM

## 2020-12-21 ENCOUNTER — OFFICE VISIT (OUTPATIENT)
Dept: FAMILY MEDICINE | Facility: CLINIC | Age: 50
End: 2020-12-21
Payer: MEDICAID

## 2020-12-21 VITALS
SYSTOLIC BLOOD PRESSURE: 132 MMHG | DIASTOLIC BLOOD PRESSURE: 67 MMHG | BODY MASS INDEX: 47.97 KG/M2 | RESPIRATION RATE: 18 BRPM | WEIGHT: 281 LBS | TEMPERATURE: 98 F | HEART RATE: 76 BPM | HEIGHT: 64 IN | OXYGEN SATURATION: 98 %

## 2020-12-21 DIAGNOSIS — I10 ESSENTIAL HYPERTENSION: ICD-10-CM

## 2020-12-21 DIAGNOSIS — Z79.4 TYPE 2 DIABETES MELLITUS WITHOUT COMPLICATION, WITH LONG-TERM CURRENT USE OF INSULIN: Primary | ICD-10-CM

## 2020-12-21 DIAGNOSIS — B37.31 YEAST VAGINITIS: ICD-10-CM

## 2020-12-21 DIAGNOSIS — E11.9 TYPE 2 DIABETES MELLITUS WITHOUT COMPLICATION, WITH LONG-TERM CURRENT USE OF INSULIN: Primary | ICD-10-CM

## 2020-12-21 DIAGNOSIS — E78.5 HYPERLIPIDEMIA, UNSPECIFIED HYPERLIPIDEMIA TYPE: ICD-10-CM

## 2020-12-21 DIAGNOSIS — G47.00 INSOMNIA, UNSPECIFIED TYPE: ICD-10-CM

## 2020-12-21 LAB — A1C: 6.1

## 2020-12-21 PROCEDURE — 99214 PR OFFICE/OUTPT VISIT, EST, LEVL IV, 30-39 MIN: ICD-10-PCS | Mod: S$PBB,,, | Performed by: NURSE PRACTITIONER

## 2020-12-21 PROCEDURE — 99214 OFFICE O/P EST MOD 30 MIN: CPT | Mod: S$PBB,,, | Performed by: NURSE PRACTITIONER

## 2020-12-21 PROCEDURE — 99215 OFFICE O/P EST HI 40 MIN: CPT | Mod: PBBFAC,PN | Performed by: NURSE PRACTITIONER

## 2020-12-21 PROCEDURE — 99999 PR PBB SHADOW E&M-EST. PATIENT-LVL V: CPT | Mod: PBBFAC,,, | Performed by: NURSE PRACTITIONER

## 2020-12-21 PROCEDURE — 99999 PR PBB SHADOW E&M-EST. PATIENT-LVL V: ICD-10-PCS | Mod: PBBFAC,,, | Performed by: NURSE PRACTITIONER

## 2020-12-21 RX ORDER — ZOLPIDEM TARTRATE 10 MG/1
TABLET ORAL
Qty: 30 TABLET | Refills: 2 | Status: SHIPPED | OUTPATIENT
Start: 2020-12-21 | End: 2021-03-19 | Stop reason: SDUPTHER

## 2020-12-21 RX ORDER — NYSTATIN 100000 [USP'U]/G
POWDER TOPICAL 3 TIMES DAILY
Qty: 60 G | Refills: 2 | Status: SHIPPED | OUTPATIENT
Start: 2020-12-21 | End: 2021-03-15 | Stop reason: SDUPTHER

## 2020-12-21 RX ORDER — ZOLPIDEM TARTRATE 10 MG/1
TABLET ORAL
Qty: 30 TABLET | Refills: 0 | OUTPATIENT
Start: 2020-12-21

## 2020-12-21 NOTE — PROGRESS NOTES
Subjective:       Patient ID: Shweta Gupta is a 50 y.o. female.    Chief Complaint: Follow-up    Ms. Shweta Gupta is a 50 year old female who presents to the clinic today for follow up. She suffers with yeast dermatitis  and requesting a refill of her nystatin. She continues to receive her allergy injections.  Reports injections are still effective and improve her quality of life. She continues to see pain management as ordered. No mood concerns today. She continues to take her medications as ordered for mood. Denies si/hi. She continues to take ambien for sleep. Denies any s/e reports the medication improves her sleep significantly.   In regards to the patient's hypertension, patient denies chest pain/sob, and has been compliant with the medication regimen. hypertension well controlled. Goal of <130/80. Meds and labs as per orders. In regards to the patient's dyslipidemia, patient reports has been compliant with the medication regimen  without side effects. In regard to the patient's diabetes, patient reports that blood sugars have been Good control. Patient denies hypoglycemia. Patient is currently on insulin therapy. Patient is on ACE/ARB and is on statin. Last a1c was Hemoglobin A1C       Date                     Value               Ref Range           Status                07/05/2019               6.3 (H)             4.5 - 6.2 %         Final                  11/13/2018               6.7 (H)             4.5 - 6.2 %         Final                  07/24/2018               6.7 (H)             4.5 - 6.2 %         Final              Comment:    According to ADA guidelines, hemoglobin A1C <7.0% represents  optimal control in non-pregnant diabetic patients.  Different  metrics may apply to specific populations.   Standards of Medical Care in Diabetes - 2016.  For the purpose of screening for the presence of diabetes:  <5.7%     Consistent with the absence of diabetes  5.7-6.4%  Consistent with increasing risk for  "diabetes   (prediabetes)  >or=6.5%  Consistent with diabetes  Currently no consensus exists for use of hemoglobin A1C  for diagnosis of diabetes for children.    ----------             Review of Systems   Constitutional: Negative for activity change, chills, diaphoresis and unexpected weight change.   HENT: Negative for congestion, hearing loss, rhinorrhea and trouble swallowing.    Eyes: Negative for pain, discharge, redness and visual disturbance.   Respiratory: Negative for cough, chest tightness, shortness of breath and wheezing.    Cardiovascular: Negative for chest pain and palpitations.   Gastrointestinal: Negative for abdominal pain, blood in stool, constipation, diarrhea and vomiting.   Endocrine: Negative for polydipsia and polyuria.   Genitourinary: Negative for difficulty urinating, dysuria, hematuria and menstrual problem.   Musculoskeletal: Negative for arthralgias, joint swelling and neck pain.   Skin: Positive for rash. Negative for color change.   Neurological: Negative for dizziness, tremors, syncope, weakness, numbness and headaches.   Psychiatric/Behavioral: Negative for confusion, dysphoric mood and sleep disturbance. The patient is not nervous/anxious.          Reviewed family, medical, surgical, and social history.    Objective:      /67 (BP Location: Left arm, Patient Position: Sitting, BP Method: Medium (Automatic))   Pulse 76   Temp 98.1 °F (36.7 °C) (Tympanic)   Resp 18   Ht 5' 4" (1.626 m)   Wt 127.5 kg (281 lb)   LMP 06/10/2002   SpO2 98%   BMI 48.23 kg/m²   Physical Exam  Vitals signs and nursing note reviewed.   Constitutional:       General: She is not in acute distress.     Appearance: Normal appearance. She is well-developed. She is not diaphoretic.   HENT:      Head: Normocephalic and atraumatic.      Nose: Nose normal.      Mouth/Throat:      Pharynx: Uvula midline.   Eyes:      General: Lids are normal.      Conjunctiva/sclera: Conjunctivae normal.      Pupils: " Pupils are equal, round, and reactive to light.   Neck:      Musculoskeletal: Full passive range of motion without pain and normal range of motion. No neck rigidity.      Thyroid: No thyroid mass.      Trachea: Trachea normal. No tracheal tenderness.   Cardiovascular:      Rate and Rhythm: Normal rate and regular rhythm.      Pulses: Normal pulses.      Heart sounds: Normal heart sounds, S1 normal and S2 normal. No murmur.   Pulmonary:      Effort: Pulmonary effort is normal. No respiratory distress.      Breath sounds: Normal breath sounds. No decreased breath sounds or wheezing.   Abdominal:      General: Bowel sounds are normal. There is no distension or abdominal bruit.      Palpations: Abdomen is soft.      Tenderness: There is no guarding.   Musculoskeletal:         General: No tenderness or deformity.   Lymphadenopathy:      Cervical: No cervical adenopathy.   Skin:     General: Skin is warm and dry.      Capillary Refill: Capillary refill takes less than 2 seconds.      Findings: No abrasion, laceration, lesion or rash.      Nails: There is no clubbing.     Neurological:      Mental Status: She is alert and oriented to person, place, and time.      Motor: No abnormal muscle tone.   Psychiatric:         Speech: Speech normal.         Behavior: Behavior normal. Behavior is cooperative.         Thought Content: Thought content normal.         Judgment: Judgment normal.         Assessment:       1. Type 2 diabetes mellitus without complication, with long-term current use of insulin    2. Insomnia, unspecified type    3. Yeast vaginitis    4. Essential hypertension    5. Hyperlipidemia, unspecified hyperlipidemia type        Plan:       Type 2 diabetes mellitus without complication, with long-term current use of insulin    Insomnia, unspecified type  -     zolpidem (AMBIEN) 10 mg Tab; TAKE 1 TABLET BY MOUTH EVERY EVENING  Dispense: 30 tablet; Refill: 2    Yeast vaginitis  -     nystatin (NYSTOP) powder; Apply  topically 3 (three) times daily.  Dispense: 60 g; Refill: 2    Essential hypertension    Hyperlipidemia, unspecified hyperlipidemia type        PLAN:  - Discussed with patient the plan of care  -  reviewed  - Medications reviewed. Medication side effects discussed. Patient has no questions or concerns at this time. Informed patient to notify me regarding any concerns.   - Continue monitoring BP  - A1C ordered  - Informed patient to please notify me with any questions or concerns at anytime  - Follow up ordered for 3 months         Risks, benefits, and side effects were discussed with the patient. All questions were answered to the fullest satisfaction of the patient, and pt verbalized understanding and agreement to treatment plan. Pt was to call with any new or worsening symptoms, or present to the ER.

## 2021-01-04 ENCOUNTER — PATIENT MESSAGE (OUTPATIENT)
Dept: ADMINISTRATIVE | Facility: HOSPITAL | Age: 51
End: 2021-01-04

## 2021-01-05 ENCOUNTER — PATIENT OUTREACH (OUTPATIENT)
Dept: ADMINISTRATIVE | Facility: HOSPITAL | Age: 51
End: 2021-01-05

## 2021-01-29 ENCOUNTER — PATIENT MESSAGE (OUTPATIENT)
Dept: FAMILY MEDICINE | Facility: CLINIC | Age: 51
End: 2021-01-29

## 2021-02-05 ENCOUNTER — PATIENT MESSAGE (OUTPATIENT)
Dept: FAMILY MEDICINE | Facility: CLINIC | Age: 51
End: 2021-02-05

## 2021-02-05 RX ORDER — MONTELUKAST SODIUM 10 MG/1
10 TABLET ORAL DAILY
Qty: 90 TABLET | Refills: 3 | Status: SHIPPED | OUTPATIENT
Start: 2021-02-05 | End: 2022-02-21 | Stop reason: SDUPTHER

## 2021-02-11 ENCOUNTER — PATIENT OUTREACH (OUTPATIENT)
Dept: ADMINISTRATIVE | Facility: HOSPITAL | Age: 51
End: 2021-02-11

## 2021-02-11 DIAGNOSIS — E11.9 DIABETES MELLITUS: ICD-10-CM

## 2021-02-21 ENCOUNTER — HOSPITAL ENCOUNTER (EMERGENCY)
Facility: HOSPITAL | Age: 51
Discharge: HOME | End: 2021-02-21
Attending: EMERGENCY MEDICINE
Payer: MEDICAID

## 2021-02-21 VITALS
BODY MASS INDEX: 50.02 KG/M2 | HEIGHT: 64 IN | HEART RATE: 89 BPM | TEMPERATURE: 100 F | OXYGEN SATURATION: 97 % | WEIGHT: 293 LBS | SYSTOLIC BLOOD PRESSURE: 133 MMHG | RESPIRATION RATE: 18 BRPM | DIASTOLIC BLOOD PRESSURE: 87 MMHG

## 2021-02-21 DIAGNOSIS — K57.30 DIVERTICULOSIS OF COLON: ICD-10-CM

## 2021-02-21 DIAGNOSIS — E66.01 MORBID OBESITY WITH BMI OF 50.0-59.9, ADULT: ICD-10-CM

## 2021-02-21 DIAGNOSIS — R09.82 ALLERGIC RHINITIS WITH POSTNASAL DRIP: ICD-10-CM

## 2021-02-21 DIAGNOSIS — R05.9 COUGH: ICD-10-CM

## 2021-02-21 DIAGNOSIS — J30.9 ALLERGIC RHINITIS WITH POSTNASAL DRIP: ICD-10-CM

## 2021-02-21 DIAGNOSIS — R06.02 SHORTNESS OF BREATH: ICD-10-CM

## 2021-02-21 DIAGNOSIS — R10.30 LOWER ABDOMINAL PAIN: Primary | ICD-10-CM

## 2021-02-21 LAB
ALBUMIN SERPL BCP-MCNC: 4.4 G/DL (ref 3.5–5.2)
ALP SERPL-CCNC: 51 U/L (ref 55–135)
ALT SERPL W/O P-5'-P-CCNC: 27 U/L (ref 10–44)
ANION GAP SERPL CALC-SCNC: 10 MMOL/L (ref 8–16)
AST SERPL-CCNC: 19 U/L (ref 10–40)
BASOPHILS # BLD AUTO: 0.03 K/UL (ref 0–0.2)
BASOPHILS NFR BLD: 0.2 % (ref 0–1.9)
BILIRUB SERPL-MCNC: 0.8 MG/DL (ref 0.1–1)
BILIRUB UR QL STRIP: NEGATIVE
BUN SERPL-MCNC: 19 MG/DL (ref 6–20)
CALCIUM SERPL-MCNC: 9.3 MG/DL (ref 8.7–10.5)
CHLORIDE SERPL-SCNC: 105 MMOL/L (ref 95–110)
CLARITY UR: CLEAR
CO2 SERPL-SCNC: 26 MMOL/L (ref 23–29)
COLOR UR: YELLOW
CREAT SERPL-MCNC: 0.7 MG/DL (ref 0.5–1.4)
D DIMER PPP IA.FEU-MCNC: 0.92 MG/L FEU
DIFFERENTIAL METHOD: ABNORMAL
EOSINOPHIL # BLD AUTO: 0.2 K/UL (ref 0–0.5)
EOSINOPHIL NFR BLD: 1.4 % (ref 0–8)
ERYTHROCYTE [DISTWIDTH] IN BLOOD BY AUTOMATED COUNT: 13.3 % (ref 11.5–14.5)
EST. GFR  (AFRICAN AMERICAN): >60 ML/MIN/1.73 M^2
EST. GFR  (NON AFRICAN AMERICAN): >60 ML/MIN/1.73 M^2
GLUCOSE SERPL-MCNC: 114 MG/DL (ref 70–110)
GLUCOSE UR QL STRIP: NEGATIVE
HCT VFR BLD AUTO: 32.2 % (ref 37–48.5)
HGB BLD-MCNC: 10.2 G/DL (ref 12–16)
HGB UR QL STRIP: NEGATIVE
IMM GRANULOCYTES # BLD AUTO: 0.03 K/UL (ref 0–0.04)
IMM GRANULOCYTES NFR BLD AUTO: 0.2 % (ref 0–0.5)
KETONES UR QL STRIP: NEGATIVE
LACTATE SERPL-SCNC: 0.8 MMOL/L (ref 0.5–2.2)
LEUKOCYTE ESTERASE UR QL STRIP: NEGATIVE
LYMPHOCYTES # BLD AUTO: 2.6 K/UL (ref 1–4.8)
LYMPHOCYTES NFR BLD: 21.3 % (ref 18–48)
MCH RBC QN AUTO: 29.5 PG (ref 27–31)
MCHC RBC AUTO-ENTMCNC: 31.7 G/DL (ref 32–36)
MCV RBC AUTO: 93 FL (ref 82–98)
MONOCYTES # BLD AUTO: 0.9 K/UL (ref 0.3–1)
MONOCYTES NFR BLD: 7 % (ref 4–15)
NEUTROPHILS # BLD AUTO: 8.4 K/UL (ref 1.8–7.7)
NEUTROPHILS NFR BLD: 69.9 % (ref 38–73)
NITRITE UR QL STRIP: NEGATIVE
NRBC BLD-RTO: 0 /100 WBC
PH UR STRIP: 6 [PH] (ref 5–8)
PLATELET # BLD AUTO: 268 K/UL (ref 150–350)
PMV BLD AUTO: 10.3 FL (ref 9.2–12.9)
POTASSIUM SERPL-SCNC: 3.4 MMOL/L (ref 3.5–5.1)
PROT SERPL-MCNC: 8.3 G/DL (ref 6–8.4)
PROT UR QL STRIP: NEGATIVE
RBC # BLD AUTO: 3.46 M/UL (ref 4–5.4)
SARS-COV-2 RDRP RESP QL NAA+PROBE: NEGATIVE
SODIUM SERPL-SCNC: 141 MMOL/L (ref 136–145)
SP GR UR STRIP: 1.02 (ref 1–1.03)
URN SPEC COLLECT METH UR: NORMAL
UROBILINOGEN UR STRIP-ACNC: NEGATIVE EU/DL
WBC # BLD AUTO: 12.06 K/UL (ref 3.9–12.7)

## 2021-02-21 PROCEDURE — U0002 COVID-19 LAB TEST NON-CDC: HCPCS

## 2021-02-21 PROCEDURE — 85379 FIBRIN DEGRADATION QUANT: CPT

## 2021-02-21 PROCEDURE — 85025 COMPLETE CBC W/AUTO DIFF WBC: CPT

## 2021-02-21 PROCEDURE — 71046 X-RAY EXAM CHEST 2 VIEWS: CPT | Mod: TC,FY

## 2021-02-21 PROCEDURE — 71046 XR CHEST PA AND LATERAL: ICD-10-PCS | Mod: 26,,, | Performed by: RADIOLOGY

## 2021-02-21 PROCEDURE — 71046 X-RAY EXAM CHEST 2 VIEWS: CPT | Mod: 26,,, | Performed by: RADIOLOGY

## 2021-02-21 PROCEDURE — 80053 COMPREHEN METABOLIC PANEL: CPT

## 2021-02-21 PROCEDURE — 99284 EMERGENCY DEPT VISIT MOD MDM: CPT | Mod: 25

## 2021-02-21 PROCEDURE — 83605 ASSAY OF LACTIC ACID: CPT

## 2021-02-21 PROCEDURE — 81003 URINALYSIS AUTO W/O SCOPE: CPT

## 2021-02-21 RX ORDER — CIPROFLOXACIN 500 MG/1
500 TABLET ORAL 2 TIMES DAILY
Qty: 20 TABLET | Refills: 0 | Status: SHIPPED | OUTPATIENT
Start: 2021-02-21 | End: 2021-03-03

## 2021-02-21 RX ORDER — METRONIDAZOLE 500 MG/1
500 TABLET ORAL 3 TIMES DAILY
Qty: 21 TABLET | Refills: 0 | Status: SHIPPED | OUTPATIENT
Start: 2021-02-21 | End: 2021-02-28

## 2021-02-25 ENCOUNTER — TELEPHONE (OUTPATIENT)
Dept: FAMILY MEDICINE | Facility: CLINIC | Age: 51
End: 2021-02-25

## 2021-02-26 ENCOUNTER — PATIENT MESSAGE (OUTPATIENT)
Dept: FAMILY MEDICINE | Facility: CLINIC | Age: 51
End: 2021-02-26

## 2021-02-26 ENCOUNTER — TELEPHONE (OUTPATIENT)
Dept: FAMILY MEDICINE | Facility: CLINIC | Age: 51
End: 2021-02-26

## 2021-02-26 DIAGNOSIS — Z88.9 MULTIPLE ALLERGIES: Primary | ICD-10-CM

## 2021-02-26 DIAGNOSIS — J22 LOWER RESPIRATORY INFECTION: ICD-10-CM

## 2021-02-26 RX ORDER — AZITHROMYCIN 250 MG/1
TABLET, FILM COATED ORAL
Qty: 6 TABLET | Refills: 0 | Status: SHIPPED | OUTPATIENT
Start: 2021-02-26 | End: 2021-03-03

## 2021-02-26 RX ORDER — METHYLPREDNISOLONE 4 MG/1
TABLET ORAL
Qty: 1 PACKAGE | Refills: 0 | Status: SHIPPED | OUTPATIENT
Start: 2021-02-26 | End: 2021-03-02

## 2021-03-01 ENCOUNTER — TELEPHONE (OUTPATIENT)
Dept: FAMILY MEDICINE | Facility: CLINIC | Age: 51
End: 2021-03-01

## 2021-03-02 DIAGNOSIS — J22 LOWER RESPIRATORY INFECTION: ICD-10-CM

## 2021-03-02 DIAGNOSIS — Z88.9 MULTIPLE ALLERGIES: ICD-10-CM

## 2021-03-02 RX ORDER — PREDNISONE 5 MG/1
TABLET ORAL
Qty: 9 TABLET | Refills: 0 | Status: SHIPPED | OUTPATIENT
Start: 2021-03-02 | End: 2021-04-07 | Stop reason: SDUPTHER

## 2021-03-02 RX ORDER — METHYLPREDNISOLONE 4 MG/1
TABLET ORAL
Refills: 0 | OUTPATIENT
Start: 2021-03-02

## 2021-03-04 DIAGNOSIS — Z11.59 NEED FOR HEPATITIS C SCREENING TEST: ICD-10-CM

## 2021-03-14 ENCOUNTER — PATIENT MESSAGE (OUTPATIENT)
Dept: FAMILY MEDICINE | Facility: CLINIC | Age: 51
End: 2021-03-14

## 2021-03-14 DIAGNOSIS — B37.31 YEAST VAGINITIS: ICD-10-CM

## 2021-03-15 RX ORDER — NYSTATIN 100000 [USP'U]/G
POWDER TOPICAL 3 TIMES DAILY
Qty: 60 G | Refills: 2 | Status: SHIPPED | OUTPATIENT
Start: 2021-03-15 | End: 2021-06-10 | Stop reason: SDUPTHER

## 2021-03-19 ENCOUNTER — PATIENT MESSAGE (OUTPATIENT)
Dept: FAMILY MEDICINE | Facility: CLINIC | Age: 51
End: 2021-03-19

## 2021-03-19 DIAGNOSIS — G47.00 INSOMNIA, UNSPECIFIED TYPE: ICD-10-CM

## 2021-03-19 RX ORDER — ZOLPIDEM TARTRATE 10 MG/1
TABLET ORAL
Qty: 30 TABLET | Refills: 2 | Status: SHIPPED | OUTPATIENT
Start: 2021-03-19 | End: 2021-06-10 | Stop reason: SDUPTHER

## 2021-03-22 ENCOUNTER — LAB VISIT (OUTPATIENT)
Dept: LAB | Facility: HOSPITAL | Age: 51
End: 2021-03-22
Attending: NURSE PRACTITIONER
Payer: MEDICAID

## 2021-03-22 ENCOUNTER — OFFICE VISIT (OUTPATIENT)
Dept: FAMILY MEDICINE | Facility: CLINIC | Age: 51
End: 2021-03-22
Payer: MEDICAID

## 2021-03-22 VITALS
BODY MASS INDEX: 49.51 KG/M2 | TEMPERATURE: 98 F | DIASTOLIC BLOOD PRESSURE: 89 MMHG | HEIGHT: 64 IN | HEART RATE: 88 BPM | SYSTOLIC BLOOD PRESSURE: 127 MMHG | RESPIRATION RATE: 18 BRPM | WEIGHT: 290 LBS | OXYGEN SATURATION: 99 %

## 2021-03-22 DIAGNOSIS — E55.9 VITAMIN D DEFICIENCY: ICD-10-CM

## 2021-03-22 DIAGNOSIS — R53.83 FATIGUE, UNSPECIFIED TYPE: ICD-10-CM

## 2021-03-22 DIAGNOSIS — E11.9 TYPE 2 DIABETES MELLITUS WITHOUT COMPLICATION, WITH LONG-TERM CURRENT USE OF INSULIN: Primary | ICD-10-CM

## 2021-03-22 DIAGNOSIS — Z79.4 TYPE 2 DIABETES MELLITUS WITHOUT COMPLICATION, WITH LONG-TERM CURRENT USE OF INSULIN: ICD-10-CM

## 2021-03-22 DIAGNOSIS — E11.9 TYPE 2 DIABETES MELLITUS WITHOUT COMPLICATION, WITH LONG-TERM CURRENT USE OF INSULIN: ICD-10-CM

## 2021-03-22 DIAGNOSIS — Z11.59 NEED FOR HEPATITIS C SCREENING TEST: ICD-10-CM

## 2021-03-22 DIAGNOSIS — Z13.6 ENCOUNTER FOR LIPID SCREENING FOR CARDIOVASCULAR DISEASE: ICD-10-CM

## 2021-03-22 DIAGNOSIS — Z13.220 ENCOUNTER FOR LIPID SCREENING FOR CARDIOVASCULAR DISEASE: ICD-10-CM

## 2021-03-22 DIAGNOSIS — E11.9 DIABETES MELLITUS: ICD-10-CM

## 2021-03-22 DIAGNOSIS — Z11.4 SCREENING FOR HIV (HUMAN IMMUNODEFICIENCY VIRUS): ICD-10-CM

## 2021-03-22 DIAGNOSIS — Z79.4 TYPE 2 DIABETES MELLITUS WITHOUT COMPLICATION, WITH LONG-TERM CURRENT USE OF INSULIN: Primary | ICD-10-CM

## 2021-03-22 LAB
ALBUMIN SERPL BCP-MCNC: 4.5 G/DL (ref 3.5–5.2)
ALP SERPL-CCNC: 54 U/L (ref 55–135)
ALT SERPL W/O P-5'-P-CCNC: 25 U/L (ref 10–44)
ANION GAP SERPL CALC-SCNC: 10 MMOL/L (ref 8–16)
AST SERPL-CCNC: 25 U/L (ref 10–40)
BILIRUB SERPL-MCNC: 0.8 MG/DL (ref 0.1–1)
BUN SERPL-MCNC: 25 MG/DL (ref 6–20)
CALCIUM SERPL-MCNC: 9.6 MG/DL (ref 8.7–10.5)
CHLORIDE SERPL-SCNC: 103 MMOL/L (ref 95–110)
CHOLEST SERPL-MCNC: 145 MG/DL (ref 120–199)
CHOLEST/HDLC SERPL: 3.5 {RATIO} (ref 2–5)
CO2 SERPL-SCNC: 29 MMOL/L (ref 23–29)
CREAT SERPL-MCNC: 0.7 MG/DL (ref 0.5–1.4)
EST. GFR  (AFRICAN AMERICAN): >60 ML/MIN/1.73 M^2
EST. GFR  (NON AFRICAN AMERICAN): >60 ML/MIN/1.73 M^2
ESTIMATED AVG GLUCOSE: 143 MG/DL (ref 68–131)
GLUCOSE SERPL-MCNC: 135 MG/DL (ref 70–110)
HBA1C MFR BLD: 6.6 % (ref 4.5–6.2)
HDLC SERPL-MCNC: 42 MG/DL (ref 40–75)
HDLC SERPL: 29 % (ref 20–50)
LDLC SERPL CALC-MCNC: 82.4 MG/DL (ref 63–159)
NONHDLC SERPL-MCNC: 103 MG/DL
POTASSIUM SERPL-SCNC: 3.9 MMOL/L (ref 3.5–5.1)
PROT SERPL-MCNC: 8.1 G/DL (ref 6–8.4)
SODIUM SERPL-SCNC: 142 MMOL/L (ref 136–145)
TRIGL SERPL-MCNC: 103 MG/DL (ref 30–150)

## 2021-03-22 PROCEDURE — 80061 LIPID PANEL: CPT | Performed by: NURSE PRACTITIONER

## 2021-03-22 PROCEDURE — 80053 COMPREHEN METABOLIC PANEL: CPT | Performed by: NURSE PRACTITIONER

## 2021-03-22 PROCEDURE — 83036 HEMOGLOBIN GLYCOSYLATED A1C: CPT | Performed by: NURSE PRACTITIONER

## 2021-03-22 PROCEDURE — 99999 PR PBB SHADOW E&M-EST. PATIENT-LVL V: CPT | Mod: PBBFAC,,, | Performed by: NURSE PRACTITIONER

## 2021-03-22 PROCEDURE — 99214 PR OFFICE/OUTPT VISIT, EST, LEVL IV, 30-39 MIN: ICD-10-PCS | Mod: S$PBB,,, | Performed by: NURSE PRACTITIONER

## 2021-03-22 PROCEDURE — 99215 OFFICE O/P EST HI 40 MIN: CPT | Mod: PBBFAC,PN | Performed by: NURSE PRACTITIONER

## 2021-03-22 PROCEDURE — 99999 PR PBB SHADOW E&M-EST. PATIENT-LVL V: ICD-10-PCS | Mod: PBBFAC,,, | Performed by: NURSE PRACTITIONER

## 2021-03-22 PROCEDURE — 82607 VITAMIN B-12: CPT | Performed by: NURSE PRACTITIONER

## 2021-03-22 PROCEDURE — 99214 OFFICE O/P EST MOD 30 MIN: CPT | Mod: S$PBB,,, | Performed by: NURSE PRACTITIONER

## 2021-03-22 PROCEDURE — 36415 COLL VENOUS BLD VENIPUNCTURE: CPT | Performed by: NURSE PRACTITIONER

## 2021-03-22 PROCEDURE — 82746 ASSAY OF FOLIC ACID SERUM: CPT | Performed by: NURSE PRACTITIONER

## 2021-03-22 PROCEDURE — 86703 HIV-1/HIV-2 1 RESULT ANTBDY: CPT | Performed by: NURSE PRACTITIONER

## 2021-03-22 PROCEDURE — 86803 HEPATITIS C AB TEST: CPT | Performed by: NURSE PRACTITIONER

## 2021-03-22 RX ORDER — ERGOCALCIFEROL 1.25 MG/1
50000 CAPSULE ORAL
Qty: 4 CAPSULE | Refills: 1 | Status: SHIPPED | OUTPATIENT
Start: 2021-03-22 | End: 2021-05-24

## 2021-03-22 RX ORDER — FENOFIBRATE 145 MG/1
145 TABLET, FILM COATED ORAL DAILY
COMMUNITY
Start: 2021-02-12 | End: 2021-11-17 | Stop reason: SDUPTHER

## 2021-03-23 LAB
FOLATE SERPL-MCNC: 18 NG/ML (ref 4–24)
VIT B12 SERPL-MCNC: 1824 PG/ML (ref 210–950)

## 2021-03-24 LAB
HCV AB SERPL QL IA: NEGATIVE
HIV 1+2 AB+HIV1 P24 AG SERPL QL IA: NEGATIVE

## 2021-04-07 ENCOUNTER — OFFICE VISIT (OUTPATIENT)
Dept: PODIATRY | Facility: CLINIC | Age: 51
End: 2021-04-07
Payer: MEDICAID

## 2021-04-07 VITALS
WEIGHT: 285 LBS | HEIGHT: 64 IN | TEMPERATURE: 98 F | SYSTOLIC BLOOD PRESSURE: 130 MMHG | DIASTOLIC BLOOD PRESSURE: 77 MMHG | HEART RATE: 80 BPM | BODY MASS INDEX: 48.65 KG/M2

## 2021-04-07 DIAGNOSIS — Z79.4 TYPE 2 DIABETES MELLITUS WITHOUT COMPLICATION, WITH LONG-TERM CURRENT USE OF INSULIN: ICD-10-CM

## 2021-04-07 DIAGNOSIS — E11.9 TYPE 2 DIABETES MELLITUS WITHOUT COMPLICATION, WITH LONG-TERM CURRENT USE OF INSULIN: ICD-10-CM

## 2021-04-07 DIAGNOSIS — M72.2 PLANTAR FASCIITIS: Primary | ICD-10-CM

## 2021-04-07 PROCEDURE — 99214 OFFICE O/P EST MOD 30 MIN: CPT | Mod: S$PBB,,, | Performed by: PODIATRIST

## 2021-04-07 PROCEDURE — 99215 OFFICE O/P EST HI 40 MIN: CPT | Mod: PBBFAC | Performed by: PODIATRIST

## 2021-04-07 PROCEDURE — 99999 PR PBB SHADOW E&M-EST. PATIENT-LVL V: CPT | Mod: PBBFAC,,, | Performed by: PODIATRIST

## 2021-04-07 PROCEDURE — 99214 PR OFFICE/OUTPT VISIT, EST, LEVL IV, 30-39 MIN: ICD-10-PCS | Mod: S$PBB,,, | Performed by: PODIATRIST

## 2021-04-07 PROCEDURE — 99999 PR PBB SHADOW E&M-EST. PATIENT-LVL V: ICD-10-PCS | Mod: PBBFAC,,, | Performed by: PODIATRIST

## 2021-04-07 RX ORDER — MELOXICAM 15 MG/1
15 TABLET ORAL DAILY
Qty: 30 TABLET | Refills: 0 | Status: SHIPPED | OUTPATIENT
Start: 2021-04-07 | End: 2021-05-07

## 2021-04-15 ENCOUNTER — PATIENT MESSAGE (OUTPATIENT)
Dept: FAMILY MEDICINE | Facility: CLINIC | Age: 51
End: 2021-04-15

## 2021-04-15 DIAGNOSIS — Z79.4 TYPE 2 DIABETES MELLITUS WITHOUT COMPLICATION, WITH LONG-TERM CURRENT USE OF INSULIN: ICD-10-CM

## 2021-04-15 DIAGNOSIS — E11.9 TYPE 2 DIABETES MELLITUS WITHOUT COMPLICATION, WITH LONG-TERM CURRENT USE OF INSULIN: ICD-10-CM

## 2021-04-15 RX ORDER — INSULIN GLARGINE 100 [IU]/ML
60 INJECTION, SOLUTION SUBCUTANEOUS DAILY
Qty: 15 ML | Refills: 6 | Status: SHIPPED | OUTPATIENT
Start: 2021-04-15 | End: 2022-08-09 | Stop reason: SDUPTHER

## 2021-04-19 ENCOUNTER — PATIENT MESSAGE (OUTPATIENT)
Dept: FAMILY MEDICINE | Facility: CLINIC | Age: 51
End: 2021-04-19

## 2021-04-19 RX ORDER — ATORVASTATIN CALCIUM 40 MG/1
40 TABLET, FILM COATED ORAL DAILY
Qty: 90 TABLET | Refills: 3 | Status: SHIPPED | OUTPATIENT
Start: 2021-04-19 | End: 2022-04-14

## 2021-04-26 ENCOUNTER — PATIENT MESSAGE (OUTPATIENT)
Dept: FAMILY MEDICINE | Facility: CLINIC | Age: 51
End: 2021-04-26

## 2021-04-26 RX ORDER — LISINOPRIL 20 MG/1
20 TABLET ORAL DAILY
Qty: 90 TABLET | Refills: 2 | Status: SHIPPED | OUTPATIENT
Start: 2021-04-26 | End: 2022-01-31

## 2021-04-26 RX ORDER — LISINOPRIL 20 MG/1
TABLET ORAL
Qty: 90 TABLET | Refills: 2 | Status: SHIPPED | OUTPATIENT
Start: 2021-04-26 | End: 2021-04-26 | Stop reason: SDUPTHER

## 2021-05-04 DIAGNOSIS — E11.9 TYPE 2 DIABETES MELLITUS WITHOUT COMPLICATION: ICD-10-CM

## 2021-05-23 ENCOUNTER — PATIENT MESSAGE (OUTPATIENT)
Dept: FAMILY MEDICINE | Facility: CLINIC | Age: 51
End: 2021-05-23

## 2021-05-23 DIAGNOSIS — E55.9 VITAMIN D DEFICIENCY: ICD-10-CM

## 2021-05-24 RX ORDER — ERGOCALCIFEROL 1.25 MG/1
50000 CAPSULE ORAL
Qty: 4 CAPSULE | Refills: 2 | Status: SHIPPED | OUTPATIENT
Start: 2021-05-24 | End: 2021-09-30

## 2021-06-07 RX ORDER — BLOOD-GLUCOSE CONTROL, NORMAL
EACH MISCELLANEOUS
Qty: 90 EACH | Refills: 10 | Status: SHIPPED | OUTPATIENT
Start: 2021-06-07

## 2021-06-10 ENCOUNTER — PATIENT MESSAGE (OUTPATIENT)
Dept: FAMILY MEDICINE | Facility: CLINIC | Age: 51
End: 2021-06-10

## 2021-06-10 DIAGNOSIS — B37.31 YEAST VAGINITIS: ICD-10-CM

## 2021-06-10 DIAGNOSIS — K21.9 GASTROESOPHAGEAL REFLUX DISEASE: ICD-10-CM

## 2021-06-10 DIAGNOSIS — E11.9 TYPE 2 DIABETES MELLITUS WITHOUT COMPLICATION, WITH LONG-TERM CURRENT USE OF INSULIN: ICD-10-CM

## 2021-06-10 DIAGNOSIS — G47.00 INSOMNIA, UNSPECIFIED TYPE: ICD-10-CM

## 2021-06-10 DIAGNOSIS — I10 ESSENTIAL HYPERTENSION: ICD-10-CM

## 2021-06-10 DIAGNOSIS — Z79.4 TYPE 2 DIABETES MELLITUS WITHOUT COMPLICATION, WITH LONG-TERM CURRENT USE OF INSULIN: ICD-10-CM

## 2021-06-10 RX ORDER — FUROSEMIDE 40 MG/1
40 TABLET ORAL 2 TIMES DAILY
Qty: 180 TABLET | Refills: 1 | Status: SHIPPED | OUTPATIENT
Start: 2021-06-10 | End: 2022-05-19 | Stop reason: SDUPTHER

## 2021-06-10 RX ORDER — POTASSIUM CHLORIDE 20 MEQ/1
20 TABLET, EXTENDED RELEASE ORAL 2 TIMES DAILY
Qty: 180 TABLET | Refills: 1 | Status: SHIPPED | OUTPATIENT
Start: 2021-06-10 | End: 2021-12-15

## 2021-06-10 RX ORDER — NYSTATIN 100000 [USP'U]/G
POWDER TOPICAL 3 TIMES DAILY
Qty: 60 G | Refills: 2 | Status: SHIPPED | OUTPATIENT
Start: 2021-06-10 | End: 2021-08-12 | Stop reason: SDUPTHER

## 2021-06-10 RX ORDER — SUCRALFATE 1 G/1
TABLET ORAL
Qty: 360 TABLET | Refills: 1 | Status: SHIPPED | OUTPATIENT
Start: 2021-06-10 | End: 2022-03-07 | Stop reason: HOSPADM

## 2021-06-10 RX ORDER — BUPROPION HYDROCHLORIDE 150 MG/1
150 TABLET ORAL DAILY
Qty: 90 TABLET | Refills: 1 | Status: SHIPPED | OUTPATIENT
Start: 2021-06-10 | End: 2022-08-09 | Stop reason: SDUPTHER

## 2021-06-10 RX ORDER — ZOLPIDEM TARTRATE 10 MG/1
TABLET ORAL
Qty: 30 TABLET | Refills: 2 | Status: SHIPPED | OUTPATIENT
Start: 2021-06-10 | End: 2021-09-30 | Stop reason: SDUPTHER

## 2021-06-10 RX ORDER — PANTOPRAZOLE SODIUM 40 MG/1
40 TABLET, DELAYED RELEASE ORAL DAILY
Qty: 90 TABLET | Refills: 1 | Status: SHIPPED | OUTPATIENT
Start: 2021-06-10 | End: 2021-10-25 | Stop reason: SDUPTHER

## 2021-06-10 RX ORDER — PIOGLITAZONEHYDROCHLORIDE 45 MG/1
45 TABLET ORAL DAILY
Qty: 90 TABLET | Refills: 1 | Status: SHIPPED | OUTPATIENT
Start: 2021-06-10 | End: 2021-12-15

## 2021-06-11 ENCOUNTER — PATIENT MESSAGE (OUTPATIENT)
Dept: FAMILY MEDICINE | Facility: CLINIC | Age: 51
End: 2021-06-11

## 2021-06-24 ENCOUNTER — PATIENT MESSAGE (OUTPATIENT)
Dept: FAMILY MEDICINE | Facility: CLINIC | Age: 51
End: 2021-06-24

## 2021-07-16 ENCOUNTER — PATIENT MESSAGE (OUTPATIENT)
Dept: FAMILY MEDICINE | Facility: CLINIC | Age: 51
End: 2021-07-16

## 2021-07-19 ENCOUNTER — PATIENT MESSAGE (OUTPATIENT)
Dept: FAMILY MEDICINE | Facility: CLINIC | Age: 51
End: 2021-07-19

## 2021-07-19 RX ORDER — AZITHROMYCIN 250 MG/1
TABLET, FILM COATED ORAL
Qty: 6 TABLET | Refills: 0 | Status: SHIPPED | OUTPATIENT
Start: 2021-07-19 | End: 2021-09-30

## 2021-07-23 ENCOUNTER — PATIENT MESSAGE (OUTPATIENT)
Dept: FAMILY MEDICINE | Facility: CLINIC | Age: 51
End: 2021-07-23

## 2021-07-23 RX ORDER — NYSTATIN AND TRIAMCINOLONE ACETONIDE 100000; 1 [USP'U]/G; MG/G
CREAM TOPICAL
Qty: 60 G | Refills: 5 | Status: SHIPPED | OUTPATIENT
Start: 2021-07-23 | End: 2023-03-14 | Stop reason: SDUPTHER

## 2021-08-10 ENCOUNTER — PATIENT MESSAGE (OUTPATIENT)
Dept: FAMILY MEDICINE | Facility: CLINIC | Age: 51
End: 2021-08-10

## 2021-08-10 DIAGNOSIS — B37.31 YEAST VAGINITIS: ICD-10-CM

## 2021-08-12 RX ORDER — NYSTATIN 100000 [USP'U]/G
POWDER TOPICAL 3 TIMES DAILY
Qty: 120 G | Refills: 2 | Status: SHIPPED | OUTPATIENT
Start: 2021-08-12 | End: 2021-12-14

## 2021-08-13 ENCOUNTER — PATIENT MESSAGE (OUTPATIENT)
Dept: FAMILY MEDICINE | Facility: CLINIC | Age: 51
End: 2021-08-13

## 2021-09-24 ENCOUNTER — PATIENT MESSAGE (OUTPATIENT)
Dept: FAMILY MEDICINE | Facility: CLINIC | Age: 51
End: 2021-09-24

## 2021-09-24 DIAGNOSIS — G47.00 INSOMNIA, UNSPECIFIED TYPE: ICD-10-CM

## 2021-09-24 RX ORDER — PREGABALIN 200 MG/1
200 CAPSULE ORAL 3 TIMES DAILY
Qty: 90 CAPSULE | Refills: 1 | OUTPATIENT
Start: 2021-09-24

## 2021-09-24 RX ORDER — ZOLPIDEM TARTRATE 10 MG/1
TABLET ORAL
Qty: 30 TABLET | Refills: 2 | OUTPATIENT
Start: 2021-09-24

## 2021-09-24 RX ORDER — ZOLPIDEM TARTRATE 10 MG/1
TABLET ORAL
Qty: 30 TABLET | Refills: 0 | OUTPATIENT
Start: 2021-09-24

## 2021-09-30 ENCOUNTER — LAB VISIT (OUTPATIENT)
Dept: FAMILY MEDICINE | Facility: CLINIC | Age: 51
End: 2021-09-30
Payer: MEDICAID

## 2021-09-30 ENCOUNTER — OFFICE VISIT (OUTPATIENT)
Dept: FAMILY MEDICINE | Facility: CLINIC | Age: 51
End: 2021-09-30
Payer: MEDICAID

## 2021-09-30 ENCOUNTER — PATIENT MESSAGE (OUTPATIENT)
Dept: FAMILY MEDICINE | Facility: CLINIC | Age: 51
End: 2021-09-30

## 2021-09-30 VITALS
HEART RATE: 75 BPM | SYSTOLIC BLOOD PRESSURE: 137 MMHG | RESPIRATION RATE: 18 BRPM | OXYGEN SATURATION: 98 % | WEIGHT: 285 LBS | BODY MASS INDEX: 48.65 KG/M2 | DIASTOLIC BLOOD PRESSURE: 72 MMHG | HEIGHT: 64 IN

## 2021-09-30 DIAGNOSIS — Z79.4 TYPE 2 DIABETES MELLITUS WITHOUT COMPLICATION, WITH LONG-TERM CURRENT USE OF INSULIN: ICD-10-CM

## 2021-09-30 DIAGNOSIS — E78.5 HYPERLIPIDEMIA, UNSPECIFIED HYPERLIPIDEMIA TYPE: ICD-10-CM

## 2021-09-30 DIAGNOSIS — I10 ESSENTIAL HYPERTENSION: ICD-10-CM

## 2021-09-30 DIAGNOSIS — R10.13 EPIGASTRIC PAIN: ICD-10-CM

## 2021-09-30 DIAGNOSIS — G47.00 INSOMNIA, UNSPECIFIED TYPE: ICD-10-CM

## 2021-09-30 DIAGNOSIS — E11.9 TYPE 2 DIABETES MELLITUS WITHOUT COMPLICATION, WITH LONG-TERM CURRENT USE OF INSULIN: ICD-10-CM

## 2021-09-30 DIAGNOSIS — K29.31 CHRONIC SUPERFICIAL GASTRITIS WITH BLEEDING: ICD-10-CM

## 2021-09-30 DIAGNOSIS — E11.9 TYPE 2 DIABETES MELLITUS WITHOUT COMPLICATION: ICD-10-CM

## 2021-09-30 DIAGNOSIS — Z12.31 ENCOUNTER FOR SCREENING MAMMOGRAM FOR BREAST CANCER: Primary | ICD-10-CM

## 2021-09-30 LAB
ALBUMIN SERPL BCP-MCNC: 4.1 G/DL (ref 3.5–5.2)
ALBUMIN/CREAT UR: 38.9 UG/MG (ref 0–30)
ALP SERPL-CCNC: 53 U/L (ref 55–135)
ALT SERPL W/O P-5'-P-CCNC: 24 U/L (ref 10–44)
ANION GAP SERPL CALC-SCNC: 11 MMOL/L (ref 8–16)
AST SERPL-CCNC: 19 U/L (ref 10–40)
BASOPHILS # BLD AUTO: 0.04 K/UL (ref 0–0.2)
BASOPHILS NFR BLD: 0.7 % (ref 0–1.9)
BILIRUB SERPL-MCNC: 0.5 MG/DL (ref 0.1–1)
BUN SERPL-MCNC: 17 MG/DL (ref 6–20)
CALCIUM SERPL-MCNC: 10.2 MG/DL (ref 8.7–10.5)
CHLORIDE SERPL-SCNC: 105 MMOL/L (ref 95–110)
CHOLEST SERPL-MCNC: 151 MG/DL (ref 120–199)
CHOLEST/HDLC SERPL: 3.4 {RATIO} (ref 2–5)
CO2 SERPL-SCNC: 26 MMOL/L (ref 23–29)
CREAT SERPL-MCNC: 0.8 MG/DL (ref 0.5–1.4)
CREAT UR-MCNC: 18 MG/DL (ref 15–325)
DIFFERENTIAL METHOD: ABNORMAL
EOSINOPHIL # BLD AUTO: 0.2 K/UL (ref 0–0.5)
EOSINOPHIL NFR BLD: 2.8 % (ref 0–8)
ERYTHROCYTE [DISTWIDTH] IN BLOOD BY AUTOMATED COUNT: 14 % (ref 11.5–14.5)
EST. GFR  (AFRICAN AMERICAN): >60 ML/MIN/1.73 M^2
EST. GFR  (NON AFRICAN AMERICAN): >60 ML/MIN/1.73 M^2
ESTIMATED AVG GLUCOSE: 128 MG/DL (ref 68–131)
GLUCOSE SERPL-MCNC: 117 MG/DL (ref 70–110)
HBA1C MFR BLD: 6.1 % (ref 4–5.6)
HCT VFR BLD AUTO: 34.9 % (ref 37–48.5)
HDLC SERPL-MCNC: 44 MG/DL (ref 40–75)
HDLC SERPL: 29.1 % (ref 20–50)
HGB BLD-MCNC: 11.1 G/DL (ref 12–16)
IMM GRANULOCYTES # BLD AUTO: 0.01 K/UL (ref 0–0.04)
IMM GRANULOCYTES NFR BLD AUTO: 0.2 % (ref 0–0.5)
LDLC SERPL CALC-MCNC: 88.6 MG/DL (ref 63–159)
LEFT EYE DM RETINOPATHY: NEGATIVE
LYMPHOCYTES # BLD AUTO: 2.4 K/UL (ref 1–4.8)
LYMPHOCYTES NFR BLD: 39.5 % (ref 18–48)
MCH RBC QN AUTO: 30.2 PG (ref 27–31)
MCHC RBC AUTO-ENTMCNC: 31.8 G/DL (ref 32–36)
MCV RBC AUTO: 95 FL (ref 82–98)
MICROALBUMIN UR DL<=1MG/L-MCNC: 7 UG/ML
MONOCYTES # BLD AUTO: 0.6 K/UL (ref 0.3–1)
MONOCYTES NFR BLD: 9.3 % (ref 4–15)
NEUTROPHILS # BLD AUTO: 2.9 K/UL (ref 1.8–7.7)
NEUTROPHILS NFR BLD: 47.5 % (ref 38–73)
NONHDLC SERPL-MCNC: 107 MG/DL
NRBC BLD-RTO: 0 /100 WBC
PLATELET # BLD AUTO: 290 K/UL (ref 150–450)
PMV BLD AUTO: 10.9 FL (ref 9.2–12.9)
POTASSIUM SERPL-SCNC: 4.1 MMOL/L (ref 3.5–5.1)
PROT SERPL-MCNC: 7.9 G/DL (ref 6–8.4)
RBC # BLD AUTO: 3.68 M/UL (ref 4–5.4)
RIGHT EYE DM RETINOPATHY: NEGATIVE
SODIUM SERPL-SCNC: 142 MMOL/L (ref 136–145)
TRIGL SERPL-MCNC: 92 MG/DL (ref 30–150)
TSH SERPL DL<=0.005 MIU/L-ACNC: 0.47 UIU/ML (ref 0.4–4)
WBC # BLD AUTO: 6 K/UL (ref 3.9–12.7)

## 2021-09-30 PROCEDURE — 80307 DRUG TEST PRSMV CHEM ANLYZR: CPT | Performed by: NURSE PRACTITIONER

## 2021-09-30 PROCEDURE — 82570 ASSAY OF URINE CREATININE: CPT | Mod: 59 | Performed by: NURSE PRACTITIONER

## 2021-09-30 PROCEDURE — 80061 LIPID PANEL: CPT | Performed by: NURSE PRACTITIONER

## 2021-09-30 PROCEDURE — 99214 OFFICE O/P EST MOD 30 MIN: CPT | Mod: S$PBB,,, | Performed by: NURSE PRACTITIONER

## 2021-09-30 PROCEDURE — 99214 PR OFFICE/OUTPT VISIT, EST, LEVL IV, 30-39 MIN: ICD-10-PCS | Mod: S$PBB,,, | Performed by: NURSE PRACTITIONER

## 2021-09-30 PROCEDURE — 84443 ASSAY THYROID STIM HORMONE: CPT | Performed by: NURSE PRACTITIONER

## 2021-09-30 PROCEDURE — 99999 PR PBB SHADOW E&M-EST. PATIENT-LVL V: CPT | Mod: PBBFAC,,, | Performed by: NURSE PRACTITIONER

## 2021-09-30 PROCEDURE — 80053 COMPREHEN METABOLIC PANEL: CPT | Performed by: NURSE PRACTITIONER

## 2021-09-30 PROCEDURE — 99215 OFFICE O/P EST HI 40 MIN: CPT | Mod: PBBFAC,PN | Performed by: NURSE PRACTITIONER

## 2021-09-30 PROCEDURE — 85025 COMPLETE CBC W/AUTO DIFF WBC: CPT | Performed by: NURSE PRACTITIONER

## 2021-09-30 PROCEDURE — 83036 HEMOGLOBIN GLYCOSYLATED A1C: CPT | Performed by: NURSE PRACTITIONER

## 2021-09-30 PROCEDURE — 99999 PR PBB SHADOW E&M-EST. PATIENT-LVL V: ICD-10-PCS | Mod: PBBFAC,,, | Performed by: NURSE PRACTITIONER

## 2021-09-30 RX ORDER — IPRATROPIUM BROMIDE 42 UG/1
2 SPRAY, METERED NASAL
COMMUNITY
Start: 2021-06-09 | End: 2022-06-09

## 2021-09-30 RX ORDER — ZOLPIDEM TARTRATE 10 MG/1
TABLET ORAL
Qty: 30 TABLET | Refills: 2 | Status: SHIPPED | OUTPATIENT
Start: 2021-09-30 | End: 2021-12-07 | Stop reason: SDUPTHER

## 2021-10-01 ENCOUNTER — PATIENT MESSAGE (OUTPATIENT)
Dept: FAMILY MEDICINE | Facility: CLINIC | Age: 51
End: 2021-10-01

## 2021-10-01 DIAGNOSIS — Z79.4 TYPE 2 DIABETES MELLITUS WITHOUT COMPLICATION, WITH LONG-TERM CURRENT USE OF INSULIN: Primary | ICD-10-CM

## 2021-10-01 DIAGNOSIS — E11.9 TYPE 2 DIABETES MELLITUS WITHOUT COMPLICATION, WITH LONG-TERM CURRENT USE OF INSULIN: Primary | ICD-10-CM

## 2021-10-05 LAB
6MAM UR QL: NOT DETECTED
7AMINOCLONAZEPAM UR QL: NOT DETECTED
A-OH ALPRAZ UR QL: NOT DETECTED
ALPHA-OH-MIDAZOLAM: NOT DETECTED
ALPRAZ UR QL: NOT DETECTED
AMPHET UR QL SCN: NOT DETECTED
ANNOTATION COMMENT IMP: ABNORMAL
ANNOTATION COMMENT IMP: ABNORMAL
BARBITURATES UR QL: NOT DETECTED
BUPRENORPHINE UR QL: NOT DETECTED
BZE UR QL: NOT DETECTED
CARBOXYTHC UR QL: NOT DETECTED
CARISOPRODOL UR QL: NOT DETECTED
CLONAZEPAM UR QL: NOT DETECTED
CODEINE UR QL: NOT DETECTED
CREAT UR-MCNC: <20 MG/DL (ref 20–400)
DIAZEPAM UR QL: NOT DETECTED
ETHYL GLUCURONIDE UR QL: NOT DETECTED
FENTANYL UR QL: NOT DETECTED
GABAPENTIN: NOT DETECTED
HYDROCODONE UR QL: NOT DETECTED
HYDROMORPHONE UR QL: NOT DETECTED
LORAZEPAM UR QL: NOT DETECTED
MDA UR QL: NOT DETECTED
MDEA UR QL: NOT DETECTED
MDMA UR QL: NOT DETECTED
ME-PHENIDATE UR QL: NOT DETECTED
METHADONE UR QL: NOT DETECTED
METHAMPHET UR QL: NOT DETECTED
MIDAZOLAM UR QL SCN: NOT DETECTED
MORPHINE UR QL: NOT DETECTED
NALOXONE: NOT DETECTED
NORBUPRENORPHINE UR QL CFM: NOT DETECTED
NORDIAZEPAM UR QL: NOT DETECTED
NORFENTANYL UR QL: NOT DETECTED
NORHYDROCODONE UR QL CFM: NOT DETECTED
NORMEPERIDINE UR QL CFM: NOT DETECTED
NOROXYCODONE UR QL CFM: NOT DETECTED
NOROXYMORPHONE UR QL SCN: NOT DETECTED
OXAZEPAM UR QL: NOT DETECTED
OXYCODONE UR QL: NOT DETECTED
OXYMORPHONE UR QL: NOT DETECTED
PATHOLOGY STUDY: ABNORMAL
PCP UR QL: NOT DETECTED
PHENTERMINE UR QL: NOT DETECTED
PREGABALIN: PRESENT
SERVICE CMNT-IMP: ABNORMAL
TAPENTADOL UR QL SCN: NOT DETECTED
TAPENTADOL UR QL SCN: NOT DETECTED
TEMAZEPAM UR QL: NOT DETECTED
TRAMADOL UR QL: NOT DETECTED
ZOLPIDEM METABOLITE: PRESENT
ZOLPIDEM UR QL: NOT DETECTED

## 2021-10-11 ENCOUNTER — PATIENT OUTREACH (OUTPATIENT)
Dept: ADMINISTRATIVE | Facility: HOSPITAL | Age: 51
End: 2021-10-11

## 2021-10-11 ENCOUNTER — TELEPHONE (OUTPATIENT)
Dept: FAMILY MEDICINE | Facility: CLINIC | Age: 51
End: 2021-10-11

## 2021-10-11 ENCOUNTER — CLINICAL SUPPORT (OUTPATIENT)
Dept: FAMILY MEDICINE | Facility: CLINIC | Age: 51
End: 2021-10-11
Payer: MEDICAID

## 2021-10-11 PROCEDURE — 90686 FLU VACCINE (QUAD) GREATER THAN OR EQUAL TO 3YO PRESERVATIVE FREE IM: ICD-10-PCS | Mod: EP,S$GLB,, | Performed by: FAMILY MEDICINE

## 2021-10-11 PROCEDURE — 90471 IMMUNIZATION ADMIN: CPT | Mod: EP,S$GLB,, | Performed by: FAMILY MEDICINE

## 2021-10-11 PROCEDURE — 90686 IIV4 VACC NO PRSV 0.5 ML IM: CPT | Mod: EP,S$GLB,, | Performed by: FAMILY MEDICINE

## 2021-10-11 PROCEDURE — 90471 FLU VACCINE (QUAD) GREATER THAN OR EQUAL TO 3YO PRESERVATIVE FREE IM: ICD-10-PCS | Mod: EP,S$GLB,, | Performed by: FAMILY MEDICINE

## 2021-10-13 ENCOUNTER — PATIENT OUTREACH (OUTPATIENT)
Dept: ADMINISTRATIVE | Facility: HOSPITAL | Age: 51
End: 2021-10-13

## 2021-10-18 ENCOUNTER — HOSPITAL ENCOUNTER (EMERGENCY)
Facility: HOSPITAL | Age: 51
Discharge: HOME | End: 2021-10-18
Attending: INTERNAL MEDICINE
Payer: MEDICAID

## 2021-10-18 ENCOUNTER — PATIENT MESSAGE (OUTPATIENT)
Dept: GASTROENTEROLOGY | Facility: CLINIC | Age: 51
End: 2021-10-18
Payer: MEDICAID

## 2021-10-18 ENCOUNTER — PATIENT MESSAGE (OUTPATIENT)
Dept: FAMILY MEDICINE | Facility: CLINIC | Age: 51
End: 2021-10-18

## 2021-10-18 VITALS
DIASTOLIC BLOOD PRESSURE: 65 MMHG | OXYGEN SATURATION: 94 % | HEART RATE: 91 BPM | TEMPERATURE: 98 F | HEIGHT: 64 IN | RESPIRATION RATE: 19 BRPM | WEIGHT: 293 LBS | SYSTOLIC BLOOD PRESSURE: 125 MMHG | BODY MASS INDEX: 50.02 KG/M2

## 2021-10-18 DIAGNOSIS — N20.0 KIDNEY STONES: Primary | ICD-10-CM

## 2021-10-18 DIAGNOSIS — K52.9 COLITIS, ACUTE: ICD-10-CM

## 2021-10-18 DIAGNOSIS — N13.2 HYDRONEPHROSIS CONCURRENT WITH AND DUE TO CALCULI OF KIDNEY AND URETER: ICD-10-CM

## 2021-10-18 DIAGNOSIS — K63.89 MASS OF COLON: Primary | ICD-10-CM

## 2021-10-18 DIAGNOSIS — R09.02 HYPOXIA: Primary | ICD-10-CM

## 2021-10-18 DIAGNOSIS — K63.89 MASS OF COLON: ICD-10-CM

## 2021-10-18 LAB
ALBUMIN SERPL BCP-MCNC: 3.5 G/DL (ref 3.5–5.2)
ALP SERPL-CCNC: 53 U/L (ref 55–135)
ALT SERPL W/O P-5'-P-CCNC: 17 U/L (ref 10–44)
AMPHET+METHAMPHET UR QL: NEGATIVE
ANION GAP SERPL CALC-SCNC: 10 MMOL/L (ref 8–16)
AST SERPL-CCNC: 14 U/L (ref 10–40)
BACTERIA #/AREA URNS HPF: ABNORMAL /HPF
BARBITURATES UR QL SCN>200 NG/ML: NEGATIVE
BASOPHILS # BLD AUTO: 0.03 K/UL (ref 0–0.2)
BASOPHILS NFR BLD: 0.2 % (ref 0–1.9)
BENZODIAZ UR QL SCN>200 NG/ML: NEGATIVE
BILIRUB SERPL-MCNC: 0.3 MG/DL (ref 0.1–1)
BILIRUB UR QL STRIP: NEGATIVE
BUN SERPL-MCNC: 16 MG/DL (ref 6–20)
BZE UR QL SCN: NEGATIVE
CALCIUM SERPL-MCNC: 9.5 MG/DL (ref 8.7–10.5)
CANNABINOIDS UR QL SCN: NEGATIVE
CAOX CRY URNS QL MICRO: ABNORMAL
CHLORIDE SERPL-SCNC: 109 MMOL/L (ref 95–110)
CLARITY UR: CLEAR
CO2 SERPL-SCNC: 22 MMOL/L (ref 23–29)
COLOR UR: YELLOW
CREAT SERPL-MCNC: 0.9 MG/DL (ref 0.5–1.4)
CREAT UR-MCNC: 144.2 MG/DL (ref 15–325)
DIFFERENTIAL METHOD: ABNORMAL
EOSINOPHIL # BLD AUTO: 0.2 K/UL (ref 0–0.5)
EOSINOPHIL NFR BLD: 1.2 % (ref 0–8)
ERYTHROCYTE [DISTWIDTH] IN BLOOD BY AUTOMATED COUNT: 13.6 % (ref 11.5–14.5)
EST. GFR  (AFRICAN AMERICAN): >60 ML/MIN/1.73 M^2
EST. GFR  (NON AFRICAN AMERICAN): >60 ML/MIN/1.73 M^2
GLUCOSE SERPL-MCNC: 158 MG/DL (ref 70–110)
GLUCOSE UR QL STRIP: NEGATIVE
HCT VFR BLD AUTO: 31.7 % (ref 37–48.5)
HGB BLD-MCNC: 10 G/DL (ref 12–16)
HGB UR QL STRIP: ABNORMAL
IMM GRANULOCYTES # BLD AUTO: 0.05 K/UL (ref 0–0.04)
IMM GRANULOCYTES NFR BLD AUTO: 0.4 % (ref 0–0.5)
KETONES UR QL STRIP: NEGATIVE
LEUKOCYTE ESTERASE UR QL STRIP: ABNORMAL
LIPASE SERPL-CCNC: 10 U/L (ref 4–60)
LYMPHOCYTES # BLD AUTO: 1.9 K/UL (ref 1–4.8)
LYMPHOCYTES NFR BLD: 15.7 % (ref 18–48)
MCH RBC QN AUTO: 29.6 PG (ref 27–31)
MCHC RBC AUTO-ENTMCNC: 31.5 G/DL (ref 32–36)
MCV RBC AUTO: 94 FL (ref 82–98)
METHADONE UR QL SCN>300 NG/ML: NEGATIVE
MICROSCOPIC COMMENT: ABNORMAL
MONOCYTES # BLD AUTO: 1.2 K/UL (ref 0.3–1)
MONOCYTES NFR BLD: 9.9 % (ref 4–15)
NEUTROPHILS # BLD AUTO: 8.8 K/UL (ref 1.8–7.7)
NEUTROPHILS NFR BLD: 72.6 % (ref 38–73)
NITRITE UR QL STRIP: NEGATIVE
NRBC BLD-RTO: 0 /100 WBC
OPIATES UR QL SCN: ABNORMAL
PCP UR QL SCN>25 NG/ML: NEGATIVE
PH UR STRIP: 6 [PH] (ref 5–8)
PLATELET # BLD AUTO: 260 K/UL (ref 150–450)
PMV BLD AUTO: 10.7 FL (ref 9.2–12.9)
POTASSIUM SERPL-SCNC: 4 MMOL/L (ref 3.5–5.1)
PROT SERPL-MCNC: 7.5 G/DL (ref 6–8.4)
PROT UR QL STRIP: NEGATIVE
RBC # BLD AUTO: 3.38 M/UL (ref 4–5.4)
RBC #/AREA URNS HPF: 20 /HPF (ref 0–4)
SARS-COV-2 RDRP RESP QL NAA+PROBE: NEGATIVE
SODIUM SERPL-SCNC: 141 MMOL/L (ref 136–145)
SP GR UR STRIP: >=1.03 (ref 1–1.03)
TOXICOLOGY INFORMATION: ABNORMAL
URN SPEC COLLECT METH UR: ABNORMAL
UROBILINOGEN UR STRIP-ACNC: NEGATIVE EU/DL
WBC # BLD AUTO: 12.17 K/UL (ref 3.9–12.7)
WBC #/AREA URNS HPF: 8 /HPF (ref 0–5)

## 2021-10-18 PROCEDURE — 74176 CT RENAL STONE STUDY ABD PELVIS WO: ICD-10-PCS | Mod: 26,,, | Performed by: RADIOLOGY

## 2021-10-18 PROCEDURE — 80307 DRUG TEST PRSMV CHEM ANLYZR: CPT | Performed by: INTERNAL MEDICINE

## 2021-10-18 PROCEDURE — 96374 THER/PROPH/DIAG INJ IV PUSH: CPT

## 2021-10-18 PROCEDURE — 74176 CT ABD & PELVIS W/O CONTRAST: CPT | Mod: 26,,, | Performed by: RADIOLOGY

## 2021-10-18 PROCEDURE — 80053 COMPREHEN METABOLIC PANEL: CPT | Performed by: INTERNAL MEDICINE

## 2021-10-18 PROCEDURE — 96376 TX/PRO/DX INJ SAME DRUG ADON: CPT

## 2021-10-18 PROCEDURE — 85025 COMPLETE CBC W/AUTO DIFF WBC: CPT | Performed by: INTERNAL MEDICINE

## 2021-10-18 PROCEDURE — 74176 CT ABD & PELVIS W/O CONTRAST: CPT | Mod: TC

## 2021-10-18 PROCEDURE — 99285 EMERGENCY DEPT VISIT HI MDM: CPT | Mod: 25

## 2021-10-18 PROCEDURE — 71045 X-RAY EXAM CHEST 1 VIEW: CPT | Mod: TC,FY

## 2021-10-18 PROCEDURE — 71045 XR CHEST AP PORTABLE: ICD-10-PCS | Mod: 26,,, | Performed by: RADIOLOGY

## 2021-10-18 PROCEDURE — 81000 URINALYSIS NONAUTO W/SCOPE: CPT | Mod: 59 | Performed by: INTERNAL MEDICINE

## 2021-10-18 PROCEDURE — 71045 X-RAY EXAM CHEST 1 VIEW: CPT | Mod: 26,,, | Performed by: RADIOLOGY

## 2021-10-18 PROCEDURE — 63600175 PHARM REV CODE 636 W HCPCS: Performed by: INTERNAL MEDICINE

## 2021-10-18 PROCEDURE — U0002 COVID-19 LAB TEST NON-CDC: HCPCS | Performed by: INTERNAL MEDICINE

## 2021-10-18 PROCEDURE — 83690 ASSAY OF LIPASE: CPT | Performed by: INTERNAL MEDICINE

## 2021-10-18 PROCEDURE — 96375 TX/PRO/DX INJ NEW DRUG ADDON: CPT

## 2021-10-18 RX ORDER — ONDANSETRON 2 MG/ML
4 INJECTION INTRAMUSCULAR; INTRAVENOUS
Status: COMPLETED | OUTPATIENT
Start: 2021-10-18 | End: 2021-10-18

## 2021-10-18 RX ORDER — HYDROMORPHONE HYDROCHLORIDE 2 MG/ML
1 INJECTION, SOLUTION INTRAMUSCULAR; INTRAVENOUS; SUBCUTANEOUS
Status: COMPLETED | OUTPATIENT
Start: 2021-10-18 | End: 2021-10-18

## 2021-10-18 RX ORDER — METRONIDAZOLE 500 MG/1
500 TABLET ORAL 3 TIMES DAILY
Qty: 21 TABLET | Refills: 0 | Status: SHIPPED | OUTPATIENT
Start: 2021-10-18 | End: 2021-10-25

## 2021-10-18 RX ORDER — KETOROLAC TROMETHAMINE 10 MG/1
10 TABLET, FILM COATED ORAL EVERY 6 HOURS
Qty: 20 TABLET | Refills: 0 | Status: SHIPPED | OUTPATIENT
Start: 2021-10-18 | End: 2021-10-23

## 2021-10-18 RX ORDER — KETOROLAC TROMETHAMINE 30 MG/ML
15 INJECTION, SOLUTION INTRAMUSCULAR; INTRAVENOUS
Status: COMPLETED | OUTPATIENT
Start: 2021-10-18 | End: 2021-10-18

## 2021-10-18 RX ADMIN — ONDANSETRON 4 MG: 2 INJECTION INTRAMUSCULAR; INTRAVENOUS at 07:10

## 2021-10-18 RX ADMIN — HYDROMORPHONE HYDROCHLORIDE 1 MG: 2 INJECTION INTRAMUSCULAR; INTRAVENOUS; SUBCUTANEOUS at 10:10

## 2021-10-18 RX ADMIN — KETOROLAC TROMETHAMINE 15 MG: 30 INJECTION, SOLUTION INTRAMUSCULAR at 07:10

## 2021-10-18 RX ADMIN — KETOROLAC TROMETHAMINE 15 MG: 30 INJECTION, SOLUTION INTRAMUSCULAR; INTRAVENOUS at 10:10

## 2021-10-19 ENCOUNTER — TELEPHONE (OUTPATIENT)
Dept: UROLOGY | Facility: CLINIC | Age: 51
End: 2021-10-19

## 2021-10-19 ENCOUNTER — PATIENT OUTREACH (OUTPATIENT)
Dept: ADMINISTRATIVE | Facility: OTHER | Age: 51
End: 2021-10-19

## 2021-10-20 ENCOUNTER — TELEPHONE (OUTPATIENT)
Dept: SURGERY | Facility: CLINIC | Age: 51
End: 2021-10-20

## 2021-10-20 ENCOUNTER — OFFICE VISIT (OUTPATIENT)
Dept: SURGERY | Facility: CLINIC | Age: 51
End: 2021-10-20
Payer: MEDICAID

## 2021-10-20 VITALS
WEIGHT: 286 LBS | SYSTOLIC BLOOD PRESSURE: 110 MMHG | HEART RATE: 72 BPM | OXYGEN SATURATION: 95 % | HEIGHT: 64 IN | DIASTOLIC BLOOD PRESSURE: 63 MMHG | TEMPERATURE: 98 F | BODY MASS INDEX: 48.83 KG/M2

## 2021-10-20 DIAGNOSIS — K63.89 MASS OF COLON: Primary | ICD-10-CM

## 2021-10-20 DIAGNOSIS — K57.90 DIVERTICULOSIS: ICD-10-CM

## 2021-10-20 DIAGNOSIS — Z87.19 HISTORY OF DIVERTICULITIS: ICD-10-CM

## 2021-10-20 DIAGNOSIS — G89.29 CHRONIC EPIGASTRIC PAIN: ICD-10-CM

## 2021-10-20 DIAGNOSIS — Z87.19 HISTORY OF GASTRITIS: ICD-10-CM

## 2021-10-20 DIAGNOSIS — R10.13 CHRONIC EPIGASTRIC PAIN: ICD-10-CM

## 2021-10-20 DIAGNOSIS — K21.9 GASTROESOPHAGEAL REFLUX DISEASE, UNSPECIFIED WHETHER ESOPHAGITIS PRESENT: ICD-10-CM

## 2021-10-20 DIAGNOSIS — Z90.49 S/P PARTIAL COLECTOMY: ICD-10-CM

## 2021-10-20 DIAGNOSIS — R93.3 ABNORMAL CT SCAN, COLON: ICD-10-CM

## 2021-10-20 PROCEDURE — 99214 PR OFFICE/OUTPT VISIT, EST, LEVL IV, 30-39 MIN: ICD-10-PCS | Mod: S$PBB,,, | Performed by: STUDENT IN AN ORGANIZED HEALTH CARE EDUCATION/TRAINING PROGRAM

## 2021-10-20 PROCEDURE — 99999 PR PBB SHADOW E&M-EST. PATIENT-LVL V: ICD-10-PCS | Mod: PBBFAC,,, | Performed by: STUDENT IN AN ORGANIZED HEALTH CARE EDUCATION/TRAINING PROGRAM

## 2021-10-20 PROCEDURE — 99215 OFFICE O/P EST HI 40 MIN: CPT | Mod: PBBFAC | Performed by: STUDENT IN AN ORGANIZED HEALTH CARE EDUCATION/TRAINING PROGRAM

## 2021-10-20 PROCEDURE — 99999 PR PBB SHADOW E&M-EST. PATIENT-LVL V: CPT | Mod: PBBFAC,,, | Performed by: STUDENT IN AN ORGANIZED HEALTH CARE EDUCATION/TRAINING PROGRAM

## 2021-10-20 PROCEDURE — 99214 OFFICE O/P EST MOD 30 MIN: CPT | Mod: S$PBB,,, | Performed by: STUDENT IN AN ORGANIZED HEALTH CARE EDUCATION/TRAINING PROGRAM

## 2021-10-20 RX ORDER — SODIUM CHLORIDE 9 MG/ML
INJECTION, SOLUTION INTRAVENOUS CONTINUOUS
Status: CANCELLED | OUTPATIENT
Start: 2021-10-20

## 2021-10-23 ENCOUNTER — PATIENT MESSAGE (OUTPATIENT)
Dept: SURGERY | Facility: HOSPITAL | Age: 51
End: 2021-10-23
Payer: MEDICAID

## 2021-10-25 ENCOUNTER — TELEPHONE (OUTPATIENT)
Dept: SURGERY | Facility: CLINIC | Age: 51
End: 2021-10-25
Payer: MEDICAID

## 2021-10-25 ENCOUNTER — PATIENT MESSAGE (OUTPATIENT)
Dept: SURGERY | Facility: CLINIC | Age: 51
End: 2021-10-25
Payer: MEDICAID

## 2021-10-25 DIAGNOSIS — K21.9 GASTROESOPHAGEAL REFLUX DISEASE: ICD-10-CM

## 2021-10-25 RX ORDER — PANTOPRAZOLE SODIUM 40 MG/1
40 TABLET, DELAYED RELEASE ORAL DAILY
Qty: 90 TABLET | Refills: 1 | Status: SHIPPED | OUTPATIENT
Start: 2021-10-25 | End: 2022-10-31 | Stop reason: SDUPTHER

## 2021-10-26 ENCOUNTER — PATIENT MESSAGE (OUTPATIENT)
Dept: FAMILY MEDICINE | Facility: CLINIC | Age: 51
End: 2021-10-26
Payer: MEDICAID

## 2021-10-26 ENCOUNTER — TELEPHONE (OUTPATIENT)
Dept: FAMILY MEDICINE | Facility: CLINIC | Age: 51
End: 2021-10-26
Payer: MEDICAID

## 2021-10-29 ENCOUNTER — PATIENT MESSAGE (OUTPATIENT)
Dept: FAMILY MEDICINE | Facility: CLINIC | Age: 51
End: 2021-10-29
Payer: MEDICAID

## 2021-10-29 ENCOUNTER — TELEPHONE (OUTPATIENT)
Dept: UROLOGY | Facility: CLINIC | Age: 51
End: 2021-10-29
Payer: MEDICAID

## 2021-10-29 ENCOUNTER — PATIENT MESSAGE (OUTPATIENT)
Dept: INTERNAL MEDICINE | Facility: CLINIC | Age: 51
End: 2021-10-29
Payer: MEDICAID

## 2021-11-01 ENCOUNTER — TELEPHONE (OUTPATIENT)
Dept: UROLOGY | Facility: CLINIC | Age: 51
End: 2021-11-01
Payer: MEDICAID

## 2021-11-02 ENCOUNTER — OFFICE VISIT (OUTPATIENT)
Dept: UROLOGY | Facility: CLINIC | Age: 51
End: 2021-11-02
Payer: MEDICAID

## 2021-11-02 ENCOUNTER — TELEPHONE (OUTPATIENT)
Dept: FAMILY MEDICINE | Facility: CLINIC | Age: 51
End: 2021-11-02
Payer: MEDICAID

## 2021-11-02 ENCOUNTER — HOSPITAL ENCOUNTER (OUTPATIENT)
Dept: RADIOLOGY | Facility: HOSPITAL | Age: 51
Discharge: HOME | End: 2021-11-02
Attending: STUDENT IN AN ORGANIZED HEALTH CARE EDUCATION/TRAINING PROGRAM
Payer: MEDICAID

## 2021-11-02 VITALS
HEIGHT: 64 IN | SYSTOLIC BLOOD PRESSURE: 128 MMHG | BODY MASS INDEX: 48.83 KG/M2 | DIASTOLIC BLOOD PRESSURE: 75 MMHG | WEIGHT: 286 LBS | HEART RATE: 74 BPM

## 2021-11-02 DIAGNOSIS — Z79.4 TYPE 2 DIABETES MELLITUS WITHOUT COMPLICATION, WITH LONG-TERM CURRENT USE OF INSULIN: ICD-10-CM

## 2021-11-02 DIAGNOSIS — E11.9 TYPE 2 DIABETES MELLITUS WITHOUT COMPLICATION, WITH LONG-TERM CURRENT USE OF INSULIN: ICD-10-CM

## 2021-11-02 DIAGNOSIS — E66.9 OBESITY, UNSPECIFIED CLASSIFICATION, UNSPECIFIED OBESITY TYPE, UNSPECIFIED WHETHER SERIOUS COMORBIDITY PRESENT: ICD-10-CM

## 2021-11-02 DIAGNOSIS — R10.9 ABDOMINAL PAIN, UNSPECIFIED ABDOMINAL LOCATION: ICD-10-CM

## 2021-11-02 DIAGNOSIS — E78.5 HYPERLIPIDEMIA, UNSPECIFIED HYPERLIPIDEMIA TYPE: ICD-10-CM

## 2021-11-02 DIAGNOSIS — N20.1 LEFT URETERAL STONE: Primary | ICD-10-CM

## 2021-11-02 DIAGNOSIS — I10 ESSENTIAL HYPERTENSION: ICD-10-CM

## 2021-11-02 PROCEDURE — 99204 OFFICE O/P NEW MOD 45 MIN: CPT | Mod: ,,, | Performed by: STUDENT IN AN ORGANIZED HEALTH CARE EDUCATION/TRAINING PROGRAM

## 2021-11-02 PROCEDURE — 99204 PR OFFICE/OUTPT VISIT, NEW, LEVL IV, 45-59 MIN: ICD-10-PCS | Mod: ,,, | Performed by: STUDENT IN AN ORGANIZED HEALTH CARE EDUCATION/TRAINING PROGRAM

## 2021-11-02 PROCEDURE — 99215 OFFICE O/P EST HI 40 MIN: CPT | Mod: PBBFAC,25 | Performed by: STUDENT IN AN ORGANIZED HEALTH CARE EDUCATION/TRAINING PROGRAM

## 2021-11-02 PROCEDURE — 99999 PR PBB SHADOW E&M-EST. PATIENT-LVL V: ICD-10-PCS | Mod: PBBFAC,,, | Performed by: STUDENT IN AN ORGANIZED HEALTH CARE EDUCATION/TRAINING PROGRAM

## 2021-11-02 PROCEDURE — 74176 CT ABD & PELVIS W/O CONTRAST: CPT | Mod: TC

## 2021-11-02 PROCEDURE — 99999 PR PBB SHADOW E&M-EST. PATIENT-LVL V: CPT | Mod: PBBFAC,,, | Performed by: STUDENT IN AN ORGANIZED HEALTH CARE EDUCATION/TRAINING PROGRAM

## 2021-11-02 PROCEDURE — 74176 CT ABD & PELVIS W/O CONTRAST: CPT | Mod: 26,,, | Performed by: RADIOLOGY

## 2021-11-02 PROCEDURE — 87086 URINE CULTURE/COLONY COUNT: CPT | Performed by: STUDENT IN AN ORGANIZED HEALTH CARE EDUCATION/TRAINING PROGRAM

## 2021-11-02 PROCEDURE — 74176 CT RENAL STONE STUDY ABD PELVIS WO: ICD-10-PCS | Mod: 26,,, | Performed by: RADIOLOGY

## 2021-11-03 LAB
BACTERIA UR CULT: NORMAL
BACTERIA UR CULT: NORMAL

## 2021-11-03 RX ORDER — CIPROFLOXACIN 500 MG/1
500 TABLET ORAL EVERY 8 HOURS
Qty: 30 TABLET | Refills: 0 | Status: SHIPPED | OUTPATIENT
Start: 2021-11-03 | End: 2021-11-13

## 2021-11-03 RX ORDER — SULFAMETHOXAZOLE AND TRIMETHOPRIM 800; 160 MG/1; MG/1
1 TABLET ORAL 2 TIMES DAILY
Qty: 10 TABLET | Refills: 0 | Status: SHIPPED | OUTPATIENT
Start: 2021-11-04 | End: 2021-11-09

## 2021-11-03 RX ORDER — METRONIDAZOLE 500 MG/1
500 TABLET ORAL EVERY 12 HOURS
Qty: 20 TABLET | Refills: 0 | Status: SHIPPED | OUTPATIENT
Start: 2021-11-03 | End: 2021-11-13

## 2021-11-04 ENCOUNTER — TELEPHONE (OUTPATIENT)
Dept: UROLOGY | Facility: CLINIC | Age: 51
End: 2021-11-04
Payer: MEDICAID

## 2021-11-08 ENCOUNTER — PATIENT MESSAGE (OUTPATIENT)
Dept: SURGERY | Facility: HOSPITAL | Age: 51
End: 2021-11-08
Payer: MEDICAID

## 2021-11-09 ENCOUNTER — PATIENT MESSAGE (OUTPATIENT)
Dept: SURGERY | Facility: HOSPITAL | Age: 51
End: 2021-11-09
Payer: MEDICAID

## 2021-11-09 RX ORDER — SULFAMETHOXAZOLE AND TRIMETHOPRIM 800; 160 MG/1; MG/1
1 TABLET ORAL DAILY
Qty: 5 TABLET | Refills: 0 | Status: SHIPPED | OUTPATIENT
Start: 2021-11-09 | End: 2021-11-14

## 2021-11-14 ENCOUNTER — HOSPITAL ENCOUNTER (EMERGENCY)
Facility: HOSPITAL | Age: 51
Discharge: HOME | End: 2021-11-14
Attending: EMERGENCY MEDICINE
Payer: MEDICAID

## 2021-11-14 VITALS
RESPIRATION RATE: 19 BRPM | WEIGHT: 290 LBS | TEMPERATURE: 98 F | HEIGHT: 64 IN | HEART RATE: 92 BPM | OXYGEN SATURATION: 97 % | SYSTOLIC BLOOD PRESSURE: 151 MMHG | DIASTOLIC BLOOD PRESSURE: 86 MMHG | BODY MASS INDEX: 49.51 KG/M2

## 2021-11-14 DIAGNOSIS — N17.9 AKI (ACUTE KIDNEY INJURY): ICD-10-CM

## 2021-11-14 DIAGNOSIS — N20.1 URETEROLITHIASIS: Primary | ICD-10-CM

## 2021-11-14 DIAGNOSIS — N13.30 HYDRONEPHROSIS, UNSPECIFIED HYDRONEPHROSIS TYPE: ICD-10-CM

## 2021-11-14 DIAGNOSIS — R11.2 NAUSEA AND VOMITING, INTRACTABILITY OF VOMITING NOT SPECIFIED, UNSPECIFIED VOMITING TYPE: ICD-10-CM

## 2021-11-14 LAB
ALBUMIN SERPL BCP-MCNC: 4.4 G/DL (ref 3.5–5.2)
ALP SERPL-CCNC: 57 U/L (ref 55–135)
ALT SERPL W/O P-5'-P-CCNC: 20 U/L (ref 10–44)
ANION GAP SERPL CALC-SCNC: 13 MMOL/L (ref 8–16)
AST SERPL-CCNC: 19 U/L (ref 10–40)
BACTERIA #/AREA URNS HPF: NORMAL /HPF
BASOPHILS # BLD AUTO: 0.05 K/UL (ref 0–0.2)
BASOPHILS NFR BLD: 0.4 % (ref 0–1.9)
BILIRUB SERPL-MCNC: 0.3 MG/DL (ref 0.1–1)
BILIRUB UR QL STRIP: NEGATIVE
BUN SERPL-MCNC: 26 MG/DL (ref 6–20)
CALCIUM SERPL-MCNC: 9.5 MG/DL (ref 8.7–10.5)
CHLORIDE SERPL-SCNC: 106 MMOL/L (ref 95–110)
CLARITY UR: CLEAR
CO2 SERPL-SCNC: 24 MMOL/L (ref 23–29)
COLOR UR: YELLOW
CREAT SERPL-MCNC: 1.6 MG/DL (ref 0.5–1.4)
DIFFERENTIAL METHOD: ABNORMAL
EOSINOPHIL # BLD AUTO: 0.1 K/UL (ref 0–0.5)
EOSINOPHIL NFR BLD: 0.6 % (ref 0–8)
ERYTHROCYTE [DISTWIDTH] IN BLOOD BY AUTOMATED COUNT: 14.3 % (ref 11.5–14.5)
EST. GFR  (AFRICAN AMERICAN): 42.4 ML/MIN/1.73 M^2
EST. GFR  (NON AFRICAN AMERICAN): 36.8 ML/MIN/1.73 M^2
GLUCOSE SERPL-MCNC: 156 MG/DL (ref 70–110)
GLUCOSE UR QL STRIP: NEGATIVE
HCT VFR BLD AUTO: 33.3 % (ref 37–48.5)
HGB BLD-MCNC: 10.9 G/DL (ref 12–16)
HGB UR QL STRIP: ABNORMAL
IMM GRANULOCYTES # BLD AUTO: 0.03 K/UL (ref 0–0.04)
IMM GRANULOCYTES NFR BLD AUTO: 0.3 % (ref 0–0.5)
KETONES UR QL STRIP: NEGATIVE
LEUKOCYTE ESTERASE UR QL STRIP: NEGATIVE
LIPASE SERPL-CCNC: 15 U/L (ref 4–60)
LYMPHOCYTES # BLD AUTO: 1.7 K/UL (ref 1–4.8)
LYMPHOCYTES NFR BLD: 14.7 % (ref 18–48)
MCH RBC QN AUTO: 29.9 PG (ref 27–31)
MCHC RBC AUTO-ENTMCNC: 32.7 G/DL (ref 32–36)
MCV RBC AUTO: 92 FL (ref 82–98)
MICROSCOPIC COMMENT: NORMAL
MONOCYTES # BLD AUTO: 0.8 K/UL (ref 0.3–1)
MONOCYTES NFR BLD: 6.7 % (ref 4–15)
NEUTROPHILS # BLD AUTO: 8.7 K/UL (ref 1.8–7.7)
NEUTROPHILS NFR BLD: 77.3 % (ref 38–73)
NITRITE UR QL STRIP: NEGATIVE
NRBC BLD-RTO: 0 /100 WBC
PH UR STRIP: 5 [PH] (ref 5–8)
PLATELET # BLD AUTO: 280 K/UL (ref 150–450)
PMV BLD AUTO: 10.5 FL (ref 9.2–12.9)
POTASSIUM SERPL-SCNC: 4.1 MMOL/L (ref 3.5–5.1)
PROT SERPL-MCNC: 8.2 G/DL (ref 6–8.4)
PROT UR QL STRIP: NEGATIVE
RBC # BLD AUTO: 3.64 M/UL (ref 4–5.4)
RBC #/AREA URNS HPF: 4 /HPF (ref 0–4)
SODIUM SERPL-SCNC: 143 MMOL/L (ref 136–145)
SP GR UR STRIP: 1.02 (ref 1–1.03)
SQUAMOUS #/AREA URNS HPF: 1 /HPF
URN SPEC COLLECT METH UR: ABNORMAL
UROBILINOGEN UR STRIP-ACNC: NEGATIVE EU/DL
WBC # BLD AUTO: 11.31 K/UL (ref 3.9–12.7)
WBC #/AREA URNS HPF: 2 /HPF (ref 0–5)

## 2021-11-14 PROCEDURE — 74176 CT ABD & PELVIS W/O CONTRAST: CPT | Mod: TC

## 2021-11-14 PROCEDURE — 96375 TX/PRO/DX INJ NEW DRUG ADDON: CPT

## 2021-11-14 PROCEDURE — 63600175 PHARM REV CODE 636 W HCPCS: Performed by: EMERGENCY MEDICINE

## 2021-11-14 PROCEDURE — 96376 TX/PRO/DX INJ SAME DRUG ADON: CPT

## 2021-11-14 PROCEDURE — 25000003 PHARM REV CODE 250: Performed by: EMERGENCY MEDICINE

## 2021-11-14 PROCEDURE — 96365 THER/PROPH/DIAG IV INF INIT: CPT

## 2021-11-14 PROCEDURE — 83690 ASSAY OF LIPASE: CPT | Performed by: EMERGENCY MEDICINE

## 2021-11-14 PROCEDURE — 74176 CT RENAL STONE STUDY ABD PELVIS WO: ICD-10-PCS | Mod: 26,,, | Performed by: RADIOLOGY

## 2021-11-14 PROCEDURE — 96361 HYDRATE IV INFUSION ADD-ON: CPT

## 2021-11-14 PROCEDURE — 80053 COMPREHEN METABOLIC PANEL: CPT | Performed by: EMERGENCY MEDICINE

## 2021-11-14 PROCEDURE — 74176 CT ABD & PELVIS W/O CONTRAST: CPT | Mod: 26,,, | Performed by: RADIOLOGY

## 2021-11-14 PROCEDURE — 85025 COMPLETE CBC W/AUTO DIFF WBC: CPT | Performed by: EMERGENCY MEDICINE

## 2021-11-14 PROCEDURE — 99285 EMERGENCY DEPT VISIT HI MDM: CPT | Mod: 25

## 2021-11-14 PROCEDURE — 81000 URINALYSIS NONAUTO W/SCOPE: CPT | Performed by: EMERGENCY MEDICINE

## 2021-11-14 RX ORDER — HYDROMORPHONE HYDROCHLORIDE 2 MG/ML
1 INJECTION, SOLUTION INTRAMUSCULAR; INTRAVENOUS; SUBCUTANEOUS
Status: COMPLETED | OUTPATIENT
Start: 2021-11-14 | End: 2021-11-14

## 2021-11-14 RX ORDER — ONDANSETRON 4 MG/1
4 TABLET, ORALLY DISINTEGRATING ORAL EVERY 8 HOURS PRN
Qty: 12 TABLET | Refills: 0 | Status: SHIPPED | OUTPATIENT
Start: 2021-11-14

## 2021-11-14 RX ADMIN — HYDROMORPHONE HYDROCHLORIDE 1 MG: 2 INJECTION INTRAMUSCULAR; INTRAVENOUS; SUBCUTANEOUS at 09:11

## 2021-11-14 RX ADMIN — PROMETHAZINE HYDROCHLORIDE 12.5 MG: 25 INJECTION INTRAMUSCULAR; INTRAVENOUS at 07:11

## 2021-11-14 RX ADMIN — HYDROMORPHONE HYDROCHLORIDE 1 MG: 2 INJECTION, SOLUTION INTRAMUSCULAR; INTRAVENOUS; SUBCUTANEOUS at 07:11

## 2021-11-14 RX ADMIN — SODIUM CHLORIDE 1000 ML: 0.9 INJECTION, SOLUTION INTRAVENOUS at 08:11

## 2021-11-16 ENCOUNTER — HOSPITAL ENCOUNTER (OUTPATIENT)
Facility: HOSPITAL | Age: 51
Discharge: HOME | End: 2021-11-16
Attending: STUDENT IN AN ORGANIZED HEALTH CARE EDUCATION/TRAINING PROGRAM | Admitting: STUDENT IN AN ORGANIZED HEALTH CARE EDUCATION/TRAINING PROGRAM
Payer: MEDICAID

## 2021-11-16 ENCOUNTER — ANESTHESIA EVENT (OUTPATIENT)
Dept: SURGERY | Facility: HOSPITAL | Age: 51
End: 2021-11-16
Payer: MEDICAID

## 2021-11-16 ENCOUNTER — ANESTHESIA (OUTPATIENT)
Dept: SURGERY | Facility: HOSPITAL | Age: 51
End: 2021-11-16
Payer: MEDICAID

## 2021-11-16 VITALS
TEMPERATURE: 98 F | BODY MASS INDEX: 49.51 KG/M2 | DIASTOLIC BLOOD PRESSURE: 65 MMHG | OXYGEN SATURATION: 99 % | HEART RATE: 90 BPM | HEIGHT: 64 IN | RESPIRATION RATE: 15 BRPM | WEIGHT: 290 LBS | SYSTOLIC BLOOD PRESSURE: 142 MMHG

## 2021-11-16 DIAGNOSIS — N20.0 KIDNEY STONES: ICD-10-CM

## 2021-11-16 DIAGNOSIS — N20.1 LEFT URETERAL STONE: Primary | ICD-10-CM

## 2021-11-16 DIAGNOSIS — N20.0 KIDNEY STONE: ICD-10-CM

## 2021-11-16 LAB — POCT GLUCOSE: 107 MG/DL (ref 70–110)

## 2021-11-16 PROCEDURE — 52356 PR CYSTO/URETERO W/LITHOTRIPSY: ICD-10-PCS | Mod: LT,,, | Performed by: STUDENT IN AN ORGANIZED HEALTH CARE EDUCATION/TRAINING PROGRAM

## 2021-11-16 PROCEDURE — 63600175 PHARM REV CODE 636 W HCPCS: Performed by: NURSE ANESTHETIST, CERTIFIED REGISTERED

## 2021-11-16 PROCEDURE — 88300 SURGICAL PATH GROSS: CPT | Mod: 26,,, | Performed by: STUDENT IN AN ORGANIZED HEALTH CARE EDUCATION/TRAINING PROGRAM

## 2021-11-16 PROCEDURE — 71000015 HC POSTOP RECOV 1ST HR: Performed by: STUDENT IN AN ORGANIZED HEALTH CARE EDUCATION/TRAINING PROGRAM

## 2021-11-16 PROCEDURE — 00918 ANES TRURL PX URTRL CAL RMVL: CPT | Performed by: STUDENT IN AN ORGANIZED HEALTH CARE EDUCATION/TRAINING PROGRAM

## 2021-11-16 PROCEDURE — 71000033 HC RECOVERY, INTIAL HOUR: Performed by: STUDENT IN AN ORGANIZED HEALTH CARE EDUCATION/TRAINING PROGRAM

## 2021-11-16 PROCEDURE — 25000003 PHARM REV CODE 250: Performed by: ANESTHESIOLOGY

## 2021-11-16 PROCEDURE — C1769 GUIDE WIRE: HCPCS | Performed by: STUDENT IN AN ORGANIZED HEALTH CARE EDUCATION/TRAINING PROGRAM

## 2021-11-16 PROCEDURE — 37000008 HC ANESTHESIA 1ST 15 MINUTES: Performed by: STUDENT IN AN ORGANIZED HEALTH CARE EDUCATION/TRAINING PROGRAM

## 2021-11-16 PROCEDURE — C2617 STENT, NON-COR, TEM W/O DEL: HCPCS | Performed by: STUDENT IN AN ORGANIZED HEALTH CARE EDUCATION/TRAINING PROGRAM

## 2021-11-16 PROCEDURE — 74420 UROGRAPHY RTRGR +-KUB: CPT | Mod: 26,,, | Performed by: STUDENT IN AN ORGANIZED HEALTH CARE EDUCATION/TRAINING PROGRAM

## 2021-11-16 PROCEDURE — 25000003 PHARM REV CODE 250: Performed by: NURSE ANESTHETIST, CERTIFIED REGISTERED

## 2021-11-16 PROCEDURE — D9220A PRA ANESTHESIA: Mod: CRNA,,, | Performed by: NURSE ANESTHETIST, CERTIFIED REGISTERED

## 2021-11-16 PROCEDURE — 36000707: Performed by: STUDENT IN AN ORGANIZED HEALTH CARE EDUCATION/TRAINING PROGRAM

## 2021-11-16 PROCEDURE — 27201423 OPTIME MED/SURG SUP & DEVICES STERILE SUPPLY: Performed by: STUDENT IN AN ORGANIZED HEALTH CARE EDUCATION/TRAINING PROGRAM

## 2021-11-16 PROCEDURE — D9220A PRA ANESTHESIA: ICD-10-PCS | Mod: CRNA,,, | Performed by: NURSE ANESTHETIST, CERTIFIED REGISTERED

## 2021-11-16 PROCEDURE — 63600175 PHARM REV CODE 636 W HCPCS

## 2021-11-16 PROCEDURE — 63600175 PHARM REV CODE 636 W HCPCS: Performed by: ANESTHESIOLOGY

## 2021-11-16 PROCEDURE — D9220A PRA ANESTHESIA: ICD-10-PCS | Mod: ANES,,, | Performed by: ANESTHESIOLOGY

## 2021-11-16 PROCEDURE — 25000003 PHARM REV CODE 250: Performed by: STUDENT IN AN ORGANIZED HEALTH CARE EDUCATION/TRAINING PROGRAM

## 2021-11-16 PROCEDURE — 37000009 HC ANESTHESIA EA ADD 15 MINS: Performed by: STUDENT IN AN ORGANIZED HEALTH CARE EDUCATION/TRAINING PROGRAM

## 2021-11-16 PROCEDURE — 52356 CYSTO/URETERO W/LITHOTRIPSY: CPT | Mod: LT,,, | Performed by: STUDENT IN AN ORGANIZED HEALTH CARE EDUCATION/TRAINING PROGRAM

## 2021-11-16 PROCEDURE — 88300 SURGICAL PATH GROSS: CPT | Performed by: STUDENT IN AN ORGANIZED HEALTH CARE EDUCATION/TRAINING PROGRAM

## 2021-11-16 PROCEDURE — 88300 PR  SURG PATH,GROSS,LEVEL I: ICD-10-PCS | Mod: 26,,, | Performed by: STUDENT IN AN ORGANIZED HEALTH CARE EDUCATION/TRAINING PROGRAM

## 2021-11-16 PROCEDURE — 74420 PR  X-RAY RETROGRADE PYELOGRAM: ICD-10-PCS | Mod: 26,,, | Performed by: STUDENT IN AN ORGANIZED HEALTH CARE EDUCATION/TRAINING PROGRAM

## 2021-11-16 PROCEDURE — D9220A PRA ANESTHESIA: Mod: ANES,,, | Performed by: ANESTHESIOLOGY

## 2021-11-16 PROCEDURE — 36000706: Performed by: STUDENT IN AN ORGANIZED HEALTH CARE EDUCATION/TRAINING PROGRAM

## 2021-11-16 DEVICE — STENT URETERAL UNIV 6FR 26CM: Type: IMPLANTABLE DEVICE | Site: URETER | Status: FUNCTIONAL

## 2021-11-16 RX ORDER — ONDANSETRON 2 MG/ML
INJECTION INTRAMUSCULAR; INTRAVENOUS
Status: COMPLETED
Start: 2021-11-16 | End: 2021-11-16

## 2021-11-16 RX ORDER — ONDANSETRON 2 MG/ML
INJECTION INTRAMUSCULAR; INTRAVENOUS
Status: DISCONTINUED | OUTPATIENT
Start: 2021-11-16 | End: 2021-11-16

## 2021-11-16 RX ORDER — SODIUM CHLORIDE, SODIUM LACTATE, POTASSIUM CHLORIDE, CALCIUM CHLORIDE 600; 310; 30; 20 MG/100ML; MG/100ML; MG/100ML; MG/100ML
INJECTION, SOLUTION INTRAVENOUS CONTINUOUS
Status: DISCONTINUED | OUTPATIENT
Start: 2021-11-16 | End: 2021-11-16 | Stop reason: HOSPADM

## 2021-11-16 RX ORDER — DEXAMETHASONE SODIUM PHOSPHATE 4 MG/ML
INJECTION, SOLUTION INTRA-ARTICULAR; INTRALESIONAL; INTRAMUSCULAR; INTRAVENOUS; SOFT TISSUE
Status: DISCONTINUED | OUTPATIENT
Start: 2021-11-16 | End: 2021-11-16

## 2021-11-16 RX ORDER — ONDANSETRON 4 MG/1
8 TABLET, ORALLY DISINTEGRATING ORAL EVERY 8 HOURS PRN
Status: DISCONTINUED | OUTPATIENT
Start: 2021-11-16 | End: 2021-11-16 | Stop reason: HOSPADM

## 2021-11-16 RX ORDER — MORPHINE SULFATE 4 MG/ML
INJECTION, SOLUTION INTRAMUSCULAR; INTRAVENOUS
Status: DISCONTINUED
Start: 2021-11-16 | End: 2021-11-16 | Stop reason: HOSPADM

## 2021-11-16 RX ORDER — CEFAZOLIN SODIUM 1 G/3ML
INJECTION, POWDER, FOR SOLUTION INTRAMUSCULAR; INTRAVENOUS
Status: COMPLETED
Start: 2021-11-16 | End: 2021-11-16

## 2021-11-16 RX ORDER — MORPHINE SULFATE 4 MG/ML
2 INJECTION, SOLUTION INTRAMUSCULAR; INTRAVENOUS EVERY 5 MIN PRN
Status: DISCONTINUED | OUTPATIENT
Start: 2021-11-16 | End: 2021-11-16 | Stop reason: HOSPADM

## 2021-11-16 RX ORDER — SODIUM CHLORIDE, SODIUM LACTATE, POTASSIUM CHLORIDE, CALCIUM CHLORIDE 600; 310; 30; 20 MG/100ML; MG/100ML; MG/100ML; MG/100ML
125 INJECTION, SOLUTION INTRAVENOUS CONTINUOUS
Status: DISCONTINUED | OUTPATIENT
Start: 2021-11-16 | End: 2021-11-16 | Stop reason: HOSPADM

## 2021-11-16 RX ORDER — DIPHENHYDRAMINE HYDROCHLORIDE 50 MG/ML
12.5 INJECTION INTRAMUSCULAR; INTRAVENOUS
Status: DISCONTINUED | OUTPATIENT
Start: 2021-11-16 | End: 2021-11-16 | Stop reason: HOSPADM

## 2021-11-16 RX ORDER — ONDANSETRON 2 MG/ML
4 INJECTION INTRAMUSCULAR; INTRAVENOUS DAILY PRN
Status: DISCONTINUED | OUTPATIENT
Start: 2021-11-16 | End: 2021-11-16 | Stop reason: HOSPADM

## 2021-11-16 RX ORDER — LIDOCAINE HYDROCHLORIDE 10 MG/ML
1 INJECTION, SOLUTION EPIDURAL; INFILTRATION; INTRACAUDAL; PERINEURAL ONCE
Status: DISCONTINUED | OUTPATIENT
Start: 2021-11-16 | End: 2021-11-16 | Stop reason: HOSPADM

## 2021-11-16 RX ORDER — ONDANSETRON 2 MG/ML
4 INJECTION INTRAMUSCULAR; INTRAVENOUS ONCE
Status: COMPLETED | OUTPATIENT
Start: 2021-11-16 | End: 2021-11-16

## 2021-11-16 RX ORDER — MIDAZOLAM HYDROCHLORIDE 1 MG/ML
INJECTION, SOLUTION INTRAMUSCULAR; INTRAVENOUS
Status: DISCONTINUED | OUTPATIENT
Start: 2021-11-16 | End: 2021-11-16

## 2021-11-16 RX ORDER — LIDOCAINE HYDROCHLORIDE 20 MG/ML
INJECTION, SOLUTION EPIDURAL; INFILTRATION; INTRACAUDAL; PERINEURAL
Status: DISCONTINUED | OUTPATIENT
Start: 2021-11-16 | End: 2021-11-16

## 2021-11-16 RX ORDER — PHENAZOPYRIDINE HYDROCHLORIDE 100 MG/1
100 TABLET, FILM COATED ORAL 3 TIMES DAILY PRN
Qty: 30 TABLET | Refills: 0 | Status: SHIPPED | OUTPATIENT
Start: 2021-11-16 | End: 2021-11-26

## 2021-11-16 RX ORDER — MORPHINE SULFATE 4 MG/ML
4 INJECTION, SOLUTION INTRAMUSCULAR; INTRAVENOUS ONCE
Status: COMPLETED | OUTPATIENT
Start: 2021-11-16 | End: 2021-11-16

## 2021-11-16 RX ORDER — OXYCODONE HYDROCHLORIDE 5 MG/1
5 TABLET ORAL EVERY 4 HOURS PRN
Qty: 5 TABLET | Refills: 0 | Status: SHIPPED | OUTPATIENT
Start: 2021-11-16 | End: 2022-03-21 | Stop reason: ALTCHOICE

## 2021-11-16 RX ORDER — HYDROCODONE BITARTRATE AND ACETAMINOPHEN 5; 325 MG/1; MG/1
1 TABLET ORAL EVERY 4 HOURS PRN
Status: DISCONTINUED | OUTPATIENT
Start: 2021-11-16 | End: 2021-11-16 | Stop reason: HOSPADM

## 2021-11-16 RX ORDER — TAMSULOSIN HYDROCHLORIDE 0.4 MG/1
0.4 CAPSULE ORAL DAILY
Qty: 30 CAPSULE | Refills: 0 | Status: SHIPPED | OUTPATIENT
Start: 2021-11-16

## 2021-11-16 RX ORDER — PROPOFOL 10 MG/ML
VIAL (ML) INTRAVENOUS
Status: DISCONTINUED | OUTPATIENT
Start: 2021-11-16 | End: 2021-11-16

## 2021-11-16 RX ORDER — SUCCINYLCHOLINE CHLORIDE 20 MG/ML
INJECTION INTRAMUSCULAR; INTRAVENOUS
Status: DISCONTINUED | OUTPATIENT
Start: 2021-11-16 | End: 2021-11-16

## 2021-11-16 RX ORDER — FAMOTIDINE 10 MG/ML
20 INJECTION INTRAVENOUS ONCE
Status: COMPLETED | OUTPATIENT
Start: 2021-11-16 | End: 2021-11-16

## 2021-11-16 RX ADMIN — CEFAZOLIN 2 G: 330 INJECTION, POWDER, FOR SOLUTION INTRAMUSCULAR; INTRAVENOUS at 02:11

## 2021-11-16 RX ADMIN — ONDANSETRON 4 MG: 2 INJECTION INTRAMUSCULAR; INTRAVENOUS at 12:11

## 2021-11-16 RX ADMIN — MORPHINE SULFATE 4 MG: 4 INJECTION INTRAVENOUS at 12:11

## 2021-11-16 RX ADMIN — ONDANSETRON 4 MG: 2 INJECTION INTRAMUSCULAR; INTRAVENOUS at 02:11

## 2021-11-16 RX ADMIN — DEXAMETHASONE SODIUM PHOSPHATE 4 MG: 4 INJECTION, SOLUTION INTRA-ARTICULAR; INTRALESIONAL; INTRAMUSCULAR; INTRAVENOUS; SOFT TISSUE at 02:11

## 2021-11-16 RX ADMIN — PROPOFOL 200 MG: 10 INJECTION, EMULSION INTRAVENOUS at 02:11

## 2021-11-16 RX ADMIN — SUCCINYLCHOLINE CHLORIDE 100 MG: 20 INJECTION, SOLUTION INTRAMUSCULAR; INTRAVENOUS at 02:11

## 2021-11-16 RX ADMIN — FAMOTIDINE 20 MG: 10 INJECTION INTRAVENOUS at 12:11

## 2021-11-16 RX ADMIN — LIDOCAINE HYDROCHLORIDE 100 MG: 20 INJECTION, SOLUTION EPIDURAL; INFILTRATION; INTRACAUDAL; PERINEURAL at 02:11

## 2021-11-16 RX ADMIN — MIDAZOLAM HYDROCHLORIDE 2 MG: 1 INJECTION, SOLUTION INTRAMUSCULAR; INTRAVENOUS at 02:11

## 2021-11-16 RX ADMIN — SODIUM CHLORIDE, POTASSIUM CHLORIDE, SODIUM LACTATE AND CALCIUM CHLORIDE: 600; 310; 30; 20 INJECTION, SOLUTION INTRAVENOUS at 02:11

## 2021-11-17 ENCOUNTER — PATIENT MESSAGE (OUTPATIENT)
Dept: UROLOGY | Facility: CLINIC | Age: 51
End: 2021-11-17
Payer: MEDICAID

## 2021-11-17 ENCOUNTER — PATIENT MESSAGE (OUTPATIENT)
Dept: FAMILY MEDICINE | Facility: CLINIC | Age: 51
End: 2021-11-17
Payer: MEDICAID

## 2021-11-17 LAB — POCT GLUCOSE: 113 MG/DL (ref 70–110)

## 2021-11-18 RX ORDER — FENOFIBRATE 145 MG/1
145 TABLET, FILM COATED ORAL DAILY
Qty: 90 TABLET | Refills: 3 | Status: SHIPPED | OUTPATIENT
Start: 2021-11-18 | End: 2022-11-09 | Stop reason: SDUPTHER

## 2021-11-19 LAB
FINAL PATHOLOGIC DIAGNOSIS: NORMAL
GROSS: NORMAL
Lab: NORMAL

## 2021-11-22 ENCOUNTER — PATIENT MESSAGE (OUTPATIENT)
Dept: FAMILY MEDICINE | Facility: CLINIC | Age: 51
End: 2021-11-22
Payer: MEDICAID

## 2021-11-22 RX ORDER — FLUCONAZOLE 150 MG/1
150 TABLET ORAL DAILY
Qty: 1 TABLET | Refills: 0 | Status: SHIPPED | OUTPATIENT
Start: 2021-11-22 | End: 2022-01-20 | Stop reason: SDUPTHER

## 2021-11-24 ENCOUNTER — PATIENT MESSAGE (OUTPATIENT)
Dept: UROLOGY | Facility: CLINIC | Age: 51
End: 2021-11-24
Payer: MEDICAID

## 2021-11-24 ENCOUNTER — LAB VISIT (OUTPATIENT)
Dept: LAB | Facility: CLINIC | Age: 51
End: 2021-11-24
Payer: MEDICAID

## 2021-11-24 DIAGNOSIS — N20.1 LEFT URETERAL STONE: ICD-10-CM

## 2021-11-24 DIAGNOSIS — N20.1 LEFT URETERAL STONE: Primary | ICD-10-CM

## 2021-11-24 LAB
BACTERIA #/AREA URNS HPF: ABNORMAL /HPF
BILIRUB UR QL STRIP: NEGATIVE
CLARITY UR: CLEAR
COLOR UR: YELLOW
GLUCOSE UR QL STRIP: NEGATIVE
HGB UR QL STRIP: NEGATIVE
HYALINE CASTS #/AREA URNS LPF: 3 /LPF
KETONES UR QL STRIP: NEGATIVE
LEUKOCYTE ESTERASE UR QL STRIP: ABNORMAL
MICROSCOPIC COMMENT: ABNORMAL
NITRITE UR QL STRIP: NEGATIVE
PH UR STRIP: 6 [PH] (ref 5–8)
PROT UR QL STRIP: NEGATIVE
RBC #/AREA URNS HPF: 9 /HPF (ref 0–4)
SP GR UR STRIP: 1.02 (ref 1–1.03)
SQUAMOUS #/AREA URNS HPF: 2 /HPF
URN SPEC COLLECT METH UR: ABNORMAL
UROBILINOGEN UR STRIP-ACNC: NEGATIVE EU/DL
WBC #/AREA URNS HPF: 16 /HPF (ref 0–5)

## 2021-11-24 PROCEDURE — 81000 URINALYSIS NONAUTO W/SCOPE: CPT | Performed by: STUDENT IN AN ORGANIZED HEALTH CARE EDUCATION/TRAINING PROGRAM

## 2021-11-24 PROCEDURE — 87086 URINE CULTURE/COLONY COUNT: CPT | Performed by: STUDENT IN AN ORGANIZED HEALTH CARE EDUCATION/TRAINING PROGRAM

## 2021-11-26 LAB
BACTERIA UR CULT: NORMAL
BACTERIA UR CULT: NORMAL

## 2021-12-02 ENCOUNTER — ANESTHESIA (OUTPATIENT)
Dept: SURGERY | Facility: HOSPITAL | Age: 51
End: 2021-12-02
Payer: MEDICAID

## 2021-12-02 ENCOUNTER — ANESTHESIA EVENT (OUTPATIENT)
Dept: SURGERY | Facility: HOSPITAL | Age: 51
End: 2021-12-02
Payer: MEDICAID

## 2021-12-02 ENCOUNTER — HOSPITAL ENCOUNTER (OUTPATIENT)
Facility: HOSPITAL | Age: 51
Discharge: HOME | End: 2021-12-02
Attending: STUDENT IN AN ORGANIZED HEALTH CARE EDUCATION/TRAINING PROGRAM | Admitting: STUDENT IN AN ORGANIZED HEALTH CARE EDUCATION/TRAINING PROGRAM
Payer: MEDICAID

## 2021-12-02 VITALS
DIASTOLIC BLOOD PRESSURE: 87 MMHG | OXYGEN SATURATION: 97 % | RESPIRATION RATE: 13 BRPM | SYSTOLIC BLOOD PRESSURE: 126 MMHG | HEIGHT: 64 IN | WEIGHT: 286 LBS | HEART RATE: 82 BPM | TEMPERATURE: 98 F | BODY MASS INDEX: 48.83 KG/M2

## 2021-12-02 DIAGNOSIS — K21.9 GASTROESOPHAGEAL REFLUX DISEASE, UNSPECIFIED WHETHER ESOPHAGITIS PRESENT: ICD-10-CM

## 2021-12-02 DIAGNOSIS — Z87.19 HISTORY OF DIVERTICULITIS: ICD-10-CM

## 2021-12-02 DIAGNOSIS — K57.30 DIVERTICULOSIS OF COLON: ICD-10-CM

## 2021-12-02 DIAGNOSIS — Z90.49 S/P PARTIAL COLECTOMY: ICD-10-CM

## 2021-12-02 DIAGNOSIS — K21.9 GASTROESOPHAGEAL REFLUX DISEASE WITHOUT ESOPHAGITIS: Primary | ICD-10-CM

## 2021-12-02 DIAGNOSIS — R10.13 CHRONIC EPIGASTRIC PAIN: ICD-10-CM

## 2021-12-02 DIAGNOSIS — G89.29 CHRONIC EPIGASTRIC PAIN: ICD-10-CM

## 2021-12-02 DIAGNOSIS — K63.89 MASS OF COLON: ICD-10-CM

## 2021-12-02 DIAGNOSIS — K57.90 DIVERTICULOSIS: ICD-10-CM

## 2021-12-02 LAB — POCT GLUCOSE: 120 MG/DL (ref 70–110)

## 2021-12-02 PROCEDURE — 37000009 HC ANESTHESIA EA ADD 15 MINS: Performed by: STUDENT IN AN ORGANIZED HEALTH CARE EDUCATION/TRAINING PROGRAM

## 2021-12-02 PROCEDURE — 88305 TISSUE EXAM BY PATHOLOGIST: CPT | Performed by: PATHOLOGY

## 2021-12-02 PROCEDURE — D9220A PRA ANESTHESIA: ICD-10-PCS | Mod: ANES,,, | Performed by: ANESTHESIOLOGY

## 2021-12-02 PROCEDURE — D9220A PRA ANESTHESIA: Mod: CRNA,,, | Performed by: NURSE ANESTHETIST, CERTIFIED REGISTERED

## 2021-12-02 PROCEDURE — 43239 EGD BIOPSY SINGLE/MULTIPLE: CPT | Performed by: STUDENT IN AN ORGANIZED HEALTH CARE EDUCATION/TRAINING PROGRAM

## 2021-12-02 PROCEDURE — 43239 PR EGD, FLEX, W/BIOPSY, SGL/MULTI: ICD-10-PCS | Mod: 51,,, | Performed by: STUDENT IN AN ORGANIZED HEALTH CARE EDUCATION/TRAINING PROGRAM

## 2021-12-02 PROCEDURE — D9220A PRA ANESTHESIA: Mod: ANES,,, | Performed by: ANESTHESIOLOGY

## 2021-12-02 PROCEDURE — 37000008 HC ANESTHESIA 1ST 15 MINUTES: Performed by: STUDENT IN AN ORGANIZED HEALTH CARE EDUCATION/TRAINING PROGRAM

## 2021-12-02 PROCEDURE — 45378 PR COLONOSCOPY,DIAGNOSTIC: ICD-10-PCS | Mod: ,,, | Performed by: STUDENT IN AN ORGANIZED HEALTH CARE EDUCATION/TRAINING PROGRAM

## 2021-12-02 PROCEDURE — 88342 CHG IMMUNOCYTOCHEMISTRY: ICD-10-PCS | Mod: 26,,, | Performed by: PATHOLOGY

## 2021-12-02 PROCEDURE — 63600175 PHARM REV CODE 636 W HCPCS: Performed by: NURSE ANESTHETIST, CERTIFIED REGISTERED

## 2021-12-02 PROCEDURE — 88342 IMHCHEM/IMCYTCHM 1ST ANTB: CPT | Mod: 26,,, | Performed by: PATHOLOGY

## 2021-12-02 PROCEDURE — 88305 TISSUE EXAM BY PATHOLOGIST: ICD-10-PCS | Mod: 26,,, | Performed by: PATHOLOGY

## 2021-12-02 PROCEDURE — 88305 TISSUE EXAM BY PATHOLOGIST: CPT | Mod: 26,,, | Performed by: PATHOLOGY

## 2021-12-02 PROCEDURE — 25000003 PHARM REV CODE 250: Performed by: NURSE ANESTHETIST, CERTIFIED REGISTERED

## 2021-12-02 PROCEDURE — 45378 DIAGNOSTIC COLONOSCOPY: CPT | Mod: ,,, | Performed by: STUDENT IN AN ORGANIZED HEALTH CARE EDUCATION/TRAINING PROGRAM

## 2021-12-02 PROCEDURE — 45378 DIAGNOSTIC COLONOSCOPY: CPT | Performed by: STUDENT IN AN ORGANIZED HEALTH CARE EDUCATION/TRAINING PROGRAM

## 2021-12-02 PROCEDURE — 00813 ANES UPR LWR GI NDSC PX: CPT | Performed by: STUDENT IN AN ORGANIZED HEALTH CARE EDUCATION/TRAINING PROGRAM

## 2021-12-02 PROCEDURE — 43239 EGD BIOPSY SINGLE/MULTIPLE: CPT | Mod: 51,,, | Performed by: STUDENT IN AN ORGANIZED HEALTH CARE EDUCATION/TRAINING PROGRAM

## 2021-12-02 PROCEDURE — 88342 IMHCHEM/IMCYTCHM 1ST ANTB: CPT | Performed by: PATHOLOGY

## 2021-12-02 PROCEDURE — D9220A PRA ANESTHESIA: ICD-10-PCS | Mod: CRNA,,, | Performed by: NURSE ANESTHETIST, CERTIFIED REGISTERED

## 2021-12-02 PROCEDURE — 27201423 OPTIME MED/SURG SUP & DEVICES STERILE SUPPLY: Performed by: STUDENT IN AN ORGANIZED HEALTH CARE EDUCATION/TRAINING PROGRAM

## 2021-12-02 RX ORDER — PROPOFOL 10 MG/ML
VIAL (ML) INTRAVENOUS
Status: DISCONTINUED | OUTPATIENT
Start: 2021-12-02 | End: 2021-12-02

## 2021-12-02 RX ORDER — FAMOTIDINE 10 MG/ML
INJECTION INTRAVENOUS
Status: DISCONTINUED
Start: 2021-12-02 | End: 2021-12-02 | Stop reason: HOSPADM

## 2021-12-02 RX ORDER — SODIUM CHLORIDE 9 MG/ML
INJECTION, SOLUTION INTRAVENOUS CONTINUOUS
Status: DISCONTINUED | OUTPATIENT
Start: 2021-12-02 | End: 2021-12-02 | Stop reason: HOSPADM

## 2021-12-02 RX ORDER — SODIUM CHLORIDE, SODIUM LACTATE, POTASSIUM CHLORIDE, CALCIUM CHLORIDE 600; 310; 30; 20 MG/100ML; MG/100ML; MG/100ML; MG/100ML
INJECTION, SOLUTION INTRAVENOUS CONTINUOUS
Status: CANCELLED | OUTPATIENT
Start: 2021-12-02

## 2021-12-02 RX ORDER — LIDOCAINE HYDROCHLORIDE 10 MG/ML
1 INJECTION, SOLUTION EPIDURAL; INFILTRATION; INTRACAUDAL; PERINEURAL ONCE
Status: CANCELLED | OUTPATIENT
Start: 2021-12-02 | End: 2021-12-02

## 2021-12-02 RX ORDER — DIPHENHYDRAMINE HYDROCHLORIDE 50 MG/ML
12.5 INJECTION INTRAMUSCULAR; INTRAVENOUS
Status: DISCONTINUED | OUTPATIENT
Start: 2021-12-02 | End: 2021-12-02 | Stop reason: HOSPADM

## 2021-12-02 RX ORDER — FAMOTIDINE 10 MG/ML
20 INJECTION INTRAVENOUS ONCE
Status: CANCELLED | OUTPATIENT
Start: 2021-12-02 | End: 2021-12-02

## 2021-12-02 RX ORDER — LIDOCAINE HYDROCHLORIDE 20 MG/ML
INJECTION, SOLUTION EPIDURAL; INFILTRATION; INTRACAUDAL; PERINEURAL
Status: DISCONTINUED | OUTPATIENT
Start: 2021-12-02 | End: 2021-12-02

## 2021-12-02 RX ORDER — SODIUM CHLORIDE, SODIUM LACTATE, POTASSIUM CHLORIDE, CALCIUM CHLORIDE 600; 310; 30; 20 MG/100ML; MG/100ML; MG/100ML; MG/100ML
125 INJECTION, SOLUTION INTRAVENOUS CONTINUOUS
Status: DISCONTINUED | OUTPATIENT
Start: 2021-12-02 | End: 2021-12-02 | Stop reason: HOSPADM

## 2021-12-02 RX ORDER — ONDANSETRON 2 MG/ML
4 INJECTION INTRAMUSCULAR; INTRAVENOUS DAILY PRN
Status: DISCONTINUED | OUTPATIENT
Start: 2021-12-02 | End: 2021-12-02 | Stop reason: HOSPADM

## 2021-12-02 RX ADMIN — PROPOFOL 100 MG: 10 INJECTION, EMULSION INTRAVENOUS at 11:12

## 2021-12-02 RX ADMIN — SODIUM CHLORIDE, POTASSIUM CHLORIDE, SODIUM LACTATE AND CALCIUM CHLORIDE: 600; 310; 30; 20 INJECTION, SOLUTION INTRAVENOUS at 11:12

## 2021-12-02 RX ADMIN — GLYCOPYRROLATE 0.4 MG: 0.2 INJECTION INTRAMUSCULAR; INTRAVENOUS at 11:12

## 2021-12-02 RX ADMIN — LIDOCAINE HYDROCHLORIDE 100 MG: 20 INJECTION, SOLUTION EPIDURAL; INFILTRATION; INTRACAUDAL; PERINEURAL at 11:12

## 2021-12-06 ENCOUNTER — HOSPITAL ENCOUNTER (OUTPATIENT)
Dept: RADIOLOGY | Facility: HOSPITAL | Age: 51
Discharge: HOME | End: 2021-12-06
Attending: NURSE PRACTITIONER
Payer: MEDICAID

## 2021-12-06 VITALS — HEIGHT: 64 IN | WEIGHT: 286 LBS | BODY MASS INDEX: 48.83 KG/M2

## 2021-12-06 DIAGNOSIS — Z12.31 ENCOUNTER FOR SCREENING MAMMOGRAM FOR BREAST CANCER: ICD-10-CM

## 2021-12-06 PROCEDURE — 77067 SCR MAMMO BI INCL CAD: CPT | Mod: TC

## 2021-12-06 PROCEDURE — 77063 BREAST TOMOSYNTHESIS BI: CPT | Mod: 26,,, | Performed by: RADIOLOGY

## 2021-12-06 PROCEDURE — 77063 MAMMO DIGITAL SCREENING BILAT WITH TOMO: ICD-10-PCS | Mod: 26,,, | Performed by: RADIOLOGY

## 2021-12-06 PROCEDURE — 77067 MAMMO DIGITAL SCREENING BILAT WITH TOMO: ICD-10-PCS | Mod: 26,,, | Performed by: RADIOLOGY

## 2021-12-06 PROCEDURE — 77067 SCR MAMMO BI INCL CAD: CPT | Mod: 26,,, | Performed by: RADIOLOGY

## 2021-12-07 ENCOUNTER — OFFICE VISIT (OUTPATIENT)
Dept: FAMILY MEDICINE | Facility: CLINIC | Age: 51
End: 2021-12-07
Payer: MEDICAID

## 2021-12-07 VITALS
HEART RATE: 80 BPM | DIASTOLIC BLOOD PRESSURE: 62 MMHG | SYSTOLIC BLOOD PRESSURE: 106 MMHG | RESPIRATION RATE: 18 BRPM | HEIGHT: 64 IN | WEIGHT: 286 LBS | OXYGEN SATURATION: 97 % | BODY MASS INDEX: 48.83 KG/M2

## 2021-12-07 DIAGNOSIS — E11.9 TYPE 2 DIABETES MELLITUS WITHOUT COMPLICATION, WITH LONG-TERM CURRENT USE OF INSULIN: Primary | ICD-10-CM

## 2021-12-07 DIAGNOSIS — Z79.4 TYPE 2 DIABETES MELLITUS WITHOUT COMPLICATION, WITH LONG-TERM CURRENT USE OF INSULIN: Primary | ICD-10-CM

## 2021-12-07 DIAGNOSIS — K62.5 RECTAL BLEEDING: ICD-10-CM

## 2021-12-07 DIAGNOSIS — G47.00 INSOMNIA, UNSPECIFIED TYPE: ICD-10-CM

## 2021-12-07 DIAGNOSIS — R10.9 ABDOMINAL PAIN, UNSPECIFIED ABDOMINAL LOCATION: ICD-10-CM

## 2021-12-07 PROBLEM — Z12.11 ENCOUNTER FOR SCREENING COLONOSCOPY: Status: RESOLVED | Noted: 2020-02-07 | Resolved: 2021-12-07

## 2021-12-07 PROCEDURE — 99214 OFFICE O/P EST MOD 30 MIN: CPT | Mod: S$PBB,,, | Performed by: FAMILY MEDICINE

## 2021-12-07 PROCEDURE — 3066F NEPHROPATHY DOC TX: CPT | Mod: CPTII,,, | Performed by: FAMILY MEDICINE

## 2021-12-07 PROCEDURE — 99999 PR PBB SHADOW E&M-EST. PATIENT-LVL V: CPT | Mod: PBBFAC,,, | Performed by: FAMILY MEDICINE

## 2021-12-07 PROCEDURE — 99215 OFFICE O/P EST HI 40 MIN: CPT | Mod: PBBFAC,PN | Performed by: FAMILY MEDICINE

## 2021-12-07 PROCEDURE — 3066F PR DOCUMENTATION OF TREATMENT FOR NEPHROPATHY: ICD-10-PCS | Mod: CPTII,,, | Performed by: FAMILY MEDICINE

## 2021-12-07 PROCEDURE — 4010F PR ACE/ARB THEARPY RXD/TAKEN: ICD-10-PCS | Mod: CPTII,,, | Performed by: FAMILY MEDICINE

## 2021-12-07 PROCEDURE — 4010F ACE/ARB THERAPY RXD/TAKEN: CPT | Mod: CPTII,,, | Performed by: FAMILY MEDICINE

## 2021-12-07 PROCEDURE — 99214 PR OFFICE/OUTPT VISIT, EST, LEVL IV, 30-39 MIN: ICD-10-PCS | Mod: S$PBB,,, | Performed by: FAMILY MEDICINE

## 2021-12-07 PROCEDURE — 3060F PR POS MICROALBUMINURIA RESULT DOCUMENTED/REVIEW: ICD-10-PCS | Mod: CPTII,,, | Performed by: FAMILY MEDICINE

## 2021-12-07 PROCEDURE — 3060F POS MICROALBUMINURIA REV: CPT | Mod: CPTII,,, | Performed by: FAMILY MEDICINE

## 2021-12-07 PROCEDURE — 99999 PR PBB SHADOW E&M-EST. PATIENT-LVL V: ICD-10-PCS | Mod: PBBFAC,,, | Performed by: FAMILY MEDICINE

## 2021-12-07 RX ORDER — ZOLPIDEM TARTRATE 10 MG/1
TABLET ORAL
Qty: 30 TABLET | Refills: 2 | Status: SHIPPED | OUTPATIENT
Start: 2021-12-07 | End: 2021-12-30

## 2021-12-08 LAB
FINAL PATHOLOGIC DIAGNOSIS: NORMAL
GROSS: NORMAL
Lab: NORMAL
SUPPLEMENTAL DIAGNOSIS: NORMAL

## 2021-12-14 ENCOUNTER — PATIENT MESSAGE (OUTPATIENT)
Dept: FAMILY MEDICINE | Facility: CLINIC | Age: 51
End: 2021-12-14
Payer: MEDICAID

## 2021-12-14 DIAGNOSIS — B37.31 YEAST VAGINITIS: ICD-10-CM

## 2021-12-14 DIAGNOSIS — Z79.4 TYPE 2 DIABETES MELLITUS WITHOUT COMPLICATION, WITH LONG-TERM CURRENT USE OF INSULIN: ICD-10-CM

## 2021-12-14 DIAGNOSIS — I10 ESSENTIAL HYPERTENSION: ICD-10-CM

## 2021-12-14 DIAGNOSIS — E11.9 TYPE 2 DIABETES MELLITUS WITHOUT COMPLICATION, WITH LONG-TERM CURRENT USE OF INSULIN: ICD-10-CM

## 2021-12-15 ENCOUNTER — PATIENT MESSAGE (OUTPATIENT)
Dept: FAMILY MEDICINE | Facility: CLINIC | Age: 51
End: 2021-12-15
Payer: MEDICAID

## 2021-12-15 RX ORDER — PIOGLITAZONEHYDROCHLORIDE 45 MG/1
45 TABLET ORAL DAILY
Qty: 90 TABLET | Refills: 1 | Status: SHIPPED | OUTPATIENT
Start: 2021-12-15 | End: 2022-05-19

## 2021-12-15 RX ORDER — RANOLAZINE 500 MG/1
500 TABLET, EXTENDED RELEASE ORAL 2 TIMES DAILY
Qty: 180 TABLET | Refills: 1 | Status: SHIPPED | OUTPATIENT
Start: 2021-12-15 | End: 2022-01-15 | Stop reason: SDUPTHER

## 2021-12-15 RX ORDER — NYSTATIN 100000 [USP'U]/G
POWDER TOPICAL
Qty: 120 G | Refills: 1 | Status: SHIPPED | OUTPATIENT
Start: 2021-12-15 | End: 2022-03-21 | Stop reason: SDUPTHER

## 2021-12-15 RX ORDER — POTASSIUM CHLORIDE 20 MEQ/1
20 TABLET, EXTENDED RELEASE ORAL 2 TIMES DAILY
Qty: 180 TABLET | Refills: 0 | Status: SHIPPED | OUTPATIENT
Start: 2021-12-15 | End: 2022-03-21 | Stop reason: SDUPTHER

## 2021-12-20 ENCOUNTER — OFFICE VISIT (OUTPATIENT)
Dept: SURGERY | Facility: CLINIC | Age: 51
End: 2021-12-20
Payer: MEDICAID

## 2021-12-20 ENCOUNTER — PATIENT MESSAGE (OUTPATIENT)
Dept: SURGERY | Facility: CLINIC | Age: 51
End: 2021-12-20

## 2021-12-20 VITALS
HEIGHT: 64 IN | WEIGHT: 285 LBS | RESPIRATION RATE: 19 BRPM | DIASTOLIC BLOOD PRESSURE: 80 MMHG | BODY MASS INDEX: 48.65 KG/M2 | SYSTOLIC BLOOD PRESSURE: 128 MMHG | OXYGEN SATURATION: 98 % | HEART RATE: 71 BPM

## 2021-12-20 DIAGNOSIS — R10.84 GENERALIZED ABDOMINAL PAIN: ICD-10-CM

## 2021-12-20 DIAGNOSIS — K62.5 RECTAL BLEEDING: ICD-10-CM

## 2021-12-20 DIAGNOSIS — K29.30 CHRONIC SUPERFICIAL GASTRITIS WITHOUT BLEEDING: Primary | ICD-10-CM

## 2021-12-20 PROCEDURE — 99999 PR PBB SHADOW E&M-EST. PATIENT-LVL V: CPT | Mod: PBBFAC,,, | Performed by: STUDENT IN AN ORGANIZED HEALTH CARE EDUCATION/TRAINING PROGRAM

## 2021-12-20 PROCEDURE — 3060F PR POS MICROALBUMINURIA RESULT DOCUMENTED/REVIEW: ICD-10-PCS | Mod: CPTII,,, | Performed by: STUDENT IN AN ORGANIZED HEALTH CARE EDUCATION/TRAINING PROGRAM

## 2021-12-20 PROCEDURE — 99215 OFFICE O/P EST HI 40 MIN: CPT | Mod: PBBFAC | Performed by: STUDENT IN AN ORGANIZED HEALTH CARE EDUCATION/TRAINING PROGRAM

## 2021-12-20 PROCEDURE — 4010F PR ACE/ARB THEARPY RXD/TAKEN: ICD-10-PCS | Mod: CPTII,,, | Performed by: STUDENT IN AN ORGANIZED HEALTH CARE EDUCATION/TRAINING PROGRAM

## 2021-12-20 PROCEDURE — 3066F NEPHROPATHY DOC TX: CPT | Mod: CPTII,,, | Performed by: STUDENT IN AN ORGANIZED HEALTH CARE EDUCATION/TRAINING PROGRAM

## 2021-12-20 PROCEDURE — 3066F PR DOCUMENTATION OF TREATMENT FOR NEPHROPATHY: ICD-10-PCS | Mod: CPTII,,, | Performed by: STUDENT IN AN ORGANIZED HEALTH CARE EDUCATION/TRAINING PROGRAM

## 2021-12-20 PROCEDURE — 3060F POS MICROALBUMINURIA REV: CPT | Mod: CPTII,,, | Performed by: STUDENT IN AN ORGANIZED HEALTH CARE EDUCATION/TRAINING PROGRAM

## 2021-12-20 PROCEDURE — 99213 OFFICE O/P EST LOW 20 MIN: CPT | Mod: S$PBB,,, | Performed by: STUDENT IN AN ORGANIZED HEALTH CARE EDUCATION/TRAINING PROGRAM

## 2021-12-20 PROCEDURE — 4010F ACE/ARB THERAPY RXD/TAKEN: CPT | Mod: CPTII,,, | Performed by: STUDENT IN AN ORGANIZED HEALTH CARE EDUCATION/TRAINING PROGRAM

## 2021-12-20 PROCEDURE — 99999 PR PBB SHADOW E&M-EST. PATIENT-LVL V: ICD-10-PCS | Mod: PBBFAC,,, | Performed by: STUDENT IN AN ORGANIZED HEALTH CARE EDUCATION/TRAINING PROGRAM

## 2021-12-20 PROCEDURE — 99213 PR OFFICE/OUTPT VISIT, EST, LEVL III, 20-29 MIN: ICD-10-PCS | Mod: S$PBB,,, | Performed by: STUDENT IN AN ORGANIZED HEALTH CARE EDUCATION/TRAINING PROGRAM

## 2021-12-29 DIAGNOSIS — G47.00 INSOMNIA, UNSPECIFIED TYPE: ICD-10-CM

## 2021-12-30 RX ORDER — ZOLPIDEM TARTRATE 10 MG/1
TABLET ORAL
Qty: 30 TABLET | Refills: 0 | Status: SHIPPED | OUTPATIENT
Start: 2021-12-30 | End: 2022-04-04 | Stop reason: SDUPTHER

## 2022-01-04 ENCOUNTER — PATIENT OUTREACH (OUTPATIENT)
Dept: ADMINISTRATIVE | Facility: OTHER | Age: 52
End: 2022-01-04
Payer: MEDICAID

## 2022-01-04 NOTE — PROGRESS NOTES
Chart was reviewed for overdue [facility] Encounters ([facility])  topics  Updates were requested from care everywhere  Health Maintenance has been updated  [place] immunization registry triggered

## 2022-01-06 ENCOUNTER — PATIENT MESSAGE (OUTPATIENT)
Dept: FAMILY MEDICINE | Facility: CLINIC | Age: 52
End: 2022-01-06
Payer: MEDICAID

## 2022-01-06 ENCOUNTER — HOSPITAL ENCOUNTER (OUTPATIENT)
Dept: RADIOLOGY | Facility: HOSPITAL | Age: 52
Discharge: HOME | End: 2022-01-06
Attending: FAMILY MEDICINE
Payer: MEDICAID

## 2022-01-06 DIAGNOSIS — R10.9 ABDOMINAL PAIN, UNSPECIFIED ABDOMINAL LOCATION: ICD-10-CM

## 2022-01-06 PROCEDURE — A9541 TC99M SULFUR COLLOID: HCPCS

## 2022-01-06 PROCEDURE — 78264 NM GASTRIC EMPTYING: ICD-10-PCS | Mod: 26,,, | Performed by: RADIOLOGY

## 2022-01-06 PROCEDURE — 78264 GASTRIC EMPTYING IMG STUDY: CPT | Mod: 26,,, | Performed by: RADIOLOGY

## 2022-01-10 ENCOUNTER — OFFICE VISIT (OUTPATIENT)
Dept: SURGERY | Facility: CLINIC | Age: 52
End: 2022-01-10
Payer: MEDICAID

## 2022-01-10 VITALS
HEART RATE: 71 BPM | WEIGHT: 284 LBS | SYSTOLIC BLOOD PRESSURE: 139 MMHG | BODY MASS INDEX: 48.49 KG/M2 | DIASTOLIC BLOOD PRESSURE: 78 MMHG | HEIGHT: 64 IN | OXYGEN SATURATION: 98 %

## 2022-01-10 DIAGNOSIS — K62.5 RECTAL BLEEDING: ICD-10-CM

## 2022-01-10 DIAGNOSIS — K29.30 CHRONIC SUPERFICIAL GASTRITIS WITHOUT BLEEDING: ICD-10-CM

## 2022-01-10 DIAGNOSIS — R10.13 CHRONIC EPIGASTRIC PAIN: ICD-10-CM

## 2022-01-10 DIAGNOSIS — Z87.19 HISTORY OF DIVERTICULITIS: Primary | ICD-10-CM

## 2022-01-10 DIAGNOSIS — G89.29 CHRONIC EPIGASTRIC PAIN: ICD-10-CM

## 2022-01-10 PROCEDURE — 3078F PR MOST RECENT DIASTOLIC BLOOD PRESSURE < 80 MM HG: ICD-10-PCS | Mod: CPTII,,, | Performed by: STUDENT IN AN ORGANIZED HEALTH CARE EDUCATION/TRAINING PROGRAM

## 2022-01-10 PROCEDURE — 99214 OFFICE O/P EST MOD 30 MIN: CPT | Mod: S$PBB,,, | Performed by: STUDENT IN AN ORGANIZED HEALTH CARE EDUCATION/TRAINING PROGRAM

## 2022-01-10 PROCEDURE — 3075F PR MOST RECENT SYSTOLIC BLOOD PRESS GE 130-139MM HG: ICD-10-PCS | Mod: CPTII,,, | Performed by: STUDENT IN AN ORGANIZED HEALTH CARE EDUCATION/TRAINING PROGRAM

## 2022-01-10 PROCEDURE — 3075F SYST BP GE 130 - 139MM HG: CPT | Mod: CPTII,,, | Performed by: STUDENT IN AN ORGANIZED HEALTH CARE EDUCATION/TRAINING PROGRAM

## 2022-01-10 PROCEDURE — 1159F MED LIST DOCD IN RCRD: CPT | Mod: CPTII,,, | Performed by: STUDENT IN AN ORGANIZED HEALTH CARE EDUCATION/TRAINING PROGRAM

## 2022-01-10 PROCEDURE — 99214 PR OFFICE/OUTPT VISIT, EST, LEVL IV, 30-39 MIN: ICD-10-PCS | Mod: S$PBB,,, | Performed by: STUDENT IN AN ORGANIZED HEALTH CARE EDUCATION/TRAINING PROGRAM

## 2022-01-10 PROCEDURE — 1159F PR MEDICATION LIST DOCUMENTED IN MEDICAL RECORD: ICD-10-PCS | Mod: CPTII,,, | Performed by: STUDENT IN AN ORGANIZED HEALTH CARE EDUCATION/TRAINING PROGRAM

## 2022-01-10 PROCEDURE — 3078F DIAST BP <80 MM HG: CPT | Mod: CPTII,,, | Performed by: STUDENT IN AN ORGANIZED HEALTH CARE EDUCATION/TRAINING PROGRAM

## 2022-01-10 PROCEDURE — 3008F PR BODY MASS INDEX (BMI) DOCUMENTED: ICD-10-PCS | Mod: CPTII,,, | Performed by: STUDENT IN AN ORGANIZED HEALTH CARE EDUCATION/TRAINING PROGRAM

## 2022-01-10 PROCEDURE — 99999 PR PBB SHADOW E&M-EST. PATIENT-LVL V: ICD-10-PCS | Mod: PBBFAC,,, | Performed by: STUDENT IN AN ORGANIZED HEALTH CARE EDUCATION/TRAINING PROGRAM

## 2022-01-10 PROCEDURE — 3008F BODY MASS INDEX DOCD: CPT | Mod: CPTII,,, | Performed by: STUDENT IN AN ORGANIZED HEALTH CARE EDUCATION/TRAINING PROGRAM

## 2022-01-10 PROCEDURE — 99215 OFFICE O/P EST HI 40 MIN: CPT | Mod: PBBFAC | Performed by: STUDENT IN AN ORGANIZED HEALTH CARE EDUCATION/TRAINING PROGRAM

## 2022-01-10 PROCEDURE — 99999 PR PBB SHADOW E&M-EST. PATIENT-LVL V: CPT | Mod: PBBFAC,,, | Performed by: STUDENT IN AN ORGANIZED HEALTH CARE EDUCATION/TRAINING PROGRAM

## 2022-01-10 RX ORDER — MUPIROCIN 20 MG/G
OINTMENT TOPICAL 3 TIMES DAILY
COMMUNITY

## 2022-01-10 NOTE — PROGRESS NOTES
"  [facility] General Surgery  Follow-up    Subjective:     Chief Complaint:  Follow up imaging    HPI:  Shweta Gupta is a 51 y.o. female here today to discuss results of her recent gastric emptying study.  The study was normal.  Patient does reports some improvement in her abdominal pain since increasing the protonix to twice daily.  The patient reports continued early satiety, abdominal fullness, and occasional blood in stool.  EGD revealed some chronic gastritis.  She also had a fair amount of food still in her stomach did despite being NPO for the procedure.  Colonoscopy was negative for hemorrhoids, polyps, masses, or source of bleeding.  She does have diverticulosis and a history of a partial colectomy for diverticulosis.  She has already had cholecystectomy.    Review of Systems   Constitutional: Negative for activity change, appetite change, chills and fever.   HENT: Negative for congestion, dental problem and ear discharge.    Eyes: Negative for discharge and itching.   Respiratory: Negative for apnea, choking and chest tightness.    Cardiovascular: Negative for chest pain and leg swelling.   Gastrointestinal: Positive for abdominal distention, abdominal pain and constipation. Negative for anal bleeding, diarrhea and nausea.   Endocrine: Negative for cold intolerance and heat intolerance.   Genitourinary: Negative for difficulty urinating and dyspareunia.   Musculoskeletal: Negative for arthralgias and back pain.   Skin: Negative for color change and pallor.   Neurological: Negative for dizziness and facial asymmetry.   Hematological: Negative for adenopathy. Does not bruise/bleed easily.   Psychiatric/Behavioral: Negative for agitation and behavioral problems.       Objective:      Vitals:    01/10/22 1040   BP: 139/78   Pulse: 71   SpO2: 98%   Weight: 128.8 kg (284 lb)   Height: 5' 4" (1.626 m)     Physical Exam  Constitutional:       General: She is not in acute distress.     Appearance: She is " well-developed.   HENT:      Head: Normocephalic and atraumatic.   Eyes:      Pupils: Pupils are equal, round, and reactive to light.   Neck:      Thyroid: No thyromegaly.   Cardiovascular:      Rate and Rhythm: Normal rate and regular rhythm.   Pulmonary:      Effort: Pulmonary effort is normal.      Breath sounds: Normal breath sounds.   Abdominal:      General: Bowel sounds are normal. There is no distension.      Palpations: Abdomen is soft.      Tenderness: There is no abdominal tenderness.   Musculoskeletal:         General: No deformity. Normal range of motion.      Cervical back: Normal range of motion and neck supple.   Skin:     General: Skin is warm.      Capillary Refill: Capillary refill takes less than 2 seconds.      Findings: No erythema.   Neurological:      Mental Status: She is alert and oriented to person, place, and time.      Cranial Nerves: No cranial nerve deficit.   Psychiatric:         Behavior: Behavior normal.         Lab Review:   CBC:   Lab Results   Component Value Date    WBC 11.31 11/14/2021    RBC 3.64 (L) 11/14/2021    HGB 10.9 (L) 11/14/2021    HCT 33.3 (L) 11/14/2021     11/14/2021     BMP:   Lab Results   Component Value Date     (H) 11/14/2021     11/14/2021    K 4.1 11/14/2021     11/14/2021    CO2 24 11/14/2021    BUN 26 (H) 11/14/2021    CREATININE 1.6 (H) 11/14/2021    CALCIUM 9.5 11/14/2021     Lab Results   Component Value Date    ALT 20 11/14/2021    AST 19 11/14/2021    ALKPHOS 57 11/14/2021    BILITOT 0.3 11/14/2021       Diagnostics Review: path reviewed     Assessment:       1. History of diverticulitis    2. Chronic epigastric pain    3. Rectal bleeding    4. Chronic superficial gastritis without bleeding        Plan:   History of diverticulitis  -     CT Enterography Abd_Pelvis With Contrast; Future; Expected date: 01/10/2022    Chronic epigastric pain  -     CT Enterography Abd_Pelvis With Contrast; Future; Expected date:  01/10/2022    Rectal bleeding  -     CT Enterography Abd_Pelvis With Contrast; Future; Expected date: 01/10/2022    Chronic superficial gastritis without bleeding        Medical Decision Making/Counselin-year-old female with chronic gastritis.  She is on Protonix for this twice a day for this.  Also early satiety, abdominal pain, inability to lose weight.  Gastric emptying study negative for gastroparesis.  She has been referred to Dr. Toledo for rectal bleeding.  I did not find any stigmata of bleeding or any potential source of bleeding on endoscopy.  CT enterography ordered to evaluate for signs of inflammatory bowel disease, abnormal anatomy, etc.    Follow up:  Follow-up after CT.  Referred to GI.    Patient instructed that best way to communicate with my office staff is for patient to get on the [facility] patient portal to expedite communication and communication issues that may occur.  Patient was given instructions on how to get on the portal.  I encouraged patient to obtain portal access as well.  Ultimately it is up to the patient to obtain access.  Patient voiced understanding.

## 2022-01-14 ENCOUNTER — PATIENT MESSAGE (OUTPATIENT)
Dept: FAMILY MEDICINE | Facility: CLINIC | Age: 52
End: 2022-01-14
Payer: MEDICAID

## 2022-01-14 DIAGNOSIS — I10 ESSENTIAL HYPERTENSION: Primary | ICD-10-CM

## 2022-01-15 RX ORDER — RANOLAZINE 500 MG/1
500 TABLET, EXTENDED RELEASE ORAL 2 TIMES DAILY
Qty: 180 TABLET | Refills: 1 | Status: SHIPPED | OUTPATIENT
Start: 2022-01-15 | End: 2022-03-21 | Stop reason: SDUPTHER

## 2022-01-20 ENCOUNTER — PATIENT MESSAGE (OUTPATIENT)
Dept: FAMILY MEDICINE | Facility: CLINIC | Age: 52
End: 2022-01-20
Payer: MEDICAID

## 2022-01-20 RX ORDER — FLUCONAZOLE 150 MG/1
150 TABLET ORAL DAILY
Qty: 2 TABLET | Refills: 0 | Status: SHIPPED | OUTPATIENT
Start: 2022-01-20 | End: 2022-01-22

## 2022-01-25 DIAGNOSIS — R10.9 ABDOMINAL PAIN, UNSPECIFIED ABDOMINAL LOCATION: Primary | ICD-10-CM

## 2022-01-26 ENCOUNTER — HOSPITAL ENCOUNTER (OUTPATIENT)
Dept: RADIOLOGY | Facility: HOSPITAL | Age: 52
Discharge: HOME | End: 2022-01-26
Attending: STUDENT IN AN ORGANIZED HEALTH CARE EDUCATION/TRAINING PROGRAM
Payer: MEDICAID

## 2022-01-26 DIAGNOSIS — K62.5 RECTAL BLEEDING: ICD-10-CM

## 2022-01-26 DIAGNOSIS — Z87.19 HISTORY OF DIVERTICULITIS: ICD-10-CM

## 2022-01-26 DIAGNOSIS — R10.13 CHRONIC EPIGASTRIC PAIN: ICD-10-CM

## 2022-01-26 DIAGNOSIS — G89.29 CHRONIC EPIGASTRIC PAIN: ICD-10-CM

## 2022-01-26 PROCEDURE — 74177 CT ABD & PELVIS W/CONTRAST: CPT | Mod: 26,,, | Performed by: RADIOLOGY

## 2022-01-26 PROCEDURE — A9698 NON-RAD CONTRAST MATERIALNOC: HCPCS | Performed by: STUDENT IN AN ORGANIZED HEALTH CARE EDUCATION/TRAINING PROGRAM

## 2022-01-26 PROCEDURE — 74177 CT ABD & PELVIS W/CONTRAST: CPT | Mod: TC

## 2022-01-26 PROCEDURE — 74177 CT ENTEROGRAPHY ABD_PELVIS WITH CONTRAST: ICD-10-PCS | Mod: 26,,, | Performed by: RADIOLOGY

## 2022-01-26 PROCEDURE — 25500020 PHARM REV CODE 255: Performed by: STUDENT IN AN ORGANIZED HEALTH CARE EDUCATION/TRAINING PROGRAM

## 2022-01-26 RX ADMIN — IOHEXOL 100 ML: 350 INJECTION, SOLUTION INTRAVENOUS at 09:01

## 2022-01-26 RX ADMIN — BARIUM SULFATE 1300 ML: 1 SUSPENSION ORAL at 08:01

## 2022-01-28 ENCOUNTER — OFFICE VISIT (OUTPATIENT)
Dept: SURGERY | Facility: CLINIC | Age: 52
End: 2022-01-28
Payer: MEDICAID

## 2022-01-28 VITALS
OXYGEN SATURATION: 98 % | SYSTOLIC BLOOD PRESSURE: 128 MMHG | RESPIRATION RATE: 18 BRPM | WEIGHT: 283 LBS | HEART RATE: 70 BPM | HEIGHT: 64 IN | BODY MASS INDEX: 48.32 KG/M2 | DIASTOLIC BLOOD PRESSURE: 78 MMHG

## 2022-01-28 DIAGNOSIS — K21.9 GASTROESOPHAGEAL REFLUX DISEASE, UNSPECIFIED WHETHER ESOPHAGITIS PRESENT: ICD-10-CM

## 2022-01-28 DIAGNOSIS — K64.9 HEMORRHOIDS, UNSPECIFIED HEMORRHOID TYPE: ICD-10-CM

## 2022-01-28 DIAGNOSIS — K62.5 RECTAL BLEEDING: ICD-10-CM

## 2022-01-28 DIAGNOSIS — R10.13 CHRONIC EPIGASTRIC PAIN: ICD-10-CM

## 2022-01-28 DIAGNOSIS — K29.30 CHRONIC SUPERFICIAL GASTRITIS WITHOUT BLEEDING: ICD-10-CM

## 2022-01-28 DIAGNOSIS — G89.29 CHRONIC EPIGASTRIC PAIN: ICD-10-CM

## 2022-01-28 DIAGNOSIS — K31.84 GASTROPARESIS: Primary | ICD-10-CM

## 2022-01-28 PROCEDURE — 99999 PR PBB SHADOW E&M-EST. PATIENT-LVL V: ICD-10-PCS | Mod: PBBFAC,,, | Performed by: STUDENT IN AN ORGANIZED HEALTH CARE EDUCATION/TRAINING PROGRAM

## 2022-01-28 PROCEDURE — 1159F PR MEDICATION LIST DOCUMENTED IN MEDICAL RECORD: ICD-10-PCS | Mod: CPTII,,, | Performed by: STUDENT IN AN ORGANIZED HEALTH CARE EDUCATION/TRAINING PROGRAM

## 2022-01-28 PROCEDURE — 3078F PR MOST RECENT DIASTOLIC BLOOD PRESSURE < 80 MM HG: ICD-10-PCS | Mod: CPTII,,, | Performed by: STUDENT IN AN ORGANIZED HEALTH CARE EDUCATION/TRAINING PROGRAM

## 2022-01-28 PROCEDURE — 99214 PR OFFICE/OUTPT VISIT, EST, LEVL IV, 30-39 MIN: ICD-10-PCS | Mod: S$PBB,,, | Performed by: STUDENT IN AN ORGANIZED HEALTH CARE EDUCATION/TRAINING PROGRAM

## 2022-01-28 PROCEDURE — 3008F BODY MASS INDEX DOCD: CPT | Mod: CPTII,,, | Performed by: STUDENT IN AN ORGANIZED HEALTH CARE EDUCATION/TRAINING PROGRAM

## 2022-01-28 PROCEDURE — 3078F DIAST BP <80 MM HG: CPT | Mod: CPTII,,, | Performed by: STUDENT IN AN ORGANIZED HEALTH CARE EDUCATION/TRAINING PROGRAM

## 2022-01-28 PROCEDURE — 99215 OFFICE O/P EST HI 40 MIN: CPT | Mod: PBBFAC | Performed by: STUDENT IN AN ORGANIZED HEALTH CARE EDUCATION/TRAINING PROGRAM

## 2022-01-28 PROCEDURE — 99214 OFFICE O/P EST MOD 30 MIN: CPT | Mod: S$PBB,,, | Performed by: STUDENT IN AN ORGANIZED HEALTH CARE EDUCATION/TRAINING PROGRAM

## 2022-01-28 PROCEDURE — 3074F SYST BP LT 130 MM HG: CPT | Mod: CPTII,,, | Performed by: STUDENT IN AN ORGANIZED HEALTH CARE EDUCATION/TRAINING PROGRAM

## 2022-01-28 PROCEDURE — 3008F PR BODY MASS INDEX (BMI) DOCUMENTED: ICD-10-PCS | Mod: CPTII,,, | Performed by: STUDENT IN AN ORGANIZED HEALTH CARE EDUCATION/TRAINING PROGRAM

## 2022-01-28 PROCEDURE — 1159F MED LIST DOCD IN RCRD: CPT | Mod: CPTII,,, | Performed by: STUDENT IN AN ORGANIZED HEALTH CARE EDUCATION/TRAINING PROGRAM

## 2022-01-28 PROCEDURE — 3074F PR MOST RECENT SYSTOLIC BLOOD PRESSURE < 130 MM HG: ICD-10-PCS | Mod: CPTII,,, | Performed by: STUDENT IN AN ORGANIZED HEALTH CARE EDUCATION/TRAINING PROGRAM

## 2022-01-28 PROCEDURE — 99999 PR PBB SHADOW E&M-EST. PATIENT-LVL V: CPT | Mod: PBBFAC,,, | Performed by: STUDENT IN AN ORGANIZED HEALTH CARE EDUCATION/TRAINING PROGRAM

## 2022-01-28 RX ORDER — METOCLOPRAMIDE 5 MG/1
5 TABLET ORAL 4 TIMES DAILY
Qty: 120 TABLET | Refills: 1 | Status: SHIPPED | OUTPATIENT
Start: 2022-01-28 | End: 2022-03-29

## 2022-01-28 RX ORDER — HYDROCORTISONE 25 MG/G
CREAM TOPICAL 2 TIMES DAILY
Qty: 20 G | Refills: 2 | Status: SHIPPED | OUTPATIENT
Start: 2022-01-28 | End: 2023-11-06

## 2022-01-28 NOTE — PROGRESS NOTES
"    [facility] General Surgery  Follow-up    Subjective:     Chief Complaint:  Follow up imaging    HPI:  Shweta Gupta is a 51 y.o. female here today to discuss results of her recent CT enterography.  The study was normal.  The patient reports no change in her symptoms of early satiety, abdominal fullness, and occasional blood in stool.  EGD revealed some chronic gastritis.  She also had a fair amount of food still in her stomach did despite being NPO for the procedure, but gastric emptying study was normal.  Colonoscopy was negative for hemorrhoids, polyps, masses, or source of bleeding.  She does have diverticulosis and a history of a partial colectomy for diverticulosis.  She has already had cholecystectomy.    Review of Systems   Constitutional: Negative for activity change, appetite change, chills and fever.   HENT: Negative for congestion, dental problem and ear discharge.    Eyes: Negative for discharge and itching.   Respiratory: Negative for apnea, choking and chest tightness.    Cardiovascular: Negative for chest pain and leg swelling.   Gastrointestinal: Positive for abdominal distention, abdominal pain, anal bleeding and constipation. Negative for diarrhea and nausea.   Endocrine: Negative for cold intolerance and heat intolerance.   Genitourinary: Negative for difficulty urinating and dyspareunia.   Musculoskeletal: Negative for arthralgias and back pain.   Skin: Negative for color change and pallor.   Neurological: Negative for dizziness and facial asymmetry.   Hematological: Negative for adenopathy. Does not bruise/bleed easily.   Psychiatric/Behavioral: Negative for agitation and behavioral problems.       Objective:      Vitals:    01/28/22 0941   BP: 128/78   Pulse: 70   Resp: 18   SpO2: 98%   Weight: 128.4 kg (283 lb)   Height: 5' 4" (1.626 m)     Physical Exam  Constitutional:       General: She is not in acute distress.     Appearance: She is well-developed.   HENT:      Head: Normocephalic " and atraumatic.   Eyes:      Pupils: Pupils are equal, round, and reactive to light.   Neck:      Thyroid: No thyromegaly.   Cardiovascular:      Rate and Rhythm: Normal rate and regular rhythm.   Pulmonary:      Effort: Pulmonary effort is normal.      Breath sounds: Normal breath sounds.   Abdominal:      General: Bowel sounds are normal. There is no distension.      Palpations: Abdomen is soft.      Tenderness: There is no abdominal tenderness.   Musculoskeletal:         General: No deformity. Normal range of motion.      Cervical back: Normal range of motion and neck supple.   Skin:     General: Skin is warm.      Capillary Refill: Capillary refill takes less than 2 seconds.      Findings: No erythema.   Neurological:      Mental Status: She is alert and oriented to person, place, and time.      Cranial Nerves: No cranial nerve deficit.   Psychiatric:         Behavior: Behavior normal.         Lab Review:   CBC:   Lab Results   Component Value Date    WBC 11.31 11/14/2021    RBC 3.64 (L) 11/14/2021    HGB 10.9 (L) 11/14/2021    HCT 33.3 (L) 11/14/2021     11/14/2021     BMP:   Lab Results   Component Value Date     (H) 11/14/2021     11/14/2021    K 4.1 11/14/2021     11/14/2021    CO2 24 11/14/2021    BUN 26 (H) 11/14/2021    CREATININE 1.6 (H) 11/14/2021    CALCIUM 9.5 11/14/2021     Lab Results   Component Value Date    ALT 20 11/14/2021    AST 19 11/14/2021    ALKPHOS 57 11/14/2021    BILITOT 0.3 11/14/2021       Diagnostics Review: path reviewed     Assessment:       1. Gastroparesis    2. Chronic epigastric pain    3. Chronic superficial gastritis without bleeding    4. Rectal bleeding    5. Gastroesophageal reflux disease, unspecified whether esophagitis present    6. Hemorrhoids, unspecified hemorrhoid type        Plan:   Gastroparesis  -     metoclopramide HCl (REGLAN) 5 MG tablet; Take 1 tablet (5 mg total) by mouth 4 (four) times daily.  Dispense: 120 tablet; Refill:  1    Chronic epigastric pain  -     metoclopramide HCl (REGLAN) 5 MG tablet; Take 1 tablet (5 mg total) by mouth 4 (four) times daily.  Dispense: 120 tablet; Refill: 1    Chronic superficial gastritis without bleeding    Rectal bleeding  -     hydrocortisone 2.5 % cream; Apply topically 2 (two) times daily.  Dispense: 20 g; Refill: 2    Gastroesophageal reflux disease, unspecified whether esophagitis present    Hemorrhoids, unspecified hemorrhoid type  -     hydrocortisone 2.5 % cream; Apply topically 2 (two) times daily.  Dispense: 20 g; Refill: 2        Medical Decision Making/Counselin-year-old female with chronic gastritis.  She is on Protonix for this twice a day for this.  Also early satiety, abdominal pain, inability to lose weight.  Gastric emptying study negative for gastroparesis.  CT enterography with no evidence of inflammatory bowel disease.  She has been referred to Dr. Toledo for rectal bleeding.  I did not find any stigmata of bleeding or any potential source of bleeding on endoscopy.  I suspect her rectal bleeding must be intermittent hemorrhoidal flare. Anusol cream RX sent. I also suspect she has some aspect of gastroparesis despite the normal gastric emptying study.  The patient should not have still had food in her stomach on EGD given the amount of time she was NPO.  RX for Reglan 5mg 4x daily.  Follow up in 6 weeks to re-assess. She sees GI next week who may have other diagnoses in mind.    Follow up:  6 weeks    Patient instructed that best way to communicate with my office staff is for patient to get on the [facility] patient portal to expedite communication and communication issues that may occur.  Patient was given instructions on how to get on the portal.  I encouraged patient to obtain portal access as well.  Ultimately it is up to the patient to obtain access.  Patient voiced understanding.

## 2022-01-30 ENCOUNTER — PATIENT MESSAGE (OUTPATIENT)
Dept: SURGERY | Facility: CLINIC | Age: 52
End: 2022-01-30
Payer: MEDICAID

## 2022-01-31 ENCOUNTER — PATIENT MESSAGE (OUTPATIENT)
Dept: FAMILY MEDICINE | Facility: CLINIC | Age: 52
End: 2022-01-31
Payer: MEDICAID

## 2022-02-03 ENCOUNTER — OFFICE VISIT (OUTPATIENT)
Dept: GASTROENTEROLOGY | Facility: CLINIC | Age: 52
End: 2022-02-03
Payer: MEDICAID

## 2022-02-03 VITALS
WEIGHT: 281 LBS | HEART RATE: 70 BPM | OXYGEN SATURATION: 97 % | HEIGHT: 64 IN | DIASTOLIC BLOOD PRESSURE: 80 MMHG | RESPIRATION RATE: 14 BRPM | SYSTOLIC BLOOD PRESSURE: 124 MMHG | BODY MASS INDEX: 47.97 KG/M2

## 2022-02-03 DIAGNOSIS — R10.13 EPIGASTRIC PAIN: ICD-10-CM

## 2022-02-03 DIAGNOSIS — K59.00 CONSTIPATION, UNSPECIFIED CONSTIPATION TYPE: Primary | ICD-10-CM

## 2022-02-03 DIAGNOSIS — K21.9 GASTROESOPHAGEAL REFLUX DISEASE, UNSPECIFIED WHETHER ESOPHAGITIS PRESENT: ICD-10-CM

## 2022-02-03 DIAGNOSIS — K62.5 RECTAL BLEEDING: ICD-10-CM

## 2022-02-03 DIAGNOSIS — Z87.19 HISTORY OF DIVERTICULITIS: ICD-10-CM

## 2022-02-03 DIAGNOSIS — Z90.49 S/P PARTIAL COLECTOMY: ICD-10-CM

## 2022-02-03 DIAGNOSIS — K57.30 DIVERTICULOSIS OF COLON: ICD-10-CM

## 2022-02-03 PROCEDURE — 4010F PR ACE/ARB THEARPY RXD/TAKEN: ICD-10-PCS | Mod: CPTII,,, | Performed by: INTERNAL MEDICINE

## 2022-02-03 PROCEDURE — 3079F DIAST BP 80-89 MM HG: CPT | Mod: CPTII,,, | Performed by: INTERNAL MEDICINE

## 2022-02-03 PROCEDURE — 3074F PR MOST RECENT SYSTOLIC BLOOD PRESSURE < 130 MM HG: ICD-10-PCS | Mod: CPTII,,, | Performed by: INTERNAL MEDICINE

## 2022-02-03 PROCEDURE — 3079F PR MOST RECENT DIASTOLIC BLOOD PRESSURE 80-89 MM HG: ICD-10-PCS | Mod: CPTII,,, | Performed by: INTERNAL MEDICINE

## 2022-02-03 PROCEDURE — 99999 PR PBB SHADOW E&M-EST. PATIENT-LVL V: CPT | Mod: PBBFAC,,, | Performed by: INTERNAL MEDICINE

## 2022-02-03 PROCEDURE — 1160F RVW MEDS BY RX/DR IN RCRD: CPT | Mod: CPTII,,, | Performed by: INTERNAL MEDICINE

## 2022-02-03 PROCEDURE — 99999 PR PBB SHADOW E&M-EST. PATIENT-LVL V: ICD-10-PCS | Mod: PBBFAC,,, | Performed by: INTERNAL MEDICINE

## 2022-02-03 PROCEDURE — 99214 PR OFFICE/OUTPT VISIT, EST, LEVL IV, 30-39 MIN: ICD-10-PCS | Mod: S$PBB,,, | Performed by: INTERNAL MEDICINE

## 2022-02-03 PROCEDURE — 1160F PR REVIEW ALL MEDS BY PRESCRIBER/CLIN PHARMACIST DOCUMENTED: ICD-10-PCS | Mod: CPTII,,, | Performed by: INTERNAL MEDICINE

## 2022-02-03 PROCEDURE — 4010F ACE/ARB THERAPY RXD/TAKEN: CPT | Mod: CPTII,,, | Performed by: INTERNAL MEDICINE

## 2022-02-03 PROCEDURE — 99214 OFFICE O/P EST MOD 30 MIN: CPT | Mod: S$PBB,,, | Performed by: INTERNAL MEDICINE

## 2022-02-03 PROCEDURE — 99215 OFFICE O/P EST HI 40 MIN: CPT | Mod: PBBFAC,PN | Performed by: INTERNAL MEDICINE

## 2022-02-03 PROCEDURE — 3008F BODY MASS INDEX DOCD: CPT | Mod: CPTII,,, | Performed by: INTERNAL MEDICINE

## 2022-02-03 PROCEDURE — 3008F PR BODY MASS INDEX (BMI) DOCUMENTED: ICD-10-PCS | Mod: CPTII,,, | Performed by: INTERNAL MEDICINE

## 2022-02-03 PROCEDURE — 1159F PR MEDICATION LIST DOCUMENTED IN MEDICAL RECORD: ICD-10-PCS | Mod: CPTII,,, | Performed by: INTERNAL MEDICINE

## 2022-02-03 PROCEDURE — 3074F SYST BP LT 130 MM HG: CPT | Mod: CPTII,,, | Performed by: INTERNAL MEDICINE

## 2022-02-03 PROCEDURE — 1159F MED LIST DOCD IN RCRD: CPT | Mod: CPTII,,, | Performed by: INTERNAL MEDICINE

## 2022-02-03 RX ORDER — HYDROCORTISONE ACETATE 25 MG/1
25 SUPPOSITORY RECTAL 2 TIMES DAILY
Qty: 20 SUPPOSITORY | Refills: 2 | Status: SHIPPED | OUTPATIENT
Start: 2022-02-03 | End: 2022-02-13

## 2022-02-03 NOTE — PROGRESS NOTES
"Subjective:       Patient ID: Shweta Gupta is a 51 y.o. female.    Chief Complaint: Gastroesophageal Reflux (Pt states both  and Dr.Scott barnes like her to be seen by you in order to get a 3rd opinion in regards to treatment)    She is having episodes of hematochezia.  She has noticed some anal irritation.  Recent colonoscopy was unrevealing except for diverticulosis.  She had a partial  colectomy for recurrent diverticulitis.  Since her surgery she has had bowel irregularity and has been on for stool softeners and laxatives per day.  She feels if she does not empty well and she means bowel function.  Upper endoscopy was unrevealing except for retained food although the nuclear medicine scan was normal.  She was started on the Reglan 2 days ago and she denies side effects such as tremor.  She does not know whether it had helped her.  When she was a young person she states that she had epigastric pain and was told that she had a "perforated ulcer .  When she pushes on her upper abdomen she is nauseated.  She has complained of lower abdominal pressure relieved with bowel movements.  The CT enterography was unrevealing except for the postsurgical changes.  She denies hematemesis or easy bleeding.  She has tried to add more fiber to the diet.  She has been overweight and has tried to reduce her weight by reducing the caloric intake.  She is on the diabetic diet.  She has never been successful with weight and diets.      Allergies:  Review of patient's allergies indicates:  No Known Allergies    Medications:    Current Outpatient Medications:     albuterol (VENTOLIN HFA) 90 mcg/actuation inhaler, Inhale 2 puffs into the lungs every 6 (six) hours as needed for Wheezing. Rescue, Disp: 18 g, Rfl: 3    alcohol swabs PadM, USE AS DIRECTEDC, Disp: , Rfl:     aspirin (ECOTRIN) 81 MG EC tablet, aspirin 81 mg tablet,delayed release  Take 1 tablet every day by oral route., Disp: , Rfl:     atorvastatin (LIPITOR) 40 " "MG tablet, Take 1 tablet (40 mg total) by mouth once daily. atorvastatin 40 mg tablet, Disp: 90 tablet, Rfl: 3    baclofen (LIORESAL) 10 MG tablet, TAKE 1 TABLET BY MOUTH THREE TIMES DAILY, Disp: , Rfl:     blood sugar diagnostic (TRUE METRIX GLUCOSE TEST STRIP) Strp, Use to check blood glucose three times per day and as needed, Disp: 200 strip, Rfl: 10    blood sugar diagnostic Strp, Use as directed to check blood sugar TID and PRN. E 11.9, Disp: 100 each, Rfl: 0    blood sugar diagnostic Strp, Use as directed to check blood sugar TID and PRN. E 11.9, Disp: , Rfl:     buPROPion (WELLBUTRIN XL) 150 MG TB24 tablet, Take 1 tablet (150 mg total) by mouth once daily., Disp: 90 tablet, Rfl: 1    busPIRone (BUSPAR) 10 MG tablet, buspirone 10 mg tablet  TAKE ONE TABLET BY MOUTH five times a day AS NEEDED, Disp: , Rfl:     cetirizine (ZYRTEC) 10 MG tablet, Take 10 mg by mouth once daily., Disp: , Rfl: 5    citalopram (CELEXA) 20 MG tablet, Take by mouth., Disp: , Rfl:     COMFORT EZ PEN NEEDLES 31 gauge x 3/16" Ndle, , Disp: , Rfl: 11    docusate sodium (COLACE) 100 MG capsule, Stool Softener  1 tab 4 times a day, Disp: , Rfl:     fenofibrate (TRICOR) 145 MG tablet, Take 1 tablet (145 mg total) by mouth once daily., Disp: 90 tablet, Rfl: 3    fluticasone (FLONASE) 50 mcg/actuation nasal spray, 1 spray by Each Nare route once daily., Disp: , Rfl:     fluticasone-salmeterol 500-50 mcg/dose (ADVAIR) 500-50 mcg/dose DsDv diskus inhaler, Inhale 1 puff into the lungs 2 (two) times daily. Controller, Disp: 1 Inhaler, Rfl: 2    furosemide (LASIX) 40 MG tablet, Take 1 tablet (40 mg total) by mouth 2 (two) times daily., Disp: 180 tablet, Rfl: 1    HYDROcodone-acetaminophen (NORCO) 5-325 mg per tablet, TAKE 1 TABLET BY MOUTH 4 TIMES DAILY AS NEEDED FOR PAIN, Disp: , Rfl: 0    hydrocortisone 2.5 % cream, Apply topically 2 (two) times daily., Disp: 20 g, Rfl: 2    insulin (LANTUS SOLOSTAR U-100 INSULIN) glargine 100 " units/mL (3mL) SubQ pen, Inject 60 Units into the skin once daily., Disp: 15 mL, Rfl: 6    ipratropium (ATROVENT) 42 mcg (0.06 %) nasal spray, 2 sprays by Nasal route., Disp: , Rfl:     lancets (EASY TOUCH LANCETS) 26 gauge Misc, Use as directed to check blood sugar TID and PRN. E 11.9, Disp: 100 each, Rfl: 0    lancets (EASY TOUCH TWIST LANCETS) 30 gauge Misc, Use to check blood glucose three times daily and as needed, Disp: 90 each, Rfl: 10    lisinopriL (PRINIVIL,ZESTRIL) 20 MG tablet, Take 1 tablet by mouth once daily, Disp: 90 tablet, Rfl: 0    metoclopramide HCl (REGLAN) 5 MG tablet, Take 1 tablet (5 mg total) by mouth 4 (four) times daily., Disp: 120 tablet, Rfl: 1    montelukast (SINGULAIR) 10 mg tablet, Take 1 tablet (10 mg total) by mouth once daily., Disp: 90 tablet, Rfl: 3    mupirocin (BACTROBAN) 2 % ointment, Apply topically 3 (three) times daily., Disp: , Rfl:     nystatin (NYSTOP) powder, APPLY  POWDER TOPICALLY TO AFFECTED AREA THREE TIMES DAILY, Disp: 120 g, Rfl: 1    nystatin-triamcinolone (MYCOLOG II) cream, APPLY ON THE SKIN TWICE DAILY AS NEEDED, Disp: 60 g, Rfl: 5    olopatadine (PATANASE) 0.6 % nasal spray, 1 spray by Each Nare route 2 (two) times daily., Disp: , Rfl:     omega 3-dha-epa-fish oil 1,000 mg (120 mg-180 mg) Cap, Fish Oil 1,000 mg (120 mg-180 mg) capsule  Take 1 capsule twice a day by oral route., Disp: , Rfl:     ondansetron (ZOFRAN-ODT) 4 MG TbDL, Take 1 tablet (4 mg total) by mouth every 8 (eight) hours as needed (nausea)., Disp: 12 tablet, Rfl: 0    oxyCODONE (ROXICODONE) 5 MG immediate release tablet, Take 1 tablet (5 mg total) by mouth every 4 (four) hours as needed for Pain., Disp: 5 tablet, Rfl: 0    pantoprazole (PROTONIX) 40 MG tablet, Take 1 tablet (40 mg total) by mouth once daily. (Patient taking differently: Take 40 mg by mouth 2 (two) times daily.), Disp: 90 tablet, Rfl: 1    pioglitazone (ACTOS) 45 MG tablet, Take 1 tablet (45 mg total) by mouth  once daily., Disp: 90 tablet, Rfl: 1    potassium chloride SA (K-DUR,KLOR-CON) 20 MEQ tablet, Take 1 tablet (20 mEq total) by mouth 2 (two) times daily., Disp: 180 tablet, Rfl: 0    pregabalin (LYRICA) 200 MG Cap, Take 1 capsule (200 mg total) by mouth 3 (three) times daily., Disp: 90 capsule, Rfl: 1    ranolazine (RANEXA) 500 MG Tb12, Take 1 tablet (500 mg total) by mouth 2 (two) times daily., Disp: 180 tablet, Rfl: 1    sucralfate (CARAFATE) 1 gram tablet, TAKE 1 TABLET BY MOUTH EVERY 6 HOURS, Disp: 360 tablet, Rfl: 1    tamsulosin (FLOMAX) 0.4 mg Cap, Take 1 capsule (0.4 mg total) by mouth once daily., Disp: 30 capsule, Rfl: 0    VIIBRYD 40 mg Tab tablet, Take 1 tablet (40 mg total) by mouth once daily., Disp: 90 tablet, Rfl: 1    zolpidem (AMBIEN) 10 mg Tab, TAKE 1 TABLET BY MOUTH ONCE DAILY IN THE EVENING, Disp: 30 tablet, Rfl: 0    azelastine (ASTELIN) 137 mcg (0.1 %) nasal spray, 2 sprays by Nasal route., Disp: , Rfl:     blood-glucose meter kit, Use as instructed, Disp: , Rfl:     budesonide-formoterol 160-4.5 mcg (SYMBICORT) 160-4.5 mcg/actuation HFAA, Inhale 2 puffs into the lungs., Disp: , Rfl:     EPINEPHrine (EPIPEN 2-NAKUL) 0.3 mg/0.3 mL AtIn, Inject 0.3 mg into the muscle. , Disp: , Rfl:     hydrocortisone (ANUSOL-HC) 25 mg suppository, Place 1 suppository (25 mg total) rectally 2 (two) times daily. for 10 days, Disp: 20 suppository, Rfl: 2    valACYclovir (VALTREX) 1000 MG tablet, Take 1 tablet (1,000 mg total) by mouth every 12 (twelve) hours., Disp: 60 tablet, Rfl: 11    Past Medical History:   Diagnosis Date    Allergy     Anemia     Anxiety     Asthma     Depression     Diabetes mellitus, type 2     Diverticulitis     Gastroparesis     GERD (gastroesophageal reflux disease)     Hyperlipidemia     Hypertension     Mitral valve prolapse     Morbid obesity     Neuropathy        Past Surgical History:   Procedure Laterality Date    ACHILLES TENDON SURGERY Left      APPENDECTOMY      BRAIN SURGERY       Shunt    CARPAL TUNNEL RELEASE Left      SECTION      CHOLECYSTECTOMY      COLON SURGERY      COLONOSCOPY N/A 2020    Procedure: COLONOSCOPY;  Surgeon: Khanh Soriano MD;  Location: [facility] ENDO;  Service: General;  Laterality: N/A;    COLONOSCOPY N/A 2021    Procedure: COLONOSCOPY;  Surgeon: Darleen Caballero MD;  Location: [facility] ENDO;  Service: General;  Laterality: N/A;    CYST REMOVAL  2021    Cyst of finger    CYSTOSCOPY WITH URETEROSCOPY, RETROGRADE PYELOGRAPHY, AND INSERTION OF STENT Left 2021    Procedure: CYSTOSCOPY, WITH RETROGRADE PYELOGRAM AND URETERAL STENT INSERTION;  Surgeon: Bessy Allen MD;  Location: [facility] OR;  Service: Urology;  Laterality: Left;    ESOPHAGOGASTRODUODENOSCOPY N/A 2020    Procedure: ESOPHAGOGASTRODUODENOSCOPY (EGD);  Surgeon: Khanh Soriano MD;  Location: [facility] ENDO;  Service: General;  Laterality: N/A;    ESOPHAGOGASTRODUODENOSCOPY N/A 2021    Procedure: ESOPHAGOGASTRODUODENOSCOPY (EGD);  Surgeon: Darleen Caballero MD;  Location: [facility] ENDO;  Service: General;  Laterality: N/A;    HERNIA REPAIR      HYSTERECTOMY      partial    KIDNEY STONE SURGERY      OOPHORECTOMY      TRIGGER FINGER RELEASE Left     URETEROSCOPIC REMOVAL OF URETERIC CALCULUS Left 2021    Procedure: REMOVAL, CALCULUS, URETER, URETEROSCOPIC;  Surgeon: Bessy Allen MD;  Location: [facility] OR;  Service: Urology;  Laterality: Left;    VENTRICULOPERITONEAL SHUNT           Review of Systems   Constitutional: Negative for appetite change, fever and unexpected weight change.   HENT: Negative for trouble swallowing.         No jaundice.   Respiratory: Negative for cough, shortness of breath and wheezing.         She has never used tobacco or alcohol.  She denies dysphagia aspiration hemoptysis chronic cough chronic sputum production or dyspnea on exertion.   Cardiovascular: Negative for chest pain.         She has a history of mitral valve prolapse but she denies cardiac pain, exertional chest pain or rhythm disturbance.  She states her hyperlipidemia hypertension are well controlled.   Gastrointestinal: Positive for abdominal distention, anal bleeding, constipation and nausea. Negative for abdominal pain, blood in stool, diarrhea and rectal pain.   Endocrine:        She tries stay on the diabetic diet.  She made a food diary and she avoids the fried in the fatty foods.  She realizes she is overweight and has tried reduce her weight by decreasing her caloric intake but she states is impossible for lose weight.  She has been on her vitamins and minerals.  Her family history is positive for diabetes and heart disease.  Is negative for GI cancer or gastrointestinal illnesses.   Genitourinary:        Has a history kidney stones.  She denies hematuria.  She has occasional nocturia.  She is followed by her urologist.   Musculoskeletal: Positive for arthralgias. Negative for back pain and neck pain.   Skin: Negative for pallor and rash.   Neurological: Negative for dizziness, seizures, syncope, speech difficulty, weakness and numbness.        Neurologically she is stable.  She has had a  shunt.  She does have a neuropathy.  She had by her neurologist.   Hematological: Negative for adenopathy.   Psychiatric/Behavioral: Negative for confusion.       Objective:      Physical Exam  Vitals reviewed.   Constitutional:       Appearance: She is well-developed and well-nourished.      Comments: Well-nourished well-hydrated afebrile white female.  She is sitting comfortably in the chair.  She is breathing normally.  She appears to be oriented 3 and cap.  relate her history and answer questions appropriately.  She is normocephalic.  Pupils are normal   HENT:      Head: Normocephalic.   Eyes:      Extraocular Movements: EOM normal.      Pupils: Pupils are equal, round, and reactive to light.   Neck:      Thyroid: No thyromegaly.       Trachea: No tracheal deviation.   Cardiovascular:      Rate and Rhythm: Normal rate and regular rhythm.      Heart sounds: Normal heart sounds.   Pulmonary:      Effort: Pulmonary effort is normal.      Breath sounds: Normal breath sounds.   Abdominal:      General: Bowel sounds are normal. There is no distension.      Palpations: Abdomen is soft. There is no mass.      Tenderness: There is abdominal tenderness. There is no right CVA tenderness, left CVA tenderness, guarding or rebound.      Hernia: No hernia is present.      Comments: The abdomen is soft it is obese with tenderness to palpation in the epigastrium.  Organomegaly or masses are detected.  Bowel sounds are normal   Musculoskeletal:         General: Normal range of motion.      Cervical back: Normal range of motion and neck supple.      Comments: She can ambulate normally.  She can go from the sitting to the standing position difficulty.  She can get on the exam table assistance   Lymphadenopathy:      Cervical: No cervical adenopathy.   Skin:     General: Skin is warm and dry.   Neurological:      Mental Status: She is alert and oriented to person, place, and time.      Cranial Nerves: No cranial nerve deficit.   Psychiatric:         Mood and Affect: Mood and affect normal.         Behavior: Behavior normal.           Plan:       Constipation, unspecified constipation type  -     X-Ray Abdomen_Colon Motility Study; Future; Expected date: 02/03/2022  -     X-Ray Abdomen_Colon Motility Study; Future; Expected date: 02/03/2022  -     X-Ray Abdomen_Colon Motility Study; Future; Expected date: 02/03/2022    Rectal bleeding  -     Ambulatory referral/consult to Gastroenterology  -     hydrocortisone (ANUSOL-HC) 25 mg suppository; Place 1 suppository (25 mg total) rectally 2 (two) times daily. for 10 days  Dispense: 20 suppository; Refill: 2    Diverticulosis of colon    Epigastric pain    S/P partial colectomy    Gastroesophageal reflux disease, unspecified  whether esophagitis present    History of diverticulitis    She will continue her reflux regimen.  She will make a food and avoid the offending foods particularly the in the fatty foods.  Discussed obesity with her.  She will try to reduce her weight by decreasing her caloric intake.  She continues her additional fiber.  She is Anusol suppositories.  She will follow-up with her surgeon.  Evaluation of the constipation is in progress.  She continues her medications vitamins and minerals.

## 2022-02-03 NOTE — PATIENT INSTRUCTIONS
She will continue her reflux regimen.  She continues the Reglan.  She started on the Anusol suppositories for the rectal bleeding.  A Sitz marker study will be obtained to evaluate colon motility.  She continues the diabetic diet vitamins minerals and current medications.

## 2022-02-07 ENCOUNTER — HOSPITAL ENCOUNTER (OUTPATIENT)
Dept: RADIOLOGY | Facility: HOSPITAL | Age: 52
Discharge: HOME | End: 2022-02-07
Attending: INTERNAL MEDICINE
Payer: MEDICAID

## 2022-02-07 DIAGNOSIS — K59.00 CONSTIPATION, UNSPECIFIED CONSTIPATION TYPE: ICD-10-CM

## 2022-02-07 PROBLEM — K62.5 RECTAL BLEEDING: Status: ACTIVE | Noted: 2022-02-07

## 2022-02-07 PROCEDURE — 74018 RADEX ABDOMEN 1 VIEW: CPT | Mod: TC,FY

## 2022-02-07 PROCEDURE — 74018 XR ABDOMEN_COLON MOTILITY STUDY: ICD-10-PCS | Mod: 26,,, | Performed by: RADIOLOGY

## 2022-02-07 PROCEDURE — 74018 RADEX ABDOMEN 1 VIEW: CPT | Mod: 26,,, | Performed by: RADIOLOGY

## 2022-02-07 PROCEDURE — A9698 NON-RAD CONTRAST MATERIALNOC: HCPCS | Performed by: INTERNAL MEDICINE

## 2022-02-07 PROCEDURE — 25500020 PHARM REV CODE 255: Performed by: INTERNAL MEDICINE

## 2022-02-07 RX ADMIN — Medication 1 CAPSULE: at 11:02

## 2022-02-09 ENCOUNTER — HOSPITAL ENCOUNTER (OUTPATIENT)
Dept: RADIOLOGY | Facility: HOSPITAL | Age: 52
Discharge: HOME | End: 2022-02-09
Attending: INTERNAL MEDICINE
Payer: MEDICAID

## 2022-02-09 DIAGNOSIS — K59.00 CONSTIPATION, UNSPECIFIED CONSTIPATION TYPE: ICD-10-CM

## 2022-02-09 PROCEDURE — 74018 RADEX ABDOMEN 1 VIEW: CPT | Mod: 26,,, | Performed by: RADIOLOGY

## 2022-02-09 PROCEDURE — 74018 RADEX ABDOMEN 1 VIEW: CPT | Mod: TC,FY

## 2022-02-09 PROCEDURE — 74018 XR ABDOMEN_COLON MOTILITY STUDY: ICD-10-PCS | Mod: 26,,, | Performed by: RADIOLOGY

## 2022-02-11 ENCOUNTER — HOSPITAL ENCOUNTER (OUTPATIENT)
Dept: RADIOLOGY | Facility: HOSPITAL | Age: 52
Discharge: HOME | End: 2022-02-11
Attending: INTERNAL MEDICINE
Payer: MEDICAID

## 2022-02-11 DIAGNOSIS — K59.00 CONSTIPATION, UNSPECIFIED CONSTIPATION TYPE: ICD-10-CM

## 2022-02-11 PROCEDURE — 74018 RADEX ABDOMEN 1 VIEW: CPT | Mod: TC,FY

## 2022-02-11 PROCEDURE — 74018 RADEX ABDOMEN 1 VIEW: CPT | Mod: 26,,, | Performed by: RADIOLOGY

## 2022-02-11 PROCEDURE — 74018 XR ABDOMEN_COLON MOTILITY STUDY: ICD-10-PCS | Mod: 26,,, | Performed by: RADIOLOGY

## 2022-02-16 ENCOUNTER — HOSPITAL ENCOUNTER (OUTPATIENT)
Dept: RADIOLOGY | Facility: HOSPITAL | Age: 52
Discharge: HOME | End: 2022-02-16
Attending: STUDENT IN AN ORGANIZED HEALTH CARE EDUCATION/TRAINING PROGRAM
Payer: MEDICAID

## 2022-02-16 DIAGNOSIS — N20.0 KIDNEY STONES: ICD-10-CM

## 2022-02-16 PROCEDURE — 76770 US EXAM ABDO BACK WALL COMP: CPT | Mod: 26,,, | Performed by: RADIOLOGY

## 2022-02-16 PROCEDURE — 76770 US EXAM ABDO BACK WALL COMP: CPT | Mod: TC

## 2022-02-16 PROCEDURE — 76770 US RETROPERITONEAL COMPLETE: ICD-10-PCS | Mod: 26,,, | Performed by: RADIOLOGY

## 2022-02-17 ENCOUNTER — PATIENT OUTREACH (OUTPATIENT)
Dept: ADMINISTRATIVE | Facility: OTHER | Age: 52
End: 2022-02-17
Payer: MEDICAID

## 2022-02-18 ENCOUNTER — PATIENT MESSAGE (OUTPATIENT)
Dept: FAMILY MEDICINE | Facility: CLINIC | Age: 52
End: 2022-02-18
Payer: MEDICAID

## 2022-02-18 DIAGNOSIS — Z76.0 MEDICATION REFILL: Primary | ICD-10-CM

## 2022-02-21 ENCOUNTER — TELEPHONE (OUTPATIENT)
Dept: FAMILY MEDICINE | Facility: CLINIC | Age: 52
End: 2022-02-21
Payer: MEDICAID

## 2022-02-21 DIAGNOSIS — J06.9 UPPER RESPIRATORY TRACT INFECTION, UNSPECIFIED TYPE: Primary | ICD-10-CM

## 2022-02-21 RX ORDER — AMOXICILLIN AND CLAVULANATE POTASSIUM 875; 125 MG/1; MG/1
1 TABLET, FILM COATED ORAL EVERY 12 HOURS
Qty: 14 TABLET | Refills: 0 | Status: SHIPPED | OUTPATIENT
Start: 2022-02-21 | End: 2022-02-28

## 2022-02-21 RX ORDER — IPRATROPIUM BROMIDE 0.5 MG/2.5ML
SOLUTION RESPIRATORY (INHALATION)
COMMUNITY
Start: 2022-02-19

## 2022-02-21 RX ORDER — MONTELUKAST SODIUM 10 MG/1
10 TABLET ORAL DAILY
Qty: 90 TABLET | Refills: 3 | Status: SHIPPED | OUTPATIENT
Start: 2022-02-21 | End: 2023-02-17 | Stop reason: SDUPTHER

## 2022-02-21 NOTE — TELEPHONE ENCOUNTER
----- Message from Mirta Hines sent at 2/21/2022  8:05 AM CST -----  Contact: pt  Type:  Same Day Appointment Request    Caller is requesting a same day appointment.  Caller declined first available appointment listed below.      Name of Caller:  pt  When is the first available appointment?  4/5/22  Symptoms:  sinus, allergy, ear & throat ache, coughing up mucus  Best Call Back Number:  [phone]  Additional Information:

## 2022-02-21 NOTE — TELEPHONE ENCOUNTER
Spoke with patient. Patient is requesting RX for sinus infection, coughing up yellow mucus and ear pain.  Patient denies any fever. Please advise!

## 2022-02-22 ENCOUNTER — PATIENT MESSAGE (OUTPATIENT)
Dept: FAMILY MEDICINE | Facility: CLINIC | Age: 52
End: 2022-02-22
Payer: MEDICAID

## 2022-02-22 DIAGNOSIS — U07.1 COVID-19: Primary | ICD-10-CM

## 2022-02-23 RX ORDER — FAMOTIDINE 40 MG/1
40 TABLET, FILM COATED ORAL 2 TIMES DAILY
Qty: 28 TABLET | Refills: 0 | Status: SHIPPED | OUTPATIENT
Start: 2022-02-23 | End: 2022-03-07 | Stop reason: HOSPADM

## 2022-02-23 RX ORDER — DOXYCYCLINE HYCLATE 100 MG
100 TABLET ORAL 2 TIMES DAILY
Qty: 20 TABLET | Refills: 0 | Status: SHIPPED | OUTPATIENT
Start: 2022-02-23 | End: 2022-03-05

## 2022-03-02 ENCOUNTER — LAB VISIT (OUTPATIENT)
Dept: LAB | Facility: HOSPITAL | Age: 52
End: 2022-03-02
Attending: FAMILY MEDICINE
Payer: MEDICAID

## 2022-03-02 DIAGNOSIS — Z79.4 TYPE 2 DIABETES MELLITUS WITHOUT COMPLICATION, WITH LONG-TERM CURRENT USE OF INSULIN: ICD-10-CM

## 2022-03-02 DIAGNOSIS — E11.9 TYPE 2 DIABETES MELLITUS WITHOUT COMPLICATION, WITH LONG-TERM CURRENT USE OF INSULIN: ICD-10-CM

## 2022-03-02 LAB
ESTIMATED AVG GLUCOSE: 140 MG/DL (ref 68–131)
HBA1C MFR BLD: 6.5 % (ref 4–5.6)
TSH SERPL DL<=0.005 MIU/L-ACNC: 1.6 UIU/ML (ref 0.4–4)

## 2022-03-02 PROCEDURE — 84443 ASSAY THYROID STIM HORMONE: CPT | Performed by: FAMILY MEDICINE

## 2022-03-02 PROCEDURE — 36415 COLL VENOUS BLD VENIPUNCTURE: CPT | Performed by: FAMILY MEDICINE

## 2022-03-02 PROCEDURE — 83036 HEMOGLOBIN GLYCOSYLATED A1C: CPT | Performed by: FAMILY MEDICINE

## 2022-03-07 ENCOUNTER — OFFICE VISIT (OUTPATIENT)
Dept: GASTROENTEROLOGY | Facility: CLINIC | Age: 52
End: 2022-03-07
Payer: MEDICAID

## 2022-03-07 VITALS
WEIGHT: 283 LBS | BODY MASS INDEX: 48.32 KG/M2 | HEART RATE: 73 BPM | HEIGHT: 64 IN | OXYGEN SATURATION: 96 % | RESPIRATION RATE: 12 BRPM | DIASTOLIC BLOOD PRESSURE: 77 MMHG | SYSTOLIC BLOOD PRESSURE: 135 MMHG

## 2022-03-07 DIAGNOSIS — E65 OBESE ABDOMEN: ICD-10-CM

## 2022-03-07 DIAGNOSIS — Z90.49 S/P PARTIAL COLECTOMY: ICD-10-CM

## 2022-03-07 DIAGNOSIS — K21.9 GASTROESOPHAGEAL REFLUX DISEASE, UNSPECIFIED WHETHER ESOPHAGITIS PRESENT: ICD-10-CM

## 2022-03-07 DIAGNOSIS — K31.84 GASTROPARESIS: ICD-10-CM

## 2022-03-07 DIAGNOSIS — Z87.19 HISTORY OF DIVERTICULITIS: ICD-10-CM

## 2022-03-07 DIAGNOSIS — E66.01 CLASS 3 SEVERE OBESITY WITH BODY MASS INDEX (BMI) OF 45.0 TO 49.9 IN ADULT, UNSPECIFIED OBESITY TYPE, UNSPECIFIED WHETHER SERIOUS COMORBIDITY PRESENT: ICD-10-CM

## 2022-03-07 DIAGNOSIS — E66.9 OBESITY, UNSPECIFIED CLASSIFICATION, UNSPECIFIED OBESITY TYPE, UNSPECIFIED WHETHER SERIOUS COMORBIDITY PRESENT: ICD-10-CM

## 2022-03-07 DIAGNOSIS — K57.90 DIVERTICULOSIS: Primary | ICD-10-CM

## 2022-03-07 PROBLEM — K59.00 CONSTIPATION: Status: RESOLVED | Noted: 2022-02-07 | Resolved: 2022-03-07

## 2022-03-07 PROBLEM — K62.5 RECTAL BLEEDING: Status: RESOLVED | Noted: 2022-02-07 | Resolved: 2022-03-07

## 2022-03-07 PROCEDURE — 99214 PR OFFICE/OUTPT VISIT, EST, LEVL IV, 30-39 MIN: ICD-10-PCS | Mod: S$PBB,,, | Performed by: INTERNAL MEDICINE

## 2022-03-07 PROCEDURE — 3075F PR MOST RECENT SYSTOLIC BLOOD PRESS GE 130-139MM HG: ICD-10-PCS | Mod: CPTII,,, | Performed by: INTERNAL MEDICINE

## 2022-03-07 PROCEDURE — 99215 OFFICE O/P EST HI 40 MIN: CPT | Mod: PBBFAC,PN | Performed by: INTERNAL MEDICINE

## 2022-03-07 PROCEDURE — 99214 OFFICE O/P EST MOD 30 MIN: CPT | Mod: S$PBB,,, | Performed by: INTERNAL MEDICINE

## 2022-03-07 PROCEDURE — 4010F PR ACE/ARB THEARPY RXD/TAKEN: ICD-10-PCS | Mod: CPTII,,, | Performed by: INTERNAL MEDICINE

## 2022-03-07 PROCEDURE — 99999 PR PBB SHADOW E&M-EST. PATIENT-LVL V: CPT | Mod: PBBFAC,,, | Performed by: INTERNAL MEDICINE

## 2022-03-07 PROCEDURE — 3044F PR MOST RECENT HEMOGLOBIN A1C LEVEL <7.0%: ICD-10-PCS | Mod: CPTII,,, | Performed by: INTERNAL MEDICINE

## 2022-03-07 PROCEDURE — 3008F BODY MASS INDEX DOCD: CPT | Mod: CPTII,,, | Performed by: INTERNAL MEDICINE

## 2022-03-07 PROCEDURE — 4010F ACE/ARB THERAPY RXD/TAKEN: CPT | Mod: CPTII,,, | Performed by: INTERNAL MEDICINE

## 2022-03-07 PROCEDURE — 3044F HG A1C LEVEL LT 7.0%: CPT | Mod: CPTII,,, | Performed by: INTERNAL MEDICINE

## 2022-03-07 PROCEDURE — 99999 PR PBB SHADOW E&M-EST. PATIENT-LVL V: ICD-10-PCS | Mod: PBBFAC,,, | Performed by: INTERNAL MEDICINE

## 2022-03-07 PROCEDURE — 3075F SYST BP GE 130 - 139MM HG: CPT | Mod: CPTII,,, | Performed by: INTERNAL MEDICINE

## 2022-03-07 PROCEDURE — 3078F DIAST BP <80 MM HG: CPT | Mod: CPTII,,, | Performed by: INTERNAL MEDICINE

## 2022-03-07 PROCEDURE — 3078F PR MOST RECENT DIASTOLIC BLOOD PRESSURE < 80 MM HG: ICD-10-PCS | Mod: CPTII,,, | Performed by: INTERNAL MEDICINE

## 2022-03-07 PROCEDURE — 1159F PR MEDICATION LIST DOCUMENTED IN MEDICAL RECORD: ICD-10-PCS | Mod: CPTII,,, | Performed by: INTERNAL MEDICINE

## 2022-03-07 PROCEDURE — 3008F PR BODY MASS INDEX (BMI) DOCUMENTED: ICD-10-PCS | Mod: CPTII,,, | Performed by: INTERNAL MEDICINE

## 2022-03-07 PROCEDURE — 1159F MED LIST DOCD IN RCRD: CPT | Mod: CPTII,,, | Performed by: INTERNAL MEDICINE

## 2022-03-07 RX ORDER — IBUPROFEN 800 MG/1
TABLET ORAL
COMMUNITY
Start: 2022-02-09 | End: 2022-03-07

## 2022-03-07 NOTE — PATIENT INSTRUCTIONS
She is doing extremely on the Reglan 5 mg   She denies side effects.  She continue a 12 week course and then will be reassessed.  She is having daily bowel movements.  The Sitz markers were normal.  She wants to have bariatric surgery and she is referred to the [facility] surgeon for obesity and consideration of the gastric sleeve.  Continue her reflux regimen current medications vitamins and minerals.  She continues the diabetic diet.  She continues the Protonix but will stop the Carafate in the Pepcid.

## 2022-03-07 NOTE — PROGRESS NOTES
Subjective:       Patient ID: Shweta Gupta is a 51 y.o. female.    Chief Complaint: No chief complaint on file.    She had upper endoscopy and colonoscopy by her surgeon.  She was found to have retained food in the stomach although the nuclear medicine scan was normal.  She was started on Reglan.  She has been on the Pepcid Protonix and Carafate for the gastroesophageal reflux.  She has constipation but her Sitz markers reveal normal function.  She was found to have a partial colectomy.  She was found to have diverticulosis.  She denies hematemesis hematochezia jaundice or bleeding.  She is tolerating the Reglan without side effects such as tremor.  She believes the Reglan has helped produced daily bowel movements and she is not constipated.  The Reglan has helped with the nausea and regurgitation.  She is on the Pepcid Carafate and Protonix.  She does not believe that she needs the Carafate in the Pepcid.  She is on the diabetic diet.  She knows that she is overweight and she wants to reduce her weight.  She has tried every known diet without benefit.  She would like to have bariatric surgery with a gastric sleeve.      Allergies:  Review of patient's allergies indicates:  No Known Allergies    Medications:    Current Outpatient Medications:     albuterol (VENTOLIN HFA) 90 mcg/actuation inhaler, Inhale 2 puffs into the lungs every 6 (six) hours as needed for Wheezing. Rescue, Disp: 18 g, Rfl: 3    alcohol swabs PadM, USE AS DIRECTEDC, Disp: , Rfl:     aspirin (ECOTRIN) 81 MG EC tablet, aspirin 81 mg tablet,delayed release  Take 1 tablet every day by oral route., Disp: , Rfl:     atorvastatin (LIPITOR) 40 MG tablet, Take 1 tablet (40 mg total) by mouth once daily. atorvastatin 40 mg tablet, Disp: 90 tablet, Rfl: 3    baclofen (LIORESAL) 10 MG tablet, TAKE 1 TABLET BY MOUTH THREE TIMES DAILY, Disp: , Rfl:     blood sugar diagnostic (TRUE METRIX GLUCOSE TEST STRIP) Strp, Use to check blood glucose three times  "per day and as needed, Disp: 200 strip, Rfl: 10    blood sugar diagnostic Strp, Use as directed to check blood sugar TID and PRN. E 11.9, Disp: 100 each, Rfl: 0    blood sugar diagnostic Strp, Use as directed to check blood sugar TID and PRN. E 11.9, Disp: , Rfl:     buPROPion (WELLBUTRIN XL) 150 MG TB24 tablet, Take 1 tablet (150 mg total) by mouth once daily., Disp: 90 tablet, Rfl: 1    busPIRone (BUSPAR) 10 MG tablet, buspirone 10 mg tablet  TAKE ONE TABLET BY MOUTH five times a day AS NEEDED, Disp: , Rfl:     cetirizine (ZYRTEC) 10 MG tablet, Take 10 mg by mouth once daily., Disp: , Rfl: 5    citalopram (CELEXA) 20 MG tablet, Take by mouth., Disp: , Rfl:     COMFORT EZ PEN NEEDLES 31 gauge x 3/16" Ndle, , Disp: , Rfl: 11    docusate sodium (COLACE) 100 MG capsule, Stool Softener  1 tab 4 times a day, Disp: , Rfl:     famotidine (PEPCID) 40 MG tablet, Take 1 tablet (40 mg total) by mouth 2 (two) times daily. for 14 days, Disp: 28 tablet, Rfl: 0    fenofibrate (TRICOR) 145 MG tablet, Take 1 tablet (145 mg total) by mouth once daily., Disp: 90 tablet, Rfl: 3    fluticasone (FLONASE) 50 mcg/actuation nasal spray, 1 spray by Each Nare route once daily., Disp: , Rfl:     fluticasone-salmeterol 500-50 mcg/dose (ADVAIR) 500-50 mcg/dose DsDv diskus inhaler, Inhale 1 puff into the lungs 2 (two) times daily. Controller, Disp: 1 Inhaler, Rfl: 2    furosemide (LASIX) 40 MG tablet, Take 1 tablet (40 mg total) by mouth 2 (two) times daily., Disp: 180 tablet, Rfl: 1    HYDROcodone-acetaminophen (NORCO) 5-325 mg per tablet, TAKE 1 TABLET BY MOUTH 4 TIMES DAILY AS NEEDED FOR PAIN, Disp: , Rfl: 0    hydrocortisone 2.5 % cream, Apply topically 2 (two) times daily., Disp: 20 g, Rfl: 2    insulin (LANTUS SOLOSTAR U-100 INSULIN) glargine 100 units/mL (3mL) SubQ pen, Inject 60 Units into the skin once daily., Disp: 15 mL, Rfl: 6    ipratropium (ATROVENT) 0.02 % nebulizer solution, SMARTSI Vial(s) Via Nebulizer 4 " Times Daily PRN, Disp: , Rfl:     ipratropium (ATROVENT) 42 mcg (0.06 %) nasal spray, 2 sprays by Nasal route., Disp: , Rfl:     lancets (EASY TOUCH LANCETS) 26 gauge Misc, Use as directed to check blood sugar TID and PRN. E 11.9, Disp: 100 each, Rfl: 0    lancets (EASY TOUCH TWIST LANCETS) 30 gauge Misc, Use to check blood glucose three times daily and as needed, Disp: 90 each, Rfl: 10    lisinopriL (PRINIVIL,ZESTRIL) 20 MG tablet, Take 1 tablet by mouth once daily, Disp: 90 tablet, Rfl: 0    metoclopramide HCl (REGLAN) 5 MG tablet, Take 1 tablet (5 mg total) by mouth 4 (four) times daily., Disp: 120 tablet, Rfl: 1    montelukast (SINGULAIR) 10 mg tablet, Take 1 tablet (10 mg total) by mouth once daily., Disp: 90 tablet, Rfl: 3    mupirocin (BACTROBAN) 2 % ointment, Apply topically 3 (three) times daily., Disp: , Rfl:     nystatin (NYSTOP) powder, APPLY  POWDER TOPICALLY TO AFFECTED AREA THREE TIMES DAILY, Disp: 120 g, Rfl: 1    nystatin-triamcinolone (MYCOLOG II) cream, APPLY ON THE SKIN TWICE DAILY AS NEEDED, Disp: 60 g, Rfl: 5    olopatadine (PATANASE) 0.6 % nasal spray, 1 spray by Each Nare route 2 (two) times daily., Disp: , Rfl:     omega 3-dha-epa-fish oil 1,000 mg (120 mg-180 mg) Cap, Fish Oil 1,000 mg (120 mg-180 mg) capsule  Take 1 capsule twice a day by oral route., Disp: , Rfl:     ondansetron (ZOFRAN-ODT) 4 MG TbDL, Take 1 tablet (4 mg total) by mouth every 8 (eight) hours as needed (nausea)., Disp: 12 tablet, Rfl: 0    oxyCODONE (ROXICODONE) 5 MG immediate release tablet, Take 1 tablet (5 mg total) by mouth every 4 (four) hours as needed for Pain., Disp: 5 tablet, Rfl: 0    pantoprazole (PROTONIX) 40 MG tablet, Take 1 tablet (40 mg total) by mouth once daily. (Patient taking differently: Take 40 mg by mouth 2 (two) times daily.), Disp: 90 tablet, Rfl: 1    pioglitazone (ACTOS) 45 MG tablet, Take 1 tablet (45 mg total) by mouth once daily., Disp: 90 tablet, Rfl: 1    potassium chloride  SA (K-DUR,KLOR-CON) 20 MEQ tablet, Take 1 tablet (20 mEq total) by mouth 2 (two) times daily., Disp: 180 tablet, Rfl: 0    pregabalin (LYRICA) 200 MG Cap, Take 1 capsule (200 mg total) by mouth 3 (three) times daily., Disp: 90 capsule, Rfl: 1    ranolazine (RANEXA) 500 MG Tb12, Take 1 tablet (500 mg total) by mouth 2 (two) times daily., Disp: 180 tablet, Rfl: 1    sucralfate (CARAFATE) 1 gram tablet, TAKE 1 TABLET BY MOUTH EVERY 6 HOURS, Disp: 360 tablet, Rfl: 1    tamsulosin (FLOMAX) 0.4 mg Cap, Take 1 capsule (0.4 mg total) by mouth once daily., Disp: 30 capsule, Rfl: 0    VIIBRYD 40 mg Tab tablet, Take 1 tablet (40 mg total) by mouth once daily., Disp: 90 tablet, Rfl: 1    zolpidem (AMBIEN) 10 mg Tab, TAKE 1 TABLET BY MOUTH ONCE DAILY IN THE EVENING, Disp: 30 tablet, Rfl: 0    azelastine (ASTELIN) 137 mcg (0.1 %) nasal spray, 2 sprays by Nasal route., Disp: , Rfl:     blood-glucose meter kit, Use as instructed, Disp: , Rfl:     budesonide-formoterol 160-4.5 mcg (SYMBICORT) 160-4.5 mcg/actuation HFAA, Inhale 2 puffs into the lungs., Disp: , Rfl:     EPINEPHrine (EPIPEN 2-NAKUL) 0.3 mg/0.3 mL AtIn, Inject 0.3 mg into the muscle. , Disp: , Rfl:     valACYclovir (VALTREX) 1000 MG tablet, Take 1 tablet (1,000 mg total) by mouth every 12 (twelve) hours., Disp: 60 tablet, Rfl: 11    Past Medical History:   Diagnosis Date    Allergy     Anemia     Anxiety     Asthma     Depression     Diabetes mellitus, type 2     Diverticulitis     Gastroparesis     GERD (gastroesophageal reflux disease)     Hyperlipidemia     Hypertension     Mitral valve prolapse     Morbid obesity     Neuropathy        Past Surgical History:   Procedure Laterality Date    ACHILLES TENDON SURGERY Left     APPENDECTOMY      BRAIN SURGERY       Shunt    CARPAL TUNNEL RELEASE Left      SECTION      CHOLECYSTECTOMY      COLON SURGERY      COLONOSCOPY N/A 2020    Procedure: COLONOSCOPY;  Surgeon: Khanh  DAO Soriano MD;  Location: [facility] ENDO;  Service: General;  Laterality: N/A;    COLONOSCOPY N/A 12/2/2021    Procedure: COLONOSCOPY;  Surgeon: Darleen Caballero MD;  Location: [facility] ENDO;  Service: General;  Laterality: N/A;    CYST REMOVAL  12/09/2021    Cyst of finger    CYSTOSCOPY WITH URETEROSCOPY, RETROGRADE PYELOGRAPHY, AND INSERTION OF STENT Left 11/16/2021    Procedure: CYSTOSCOPY, WITH RETROGRADE PYELOGRAM AND URETERAL STENT INSERTION;  Surgeon: Bessy Allen MD;  Location: [facility] OR;  Service: Urology;  Laterality: Left;    ESOPHAGOGASTRODUODENOSCOPY N/A 2/7/2020    Procedure: ESOPHAGOGASTRODUODENOSCOPY (EGD);  Surgeon: Khanh Soriano MD;  Location: [facility];  Service: General;  Laterality: N/A;    ESOPHAGOGASTRODUODENOSCOPY N/A 12/2/2021    Procedure: ESOPHAGOGASTRODUODENOSCOPY (EGD);  Surgeon: Darleen Caballero MD;  Location: [facility];  Service: General;  Laterality: N/A;    HERNIA REPAIR      HYSTERECTOMY      partial    KIDNEY STONE SURGERY      OOPHORECTOMY      TRIGGER FINGER RELEASE Left     URETEROSCOPIC REMOVAL OF URETERIC CALCULUS Left 11/16/2021    Procedure: REMOVAL, CALCULUS, URETER, URETEROSCOPIC;  Surgeon: Bessy Allen MD;  Location[facility];  Service: Urology;  Laterality: Left;    VENTRICULOPERITONEAL SHUNT           Review of Systems   Constitutional: Negative for appetite change, fever and unexpected weight change.   HENT: Negative for trouble swallowing.         No jaundice.   Respiratory: Negative for cough, shortness of breath and wheezing.         She has never used tobacco or alcohol.  She denies dysphagia aspiration hemoptysis chronic cough chronic sputum production or dyspnea on exertion but she is not physically active.   Cardiovascular: Negative for chest pain.        She denies exertional chest pain or rhythm disturbance.  She states her hypertension hyperlipidemia are well controlled.   Gastrointestinal: Positive for constipation and nausea. Negative for  abdominal distention, abdominal pain, anal bleeding, blood in stool, diarrhea and rectal pain.   Endocrine:        She tries stay on the diabetic diet.  She states her diabetes is well controlled.  She has tried reduce her caloric intake to reduce her weight.  She has tried every known diet without benefit.  She cannot lose weight and wants bariatric surgery.  She avoids the offending foods such as the fried and the fatty foods.  She is taking her vitamins and minerals particularly the vitamin-E for fatty liver.   Genitourinary:        She has been followed by the urologist for kidney stones but she denies hematuria or urinary symptoms except for occasional nocturia.   Musculoskeletal: Positive for arthralgias and back pain. Negative for neck pain.   Skin: Negative for pallor and rash.   Neurological: Negative for dizziness, seizures, syncope, speech difficulty, weakness and numbness.   Hematological: Negative for adenopathy.   Psychiatric/Behavioral: Negative for confusion.       Objective:      Physical Exam  Vitals reviewed.   Constitutional:       Appearance: She is well-developed.      Comments: Well-nourished well-hydrated overweight afebrile nonicteric white female.  She is sitting comfortably in the chair in breathing normally.  She is normocephalic.  Pupils are normal.  She appears to be oriented x3 and can relate her history and answer questions appropriately.   HENT:      Head: Normocephalic.   Eyes:      Pupils: Pupils are equal, round, and reactive to light.   Neck:      Thyroid: No thyromegaly.      Trachea: No tracheal deviation.   Cardiovascular:      Rate and Rhythm: Normal rate and regular rhythm.      Heart sounds: Normal heart sounds.   Pulmonary:      Effort: Pulmonary effort is normal.      Breath sounds: Normal breath sounds.   Abdominal:      General: Bowel sounds are normal. There is no distension.      Palpations: Abdomen is soft. There is no mass.      Tenderness: There is abdominal  tenderness. There is no right CVA tenderness, left CVA tenderness, guarding or rebound.      Hernia: No hernia is present.      Comments: The abdomen is obese with mild tenderness palpation in the epigastrium.  Organomegaly masses are not detected.  Bowel sounds are normal   Musculoskeletal:         General: Normal range of motion.      Cervical back: Normal range of motion and neck supple.      Comments: She ambulates slowly.  She can go from the sitting the standing position without difficulty.   Lymphadenopathy:      Cervical: No cervical adenopathy.   Skin:     General: Skin is warm and dry.   Neurological:      Mental Status: She is alert and oriented to person, place, and time.      Cranial Nerves: No cranial nerve deficit.   Psychiatric:         Behavior: Behavior normal.           Plan:       Diverticulosis    S/P partial colectomy    Gastroesophageal reflux disease, unspecified whether esophagitis present    History of diverticulitis    Gastroparesis    Obese abdomen    Class 3 severe obesity with body mass index (BMI) of 45.0 to 49.9 in adult, unspecified obesity type, unspecified whether serious comorbidity present    He her reflux regimen.  She will continue the Protonix but was stop the Carafate in the Pepcid.  The Reglan is been extremely effective.  The constipation and indigestion heartburn and nausea have resolved.  She will be reassessed after 12 weeks of Reglan usage.  She denies side effects.  She continues her diabetic diet vitamins and minerals.  She is referred to the surgeon for evaluation of the obesity and the bariatric surgery possibly a gastric sleeve.

## 2022-03-11 ENCOUNTER — OFFICE VISIT (OUTPATIENT)
Dept: SURGERY | Facility: CLINIC | Age: 52
End: 2022-03-11
Payer: MEDICAID

## 2022-03-11 ENCOUNTER — PATIENT MESSAGE (OUTPATIENT)
Dept: SURGERY | Facility: CLINIC | Age: 52
End: 2022-03-11

## 2022-03-11 VITALS
OXYGEN SATURATION: 98 % | WEIGHT: 279.88 LBS | HEART RATE: 69 BPM | BODY MASS INDEX: 47.78 KG/M2 | DIASTOLIC BLOOD PRESSURE: 82 MMHG | HEIGHT: 64 IN | SYSTOLIC BLOOD PRESSURE: 133 MMHG

## 2022-03-11 DIAGNOSIS — E66.01 MORBID OBESITY WITH BMI OF 45.0-49.9, ADULT: ICD-10-CM

## 2022-03-11 DIAGNOSIS — K31.84 GASTROPARESIS: Primary | ICD-10-CM

## 2022-03-11 PROCEDURE — 3075F SYST BP GE 130 - 139MM HG: CPT | Mod: CPTII,,, | Performed by: STUDENT IN AN ORGANIZED HEALTH CARE EDUCATION/TRAINING PROGRAM

## 2022-03-11 PROCEDURE — 99999 PR PBB SHADOW E&M-EST. PATIENT-LVL V: ICD-10-PCS | Mod: PBBFAC,,, | Performed by: STUDENT IN AN ORGANIZED HEALTH CARE EDUCATION/TRAINING PROGRAM

## 2022-03-11 PROCEDURE — 3079F PR MOST RECENT DIASTOLIC BLOOD PRESSURE 80-89 MM HG: ICD-10-PCS | Mod: CPTII,,, | Performed by: STUDENT IN AN ORGANIZED HEALTH CARE EDUCATION/TRAINING PROGRAM

## 2022-03-11 PROCEDURE — 3008F PR BODY MASS INDEX (BMI) DOCUMENTED: ICD-10-PCS | Mod: CPTII,,, | Performed by: STUDENT IN AN ORGANIZED HEALTH CARE EDUCATION/TRAINING PROGRAM

## 2022-03-11 PROCEDURE — 3075F PR MOST RECENT SYSTOLIC BLOOD PRESS GE 130-139MM HG: ICD-10-PCS | Mod: CPTII,,, | Performed by: STUDENT IN AN ORGANIZED HEALTH CARE EDUCATION/TRAINING PROGRAM

## 2022-03-11 PROCEDURE — 99215 OFFICE O/P EST HI 40 MIN: CPT | Mod: PBBFAC | Performed by: STUDENT IN AN ORGANIZED HEALTH CARE EDUCATION/TRAINING PROGRAM

## 2022-03-11 PROCEDURE — 3044F PR MOST RECENT HEMOGLOBIN A1C LEVEL <7.0%: ICD-10-PCS | Mod: CPTII,,, | Performed by: STUDENT IN AN ORGANIZED HEALTH CARE EDUCATION/TRAINING PROGRAM

## 2022-03-11 PROCEDURE — 4010F ACE/ARB THERAPY RXD/TAKEN: CPT | Mod: CPTII,,, | Performed by: STUDENT IN AN ORGANIZED HEALTH CARE EDUCATION/TRAINING PROGRAM

## 2022-03-11 PROCEDURE — 3079F DIAST BP 80-89 MM HG: CPT | Mod: CPTII,,, | Performed by: STUDENT IN AN ORGANIZED HEALTH CARE EDUCATION/TRAINING PROGRAM

## 2022-03-11 PROCEDURE — 4010F PR ACE/ARB THEARPY RXD/TAKEN: ICD-10-PCS | Mod: CPTII,,, | Performed by: STUDENT IN AN ORGANIZED HEALTH CARE EDUCATION/TRAINING PROGRAM

## 2022-03-11 PROCEDURE — 1159F MED LIST DOCD IN RCRD: CPT | Mod: CPTII,,, | Performed by: STUDENT IN AN ORGANIZED HEALTH CARE EDUCATION/TRAINING PROGRAM

## 2022-03-11 PROCEDURE — 3044F HG A1C LEVEL LT 7.0%: CPT | Mod: CPTII,,, | Performed by: STUDENT IN AN ORGANIZED HEALTH CARE EDUCATION/TRAINING PROGRAM

## 2022-03-11 PROCEDURE — 99999 PR PBB SHADOW E&M-EST. PATIENT-LVL V: CPT | Mod: PBBFAC,,, | Performed by: STUDENT IN AN ORGANIZED HEALTH CARE EDUCATION/TRAINING PROGRAM

## 2022-03-11 PROCEDURE — 99214 PR OFFICE/OUTPT VISIT, EST, LEVL IV, 30-39 MIN: ICD-10-PCS | Mod: S$PBB,,, | Performed by: STUDENT IN AN ORGANIZED HEALTH CARE EDUCATION/TRAINING PROGRAM

## 2022-03-11 PROCEDURE — 99214 OFFICE O/P EST MOD 30 MIN: CPT | Mod: S$PBB,,, | Performed by: STUDENT IN AN ORGANIZED HEALTH CARE EDUCATION/TRAINING PROGRAM

## 2022-03-11 PROCEDURE — 1159F PR MEDICATION LIST DOCUMENTED IN MEDICAL RECORD: ICD-10-PCS | Mod: CPTII,,, | Performed by: STUDENT IN AN ORGANIZED HEALTH CARE EDUCATION/TRAINING PROGRAM

## 2022-03-11 PROCEDURE — 3008F BODY MASS INDEX DOCD: CPT | Mod: CPTII,,, | Performed by: STUDENT IN AN ORGANIZED HEALTH CARE EDUCATION/TRAINING PROGRAM

## 2022-03-11 NOTE — TELEPHONE ENCOUNTER
Patient instructed to discuss with PCP at upcoming appointment.  Instructed patient to request the provider use a phrase stating that the doctor feels that bariatric surgery is medically necessary for the patient in their visit note for medicaid approval.  Stated understanding.

## 2022-03-11 NOTE — PROGRESS NOTES
[facility] Surgery  Follow-up    Subjective:     Chief Complaint:  Follow up gastroparesis    HPI:  Shweta Gupta is a 51 y.o. female  here today for follow-up for gastroparesis.  She had an normal gastric emptying study, however on EGD performed several weeks ago she still had retained food in the stomach.  For the past 6 weeks she has been taking Reglan.  She reports good results.  She denies any nausea, vomiting, early satiety, or abdominal pain.  EGD did show chronic gastritis.  She was instructed by the GI doctor to stop taking the Pepcid and Carafate, however patient states she has return of burning epigastric pain when she stops them.  She is interested in bariatric surgery and understands that significant weight loss would improve a lot of her gastrointestinal symptoms.  She has been referred to a bariatric surgeon [place], however she was told by their office that they do not take Medicaid patients and that she would have to pay $15,000 out of pocket which she cannot do.    Review of Systems   Constitutional: Negative for activity change, appetite change, chills and fever.   HENT: Negative for congestion, dental problem and ear discharge.    Eyes: Negative for discharge and itching.   Respiratory: Negative for apnea, choking and chest tightness.    Cardiovascular: Negative for chest pain and leg swelling.   Gastrointestinal: Negative for abdominal distention, abdominal pain, anal bleeding, constipation, diarrhea and nausea.   Endocrine: Negative for cold intolerance and heat intolerance.   Genitourinary: Negative for difficulty urinating and dyspareunia.   Musculoskeletal: Negative for arthralgias and back pain.   Skin: Negative for color change and pallor.   Neurological: Negative for dizziness and facial asymmetry.   Hematological: Negative for adenopathy. Does not bruise/bleed easily.   Psychiatric/Behavioral: Negative for agitation and behavioral problems.       Objective:      Vitals:     "03/11/22 1009   BP: 133/82   Pulse: 69   SpO2: 98%   Weight: 126.9 kg (279 lb 14 oz)   Height: 5' 4" (1.626 m)     Physical Exam  Constitutional:       General: She is not in acute distress.     Appearance: She is well-developed.   HENT:      Head: Normocephalic and atraumatic.   Eyes:      Pupils: Pupils are equal, round, and reactive to light.   Neck:      Thyroid: No thyromegaly.   Cardiovascular:      Rate and Rhythm: Normal rate and regular rhythm.   Pulmonary:      Effort: Pulmonary effort is normal.      Breath sounds: Normal breath sounds.   Abdominal:      General: Bowel sounds are normal. There is no distension.      Palpations: Abdomen is soft.      Tenderness: There is no abdominal tenderness.   Musculoskeletal:         General: No deformity. Normal range of motion.      Cervical back: Normal range of motion and neck supple.   Skin:     General: Skin is warm.      Capillary Refill: Capillary refill takes less than 2 seconds.      Findings: No erythema.   Neurological:      Mental Status: She is alert and oriented to person, place, and time.      Cranial Nerves: No cranial nerve deficit.   Psychiatric:         Behavior: Behavior normal.         Lab Review:   CBC:   Lab Results   Component Value Date    WBC 11.31 11/14/2021    RBC 3.64 (L) 11/14/2021    HGB 10.9 (L) 11/14/2021    HCT 33.3 (L) 11/14/2021     11/14/2021     BMP:   Lab Results   Component Value Date     (H) 11/14/2021     11/14/2021    K 4.1 11/14/2021     11/14/2021    CO2 24 11/14/2021    BUN 26 (H) 11/14/2021    CREATININE 1.6 (H) 11/14/2021    CALCIUM 9.5 11/14/2021     Lab Results   Component Value Date    ALT 20 11/14/2021    AST 19 11/14/2021    ALKPHOS 57 11/14/2021    BILITOT 0.3 11/14/2021       Diagnostics Review: path reviewed     Assessment:       1. Gastroparesis    2. Morbid obesity with BMI of 45.0-49.9, adult        Plan:   Gastroparesis    Morbid obesity with BMI of 45.0-49.9, adult        Medical " Decision Making/Counselin-year-old female with chronic gastritis.  She is on Protonix for this twice a day for this as well as Pepcid and Carafate.  Also early satiety, abdominal pain, inability to lose weight.  Gastric emptying study negative for gastroparesis.  CT enterography with no evidence of inflammatory bowel disease.  I did not find any stigmata of bleeding or any potential source of bleeding on endoscopy.  I suspect her rectal bleeding must be intermittent hemorrhoidal flare controlled with Anusol cream. I also suspect she has some aspect of gastroparesis despite the normal gastric emptying study.  The patient should not have still had food in her stomach on EGD given the amount of time she was NPO.  RX for Reglan 5mg 4x daily.  She has had good results on the Reglan.  Patient states she has not had any further abdominal pain.  She attempted to stop taking the Pepcid and Carafate, however the burning epigastric pain returns without them.  She desires referral to bariatric surgeon.  She is going to call medicate and see which providers they will cover.  I am happy to put in the referral to whichever provider she is able to see.  Otherwise continue current medication regimen.    Follow up:  As needed    Patient instructed that best way to communicate with my office staff is for patient to get on the [facility] epic patient portal to expedite communication and communication issues that may occur.  Patient was given instructions on how to get on the portal.  I encouraged patient to obtain portal access as well.  Ultimately it is up to the patient to obtain access.  Patient voiced understanding.

## 2022-03-18 ENCOUNTER — PATIENT MESSAGE (OUTPATIENT)
Dept: FAMILY MEDICINE | Facility: CLINIC | Age: 52
End: 2022-03-18
Payer: MEDICAID

## 2022-03-18 DIAGNOSIS — B37.31 YEAST VAGINITIS: ICD-10-CM

## 2022-03-18 DIAGNOSIS — I10 ESSENTIAL HYPERTENSION: ICD-10-CM

## 2022-03-21 RX ORDER — POTASSIUM CHLORIDE 20 MEQ/1
20 TABLET, EXTENDED RELEASE ORAL 2 TIMES DAILY
Qty: 180 TABLET | Refills: 0 | Status: SHIPPED | OUTPATIENT
Start: 2022-03-21 | End: 2022-07-22

## 2022-03-21 RX ORDER — RANOLAZINE 500 MG/1
500 TABLET, EXTENDED RELEASE ORAL 2 TIMES DAILY
Qty: 180 TABLET | Refills: 1 | Status: SHIPPED | OUTPATIENT
Start: 2022-03-21 | End: 2022-07-22

## 2022-03-21 RX ORDER — NYSTATIN 100000 [USP'U]/G
POWDER TOPICAL
Qty: 120 G | Refills: 1 | Status: SHIPPED | OUTPATIENT
Start: 2022-03-21 | End: 2022-05-19

## 2022-03-29 ENCOUNTER — PATIENT MESSAGE (OUTPATIENT)
Dept: SURGERY | Facility: CLINIC | Age: 52
End: 2022-03-29
Payer: MEDICAID

## 2022-03-31 ENCOUNTER — OFFICE VISIT (OUTPATIENT)
Dept: FAMILY MEDICINE | Facility: CLINIC | Age: 52
End: 2022-03-31
Payer: MEDICAID

## 2022-03-31 VITALS
HEIGHT: 64 IN | BODY MASS INDEX: 48.54 KG/M2 | SYSTOLIC BLOOD PRESSURE: 133 MMHG | HEART RATE: 73 BPM | WEIGHT: 284.31 LBS | OXYGEN SATURATION: 96 % | DIASTOLIC BLOOD PRESSURE: 77 MMHG

## 2022-03-31 DIAGNOSIS — E66.01 CLASS 3 SEVERE OBESITY WITH BODY MASS INDEX (BMI) OF 45.0 TO 49.9 IN ADULT, UNSPECIFIED OBESITY TYPE, UNSPECIFIED WHETHER SERIOUS COMORBIDITY PRESENT: ICD-10-CM

## 2022-03-31 DIAGNOSIS — E11.9 TYPE 2 DIABETES MELLITUS WITHOUT COMPLICATION, WITH LONG-TERM CURRENT USE OF INSULIN: Primary | ICD-10-CM

## 2022-03-31 DIAGNOSIS — Z79.4 TYPE 2 DIABETES MELLITUS WITHOUT COMPLICATION, WITH LONG-TERM CURRENT USE OF INSULIN: Primary | ICD-10-CM

## 2022-03-31 PROCEDURE — 1160F PR REVIEW ALL MEDS BY PRESCRIBER/CLIN PHARMACIST DOCUMENTED: ICD-10-PCS | Mod: CPTII,,, | Performed by: FAMILY MEDICINE

## 2022-03-31 PROCEDURE — 4010F ACE/ARB THERAPY RXD/TAKEN: CPT | Mod: CPTII,,, | Performed by: FAMILY MEDICINE

## 2022-03-31 PROCEDURE — 99215 OFFICE O/P EST HI 40 MIN: CPT | Mod: PBBFAC,PN | Performed by: FAMILY MEDICINE

## 2022-03-31 PROCEDURE — 3078F PR MOST RECENT DIASTOLIC BLOOD PRESSURE < 80 MM HG: ICD-10-PCS | Mod: CPTII,,, | Performed by: FAMILY MEDICINE

## 2022-03-31 PROCEDURE — 3008F PR BODY MASS INDEX (BMI) DOCUMENTED: ICD-10-PCS | Mod: CPTII,,, | Performed by: FAMILY MEDICINE

## 2022-03-31 PROCEDURE — 99999 PR PBB SHADOW E&M-EST. PATIENT-LVL V: ICD-10-PCS | Mod: PBBFAC,,, | Performed by: FAMILY MEDICINE

## 2022-03-31 PROCEDURE — 3044F PR MOST RECENT HEMOGLOBIN A1C LEVEL <7.0%: ICD-10-PCS | Mod: CPTII,,, | Performed by: FAMILY MEDICINE

## 2022-03-31 PROCEDURE — 3078F DIAST BP <80 MM HG: CPT | Mod: CPTII,,, | Performed by: FAMILY MEDICINE

## 2022-03-31 PROCEDURE — 1159F MED LIST DOCD IN RCRD: CPT | Mod: CPTII,,, | Performed by: FAMILY MEDICINE

## 2022-03-31 PROCEDURE — 1160F RVW MEDS BY RX/DR IN RCRD: CPT | Mod: CPTII,,, | Performed by: FAMILY MEDICINE

## 2022-03-31 PROCEDURE — 1159F PR MEDICATION LIST DOCUMENTED IN MEDICAL RECORD: ICD-10-PCS | Mod: CPTII,,, | Performed by: FAMILY MEDICINE

## 2022-03-31 PROCEDURE — 99214 PR OFFICE/OUTPT VISIT, EST, LEVL IV, 30-39 MIN: ICD-10-PCS | Mod: S$PBB,,, | Performed by: FAMILY MEDICINE

## 2022-03-31 PROCEDURE — 3075F PR MOST RECENT SYSTOLIC BLOOD PRESS GE 130-139MM HG: ICD-10-PCS | Mod: CPTII,,, | Performed by: FAMILY MEDICINE

## 2022-03-31 PROCEDURE — 3044F HG A1C LEVEL LT 7.0%: CPT | Mod: CPTII,,, | Performed by: FAMILY MEDICINE

## 2022-03-31 PROCEDURE — 99999 PR PBB SHADOW E&M-EST. PATIENT-LVL V: CPT | Mod: PBBFAC,,, | Performed by: FAMILY MEDICINE

## 2022-03-31 PROCEDURE — 99214 OFFICE O/P EST MOD 30 MIN: CPT | Mod: S$PBB,,, | Performed by: FAMILY MEDICINE

## 2022-03-31 PROCEDURE — 3075F SYST BP GE 130 - 139MM HG: CPT | Mod: CPTII,,, | Performed by: FAMILY MEDICINE

## 2022-03-31 PROCEDURE — 4010F PR ACE/ARB THEARPY RXD/TAKEN: ICD-10-PCS | Mod: CPTII,,, | Performed by: FAMILY MEDICINE

## 2022-03-31 PROCEDURE — 3008F BODY MASS INDEX DOCD: CPT | Mod: CPTII,,, | Performed by: FAMILY MEDICINE

## 2022-03-31 NOTE — PROGRESS NOTES
Subjective:       Patient ID: Shweta Gupta is a 51 y.o. female.    Chief Complaint: Follow-up (Lab review)    HPI   Patient presents for follow-up and lab review. Weight and blood pressure stable. A1c slightly increased but still at goal, TSH normal. Patient states she's feeling better than she was when she last saw me and attributes this to the reglan that Dr. Caballero put her on. Was referred for bariatric surgery by her gastroenterologist, as she has morbid obesity, gastroparesis, and type 2 diabetes (on insulin), which have all made it more difficult for her to lose weight. Her weight also puts her at risk of developing further issues. As such, bariatric surgery is medically necessary.    Review of Systems   Constitutional: Negative for chills, fever and unexpected weight change.   HENT: Negative for congestion.    Respiratory: Negative for cough and choking.        Past Medical History:   Diagnosis Date    Allergy     Anemia     Anxiety     Asthma     Depression     Diabetes mellitus, type 2     Diverticulitis     Gastroparesis     GERD (gastroesophageal reflux disease)     Hyperlipidemia     Hypertension     Mitral valve prolapse     Morbid obesity     Neuropathy      Past Surgical History:   Procedure Laterality Date    ACHILLES TENDON SURGERY Left     APPENDECTOMY      BRAIN SURGERY       Shunt    CARPAL TUNNEL RELEASE Left      SECTION      CHOLECYSTECTOMY      COLON SURGERY      COLONOSCOPY N/A 2020    Procedure: COLONOSCOPY;  Surgeon: Khanh Soriano MD;  Location: [facility] ENDO;  Service: General;  Laterality: N/A;    COLONOSCOPY N/A 2021    Procedure: COLONOSCOPY;  Surgeon: Darleen Caballero MD;  Location: [facility] ENDO;  Service: General;  Laterality: N/A;    CYST REMOVAL  2021    Cyst of finger    CYSTOSCOPY WITH URETEROSCOPY, RETROGRADE PYELOGRAPHY, AND INSERTION OF STENT Left 2021    Procedure: CYSTOSCOPY, WITH RETROGRADE PYELOGRAM AND URETERAL  "STENT INSERTION;  Surgeon: Bessy Allen MD;  Location: [facility] OR;  Service: Urology;  Laterality: Left;    ESOPHAGOGASTRODUODENOSCOPY N/A 2/7/2020    Procedure: ESOPHAGOGASTRODUODENOSCOPY (EGD);  Surgeon: Khanh Soriano MD;  Location: [facility] ENDO;  Service: General;  Laterality: N/A;    ESOPHAGOGASTRODUODENOSCOPY N/A 12/2/2021    Procedure: ESOPHAGOGASTRODUODENOSCOPY (EGD);  Surgeon: Darleen Caballero MD;  Location: [facility] ENDO;  Service: General;  Laterality: N/A;    HERNIA REPAIR      HYSTERECTOMY      partial    KIDNEY STONE SURGERY      OOPHORECTOMY      TRIGGER FINGER RELEASE Left     URETEROSCOPIC REMOVAL OF URETERIC CALCULUS Left 11/16/2021    Procedure: REMOVAL, CALCULUS, URETER, URETEROSCOPIC;  Surgeon: Bessy Allen MD;  Location: [facility] OR;  Service: Urology;  Laterality: Left;    VENTRICULOPERITONEAL SHUNT       Social History     Socioeconomic History    Marital status:    Tobacco Use    Smoking status: Never Smoker    Smokeless tobacco: Never Used   Substance and Sexual Activity    Alcohol use: No    Drug use: No    Sexual activity: Not Currently     Partners: Male     Family History   Problem Relation Age of Onset    Cancer Mother         Lung cancer    Lung cancer Mother     Hypertension Mother     Arthritis Mother     COPD Mother     Heart disease Mother     Cancer Father         Lung and brain cancer    Lung cancer Father     Hypertension Father     Heart disease Father     Heart disease Brother     Hypertension Brother     Heart disease Brother         Tetrafalow    Breast cancer Neg Hx        Objective:      /77 (BP Location: Left arm, Patient Position: Sitting, BP Method: Medium (Manual))   Pulse 73   Ht 5' 4" (1.626 m)   Wt 129 kg (284 lb 4.8 oz)   LMP 06/10/2002   SpO2 96%   BMI 48.80 kg/m²   Physical Exam  Vitals reviewed.   Constitutional:       General: She is not in acute distress.     Appearance: She is obese. She is not " toxic-appearing.   HENT:      Head: Normocephalic and atraumatic.   Eyes:      General: No scleral icterus.     Conjunctiva/sclera: Conjunctivae normal.   Cardiovascular:      Rate and Rhythm: Normal rate.   Pulmonary:      Effort: Pulmonary effort is normal. No respiratory distress.   Neurological:      Mental Status: She is alert and oriented to person, place, and time.   Psychiatric:         Mood and Affect: Mood normal.         Behavior: Behavior normal.         Assessment:       1. Type 2 diabetes mellitus without complication, with long-term current use of insulin    2. Class 3 severe obesity with body mass index (BMI) of 45.0 to 49.9 in adult, unspecified obesity type, unspecified whether serious comorbidity present        Plan:       Type 2 diabetes mellitus without complication, with long-term current use of insulin  -     Hemoglobin A1C; Future; Expected date: 03/31/2022  -     Comprehensive Metabolic Panel; Future; Expected date: 03/31/2022  -     CBC Auto Differential; Future; Expected date: 03/31/2022    Class 3 severe obesity with body mass index (BMI) of 45.0 to 49.9 in adult, unspecified obesity type, unspecified whether serious comorbidity present  - it is my medical opinion that bariatric surgery is medically necessary for her          Risks, benefits, and side effects were discussed with the patient. All questions were answered to the fullest satisfaction of the patient, and pt verbalized understanding and agreement to treatment plan. Pt was to call with any new or worsening symptoms, or present to the ER.

## 2022-04-01 ENCOUNTER — PATIENT MESSAGE (OUTPATIENT)
Dept: FAMILY MEDICINE | Facility: CLINIC | Age: 52
End: 2022-04-01
Payer: MEDICAID

## 2022-04-01 DIAGNOSIS — B37.9 YEAST INFECTION: Primary | ICD-10-CM

## 2022-04-02 ENCOUNTER — PATIENT MESSAGE (OUTPATIENT)
Dept: FAMILY MEDICINE | Facility: CLINIC | Age: 52
End: 2022-04-02
Payer: MEDICAID

## 2022-04-02 DIAGNOSIS — G47.00 INSOMNIA, UNSPECIFIED TYPE: ICD-10-CM

## 2022-04-04 ENCOUNTER — PATIENT MESSAGE (OUTPATIENT)
Dept: FAMILY MEDICINE | Facility: CLINIC | Age: 52
End: 2022-04-04
Payer: MEDICAID

## 2022-04-04 RX ORDER — FLUCONAZOLE 150 MG/1
150 TABLET ORAL DAILY
Qty: 2 TABLET | Refills: 0 | Status: SHIPPED | OUTPATIENT
Start: 2022-04-04 | End: 2022-04-06

## 2022-04-04 RX ORDER — ZOLPIDEM TARTRATE 10 MG/1
TABLET ORAL
Qty: 30 TABLET | Refills: 2 | Status: SHIPPED | OUTPATIENT
Start: 2022-04-04 | End: 2022-07-07

## 2022-04-13 ENCOUNTER — PATIENT MESSAGE (OUTPATIENT)
Dept: FAMILY MEDICINE | Facility: CLINIC | Age: 52
End: 2022-04-13
Payer: MEDICAID

## 2022-04-14 RX ORDER — ATORVASTATIN CALCIUM 40 MG/1
40 TABLET, FILM COATED ORAL DAILY
Qty: 90 TABLET | Refills: 3 | Status: SHIPPED | OUTPATIENT
Start: 2022-04-14 | End: 2022-10-10

## 2022-04-19 ENCOUNTER — PATIENT MESSAGE (OUTPATIENT)
Dept: SURGERY | Facility: CLINIC | Age: 52
End: 2022-04-19
Payer: MEDICAID

## 2022-04-19 ENCOUNTER — TELEPHONE (OUTPATIENT)
Dept: SURGERY | Facility: CLINIC | Age: 52
End: 2022-04-19
Payer: MEDICAID

## 2022-04-19 RX ORDER — SUCRALFATE 1 G/1
1 TABLET ORAL 4 TIMES DAILY
Qty: 360 TABLET | Refills: 3 | Status: SHIPPED | OUTPATIENT
Start: 2022-04-19 | End: 2023-05-01

## 2022-04-29 ENCOUNTER — PATIENT MESSAGE (OUTPATIENT)
Dept: FAMILY MEDICINE | Facility: CLINIC | Age: 52
End: 2022-04-29
Payer: MEDICAID

## 2022-05-04 ENCOUNTER — PATIENT MESSAGE (OUTPATIENT)
Dept: FAMILY MEDICINE | Facility: CLINIC | Age: 52
End: 2022-05-04
Payer: MEDICAID

## 2022-05-19 ENCOUNTER — PATIENT MESSAGE (OUTPATIENT)
Dept: FAMILY MEDICINE | Facility: CLINIC | Age: 52
End: 2022-05-19
Payer: MEDICAID

## 2022-05-19 RX ORDER — FUROSEMIDE 40 MG/1
40 TABLET ORAL 2 TIMES DAILY
Qty: 180 TABLET | Refills: 3 | Status: SHIPPED | OUTPATIENT
Start: 2022-05-19 | End: 2022-08-09 | Stop reason: SDUPTHER

## 2022-06-01 ENCOUNTER — PATIENT MESSAGE (OUTPATIENT)
Dept: SURGERY | Facility: CLINIC | Age: 52
End: 2022-06-01
Payer: MEDICAID

## 2022-06-03 ENCOUNTER — OFFICE VISIT (OUTPATIENT)
Dept: SURGERY | Facility: CLINIC | Age: 52
End: 2022-06-03
Payer: MEDICAID

## 2022-06-03 VITALS
BODY MASS INDEX: 47.27 KG/M2 | HEIGHT: 64 IN | DIASTOLIC BLOOD PRESSURE: 51 MMHG | SYSTOLIC BLOOD PRESSURE: 112 MMHG | OXYGEN SATURATION: 98 % | WEIGHT: 276.88 LBS | HEART RATE: 72 BPM

## 2022-06-03 DIAGNOSIS — R10.13 CHRONIC EPIGASTRIC PAIN: Primary | ICD-10-CM

## 2022-06-03 DIAGNOSIS — E66.01 MORBID OBESITY WITH BMI OF 45.0-49.9, ADULT: ICD-10-CM

## 2022-06-03 DIAGNOSIS — G89.29 CHRONIC EPIGASTRIC PAIN: Primary | ICD-10-CM

## 2022-06-03 PROCEDURE — 3074F SYST BP LT 130 MM HG: CPT | Mod: CPTII,,, | Performed by: STUDENT IN AN ORGANIZED HEALTH CARE EDUCATION/TRAINING PROGRAM

## 2022-06-03 PROCEDURE — 99999 PR PBB SHADOW E&M-EST. PATIENT-LVL V: ICD-10-PCS | Mod: PBBFAC,,, | Performed by: STUDENT IN AN ORGANIZED HEALTH CARE EDUCATION/TRAINING PROGRAM

## 2022-06-03 PROCEDURE — 3044F HG A1C LEVEL LT 7.0%: CPT | Mod: CPTII,,, | Performed by: STUDENT IN AN ORGANIZED HEALTH CARE EDUCATION/TRAINING PROGRAM

## 2022-06-03 PROCEDURE — 3008F BODY MASS INDEX DOCD: CPT | Mod: CPTII,,, | Performed by: STUDENT IN AN ORGANIZED HEALTH CARE EDUCATION/TRAINING PROGRAM

## 2022-06-03 PROCEDURE — 99213 PR OFFICE/OUTPT VISIT, EST, LEVL III, 20-29 MIN: ICD-10-PCS | Mod: S$PBB,,, | Performed by: STUDENT IN AN ORGANIZED HEALTH CARE EDUCATION/TRAINING PROGRAM

## 2022-06-03 PROCEDURE — 3044F PR MOST RECENT HEMOGLOBIN A1C LEVEL <7.0%: ICD-10-PCS | Mod: CPTII,,, | Performed by: STUDENT IN AN ORGANIZED HEALTH CARE EDUCATION/TRAINING PROGRAM

## 2022-06-03 PROCEDURE — 1159F MED LIST DOCD IN RCRD: CPT | Mod: CPTII,,, | Performed by: STUDENT IN AN ORGANIZED HEALTH CARE EDUCATION/TRAINING PROGRAM

## 2022-06-03 PROCEDURE — 99213 OFFICE O/P EST LOW 20 MIN: CPT | Mod: S$PBB,,, | Performed by: STUDENT IN AN ORGANIZED HEALTH CARE EDUCATION/TRAINING PROGRAM

## 2022-06-03 PROCEDURE — 3074F PR MOST RECENT SYSTOLIC BLOOD PRESSURE < 130 MM HG: ICD-10-PCS | Mod: CPTII,,, | Performed by: STUDENT IN AN ORGANIZED HEALTH CARE EDUCATION/TRAINING PROGRAM

## 2022-06-03 PROCEDURE — 99215 OFFICE O/P EST HI 40 MIN: CPT | Mod: PBBFAC | Performed by: STUDENT IN AN ORGANIZED HEALTH CARE EDUCATION/TRAINING PROGRAM

## 2022-06-03 PROCEDURE — 4010F PR ACE/ARB THEARPY RXD/TAKEN: ICD-10-PCS | Mod: CPTII,,, | Performed by: STUDENT IN AN ORGANIZED HEALTH CARE EDUCATION/TRAINING PROGRAM

## 2022-06-03 PROCEDURE — 99999 PR PBB SHADOW E&M-EST. PATIENT-LVL V: CPT | Mod: PBBFAC,,, | Performed by: STUDENT IN AN ORGANIZED HEALTH CARE EDUCATION/TRAINING PROGRAM

## 2022-06-03 PROCEDURE — 3078F DIAST BP <80 MM HG: CPT | Mod: CPTII,,, | Performed by: STUDENT IN AN ORGANIZED HEALTH CARE EDUCATION/TRAINING PROGRAM

## 2022-06-03 PROCEDURE — 4010F ACE/ARB THERAPY RXD/TAKEN: CPT | Mod: CPTII,,, | Performed by: STUDENT IN AN ORGANIZED HEALTH CARE EDUCATION/TRAINING PROGRAM

## 2022-06-03 PROCEDURE — 3008F PR BODY MASS INDEX (BMI) DOCUMENTED: ICD-10-PCS | Mod: CPTII,,, | Performed by: STUDENT IN AN ORGANIZED HEALTH CARE EDUCATION/TRAINING PROGRAM

## 2022-06-03 PROCEDURE — 3078F PR MOST RECENT DIASTOLIC BLOOD PRESSURE < 80 MM HG: ICD-10-PCS | Mod: CPTII,,, | Performed by: STUDENT IN AN ORGANIZED HEALTH CARE EDUCATION/TRAINING PROGRAM

## 2022-06-03 PROCEDURE — 1159F PR MEDICATION LIST DOCUMENTED IN MEDICAL RECORD: ICD-10-PCS | Mod: CPTII,,, | Performed by: STUDENT IN AN ORGANIZED HEALTH CARE EDUCATION/TRAINING PROGRAM

## 2022-06-03 NOTE — PROGRESS NOTES
"[facility] General Surgery  Follow-up    Subjective:     Chief Complaint:  Chronic epigastric pain    HPI:  Shweta Gupta is a 52 y.o. female here today with a complaint of chronic epigastric pain.  Patient known to me for several months now.  Upper and lower endoscopy unrevealing.  Gastric emptying study unrevealing.  She is already post cholecystectomy years ago.  She is taking Protonix and Carafate.  Recently started Raglan several weeks ago and was initially having some relief with this.  Patient states that now for the past 2 weeks she has just been having persistent epigastric pain.  Feels sore in that area.  CT scan of the area did not reveal any hernia or other abnormalities.  Patient states she has been watching what she eats to no relief.  She is having bowel movements.  Denies nausea or vomiting.    Review of Systems   Constitutional: Negative for activity change, appetite change, chills and fever.   HENT: Negative for congestion, dental problem and ear discharge.    Eyes: Negative for discharge and itching.   Respiratory: Negative for apnea, choking and chest tightness.    Cardiovascular: Negative for chest pain and leg swelling.   Gastrointestinal: Positive for abdominal pain. Negative for abdominal distention, anal bleeding, constipation, diarrhea and nausea.   Endocrine: Negative for cold intolerance and heat intolerance.   Genitourinary: Negative for difficulty urinating and dyspareunia.   Musculoskeletal: Negative for arthralgias and back pain.   Skin: Negative for color change and pallor.   Neurological: Negative for dizziness and facial asymmetry.   Hematological: Negative for adenopathy. Does not bruise/bleed easily.   Psychiatric/Behavioral: Negative for agitation and behavioral problems.       Objective:      Vitals:    06/03/22 0801   BP: (!) 112/51   Pulse: 72   SpO2: 98%   Weight: 125.6 kg (276 lb 14.4 oz)   Height: 5' 4" (1.626 m)     Physical Exam  Constitutional:       General: She is " not in acute distress.     Appearance: She is well-developed.   HENT:      Head: Normocephalic and atraumatic.   Eyes:      Pupils: Pupils are equal, round, and reactive to light.   Neck:      Thyroid: No thyromegaly.   Cardiovascular:      Rate and Rhythm: Normal rate and regular rhythm.   Pulmonary:      Effort: Pulmonary effort is normal.      Breath sounds: Normal breath sounds.   Abdominal:      General: Bowel sounds are normal. There is no distension.      Palpations: Abdomen is soft. There is no mass.      Tenderness: There is no abdominal tenderness.      Hernia: No hernia is present.      Comments: Well-healed right subcostal scar   Musculoskeletal:         General: No deformity. Normal range of motion.      Cervical back: Normal range of motion and neck supple.   Skin:     General: Skin is warm.      Capillary Refill: Capillary refill takes less than 2 seconds.      Findings: No erythema.   Neurological:      Mental Status: She is alert and oriented to person, place, and time.      Cranial Nerves: No cranial nerve deficit.   Psychiatric:         Behavior: Behavior normal.         Lab Review:   CBC:   Lab Results   Component Value Date    WBC 6.0 12/07/2021    WBC 11.31 11/14/2021    RBC 3.59 (L) 12/07/2021    RBC 3.64 (L) 11/14/2021    HGB 10.7 (L) 12/07/2021    HGB 10.9 (L) 11/14/2021    HCT 34.5 (L) 12/07/2021    HCT 33.3 (L) 11/14/2021     12/07/2021     11/14/2021     BMP:   Lab Results   Component Value Date     (H) 11/14/2021     12/07/2021     11/14/2021    K 3.9 12/07/2021    K 4.1 11/14/2021     (H) 12/07/2021     11/14/2021    CO2 30 12/07/2021    CO2 24 11/14/2021    BUN 19 (H) 12/07/2021    BUN 26 (H) 11/14/2021    CREATININE 0.87 12/07/2021    CREATININE 1.6 (H) 11/14/2021    CALCIUM 9.5 12/07/2021    CALCIUM 9.5 11/14/2021     Lab Results   Component Value Date    ALT 20 11/14/2021    AST 19 11/14/2021    ALKPHOS 57 11/14/2021    BILITOT 0.3  11/14/2021       Diagnostics Review: CT: Reviewed     Assessment:       1. Chronic epigastric pain    2. Morbid obesity with BMI of 45.0-49.9, adult        Plan:   Chronic epigastric pain  -     Ambulatory referral/consult to Gastroenterology; Future; Expected date: 06/10/2022    Morbid obesity with BMI of 45.0-49.9, adult        Medical Decision Making/Counseling:  Morbidly obese female with chronic epigastric pain.  Upper and lower endoscopies did not reveal any abnormalities.  She is compliant with her medication.  Denies nausea or vomiting.  No change in bowel habits.  I have been unable to thus far identify any surgical problem.  At this point I will refer her to a gastroenterologist for further evaluation.  I have also recommended referral to a bariatric surgeon.    Follow up: PRN    Patient instructed that best way to communicate with my office staff is for patient to get on the [facility] epic patient portal to expedite communication and communication issues that may occur.  Patient was given instructions on how to get on the portal.  I encouraged patient to obtain portal access as well.  Ultimately it is up to the patient to obtain access.  Patient voiced understanding.

## 2022-06-15 ENCOUNTER — TELEPHONE (OUTPATIENT)
Dept: SURGERY | Facility: CLINIC | Age: 52
End: 2022-06-15
Payer: MEDICAID

## 2022-06-15 NOTE — TELEPHONE ENCOUNTER
Writer called patient to f/u on her referral to Dr Aden at [facility]. Pt stated that she has a scheduled appt with him on Wed 07/27/22. Writer expressed verbal understanding.

## 2022-06-20 NOTE — TELEPHONE ENCOUNTER
AFTER HAND SURGERY    DOS:  Keep affected wrist elevated above the level of your heart  Check circulation frequently in fingers by pressing on the nail bed. Nail bed should turn white and then pink when released.  Exercise fingers to promote circulation.  Advance diet as tolerated  Resume home medications today.      DONT:  No driving for 24 hours or while taking narcotic pain medication      CALL PHYSICIAN FOR:  Obvious bleeding  Excessive swelling  Drainage (pus) from the wound  Pain unrelieved by pain medication.        Pt notified.    Spoke with pharmacist at [facility], given notification of both meds being prescribed.      ----- Message from Jovany Martinez sent at 11/6/2018  8:49 AM CST -----  Contact: pt  Pt states [facility] pharmacy is attempting to contact the office to state the pt is taking ambien and norco.  This is to prove the pt is not Dr castillo and that Dr Lr took over from Dr Aragon.  Please call and advise.    Pt states this is preventing the Rxs from being filled and pt is almost out of these medications.    Call Back #: [phone] (pt)  Call Back #: [phone] ([facility])  Thanks

## 2022-07-21 DIAGNOSIS — G89.29 CHRONIC EPIGASTRIC PAIN: ICD-10-CM

## 2022-07-21 DIAGNOSIS — K31.84 GASTROPARESIS: ICD-10-CM

## 2022-07-21 DIAGNOSIS — R10.13 CHRONIC EPIGASTRIC PAIN: ICD-10-CM

## 2022-07-22 ENCOUNTER — PATIENT MESSAGE (OUTPATIENT)
Dept: FAMILY MEDICINE | Facility: CLINIC | Age: 52
End: 2022-07-22
Payer: MEDICAID

## 2022-07-22 RX ORDER — METOCLOPRAMIDE 5 MG/1
5 TABLET ORAL
Qty: 120 TABLET | Refills: 0 | Status: CANCELLED | OUTPATIENT
Start: 2022-07-22

## 2022-08-05 ENCOUNTER — LAB VISIT (OUTPATIENT)
Dept: LAB | Facility: HOSPITAL | Age: 52
End: 2022-08-05
Attending: FAMILY MEDICINE
Payer: MEDICAID

## 2022-08-05 DIAGNOSIS — Z79.4 TYPE 2 DIABETES MELLITUS WITHOUT COMPLICATION, WITH LONG-TERM CURRENT USE OF INSULIN: ICD-10-CM

## 2022-08-05 DIAGNOSIS — E11.9 TYPE 2 DIABETES MELLITUS WITHOUT COMPLICATION, WITH LONG-TERM CURRENT USE OF INSULIN: ICD-10-CM

## 2022-08-05 LAB
ALBUMIN SERPL BCP-MCNC: 4.3 G/DL (ref 3.5–5.2)
ALP SERPL-CCNC: 54 U/L (ref 55–135)
ALT SERPL W/O P-5'-P-CCNC: 27 U/L (ref 10–44)
ANION GAP SERPL CALC-SCNC: 13 MMOL/L (ref 8–16)
AST SERPL-CCNC: 22 U/L (ref 10–40)
BASOPHILS # BLD AUTO: 0.06 K/UL (ref 0–0.2)
BASOPHILS NFR BLD: 1 % (ref 0–1.9)
BILIRUB SERPL-MCNC: 0.5 MG/DL (ref 0.1–1)
BUN SERPL-MCNC: 22 MG/DL (ref 6–20)
CALCIUM SERPL-MCNC: 9.7 MG/DL (ref 8.7–10.5)
CHLORIDE SERPL-SCNC: 103 MMOL/L (ref 95–110)
CO2 SERPL-SCNC: 25 MMOL/L (ref 23–29)
CREAT SERPL-MCNC: 0.9 MG/DL (ref 0.5–1.4)
DIFFERENTIAL METHOD: ABNORMAL
EOSINOPHIL # BLD AUTO: 0.2 K/UL (ref 0–0.5)
EOSINOPHIL NFR BLD: 4 % (ref 0–8)
ERYTHROCYTE [DISTWIDTH] IN BLOOD BY AUTOMATED COUNT: 13.4 % (ref 11.5–14.5)
EST. GFR  (NO RACE VARIABLE): >60 ML/MIN/1.73 M^2
ESTIMATED AVG GLUCOSE: 131 MG/DL (ref 68–131)
GLUCOSE SERPL-MCNC: 123 MG/DL (ref 70–110)
HBA1C MFR BLD: 6.2 % (ref 4–5.6)
HCT VFR BLD AUTO: 34.6 % (ref 37–48.5)
HGB BLD-MCNC: 11.1 G/DL (ref 12–16)
IMM GRANULOCYTES # BLD AUTO: 0.02 K/UL (ref 0–0.04)
IMM GRANULOCYTES NFR BLD AUTO: 0.3 % (ref 0–0.5)
LYMPHOCYTES # BLD AUTO: 2.2 K/UL (ref 1–4.8)
LYMPHOCYTES NFR BLD: 37.9 % (ref 18–48)
MCH RBC QN AUTO: 30.3 PG (ref 27–31)
MCHC RBC AUTO-ENTMCNC: 32.1 G/DL (ref 32–36)
MCV RBC AUTO: 95 FL (ref 82–98)
MONOCYTES # BLD AUTO: 0.5 K/UL (ref 0.3–1)
MONOCYTES NFR BLD: 9 % (ref 4–15)
NEUTROPHILS # BLD AUTO: 2.8 K/UL (ref 1.8–7.7)
NEUTROPHILS NFR BLD: 47.8 % (ref 38–73)
NRBC BLD-RTO: 0 /100 WBC
PLATELET # BLD AUTO: 256 K/UL (ref 150–450)
PMV BLD AUTO: 10.7 FL (ref 9.2–12.9)
POTASSIUM SERPL-SCNC: 4.1 MMOL/L (ref 3.5–5.1)
PROT SERPL-MCNC: 7.9 G/DL (ref 6–8.4)
RBC # BLD AUTO: 3.66 M/UL (ref 4–5.4)
SODIUM SERPL-SCNC: 141 MMOL/L (ref 136–145)
WBC # BLD AUTO: 5.8 K/UL (ref 3.9–12.7)

## 2022-08-05 PROCEDURE — 83036 HEMOGLOBIN GLYCOSYLATED A1C: CPT | Performed by: FAMILY MEDICINE

## 2022-08-05 PROCEDURE — 36415 COLL VENOUS BLD VENIPUNCTURE: CPT | Performed by: FAMILY MEDICINE

## 2022-08-05 PROCEDURE — 80053 COMPREHEN METABOLIC PANEL: CPT | Performed by: FAMILY MEDICINE

## 2022-08-05 PROCEDURE — 85025 COMPLETE CBC W/AUTO DIFF WBC: CPT | Performed by: FAMILY MEDICINE

## 2022-08-07 DIAGNOSIS — G47.00 INSOMNIA, UNSPECIFIED TYPE: ICD-10-CM

## 2022-08-07 RX ORDER — ZOLPIDEM TARTRATE 10 MG/1
10 TABLET ORAL NIGHTLY
Qty: 30 TABLET | Refills: 0 | Status: CANCELLED | OUTPATIENT
Start: 2022-08-07

## 2022-08-09 ENCOUNTER — LAB VISIT (OUTPATIENT)
Dept: LAB | Facility: HOSPITAL | Age: 52
End: 2022-08-09
Attending: FAMILY MEDICINE
Payer: MEDICAID

## 2022-08-09 ENCOUNTER — OFFICE VISIT (OUTPATIENT)
Dept: FAMILY MEDICINE | Facility: CLINIC | Age: 52
End: 2022-08-09
Payer: MEDICAID

## 2022-08-09 VITALS
OXYGEN SATURATION: 97 % | HEART RATE: 69 BPM | DIASTOLIC BLOOD PRESSURE: 77 MMHG | BODY MASS INDEX: 48.52 KG/M2 | HEIGHT: 64 IN | SYSTOLIC BLOOD PRESSURE: 119 MMHG | WEIGHT: 284.19 LBS

## 2022-08-09 DIAGNOSIS — Z79.4 TYPE 2 DIABETES MELLITUS WITH HYPERGLYCEMIA, WITH LONG-TERM CURRENT USE OF INSULIN: Primary | ICD-10-CM

## 2022-08-09 DIAGNOSIS — F32.A DEPRESSIVE DISORDER: ICD-10-CM

## 2022-08-09 DIAGNOSIS — D64.9 ANEMIA, UNSPECIFIED TYPE: ICD-10-CM

## 2022-08-09 DIAGNOSIS — E11.65 TYPE 2 DIABETES MELLITUS WITH HYPERGLYCEMIA, WITH LONG-TERM CURRENT USE OF INSULIN: Primary | ICD-10-CM

## 2022-08-09 DIAGNOSIS — E11.9 TYPE 2 DIABETES MELLITUS WITHOUT COMPLICATION, WITH LONG-TERM CURRENT USE OF INSULIN: ICD-10-CM

## 2022-08-09 DIAGNOSIS — Z79.4 TYPE 2 DIABETES MELLITUS WITHOUT COMPLICATION, WITH LONG-TERM CURRENT USE OF INSULIN: ICD-10-CM

## 2022-08-09 PROBLEM — H66.91 RIGHT OTITIS MEDIA: Status: RESOLVED | Noted: 2018-10-18 | Resolved: 2022-08-09

## 2022-08-09 LAB
ALBUMIN/CREAT UR: ABNORMAL UG/MG (ref 0–30)
BACTERIA #/AREA URNS HPF: NORMAL /HPF
CREAT UR-MCNC: 10 MG/DL (ref 15–325)
IRON SERPL-MCNC: 96 UG/DL (ref 30–160)
MICROALBUMIN UR DL<=1MG/L-MCNC: <5 UG/ML
MICROSCOPIC COMMENT: NORMAL
RBC #/AREA URNS HPF: 2 /HPF (ref 0–4)
SATURATED IRON: 21 % (ref 20–50)
TOTAL IRON BINDING CAPACITY: 466 UG/DL (ref 250–450)
TRANSFERRIN SERPL-MCNC: 315 MG/DL (ref 200–375)
WBC #/AREA URNS HPF: 2 /HPF (ref 0–5)

## 2022-08-09 PROCEDURE — 99999 PR PBB SHADOW E&M-EST. PATIENT-LVL V: ICD-10-PCS | Mod: PBBFAC,,, | Performed by: FAMILY MEDICINE

## 2022-08-09 PROCEDURE — 4010F ACE/ARB THERAPY RXD/TAKEN: CPT | Mod: CPTII,,, | Performed by: FAMILY MEDICINE

## 2022-08-09 PROCEDURE — 1160F RVW MEDS BY RX/DR IN RCRD: CPT | Mod: CPTII,,, | Performed by: FAMILY MEDICINE

## 2022-08-09 PROCEDURE — 99214 PR OFFICE/OUTPT VISIT, EST, LEVL IV, 30-39 MIN: ICD-10-PCS | Mod: S$PBB,,, | Performed by: FAMILY MEDICINE

## 2022-08-09 PROCEDURE — 81000 URINALYSIS NONAUTO W/SCOPE: CPT | Performed by: FAMILY MEDICINE

## 2022-08-09 PROCEDURE — 4010F PR ACE/ARB THEARPY RXD/TAKEN: ICD-10-PCS | Mod: CPTII,,, | Performed by: FAMILY MEDICINE

## 2022-08-09 PROCEDURE — 82570 ASSAY OF URINE CREATININE: CPT | Performed by: FAMILY MEDICINE

## 2022-08-09 PROCEDURE — 99214 OFFICE O/P EST MOD 30 MIN: CPT | Mod: S$PBB,,, | Performed by: FAMILY MEDICINE

## 2022-08-09 PROCEDURE — 36415 COLL VENOUS BLD VENIPUNCTURE: CPT | Performed by: FAMILY MEDICINE

## 2022-08-09 PROCEDURE — 3074F SYST BP LT 130 MM HG: CPT | Mod: CPTII,,, | Performed by: FAMILY MEDICINE

## 2022-08-09 PROCEDURE — 3008F PR BODY MASS INDEX (BMI) DOCUMENTED: ICD-10-PCS | Mod: CPTII,,, | Performed by: FAMILY MEDICINE

## 2022-08-09 PROCEDURE — 3078F DIAST BP <80 MM HG: CPT | Mod: CPTII,,, | Performed by: FAMILY MEDICINE

## 2022-08-09 PROCEDURE — 82043 UR ALBUMIN QUANTITATIVE: CPT | Performed by: FAMILY MEDICINE

## 2022-08-09 PROCEDURE — 99215 OFFICE O/P EST HI 40 MIN: CPT | Mod: PBBFAC,PN | Performed by: FAMILY MEDICINE

## 2022-08-09 PROCEDURE — 3074F PR MOST RECENT SYSTOLIC BLOOD PRESSURE < 130 MM HG: ICD-10-PCS | Mod: CPTII,,, | Performed by: FAMILY MEDICINE

## 2022-08-09 PROCEDURE — 1159F PR MEDICATION LIST DOCUMENTED IN MEDICAL RECORD: ICD-10-PCS | Mod: CPTII,,, | Performed by: FAMILY MEDICINE

## 2022-08-09 PROCEDURE — 1159F MED LIST DOCD IN RCRD: CPT | Mod: CPTII,,, | Performed by: FAMILY MEDICINE

## 2022-08-09 PROCEDURE — 3008F BODY MASS INDEX DOCD: CPT | Mod: CPTII,,, | Performed by: FAMILY MEDICINE

## 2022-08-09 PROCEDURE — 84466 ASSAY OF TRANSFERRIN: CPT | Performed by: FAMILY MEDICINE

## 2022-08-09 PROCEDURE — 3078F PR MOST RECENT DIASTOLIC BLOOD PRESSURE < 80 MM HG: ICD-10-PCS | Mod: CPTII,,, | Performed by: FAMILY MEDICINE

## 2022-08-09 PROCEDURE — 3044F PR MOST RECENT HEMOGLOBIN A1C LEVEL <7.0%: ICD-10-PCS | Mod: CPTII,,, | Performed by: FAMILY MEDICINE

## 2022-08-09 PROCEDURE — 1160F PR REVIEW ALL MEDS BY PRESCRIBER/CLIN PHARMACIST DOCUMENTED: ICD-10-PCS | Mod: CPTII,,, | Performed by: FAMILY MEDICINE

## 2022-08-09 PROCEDURE — 3044F HG A1C LEVEL LT 7.0%: CPT | Mod: CPTII,,, | Performed by: FAMILY MEDICINE

## 2022-08-09 PROCEDURE — 99999 PR PBB SHADOW E&M-EST. PATIENT-LVL V: CPT | Mod: PBBFAC,,, | Performed by: FAMILY MEDICINE

## 2022-08-09 RX ORDER — PIOGLITAZONEHYDROCHLORIDE 45 MG/1
45 TABLET ORAL DAILY
Qty: 90 TABLET | Refills: 3 | Status: SHIPPED | OUTPATIENT
Start: 2022-08-09 | End: 2022-11-09

## 2022-08-09 RX ORDER — INSULIN GLARGINE 100 [IU]/ML
60 INJECTION, SOLUTION SUBCUTANEOUS DAILY
Qty: 15 ML | Refills: 6 | Status: SHIPPED | OUTPATIENT
Start: 2022-08-09 | End: 2023-07-31 | Stop reason: SDUPTHER

## 2022-08-09 RX ORDER — FUROSEMIDE 40 MG/1
40 TABLET ORAL 2 TIMES DAILY
Qty: 180 TABLET | Refills: 3 | Status: SHIPPED | OUTPATIENT
Start: 2022-08-09 | End: 2023-08-08

## 2022-08-09 RX ORDER — BUPROPION HYDROCHLORIDE 150 MG/1
150 TABLET ORAL DAILY
Qty: 90 TABLET | Refills: 1 | Status: SHIPPED | OUTPATIENT
Start: 2022-08-09 | End: 2023-05-12 | Stop reason: SDUPTHER

## 2022-08-09 NOTE — PROGRESS NOTES
Subjective:       Patient ID: Shweta Gupta is a 52 y.o. female.    Chief Complaint: Follow-up (4 mth f/u. Pt consents to foot exam and microalbumin.)    HPI   Follow-up, feeling well overall. Due for urine microalbumin. Labs reviewed - a1c better, kidney function stable, anemia stable. Review of records show she's had blood in her urine several times. Takes otc iron once a day.    Review of Systems   Constitutional: Negative for chills, fever and unexpected weight change.   Genitourinary: Negative for dysuria and flank pain.   Skin: Negative for pallor.   Neurological: Negative for seizures and syncope.       Past Medical History:   Diagnosis Date    Allergy     Anemia     Anxiety     Asthma     Depression     Diabetes mellitus, type 2     Diverticulitis     Gastroparesis     GERD (gastroesophageal reflux disease)     Hyperlipidemia     Hypertension     Mitral valve prolapse     Morbid obesity     Neuropathy      Past Surgical History:   Procedure Laterality Date    ACHILLES TENDON SURGERY Left     APPENDECTOMY      BRAIN SURGERY       Shunt    CARPAL TUNNEL RELEASE Left      SECTION      CHOLECYSTECTOMY      COLON SURGERY      COLONOSCOPY N/A 2020    Procedure: COLONOSCOPY;  Surgeon: Khanh Soriano MD;  Location: [facility];  Service: General;  Laterality: N/A;    COLONOSCOPY N/A 2021    Procedure: COLONOSCOPY;  Surgeon: Darleen Caballero MD;  Location: [facility] ENDO;  Service: General;  Laterality: N/A;    CYST REMOVAL  2021    Cyst of finger    CYSTOSCOPY WITH URETEROSCOPY, RETROGRADE PYELOGRAPHY, AND INSERTION OF STENT Left 2021    Procedure: CYSTOSCOPY, WITH RETROGRADE PYELOGRAM AND URETERAL STENT INSERTION;  Surgeon: Bessy Allne MD;  Location: [facility] OR;  Service: Urology;  Laterality: Left;    ESOPHAGOGASTRODUODENOSCOPY N/A 2020    Procedure: ESOPHAGOGASTRODUODENOSCOPY (EGD);  Surgeon: Khanh Soriano MD;  Location: [facility];   "Service: General;  Laterality: N/A;    ESOPHAGOGASTRODUODENOSCOPY N/A 12/2/2021    Procedure: ESOPHAGOGASTRODUODENOSCOPY (EGD);  Surgeon: Darleen Caballero MD;  Location: [facility] ENDO;  Service: General;  Laterality: N/A;    HERNIA REPAIR      HYSTERECTOMY      partial    KIDNEY STONE SURGERY      OOPHORECTOMY      TRIGGER FINGER RELEASE Left     URETEROSCOPIC REMOVAL OF URETERIC CALCULUS Left 11/16/2021    Procedure: REMOVAL, CALCULUS, URETER, URETEROSCOPIC;  Surgeon: Bessy Allen MD;  Location: [facility] OR;  Service: Urology;  Laterality: Left;    VENTRICULOPERITONEAL SHUNT       Social History     Socioeconomic History    Marital status:    Tobacco Use    Smoking status: Never Smoker    Smokeless tobacco: Never Used   Substance and Sexual Activity    Alcohol use: No    Drug use: Never    Sexual activity: Not Currently     Partners: Male     Birth control/protection: None     Family History   Problem Relation Age of Onset    Cancer Mother         Lung cancer    Lung cancer Mother     Hypertension Mother     Arthritis Mother     COPD Mother     Heart disease Mother     Miscarriages / Stillbirths Mother     Cancer Father         Lung and brain cancer    Lung cancer Father     Hypertension Father     Heart disease Father     Hearing loss Father     Heart disease Brother     Hypertension Brother     Heart disease Brother         Tetrafalow    Breast cancer Neg Hx        Objective:      /77 (BP Location: Left arm, Patient Position: Sitting, BP Method: Medium (Manual))   Pulse 69   Ht 5' 4" (1.626 m)   Wt 128.9 kg (284 lb 3.2 oz)   LMP 06/10/2002   SpO2 97%   BMI 48.78 kg/m²   Physical Exam  Vitals reviewed.   Constitutional:       General: She is not in acute distress.     Appearance: She is obese. She is not toxic-appearing.   HENT:      Head: Normocephalic and atraumatic.   Eyes:      General: No scleral icterus.     Extraocular Movements: Extraocular movements intact. "   Cardiovascular:      Rate and Rhythm: Normal rate.      Pulses:           Dorsalis pedis pulses are 2+ on the right side and 2+ on the left side.        Posterior tibial pulses are 1+ on the right side and 1+ on the left side.   Pulmonary:      Effort: Pulmonary effort is normal. No respiratory distress.   Musculoskeletal:      Right foot: No deformity, bunion, Charcot foot or foot drop.      Left foot: No deformity, bunion, Charcot foot or foot drop.   Feet:      Right foot:      Protective Sensation: 5 sites tested. 3 sites sensed.      Skin integrity: Callus present. No ulcer, blister or fissure.      Toenail Condition: Right toenails are normal.      Left foot:      Protective Sensation: 5 sites tested. 4 sites sensed.      Skin integrity: Callus present. No ulcer, blister or fissure.      Toenail Condition: Left toenails are normal.   Neurological:      Mental Status: She is alert and oriented to person, place, and time.   Psychiatric:         Mood and Affect: Mood normal.         Behavior: Behavior normal.         Assessment:       1. Type 2 diabetes mellitus with hyperglycemia, with long-term current use of insulin    2. Anemia, unspecified type    3. Type 2 diabetes mellitus without complication, with long-term current use of insulin    4. Depressive disorder        Plan:       Meds refilled. Update fasting labs in 6 months. Check urinalysis and iron panel in the meantime.    Type 2 diabetes mellitus with hyperglycemia, with long-term current use of insulin  -     Microalbumin/Creatinine Ratio, Urine  -     Hemoglobin A1C; Future; Expected date: 11/09/2022  -     Comprehensive Metabolic Panel; Future; Expected date: 08/09/2022  -     CBC Auto Differential; Future; Expected date: 08/09/2022  -     furosemide (LASIX) 40 MG tablet; Take 1 tablet (40 mg total) by mouth 2 (two) times daily.  Dispense: 180 tablet; Refill: 3  -     insulin (LANTUS SOLOSTAR U-100 INSULIN) glargine 100 units/mL SubQ pen; Inject 60  Units into the skin once daily.  Dispense: 15 mL; Refill: 6  -     pioglitazone (ACTOS) 45 MG tablet; Take 1 tablet (45 mg total) by mouth once daily.  Dispense: 90 tablet; Refill: 3    Anemia, unspecified type  -     Urinalysis Microscopic  -     Iron and TIBC; Future; Expected date: 08/09/2022    Type 2 diabetes mellitus without complication, with long-term current use of insulin  -     buPROPion (WELLBUTRIN XL) 150 MG TB24 tablet; Take 1 tablet (150 mg total) by mouth once daily.  Dispense: 90 tablet; Refill: 1    Depressive disorder            Risks, benefits, and side effects were discussed with the patient. All questions were answered to the fullest satisfaction of the patient, and pt verbalized understanding and agreement to treatment plan. Pt was to call with any new or worsening symptoms, or present to the ER.

## 2022-09-07 NOTE — TELEPHONE ENCOUNTER
Impression: Combined forms of age-related cataract, right eye: H25.811. Plan: Discussed recommendation for cataract extraction w/ IOL due to current BCVA and glare. Pt's symptoms are affecting daily life at distance and near. Discussed options for surgery. Recommend OD. Patient elects to move forward with surgery. Full discussion R, B, A. Discussed IOL options. All questions answered. Patient wants surgery. RL 2. Schedule for preop.  
Recommend Standard IOL/Toric IOL/Multifocal IOL - confirm with surgeon difluc

## 2022-10-06 ENCOUNTER — PATIENT MESSAGE (OUTPATIENT)
Dept: FAMILY MEDICINE | Facility: CLINIC | Age: 52
End: 2022-10-06
Payer: MEDICAID

## 2022-10-11 ENCOUNTER — TELEPHONE (OUTPATIENT)
Dept: FAMILY MEDICINE | Facility: CLINIC | Age: 52
End: 2022-10-11
Payer: MEDICAID

## 2022-10-11 ENCOUNTER — PATIENT MESSAGE (OUTPATIENT)
Dept: FAMILY MEDICINE | Facility: CLINIC | Age: 52
End: 2022-10-11
Payer: MEDICAID

## 2022-10-11 NOTE — TELEPHONE ENCOUNTER
----- Message from Alex De La Torre sent at 10/11/2022 11:10 AM CDT -----  Type:  Sooner Appointment Request    Caller is requesting a sooner appointment.  Caller declined first available appointment listed below.  Caller will not accept being placed on the waitlist and is requesting a message be sent to doctor.    Name of Caller:  Pt  When is the first available appointment?  10/14  Symptoms:  Flu shot  Best Call Back Number:  [phone]   Additional Information:  Sts she can not do Friday and didn't get the message in the portal. Please advise -- Thank you

## 2022-10-18 ENCOUNTER — PATIENT MESSAGE (OUTPATIENT)
Dept: FAMILY MEDICINE | Facility: CLINIC | Age: 52
End: 2022-10-18

## 2022-10-31 ENCOUNTER — PATIENT MESSAGE (OUTPATIENT)
Dept: FAMILY MEDICINE | Facility: CLINIC | Age: 52
End: 2022-10-31
Payer: MEDICAID

## 2022-10-31 ENCOUNTER — PATIENT MESSAGE (OUTPATIENT)
Dept: SURGERY | Facility: CLINIC | Age: 52
End: 2022-10-31
Payer: MEDICAID

## 2022-10-31 DIAGNOSIS — K21.9 GASTROESOPHAGEAL REFLUX DISEASE: ICD-10-CM

## 2022-10-31 RX ORDER — PANTOPRAZOLE SODIUM 40 MG/1
40 TABLET, DELAYED RELEASE ORAL DAILY
Qty: 90 TABLET | Refills: 1 | Status: SHIPPED | OUTPATIENT
Start: 2022-10-31 | End: 2023-08-08 | Stop reason: SDUPTHER

## 2022-11-03 ENCOUNTER — PATIENT MESSAGE (OUTPATIENT)
Dept: FAMILY MEDICINE | Facility: CLINIC | Age: 52
End: 2022-11-03
Payer: MEDICAID

## 2022-11-09 ENCOUNTER — PATIENT MESSAGE (OUTPATIENT)
Dept: FAMILY MEDICINE | Facility: CLINIC | Age: 52
End: 2022-11-09
Payer: MEDICAID

## 2022-11-10 NOTE — TELEPHONE ENCOUNTER
Dry ARMD is responsible for some decrease in vision. Spoke with Sa at [facility] and clarified order to read (1) po qhs.  Verbalized an understanding.

## 2022-11-22 ENCOUNTER — PATIENT MESSAGE (OUTPATIENT)
Dept: FAMILY MEDICINE | Facility: CLINIC | Age: 52
End: 2022-11-22
Payer: MEDICAID

## 2022-11-23 RX ORDER — AZITHROMYCIN 250 MG/1
TABLET, FILM COATED ORAL
Qty: 6 TABLET | Refills: 0 | Status: SHIPPED | OUTPATIENT
Start: 2022-11-23 | End: 2022-11-28

## 2022-12-14 DIAGNOSIS — Z12.31 OTHER SCREENING MAMMOGRAM: ICD-10-CM

## 2022-12-19 ENCOUNTER — PATIENT MESSAGE (OUTPATIENT)
Dept: ADMINISTRATIVE | Facility: HOSPITAL | Age: 52
End: 2022-12-19
Payer: MEDICAID

## 2023-01-09 ENCOUNTER — PATIENT MESSAGE (OUTPATIENT)
Dept: FAMILY MEDICINE | Facility: CLINIC | Age: 53
End: 2023-01-09
Payer: MEDICAID

## 2023-01-13 ENCOUNTER — TELEPHONE (OUTPATIENT)
Dept: FAMILY MEDICINE | Facility: CLINIC | Age: 53
End: 2023-01-13
Payer: MEDICAID

## 2023-01-13 NOTE — TELEPHONE ENCOUNTER
Spoke with patient. Verified . Informed pt her zolpidem was denied by insurance. Pt states she pays cash for the medication. Verbalized understanding.    none

## 2023-01-13 NOTE — TELEPHONE ENCOUNTER
----- Message from Alex De La Torre sent at 1/13/2023  8:12 AM CST -----  Type:  Patient Returning Call    Who Called:  Pt  Who Left Message for Patient:  Kamilah  Does the patient know what this is regarding?:     Best Call Back Number:  [phone]  Additional Information:  Please advise -- thank you

## 2023-02-02 ENCOUNTER — LAB VISIT (OUTPATIENT)
Dept: LAB | Facility: HOSPITAL | Age: 53
End: 2023-02-02
Attending: FAMILY MEDICINE
Payer: MEDICAID

## 2023-02-02 DIAGNOSIS — Z79.4 TYPE 2 DIABETES MELLITUS WITH HYPERGLYCEMIA, WITH LONG-TERM CURRENT USE OF INSULIN: ICD-10-CM

## 2023-02-02 DIAGNOSIS — E11.65 TYPE 2 DIABETES MELLITUS WITH HYPERGLYCEMIA, WITH LONG-TERM CURRENT USE OF INSULIN: ICD-10-CM

## 2023-02-02 LAB
ALBUMIN SERPL BCP-MCNC: 4.1 G/DL (ref 3.5–5.2)
ALP SERPL-CCNC: 62 U/L (ref 55–135)
ALT SERPL W/O P-5'-P-CCNC: 22 U/L (ref 10–44)
ANION GAP SERPL CALC-SCNC: 8 MMOL/L (ref 8–16)
AST SERPL-CCNC: 15 U/L (ref 10–40)
BASOPHILS # BLD AUTO: 0.05 K/UL (ref 0–0.2)
BASOPHILS NFR BLD: 0.9 % (ref 0–1.9)
BILIRUB SERPL-MCNC: 0.4 MG/DL (ref 0.1–1)
BUN SERPL-MCNC: 16 MG/DL (ref 6–20)
CALCIUM SERPL-MCNC: 9.5 MG/DL (ref 8.7–10.5)
CHLORIDE SERPL-SCNC: 105 MMOL/L (ref 95–110)
CO2 SERPL-SCNC: 26 MMOL/L (ref 23–29)
CREAT SERPL-MCNC: 0.8 MG/DL (ref 0.5–1.4)
DIFFERENTIAL METHOD: ABNORMAL
EOSINOPHIL # BLD AUTO: 0.1 K/UL (ref 0–0.5)
EOSINOPHIL NFR BLD: 2.2 % (ref 0–8)
ERYTHROCYTE [DISTWIDTH] IN BLOOD BY AUTOMATED COUNT: 12.7 % (ref 11.5–14.5)
EST. GFR  (NO RACE VARIABLE): >60 ML/MIN/1.73 M^2
ESTIMATED AVG GLUCOSE: 134 MG/DL (ref 68–131)
GLUCOSE SERPL-MCNC: 152 MG/DL (ref 70–110)
HBA1C MFR BLD: 6.3 % (ref 4–5.6)
HCT VFR BLD AUTO: 36 % (ref 37–48.5)
HGB BLD-MCNC: 11.3 G/DL (ref 12–16)
IMM GRANULOCYTES # BLD AUTO: 0.01 K/UL (ref 0–0.04)
IMM GRANULOCYTES NFR BLD AUTO: 0.2 % (ref 0–0.5)
LYMPHOCYTES # BLD AUTO: 2.2 K/UL (ref 1–4.8)
LYMPHOCYTES NFR BLD: 38 % (ref 18–48)
MCH RBC QN AUTO: 30 PG (ref 27–31)
MCHC RBC AUTO-ENTMCNC: 31.4 G/DL (ref 32–36)
MCV RBC AUTO: 96 FL (ref 82–98)
MONOCYTES # BLD AUTO: 0.5 K/UL (ref 0.3–1)
MONOCYTES NFR BLD: 9.1 % (ref 4–15)
NEUTROPHILS # BLD AUTO: 2.9 K/UL (ref 1.8–7.7)
NEUTROPHILS NFR BLD: 49.6 % (ref 38–73)
NRBC BLD-RTO: 0 /100 WBC
PLATELET # BLD AUTO: 268 K/UL (ref 150–450)
PMV BLD AUTO: 10.5 FL (ref 9.2–12.9)
POTASSIUM SERPL-SCNC: 4 MMOL/L (ref 3.5–5.1)
PROT SERPL-MCNC: 7.3 G/DL (ref 6–8.4)
RBC # BLD AUTO: 3.77 M/UL (ref 4–5.4)
SODIUM SERPL-SCNC: 139 MMOL/L (ref 136–145)
WBC # BLD AUTO: 5.81 K/UL (ref 3.9–12.7)

## 2023-02-02 PROCEDURE — 83036 HEMOGLOBIN GLYCOSYLATED A1C: CPT | Performed by: FAMILY MEDICINE

## 2023-02-02 PROCEDURE — 80053 COMPREHEN METABOLIC PANEL: CPT | Performed by: FAMILY MEDICINE

## 2023-02-02 PROCEDURE — 85025 COMPLETE CBC W/AUTO DIFF WBC: CPT | Performed by: FAMILY MEDICINE

## 2023-02-02 PROCEDURE — 36415 COLL VENOUS BLD VENIPUNCTURE: CPT | Performed by: FAMILY MEDICINE

## 2023-02-09 ENCOUNTER — OFFICE VISIT (OUTPATIENT)
Dept: FAMILY MEDICINE | Facility: CLINIC | Age: 53
End: 2023-02-09
Payer: MEDICAID

## 2023-02-09 VITALS
OXYGEN SATURATION: 97 % | WEIGHT: 293 LBS | DIASTOLIC BLOOD PRESSURE: 86 MMHG | HEIGHT: 64 IN | HEART RATE: 71 BPM | SYSTOLIC BLOOD PRESSURE: 128 MMHG | RESPIRATION RATE: 15 BRPM | BODY MASS INDEX: 50.02 KG/M2

## 2023-02-09 DIAGNOSIS — R39.89 GENITAL SORE: ICD-10-CM

## 2023-02-09 DIAGNOSIS — E11.65 TYPE 2 DIABETES MELLITUS WITH HYPERGLYCEMIA, WITH LONG-TERM CURRENT USE OF INSULIN: Primary | ICD-10-CM

## 2023-02-09 DIAGNOSIS — Z79.4 TYPE 2 DIABETES MELLITUS WITH HYPERGLYCEMIA, WITH LONG-TERM CURRENT USE OF INSULIN: Primary | ICD-10-CM

## 2023-02-09 PROCEDURE — 3044F PR MOST RECENT HEMOGLOBIN A1C LEVEL <7.0%: ICD-10-PCS | Mod: CPTII,,, | Performed by: FAMILY MEDICINE

## 2023-02-09 PROCEDURE — 99999 PR PBB SHADOW E&M-EST. PATIENT-LVL IV: ICD-10-PCS | Mod: PBBFAC,,, | Performed by: FAMILY MEDICINE

## 2023-02-09 PROCEDURE — 4010F ACE/ARB THERAPY RXD/TAKEN: CPT | Mod: CPTII,,, | Performed by: FAMILY MEDICINE

## 2023-02-09 PROCEDURE — 3079F PR MOST RECENT DIASTOLIC BLOOD PRESSURE 80-89 MM HG: ICD-10-PCS | Mod: CPTII,,, | Performed by: FAMILY MEDICINE

## 2023-02-09 PROCEDURE — 3008F BODY MASS INDEX DOCD: CPT | Mod: CPTII,,, | Performed by: FAMILY MEDICINE

## 2023-02-09 PROCEDURE — 99214 OFFICE O/P EST MOD 30 MIN: CPT | Mod: S$PBB,,, | Performed by: FAMILY MEDICINE

## 2023-02-09 PROCEDURE — 99214 PR OFFICE/OUTPT VISIT, EST, LEVL IV, 30-39 MIN: ICD-10-PCS | Mod: S$PBB,,, | Performed by: FAMILY MEDICINE

## 2023-02-09 PROCEDURE — 1159F PR MEDICATION LIST DOCUMENTED IN MEDICAL RECORD: ICD-10-PCS | Mod: CPTII,,, | Performed by: FAMILY MEDICINE

## 2023-02-09 PROCEDURE — 3079F DIAST BP 80-89 MM HG: CPT | Mod: CPTII,,, | Performed by: FAMILY MEDICINE

## 2023-02-09 PROCEDURE — 3074F PR MOST RECENT SYSTOLIC BLOOD PRESSURE < 130 MM HG: ICD-10-PCS | Mod: CPTII,,, | Performed by: FAMILY MEDICINE

## 2023-02-09 PROCEDURE — 99214 OFFICE O/P EST MOD 30 MIN: CPT | Mod: PBBFAC,PN | Performed by: FAMILY MEDICINE

## 2023-02-09 PROCEDURE — 99999 PR PBB SHADOW E&M-EST. PATIENT-LVL IV: CPT | Mod: PBBFAC,,, | Performed by: FAMILY MEDICINE

## 2023-02-09 PROCEDURE — 3044F HG A1C LEVEL LT 7.0%: CPT | Mod: CPTII,,, | Performed by: FAMILY MEDICINE

## 2023-02-09 PROCEDURE — 1159F MED LIST DOCD IN RCRD: CPT | Mod: CPTII,,, | Performed by: FAMILY MEDICINE

## 2023-02-09 PROCEDURE — 3074F SYST BP LT 130 MM HG: CPT | Mod: CPTII,,, | Performed by: FAMILY MEDICINE

## 2023-02-09 PROCEDURE — 4010F PR ACE/ARB THEARPY RXD/TAKEN: ICD-10-PCS | Mod: CPTII,,, | Performed by: FAMILY MEDICINE

## 2023-02-09 PROCEDURE — 3008F PR BODY MASS INDEX (BMI) DOCUMENTED: ICD-10-PCS | Mod: CPTII,,, | Performed by: FAMILY MEDICINE

## 2023-02-09 RX ORDER — SEMAGLUTIDE 1.34 MG/ML
0.25 INJECTION, SOLUTION SUBCUTANEOUS
Qty: 1 PEN | Refills: 2 | Status: SHIPPED | OUTPATIENT
Start: 2023-02-09 | End: 2023-08-07

## 2023-02-09 RX ORDER — VALACYCLOVIR HYDROCHLORIDE 1 G/1
1000 TABLET, FILM COATED ORAL EVERY 12 HOURS
Qty: 60 TABLET | Refills: 11 | Status: SHIPPED | OUTPATIENT
Start: 2023-02-09 | End: 2024-02-26

## 2023-02-09 RX ORDER — LISINOPRIL 20 MG/1
20 TABLET ORAL DAILY
Qty: 90 TABLET | Refills: 3 | Status: SHIPPED | OUTPATIENT
Start: 2023-02-09 | End: 2024-02-08 | Stop reason: SDUPTHER

## 2023-03-07 ENCOUNTER — HOSPITAL ENCOUNTER (OUTPATIENT)
Dept: RADIOLOGY | Facility: HOSPITAL | Age: 53
Discharge: HOME | End: 2023-03-07
Attending: FAMILY MEDICINE
Payer: MEDICAID

## 2023-03-07 DIAGNOSIS — Z12.31 OTHER SCREENING MAMMOGRAM: ICD-10-CM

## 2023-03-07 PROCEDURE — 77063 MAMMO DIGITAL SCREENING BILAT WITH TOMO: ICD-10-PCS | Mod: 26,,, | Performed by: RADIOLOGY

## 2023-03-07 PROCEDURE — 77067 SCR MAMMO BI INCL CAD: CPT | Mod: 26,,, | Performed by: RADIOLOGY

## 2023-03-07 PROCEDURE — 77067 MAMMO DIGITAL SCREENING BILAT WITH TOMO: ICD-10-PCS | Mod: 26,,, | Performed by: RADIOLOGY

## 2023-03-07 PROCEDURE — 77063 BREAST TOMOSYNTHESIS BI: CPT | Mod: 26,,, | Performed by: RADIOLOGY

## 2023-03-07 PROCEDURE — 77067 SCR MAMMO BI INCL CAD: CPT | Mod: TC

## 2023-03-09 ENCOUNTER — TELEPHONE (OUTPATIENT)
Dept: FAMILY MEDICINE | Facility: CLINIC | Age: 53
End: 2023-03-09
Payer: MEDICAID

## 2023-03-09 NOTE — TELEPHONE ENCOUNTER
Spoke with pt. Pt requesting appeal on Ozempic due to being Type 2 diabetic. Informed pt I would place message to PA nurse for the appeal process. Please advise!

## 2023-03-09 NOTE — TELEPHONE ENCOUNTER
----- Message from Randy May sent at 3/9/2023 10:32 AM CST -----  Contact: pt at [phone]  Type: Needs Medical Advice  Who Called:  pt  Best Call Back Number: [phone]  Additional Information: pt is calling the office to see if they are going to appeal her denial for her medication./ Please call back and advise.    PER THE PT PLEASE CALL BACK TODAY ASAP

## 2023-03-22 NOTE — TELEPHONE ENCOUNTER
----- Message from Subha Brantley sent at 10/25/2018 12:18 PM CDT -----  Contact: [facility] [phone] called regarding Ciprofloxacin 0.2% for above patient. They are requesting one of the following: Rx clarification. Thanks!     Products Recommended: Blue lizard Detail Level: Detailed General Sunscreen Counseling: I recommended a broad spectrum sunscreen with a SPF of 30 or higher. I explained that SPF 30 sunscreens block approximately 97 percent of the sun's harmful rays. Sunscreens should be applied at least 15 minutes prior to expected sun exposure and then every 2 hours after that as long as sun exposure continues. If swimming or exercising sunscreen should be reapplied every 45 minutes to an hour after getting wet or sweating. One ounce, or the equivalent of a shot glass full of sunscreen, is adequate to protect the skin not covered by a bathing suit. I also recommended a lip balm with a sunscreen as well. Sun protective clothing can be used in lieu of sunscreen but must be worn the entire time you are exposed to the sun's rays.

## 2023-03-29 ENCOUNTER — PATIENT MESSAGE (OUTPATIENT)
Dept: FAMILY MEDICINE | Facility: CLINIC | Age: 53
End: 2023-03-29
Payer: MEDICAID

## 2023-04-11 ENCOUNTER — PATIENT MESSAGE (OUTPATIENT)
Dept: ADMINISTRATIVE | Facility: HOSPITAL | Age: 53
End: 2023-04-11
Payer: MEDICAID

## 2023-04-26 DIAGNOSIS — E11.9 TYPE 2 DIABETES MELLITUS WITHOUT COMPLICATION, UNSPECIFIED WHETHER LONG TERM INSULIN USE: ICD-10-CM

## 2023-05-01 RX ORDER — SUCRALFATE 1 G/1
TABLET ORAL
Qty: 360 TABLET | Refills: 3 | Status: SHIPPED | OUTPATIENT
Start: 2023-05-01

## 2023-05-04 ENCOUNTER — LAB VISIT (OUTPATIENT)
Dept: LAB | Facility: HOSPITAL | Age: 53
End: 2023-05-04
Attending: FAMILY MEDICINE
Payer: MEDICAID

## 2023-05-04 DIAGNOSIS — Z79.4 TYPE 2 DIABETES MELLITUS WITH HYPERGLYCEMIA, WITH LONG-TERM CURRENT USE OF INSULIN: ICD-10-CM

## 2023-05-04 DIAGNOSIS — E11.65 TYPE 2 DIABETES MELLITUS WITH HYPERGLYCEMIA, WITH LONG-TERM CURRENT USE OF INSULIN: ICD-10-CM

## 2023-05-04 LAB
ALBUMIN SERPL BCP-MCNC: 4.2 G/DL (ref 3.5–5.2)
ALP SERPL-CCNC: 64 U/L (ref 55–135)
ALT SERPL W/O P-5'-P-CCNC: 20 U/L (ref 10–44)
ANION GAP SERPL CALC-SCNC: 11 MMOL/L (ref 8–16)
AST SERPL-CCNC: 17 U/L (ref 10–40)
BASOPHILS # BLD AUTO: 0.04 K/UL (ref 0–0.2)
BASOPHILS NFR BLD: 0.5 % (ref 0–1.9)
BILIRUB SERPL-MCNC: 0.4 MG/DL (ref 0.1–1)
BUN SERPL-MCNC: 21 MG/DL (ref 6–20)
CALCIUM SERPL-MCNC: 9.8 MG/DL (ref 8.7–10.5)
CHLORIDE SERPL-SCNC: 106 MMOL/L (ref 95–110)
CHOLEST SERPL-MCNC: 168 MG/DL (ref 120–199)
CHOLEST/HDLC SERPL: 3.7 {RATIO} (ref 2–5)
CO2 SERPL-SCNC: 24 MMOL/L (ref 23–29)
CREAT SERPL-MCNC: 0.9 MG/DL (ref 0.5–1.4)
DIFFERENTIAL METHOD: ABNORMAL
EOSINOPHIL # BLD AUTO: 0.1 K/UL (ref 0–0.5)
EOSINOPHIL NFR BLD: 1.6 % (ref 0–8)
ERYTHROCYTE [DISTWIDTH] IN BLOOD BY AUTOMATED COUNT: 13.2 % (ref 11.5–14.5)
EST. GFR  (NO RACE VARIABLE): >60 ML/MIN/1.73 M^2
ESTIMATED AVG GLUCOSE: 126 MG/DL (ref 68–131)
GLUCOSE SERPL-MCNC: 143 MG/DL (ref 70–110)
HBA1C MFR BLD: 6 % (ref 4–5.6)
HCT VFR BLD AUTO: 35.2 % (ref 37–48.5)
HDLC SERPL-MCNC: 46 MG/DL (ref 40–75)
HDLC SERPL: 27.4 % (ref 20–50)
HGB BLD-MCNC: 11.2 G/DL (ref 12–16)
IMM GRANULOCYTES # BLD AUTO: 0.02 K/UL (ref 0–0.04)
IMM GRANULOCYTES NFR BLD AUTO: 0.2 % (ref 0–0.5)
LDLC SERPL CALC-MCNC: 97.6 MG/DL (ref 63–159)
LYMPHOCYTES # BLD AUTO: 2.2 K/UL (ref 1–4.8)
LYMPHOCYTES NFR BLD: 25.3 % (ref 18–48)
MCH RBC QN AUTO: 30.3 PG (ref 27–31)
MCHC RBC AUTO-ENTMCNC: 31.8 G/DL (ref 32–36)
MCV RBC AUTO: 95 FL (ref 82–98)
MONOCYTES # BLD AUTO: 0.7 K/UL (ref 0.3–1)
MONOCYTES NFR BLD: 7.9 % (ref 4–15)
NEUTROPHILS # BLD AUTO: 5.6 K/UL (ref 1.8–7.7)
NEUTROPHILS NFR BLD: 64.5 % (ref 38–73)
NONHDLC SERPL-MCNC: 122 MG/DL
NRBC BLD-RTO: 0 /100 WBC
PLATELET # BLD AUTO: 256 K/UL (ref 150–450)
PMV BLD AUTO: 10.8 FL (ref 9.2–12.9)
POTASSIUM SERPL-SCNC: 3.9 MMOL/L (ref 3.5–5.1)
PROT SERPL-MCNC: 7.7 G/DL (ref 6–8.4)
RBC # BLD AUTO: 3.7 M/UL (ref 4–5.4)
SODIUM SERPL-SCNC: 141 MMOL/L (ref 136–145)
T4 FREE SERPL-MCNC: 0.91 NG/DL (ref 0.71–1.51)
TRIGL SERPL-MCNC: 122 MG/DL (ref 30–150)
TSH SERPL DL<=0.005 MIU/L-ACNC: 4.06 UIU/ML (ref 0.4–4)
WBC # BLD AUTO: 8.66 K/UL (ref 3.9–12.7)

## 2023-05-04 PROCEDURE — 84439 ASSAY OF FREE THYROXINE: CPT | Performed by: FAMILY MEDICINE

## 2023-05-04 PROCEDURE — 80053 COMPREHEN METABOLIC PANEL: CPT | Performed by: FAMILY MEDICINE

## 2023-05-04 PROCEDURE — 84443 ASSAY THYROID STIM HORMONE: CPT | Performed by: FAMILY MEDICINE

## 2023-05-04 PROCEDURE — 85025 COMPLETE CBC W/AUTO DIFF WBC: CPT | Performed by: FAMILY MEDICINE

## 2023-05-04 PROCEDURE — 83036 HEMOGLOBIN GLYCOSYLATED A1C: CPT | Performed by: FAMILY MEDICINE

## 2023-05-04 PROCEDURE — 36415 COLL VENOUS BLD VENIPUNCTURE: CPT | Performed by: FAMILY MEDICINE

## 2023-05-04 PROCEDURE — 80061 LIPID PANEL: CPT | Performed by: FAMILY MEDICINE

## 2023-05-12 ENCOUNTER — OFFICE VISIT (OUTPATIENT)
Dept: FAMILY MEDICINE | Facility: CLINIC | Age: 53
End: 2023-05-12
Payer: MEDICAID

## 2023-05-12 VITALS
DIASTOLIC BLOOD PRESSURE: 71 MMHG | BODY MASS INDEX: 50.02 KG/M2 | HEIGHT: 64 IN | WEIGHT: 293 LBS | HEART RATE: 66 BPM | OXYGEN SATURATION: 96 % | RESPIRATION RATE: 15 BRPM | SYSTOLIC BLOOD PRESSURE: 119 MMHG

## 2023-05-12 DIAGNOSIS — G47.00 INSOMNIA, UNSPECIFIED TYPE: ICD-10-CM

## 2023-05-12 DIAGNOSIS — Z79.4 TYPE 2 DIABETES MELLITUS WITHOUT COMPLICATION, WITH LONG-TERM CURRENT USE OF INSULIN: ICD-10-CM

## 2023-05-12 DIAGNOSIS — E03.8 SUBCLINICAL HYPOTHYROIDISM: Primary | ICD-10-CM

## 2023-05-12 DIAGNOSIS — E11.9 TYPE 2 DIABETES MELLITUS WITHOUT COMPLICATION, WITH LONG-TERM CURRENT USE OF INSULIN: ICD-10-CM

## 2023-05-12 PROCEDURE — 4010F ACE/ARB THERAPY RXD/TAKEN: CPT | Mod: CPTII,,, | Performed by: FAMILY MEDICINE

## 2023-05-12 PROCEDURE — 3074F SYST BP LT 130 MM HG: CPT | Mod: CPTII,,, | Performed by: FAMILY MEDICINE

## 2023-05-12 PROCEDURE — 3044F HG A1C LEVEL LT 7.0%: CPT | Mod: CPTII,,, | Performed by: FAMILY MEDICINE

## 2023-05-12 PROCEDURE — 99999 PR PBB SHADOW E&M-EST. PATIENT-LVL III: ICD-10-PCS | Mod: PBBFAC,,, | Performed by: FAMILY MEDICINE

## 2023-05-12 PROCEDURE — 99213 OFFICE O/P EST LOW 20 MIN: CPT | Mod: PBBFAC,PN | Performed by: FAMILY MEDICINE

## 2023-05-12 PROCEDURE — 4010F PR ACE/ARB THEARPY RXD/TAKEN: ICD-10-PCS | Mod: CPTII,,, | Performed by: FAMILY MEDICINE

## 2023-05-12 PROCEDURE — 99214 OFFICE O/P EST MOD 30 MIN: CPT | Mod: S$PBB,,, | Performed by: FAMILY MEDICINE

## 2023-05-12 PROCEDURE — 3078F DIAST BP <80 MM HG: CPT | Mod: CPTII,,, | Performed by: FAMILY MEDICINE

## 2023-05-12 PROCEDURE — 3008F BODY MASS INDEX DOCD: CPT | Mod: CPTII,,, | Performed by: FAMILY MEDICINE

## 2023-05-12 PROCEDURE — 99999 PR PBB SHADOW E&M-EST. PATIENT-LVL III: CPT | Mod: PBBFAC,,, | Performed by: FAMILY MEDICINE

## 2023-05-12 PROCEDURE — 99214 PR OFFICE/OUTPT VISIT, EST, LEVL IV, 30-39 MIN: ICD-10-PCS | Mod: S$PBB,,, | Performed by: FAMILY MEDICINE

## 2023-05-12 PROCEDURE — 3074F PR MOST RECENT SYSTOLIC BLOOD PRESSURE < 130 MM HG: ICD-10-PCS | Mod: CPTII,,, | Performed by: FAMILY MEDICINE

## 2023-05-12 PROCEDURE — 3078F PR MOST RECENT DIASTOLIC BLOOD PRESSURE < 80 MM HG: ICD-10-PCS | Mod: CPTII,,, | Performed by: FAMILY MEDICINE

## 2023-05-12 PROCEDURE — 3044F PR MOST RECENT HEMOGLOBIN A1C LEVEL <7.0%: ICD-10-PCS | Mod: CPTII,,, | Performed by: FAMILY MEDICINE

## 2023-05-12 PROCEDURE — 3008F PR BODY MASS INDEX (BMI) DOCUMENTED: ICD-10-PCS | Mod: CPTII,,, | Performed by: FAMILY MEDICINE

## 2023-05-12 RX ORDER — BUPROPION HYDROCHLORIDE 150 MG/1
150 TABLET ORAL DAILY
Qty: 90 TABLET | Refills: 1 | Status: SHIPPED | OUTPATIENT
Start: 2023-05-12 | End: 2023-11-06

## 2023-05-12 RX ORDER — FLUTICASONE PROPIONATE AND SALMETEROL 500; 50 UG/1; UG/1
1 POWDER RESPIRATORY (INHALATION) 2 TIMES DAILY
Qty: 1 EACH | Refills: 2 | Status: SHIPPED | OUTPATIENT
Start: 2023-05-12

## 2023-05-12 RX ORDER — ZOLPIDEM TARTRATE 10 MG/1
10 TABLET ORAL NIGHTLY
Qty: 30 TABLET | Refills: 3 | Status: SHIPPED | OUTPATIENT
Start: 2023-05-12 | End: 2023-10-30 | Stop reason: SDUPTHER

## 2023-05-12 NOTE — PROGRESS NOTES
" Patient ID: Shweta Gupta is a 52 y.o. female.    Chief Complaint: Follow-up (BW review) and Medication Refill      Reviewed family, medical, surgical, and social history.    No cp/sob  No change in mental status  No fever  No asymmetrical limb swelling    Objective:      /71 (BP Location: Left arm, Patient Position: Sitting, BP Method: Large (Manual))   Pulse 66   Resp 15   Ht 5' 4" (1.626 m)   Wt 133.4 kg (294 lb)   LMP 06/10/2002   SpO2 96%   BMI 50.46 kg/m²   RRR, CTAB, s/nt/nd, no c/c/e, non-toxic appearing, no focal weakness  Assessment:       1. Subclinical hypothyroidism    2. Type 2 diabetes mellitus without complication, with long-term current use of insulin    3. Insomnia, unspecified type        Plan:       Subclinical hypothyroidism    Type 2 diabetes mellitus without complication, with long-term current use of insulin  -     buPROPion (WELLBUTRIN XL) 150 MG TB24 tablet; Take 1 tablet (150 mg total) by mouth once daily.  Dispense: 90 tablet; Refill: 1  -     Lipid Panel; Future; Expected date: 08/12/2023  -     CBC Auto Differential; Future; Expected date: 08/12/2023  -     Comprehensive Metabolic Panel; Future; Expected date: 08/12/2023  -     TSH; Future; Expected date: 08/12/2023  -     Microalbumin/Creatinine Ratio, Urine; Future; Expected date: 08/12/2023  -     Hemoglobin A1C; Future; Expected date: 08/12/2023    Insomnia, unspecified type  -     zolpidem (AMBIEN) 10 mg Tab; Take 1 tablet (10 mg total) by mouth every evening.  Dispense: 30 tablet; Refill: 3    Other orders  -     fluticasone-salmeterol diskus inhaler 500-50 mcg; Inhale 1 puff into the lungs 2 (two) times daily. Controller  Dispense: 1 each; Refill: 2              Continue current medicines, any changes noted above  Labs, radiology studies, and procedures/notes from the last 3 months were reviewed.    Risks, benefits, and side effects were discussed with the patient. All questions were answered to the fullest " satisfaction of the patient, and pt verbalized understanding and agreement to treatment plan. Pt was to call with any new or worsening symptoms, or present to the ER.

## 2023-06-04 ENCOUNTER — HOSPITAL ENCOUNTER (EMERGENCY)
Facility: HOSPITAL | Age: 53
Discharge: HOME | End: 2023-06-05
Attending: EMERGENCY MEDICINE
Payer: MEDICAID

## 2023-06-04 DIAGNOSIS — B34.9 VIRAL ILLNESS: ICD-10-CM

## 2023-06-04 DIAGNOSIS — R10.10 UPPER ABDOMINAL PAIN: ICD-10-CM

## 2023-06-04 DIAGNOSIS — R19.7 DIARRHEA, UNSPECIFIED TYPE: ICD-10-CM

## 2023-06-04 DIAGNOSIS — R11.2 NAUSEA AND VOMITING, UNSPECIFIED VOMITING TYPE: Primary | ICD-10-CM

## 2023-06-04 PROCEDURE — 99285 EMERGENCY DEPT VISIT HI MDM: CPT | Mod: 25

## 2023-06-05 VITALS
DIASTOLIC BLOOD PRESSURE: 71 MMHG | HEART RATE: 89 BPM | SYSTOLIC BLOOD PRESSURE: 126 MMHG | TEMPERATURE: 98 F | BODY MASS INDEX: 49.51 KG/M2 | HEIGHT: 64 IN | WEIGHT: 290 LBS | RESPIRATION RATE: 18 BRPM | OXYGEN SATURATION: 97 %

## 2023-06-05 LAB
ALBUMIN SERPL BCP-MCNC: 4.5 G/DL (ref 3.5–5.2)
ALP SERPL-CCNC: 66 U/L (ref 55–135)
ALT SERPL W/O P-5'-P-CCNC: 37 U/L (ref 10–44)
ANION GAP SERPL CALC-SCNC: 13 MMOL/L (ref 8–16)
AST SERPL-CCNC: 25 U/L (ref 10–40)
BASOPHILS # BLD AUTO: 0.03 K/UL (ref 0–0.2)
BASOPHILS NFR BLD: 0.2 % (ref 0–1.9)
BILIRUB SERPL-MCNC: 0.5 MG/DL (ref 0.1–1)
BUN SERPL-MCNC: 18 MG/DL (ref 6–20)
CALCIUM SERPL-MCNC: 9.5 MG/DL (ref 8.7–10.5)
CHLORIDE SERPL-SCNC: 107 MMOL/L (ref 95–110)
CO2 SERPL-SCNC: 21 MMOL/L (ref 23–29)
CREAT SERPL-MCNC: 0.9 MG/DL (ref 0.5–1.4)
DIFFERENTIAL METHOD: ABNORMAL
EOSINOPHIL # BLD AUTO: 0.1 K/UL (ref 0–0.5)
EOSINOPHIL NFR BLD: 1 % (ref 0–8)
ERYTHROCYTE [DISTWIDTH] IN BLOOD BY AUTOMATED COUNT: 13.2 % (ref 11.5–14.5)
EST. GFR  (NO RACE VARIABLE): >60 ML/MIN/1.73 M^2
GLUCOSE SERPL-MCNC: 165 MG/DL (ref 70–110)
HCT VFR BLD AUTO: 38.9 % (ref 37–48.5)
HCV AB SERPL QL IA: NORMAL
HGB BLD-MCNC: 12.8 G/DL (ref 12–16)
HIV 1+2 AB+HIV1 P24 AG SERPL QL IA: NORMAL
IMM GRANULOCYTES # BLD AUTO: 0.06 K/UL (ref 0–0.04)
IMM GRANULOCYTES NFR BLD AUTO: 0.5 % (ref 0–0.5)
LIPASE SERPL-CCNC: 17 U/L (ref 4–60)
LYMPHOCYTES # BLD AUTO: 0.9 K/UL (ref 1–4.8)
LYMPHOCYTES NFR BLD: 7.3 % (ref 18–48)
MCH RBC QN AUTO: 30.4 PG (ref 27–31)
MCHC RBC AUTO-ENTMCNC: 32.9 G/DL (ref 32–36)
MCV RBC AUTO: 92 FL (ref 82–98)
MONOCYTES # BLD AUTO: 0.8 K/UL (ref 0.3–1)
MONOCYTES NFR BLD: 6.5 % (ref 4–15)
NEUTROPHILS # BLD AUTO: 10.6 K/UL (ref 1.8–7.7)
NEUTROPHILS NFR BLD: 84.5 % (ref 38–73)
NRBC BLD-RTO: 0 /100 WBC
PLATELET # BLD AUTO: 263 K/UL (ref 150–450)
PMV BLD AUTO: 10.3 FL (ref 9.2–12.9)
POTASSIUM SERPL-SCNC: 3.8 MMOL/L (ref 3.5–5.1)
PROT SERPL-MCNC: 7.7 G/DL (ref 6–8.4)
RBC # BLD AUTO: 4.21 M/UL (ref 4–5.4)
SODIUM SERPL-SCNC: 141 MMOL/L (ref 136–145)
WBC # BLD AUTO: 12.6 K/UL (ref 3.9–12.7)

## 2023-06-05 PROCEDURE — 63600175 PHARM REV CODE 636 W HCPCS: Performed by: EMERGENCY MEDICINE

## 2023-06-05 PROCEDURE — 25500020 PHARM REV CODE 255: Performed by: EMERGENCY MEDICINE

## 2023-06-05 PROCEDURE — 96375 TX/PRO/DX INJ NEW DRUG ADDON: CPT

## 2023-06-05 PROCEDURE — 86803 HEPATITIS C AB TEST: CPT | Performed by: EMERGENCY MEDICINE

## 2023-06-05 PROCEDURE — 96365 THER/PROPH/DIAG IV INF INIT: CPT

## 2023-06-05 PROCEDURE — 87389 HIV-1 AG W/HIV-1&-2 AB AG IA: CPT | Performed by: EMERGENCY MEDICINE

## 2023-06-05 PROCEDURE — 85025 COMPLETE CBC W/AUTO DIFF WBC: CPT | Performed by: EMERGENCY MEDICINE

## 2023-06-05 PROCEDURE — 83690 ASSAY OF LIPASE: CPT | Performed by: EMERGENCY MEDICINE

## 2023-06-05 PROCEDURE — 25000003 PHARM REV CODE 250: Performed by: EMERGENCY MEDICINE

## 2023-06-05 PROCEDURE — 80053 COMPREHEN METABOLIC PANEL: CPT | Performed by: EMERGENCY MEDICINE

## 2023-06-05 RX ORDER — MORPHINE SULFATE 2 MG/ML
4 INJECTION, SOLUTION INTRAMUSCULAR; INTRAVENOUS
Status: COMPLETED | OUTPATIENT
Start: 2023-06-05 | End: 2023-06-05

## 2023-06-05 RX ORDER — HYDROCODONE BITARTRATE AND ACETAMINOPHEN 10; 325 MG/1; MG/1
1 TABLET ORAL EVERY 6 HOURS PRN
Qty: 12 TABLET | Refills: 0 | Status: SHIPPED | OUTPATIENT
Start: 2023-06-05 | End: 2024-02-13

## 2023-06-05 RX ORDER — PROMETHAZINE HYDROCHLORIDE 25 MG/1
25 TABLET ORAL EVERY 6 HOURS PRN
Qty: 15 TABLET | Refills: 0 | Status: SHIPPED | OUTPATIENT
Start: 2023-06-05

## 2023-06-05 RX ORDER — LOPERAMIDE HYDROCHLORIDE 2 MG/1
4 CAPSULE ORAL
Status: COMPLETED | OUTPATIENT
Start: 2023-06-05 | End: 2023-06-05

## 2023-06-05 RX ORDER — DIPHENOXYLATE HYDROCHLORIDE AND ATROPINE SULFATE 2.5; .025 MG/1; MG/1
1 TABLET ORAL 4 TIMES DAILY PRN
Qty: 12 TABLET | Refills: 0 | Status: SHIPPED | OUTPATIENT
Start: 2023-06-05 | End: 2023-06-15

## 2023-06-05 RX ADMIN — MORPHINE SULFATE 4 MG: 2 INJECTION, SOLUTION INTRAMUSCULAR; INTRAVENOUS at 01:06

## 2023-06-05 RX ADMIN — IOHEXOL 100 ML: 350 INJECTION, SOLUTION INTRAVENOUS at 01:06

## 2023-06-05 RX ADMIN — LOPERAMIDE HYDROCHLORIDE 4 MG: 2 CAPSULE ORAL at 03:06

## 2023-06-05 RX ADMIN — PROMETHAZINE HYDROCHLORIDE 12.5 MG: 25 INJECTION INTRAMUSCULAR; INTRAVENOUS at 12:06

## 2023-06-05 NOTE — DISCHARGE INSTRUCTIONS
Take Zofran and Phenergan for nausea, take Lomotil or over-the-counter Imodium for loose stools.  Take Norco for pain not relieved by Tylenol and Motrin.  Continue previously prescribed medications, and follow-up with your primary care provider.

## 2023-06-05 NOTE — ED PROVIDER NOTES
Encounter Date: 2023       History     Chief Complaint   Patient presents with    Abdominal Pain    Vomiting    Nausea     53-year-old female, here from home via private vehicle for evaluation and treatment of central upper abdominal pain, nausea, and vomiting.  No loose stools.  Normal urination.  No fever.  No rhinorrhea, congestion, or sore throat.  No cough shortness of breath.  No chest pain or palpitations.  Symptoms started earlier today.  She states she had a similar episode about 2 weeks ago.  She tried some Zofran at home but vomited soon thereafter.  Denies any known sick contacts.  Denies any suspicious food intake.  No one else at home has been sick.  Nothing she does makes her symptoms any better or worse.  Past medical history of anemia, anxiety, asthma, depression, diabetes mellitus type 2, diverticulitis, gastroparesis, GERD, hyperlipidemia, hypertension, mitral valve prolapse, morbid obesity, and peripheral neuropathy.  Patient states that she has seen Dr. Caballero recently and underwent a colonoscopy.  She had been noticing some bright red blood occasionally with bowel movements.  Dr. Caballero could not find any source of the bleeding.  Patient states she also noticed a little streak of blood in her vomitus today.  Denies history of gastric ulcer, cholecystitis, pancreatitis, bowel obstruction, colitis, or diverticulitis.    Review of patient's allergies indicates:  No Known Allergies  Past Medical History:   Diagnosis Date    Allergy     Anemia     Anxiety     Asthma     Depression     Diabetes mellitus, type 2     Diverticulitis     Gastroparesis     GERD (gastroesophageal reflux disease)     Hyperlipidemia     Hypertension     Mitral valve prolapse     Morbid obesity     Neuropathy      Past Surgical History:   Procedure Laterality Date    ACHILLES TENDON SURGERY Left     APPENDECTOMY      BRAIN SURGERY       Shunt    CARPAL TUNNEL RELEASE Left      SECTION       CHOLECYSTECTOMY      COLON SURGERY      COLONOSCOPY N/A 2/7/2020    Procedure: COLONOSCOPY;  Surgeon: Khanh Soriano MD;  Location: [facility] ENDO;  Service: General;  Laterality: N/A;    COLONOSCOPY N/A 12/2/2021    Procedure: COLONOSCOPY;  Surgeon: Darleen Caballero MD;  Location: [facility] ENDO;  Service: General;  Laterality: N/A;    CYST REMOVAL  12/09/2021    Cyst of finger    CYSTOSCOPY WITH URETEROSCOPY, RETROGRADE PYELOGRAPHY, AND INSERTION OF STENT Left 11/16/2021    Procedure: CYSTOSCOPY, WITH RETROGRADE PYELOGRAM AND URETERAL STENT INSERTION;  Surgeon: Bessy Allen MD;  Location: [facility] OR;  Service: Urology;  Laterality: Left;    ESOPHAGOGASTRODUODENOSCOPY N/A 2/7/2020    Procedure: ESOPHAGOGASTRODUODENOSCOPY (EGD);  Surgeon: Khanh Soriano MD;  Location: [facility] ENDO;  Service: General;  Laterality: N/A;    ESOPHAGOGASTRODUODENOSCOPY N/A 12/2/2021    Procedure: ESOPHAGOGASTRODUODENOSCOPY (EGD);  Surgeon: Darleen Caballero MD;  Location: [facility] ENDO;  Service: General;  Laterality: N/A;    HERNIA REPAIR      HYSTERECTOMY      partial    KIDNEY STONE SURGERY      OOPHORECTOMY      TRIGGER FINGER RELEASE Left     URETEROSCOPIC REMOVAL OF URETERIC CALCULUS Left 11/16/2021    Procedure: REMOVAL, CALCULUS, URETER, URETEROSCOPIC;  Surgeon: Bessy Allen MD;  Location: [facility] OR;  Service: Urology;  Laterality: Left;    VENTRICULOPERITONEAL SHUNT       Family History   Problem Relation Age of Onset    Cancer Mother         Lung cancer    Lung cancer Mother     Hypertension Mother     Arthritis Mother     COPD Mother     Heart disease Mother     Miscarriages / Stillbirths Mother     Cancer Father         Lung and brain cancer    Lung cancer Father     Hypertension Father     Heart disease Father     Hearing loss Father     Heart disease Brother     Hypertension Brother     Heart disease Brother         Tetrafalow    Breast cancer Neg Hx      Social History     Tobacco Use    Smoking status: Never    Smokeless  tobacco: Never   Substance Use Topics    Alcohol use: No    Drug use: Never     Review of Systems   Constitutional: Negative.    HENT: Negative.     Eyes: Negative.    Respiratory: Negative.     Cardiovascular: Negative.    Gastrointestinal:  Positive for abdominal pain (Describes aching pain in the center of the abdomen, between the umbilicus and the xiphoid.), nausea and vomiting.   Endocrine: Negative.    Genitourinary: Negative.    Musculoskeletal: Negative.      Physical Exam     Initial Vitals [06/04/23 2309]   BP Pulse Resp Temp SpO2   119/74 (!) 117 19 97.6 °F (36.4 °C) 96 %      MAP       --         Physical Exam    Nursing note and vitals reviewed.  Constitutional: She appears well-developed and well-nourished. She is not diaphoretic. No distress.   HENT:   Head: Normocephalic and atraumatic.   Nose: Nose normal.   Mouth/Throat: Oropharynx is clear and moist. No oropharyngeal exudate.   Eyes: Conjunctivae and EOM are normal. Pupils are equal, round, and reactive to light. No scleral icterus.   Neck: Neck supple. No JVD present.   Normal range of motion.  Cardiovascular:  Normal rate, regular rhythm, normal heart sounds and intact distal pulses.           No murmur heard.  Pulmonary/Chest: Breath sounds normal. No stridor. No respiratory distress. She has no wheezes. She has no rhonchi. She has no rales. She exhibits no tenderness.   Abdominal: Abdomen is soft. Bowel sounds are normal. She exhibits no distension. There is abdominal tenderness.   The abdomen is obese.  There is no distention.  Abdomen is soft.  No fluid wave.  She has some tenderness to palpation in the region between the umbilicus and the xiphoid.  No masses are appreciated.  No hernia in this area.  There is a periumbilical hernia, no mass felt in the defect. There is no rebound and no guarding.   Musculoskeletal:         General: No tenderness or edema. Normal range of motion.      Cervical back: Normal range of motion and neck supple.      Neurological: She is alert and oriented to person, place, and time. She has normal strength. No cranial nerve deficit or sensory deficit. GCS score is 15. GCS eye subscore is 4. GCS verbal subscore is 5. GCS motor subscore is 6.   Skin: Skin is warm and dry. Capillary refill takes less than 2 seconds. No rash noted. No erythema.   Psychiatric: She has a normal mood and affect. Her behavior is normal.       ED Course   Procedures  Labs Reviewed   CBC W/ AUTO DIFFERENTIAL - Abnormal; Notable for the following components:       Result Value    Gran # (ANC) 10.6 (*)     Immature Grans (Abs) 0.06 (*)     Lymph # 0.9 (*)     Gran % 84.5 (*)     Lymph % 7.3 (*)     All other components within normal limits   COMPREHENSIVE METABOLIC PANEL - Abnormal; Notable for the following components:    CO2 21 (*)     Glucose 165 (*)     All other components within normal limits   LIPASE   LIPASE   HIV 1 / 2 ANTIBODY   HEPATITIS C ANTIBODY          Imaging Results              CT Abdomen Pelvis With Contrast (In process)                   X-Rays:   Independently Interpreted Readings:   Other Readings:  CT abdomen pelvis, with contrast, personally reviewed by me shows hepatic steatosis, status post cholecystectomy, normal pancreas, normal spleen, normal adrenals.  Kidney show numerous hypodensities, likely cysts.  Stomach is moderately distended.  No evidence for bowel obstruction.  There is fluid in the small bowel, with no evidence of dilation.: Is also distended with fluid, again without evidence of obstruction.  There is diverticulosis without evidence of diverticulitis.  No evidence of appendicitis.  Medications   promethazine (PHENERGAN) 12.5 mg in dextrose 5 % (D5W) 50 mL IVPB (0 mg Intravenous Stopped 6/5/23 0056)   morphine injection 4 mg (4 mg Intravenous Given 6/5/23 0119)   iohexoL (OMNIPAQUE 350) injection 100 mL (100 mLs Intravenous Given 6/5/23 0142)   loperamide capsule 4 mg (4 mg Oral Given 6/5/23 4427)     Medical  Decision Making:   Differential Diagnosis:   Gastritis, gastroenteritis, colitis, bowel obstruction, diverticulitis, hernia, incarcerated hernia, pancreatitis, gastroparesis, etc.  ED Management:  Labs are unremarkable.  Patient was having significant discomfort, so a CT of the abdomen and pelvis was ordered.  This CT personally reviewed by me showed no evidence of bowel obstruction, no evidence of pancreatitis or appendicitis, no evidence of hydronephrosis or hydroureter.  Diverticulosis without evidence of diverticulitis.  Small bowel and colon were fluid-filled, suggesting gastroenteritis.  Fat containing periumbilical hernia.  No tenderness in that region.  I believe patient is likely suffering from a viral illness.  Will discharge home with medications for nausea and she will take over-the-counter medications for any loose stools.  Will prescribe a short course of Norco for discomfort not relieved by Tylenol and Motrin.  Patient will follow-up with her PCP and return here as needed or if worse in any way.                        Clinical Impression:   Final diagnoses:  [R11.2] Nausea and vomiting, unspecified vomiting type (Primary)  [R19.7] Diarrhea, unspecified type  [B34.9] Viral illness  [R10.10] Upper abdominal pain        ED Disposition Condition    Discharge Stable          ED Prescriptions       Medication Sig Dispense Start Date End Date Auth. Provider    promethazine (PHENERGAN) 25 MG tablet Take 1 tablet (25 mg total) by mouth every 6 (six) hours as needed for Nausea. 15 tablet 6/5/2023 -- Amanuel Antonio MD    diphenoxylate-atropine 2.5-0.025 mg (LOMOTIL) 2.5-0.025 mg per tablet Take 1 tablet by mouth 4 (four) times daily as needed for Diarrhea. 12 tablet 6/5/2023 6/15/2023 Amanuel Antonio MD    HYDROcodone-acetaminophen (NORCO)  mg per tablet Take 1 tablet by mouth every 6 (six) hours as needed for Pain. 12 tablet 6/5/2023 -- Amanuel Antonio MD          Follow-up Information       Follow up  With Specialties Details Why Contact Info    Devendra Toledo MD Family Medicine Call today  [address]  [place] [address] [phone]      [facility] [place], operated by [facility] Emergency Medicine  As needed, If symptoms worsen [address] [address] [phone]             Amanuel Antonio MD  06/05/23 6844

## 2023-06-05 NOTE — ED NOTES
Nausea continues per patient. Patient medicated with Phenergan 12.5mg IV. Will re-assess shortly if symptoms are relieved.

## 2023-06-07 ENCOUNTER — PATIENT MESSAGE (OUTPATIENT)
Dept: FAMILY MEDICINE | Facility: CLINIC | Age: 53
End: 2023-06-07
Payer: MEDICAID

## 2023-06-07 ENCOUNTER — TELEPHONE (OUTPATIENT)
Dept: FAMILY MEDICINE | Facility: CLINIC | Age: 53
End: 2023-06-07
Payer: MEDICAID

## 2023-06-07 NOTE — TELEPHONE ENCOUNTER
----- Message from Brittany Cummings sent at 6/7/2023  3:29 PM CDT -----  Contact: self  Type: Sooner Appointment Request        Caller is requesting a sooner appointment. Caller declined first available appointment listed below. Caller will not accept being placed on the waitlist and is requesting a message be sent to doctor.        Name of Caller: Patient   Best Call Back Number: [phone]  Additional Information: Patient states she needs a med refill/ Pt ears are hurting and also has sore throat. Plz call to get scheduled. Thanks

## 2023-06-08 ENCOUNTER — TELEPHONE (OUTPATIENT)
Dept: FAMILY MEDICINE | Facility: CLINIC | Age: 53
End: 2023-06-08
Payer: MEDICAID

## 2023-06-08 ENCOUNTER — PATIENT MESSAGE (OUTPATIENT)
Dept: FAMILY MEDICINE | Facility: CLINIC | Age: 53
End: 2023-06-08
Payer: MEDICAID

## 2023-06-08 RX ORDER — PROMETHAZINE HYDROCHLORIDE AND DEXTROMETHORPHAN HYDROBROMIDE 6.25; 15 MG/5ML; MG/5ML
5 SYRUP ORAL EVERY 6 HOURS PRN
Qty: 240 ML | Refills: 1 | Status: SHIPPED | OUTPATIENT
Start: 2023-06-08 | End: 2023-11-06

## 2023-06-08 RX ORDER — AZITHROMYCIN 250 MG/1
TABLET, FILM COATED ORAL
Qty: 6 TABLET | Refills: 0 | Status: SHIPPED | OUTPATIENT
Start: 2023-06-08 | End: 2023-11-06

## 2023-06-08 RX ORDER — PREDNISONE 20 MG/1
20 TABLET ORAL 2 TIMES DAILY
Qty: 10 TABLET | Refills: 0 | Status: SHIPPED | OUTPATIENT
Start: 2023-06-08 | End: 2023-06-13

## 2023-06-08 NOTE — TELEPHONE ENCOUNTER
----- Message from Subha Brantley sent at 6/8/2023  9:15 AM CDT -----  Regarding: sooner appointment  Contact: patient  Type:  Sooner Appointment Request    Caller is requesting a sooner appointment.  Caller declined first available appointment listed below.  Caller will not accept being placed on the waitlist and is requesting a message be sent to doctor.    Name of Caller:  patient  When is the first available appointment?  08/14/23  Symptoms:  3 month check  Best Call Back Number:  [phone] (home)   Additional Information:  Patient would like to see Dr Toledo before he leaves. Please call patient to advise.Thanks!

## 2023-06-08 NOTE — TELEPHONE ENCOUNTER
Attempted to contact Shweta Gupta to discuss Scheduling an appointment.    Left voice mail to return our call at [phone] on [phone] (home).    Mercy Reid LPN

## 2023-06-09 ENCOUNTER — PATIENT MESSAGE (OUTPATIENT)
Dept: FAMILY MEDICINE | Facility: CLINIC | Age: 53
End: 2023-06-09
Payer: MEDICAID

## 2023-06-19 DIAGNOSIS — K31.84 GASTROPARESIS: ICD-10-CM

## 2023-06-19 DIAGNOSIS — G89.29 CHRONIC EPIGASTRIC PAIN: ICD-10-CM

## 2023-06-19 DIAGNOSIS — R10.13 CHRONIC EPIGASTRIC PAIN: ICD-10-CM

## 2023-06-19 RX ORDER — METOCLOPRAMIDE 5 MG/1
TABLET ORAL
Qty: 120 TABLET | Refills: 1 | Status: SHIPPED | OUTPATIENT
Start: 2023-06-19 | End: 2023-08-29 | Stop reason: SDUPTHER

## 2023-06-19 NOTE — TELEPHONE ENCOUNTER
Care Due:                  Date            Visit Type   Department     Provider  --------------------------------------------------------------------------------    Last Visit: None Found      None         None Found  Next Visit: None Scheduled  None         None Found                                                            Last  Test          Frequency    Reason                     Performed    Due Date  --------------------------------------------------------------------------------    Office Visit  12 months..  atorvastatin, buPROPion,   Not Found    Overdue                             fenofibrate, lisinopriL,                             pantoprazole,                             pioglitazone, potassium,                             semaglutide, valACYclovir    [employer] Embedded Care Due Messages. Reference number: [id number].   6/19/2023 8:01:59 AM CDT

## 2023-07-12 RX ORDER — ATORVASTATIN CALCIUM 40 MG/1
TABLET, FILM COATED ORAL
Qty: 90 TABLET | Refills: 0 | Status: SHIPPED | OUTPATIENT
Start: 2023-07-12 | End: 2023-10-05 | Stop reason: SDUPTHER

## 2023-07-12 NOTE — TELEPHONE ENCOUNTER
Refill Decision Note   Shweta Gupta  is requesting a refill authorization.  Brief Assessment and Rationale for Refill:  Approve     Medication Therapy Plan:  REVIEWED ED VISIT NOTES; APPROVED 90+0 TO BRIDGE TO FOV 8/7/23      Extended chart review required: Yes   Comments:     Note composed:1:00 PM 07/12/2023             Appointments     Last Visit   5/12/2023 Devendra Toledo MD   Next Visit   8/7/2023 Devendra Toledo MD

## 2023-07-19 DIAGNOSIS — I10 ESSENTIAL HYPERTENSION: ICD-10-CM

## 2023-07-19 RX ORDER — RANOLAZINE 500 MG/1
TABLET, EXTENDED RELEASE ORAL
Qty: 180 TABLET | Refills: 0 | Status: SHIPPED | OUTPATIENT
Start: 2023-07-19 | End: 2023-10-19 | Stop reason: SDUPTHER

## 2023-07-31 ENCOUNTER — LAB VISIT (OUTPATIENT)
Dept: LAB | Facility: HOSPITAL | Age: 53
End: 2023-07-31
Attending: FAMILY MEDICINE
Payer: MEDICAID

## 2023-07-31 DIAGNOSIS — Z79.4 TYPE 2 DIABETES MELLITUS WITH HYPERGLYCEMIA, WITH LONG-TERM CURRENT USE OF INSULIN: ICD-10-CM

## 2023-07-31 DIAGNOSIS — E11.65 TYPE 2 DIABETES MELLITUS WITH HYPERGLYCEMIA, WITH LONG-TERM CURRENT USE OF INSULIN: ICD-10-CM

## 2023-07-31 DIAGNOSIS — E11.9 TYPE 2 DIABETES MELLITUS WITHOUT COMPLICATION, WITH LONG-TERM CURRENT USE OF INSULIN: ICD-10-CM

## 2023-07-31 DIAGNOSIS — Z79.4 TYPE 2 DIABETES MELLITUS WITHOUT COMPLICATION, WITH LONG-TERM CURRENT USE OF INSULIN: ICD-10-CM

## 2023-07-31 LAB
ALBUMIN SERPL BCP-MCNC: 4 G/DL (ref 3.5–5.2)
ALBUMIN/CREAT UR: 11.3 UG/MG (ref 0–30)
ALP SERPL-CCNC: 68 U/L (ref 55–135)
ALT SERPL W/O P-5'-P-CCNC: 29 U/L (ref 10–44)
ANION GAP SERPL CALC-SCNC: 8 MMOL/L (ref 8–16)
AST SERPL-CCNC: 25 U/L (ref 10–40)
BASOPHILS # BLD AUTO: 0.04 K/UL (ref 0–0.2)
BASOPHILS NFR BLD: 0.7 % (ref 0–1.9)
BILIRUB SERPL-MCNC: 0.5 MG/DL (ref 0.1–1)
BUN SERPL-MCNC: 19 MG/DL (ref 6–20)
CALCIUM SERPL-MCNC: 9.5 MG/DL (ref 8.7–10.5)
CHLORIDE SERPL-SCNC: 109 MMOL/L (ref 95–110)
CHOLEST SERPL-MCNC: 161 MG/DL (ref 120–199)
CHOLEST/HDLC SERPL: 3.4 {RATIO} (ref 2–5)
CO2 SERPL-SCNC: 26 MMOL/L (ref 23–29)
CREAT SERPL-MCNC: 0.8 MG/DL (ref 0.5–1.4)
CREAT UR-MCNC: 106 MG/DL (ref 15–325)
DIFFERENTIAL METHOD: ABNORMAL
EOSINOPHIL # BLD AUTO: 0.1 K/UL (ref 0–0.5)
EOSINOPHIL NFR BLD: 2.2 % (ref 0–8)
ERYTHROCYTE [DISTWIDTH] IN BLOOD BY AUTOMATED COUNT: 13 % (ref 11.5–14.5)
EST. GFR  (NO RACE VARIABLE): >60 ML/MIN/1.73 M^2
ESTIMATED AVG GLUCOSE: 140 MG/DL (ref 68–131)
GLUCOSE SERPL-MCNC: 94 MG/DL (ref 70–110)
HBA1C MFR BLD: 6.5 % (ref 4–5.6)
HCT VFR BLD AUTO: 35.2 % (ref 37–48.5)
HDLC SERPL-MCNC: 47 MG/DL (ref 40–75)
HDLC SERPL: 29.2 % (ref 20–50)
HGB BLD-MCNC: 11.4 G/DL (ref 12–16)
IMM GRANULOCYTES # BLD AUTO: 0.02 K/UL (ref 0–0.04)
IMM GRANULOCYTES NFR BLD AUTO: 0.4 % (ref 0–0.5)
LDLC SERPL CALC-MCNC: 95.2 MG/DL (ref 63–159)
LYMPHOCYTES # BLD AUTO: 2 K/UL (ref 1–4.8)
LYMPHOCYTES NFR BLD: 35.8 % (ref 18–48)
MCH RBC QN AUTO: 30.6 PG (ref 27–31)
MCHC RBC AUTO-ENTMCNC: 32.4 G/DL (ref 32–36)
MCV RBC AUTO: 94 FL (ref 82–98)
MICROALBUMIN UR DL<=1MG/L-MCNC: 12 UG/ML
MONOCYTES # BLD AUTO: 0.6 K/UL (ref 0.3–1)
MONOCYTES NFR BLD: 10.8 % (ref 4–15)
NEUTROPHILS # BLD AUTO: 2.8 K/UL (ref 1.8–7.7)
NEUTROPHILS NFR BLD: 50.1 % (ref 38–73)
NONHDLC SERPL-MCNC: 114 MG/DL
NRBC BLD-RTO: 0 /100 WBC
PLATELET # BLD AUTO: 241 K/UL (ref 150–450)
PMV BLD AUTO: 10.4 FL (ref 9.2–12.9)
POTASSIUM SERPL-SCNC: 3.9 MMOL/L (ref 3.5–5.1)
PROT SERPL-MCNC: 7.3 G/DL (ref 6–8.4)
RBC # BLD AUTO: 3.73 M/UL (ref 4–5.4)
SODIUM SERPL-SCNC: 143 MMOL/L (ref 136–145)
TRIGL SERPL-MCNC: 94 MG/DL (ref 30–150)
TSH SERPL DL<=0.005 MIU/L-ACNC: 1.64 UIU/ML (ref 0.4–4)
WBC # BLD AUTO: 5.48 K/UL (ref 3.9–12.7)

## 2023-07-31 PROCEDURE — 80061 LIPID PANEL: CPT | Performed by: FAMILY MEDICINE

## 2023-07-31 PROCEDURE — 83036 HEMOGLOBIN GLYCOSYLATED A1C: CPT | Performed by: FAMILY MEDICINE

## 2023-07-31 PROCEDURE — 85025 COMPLETE CBC W/AUTO DIFF WBC: CPT | Performed by: FAMILY MEDICINE

## 2023-07-31 PROCEDURE — 82570 ASSAY OF URINE CREATININE: CPT | Performed by: FAMILY MEDICINE

## 2023-07-31 PROCEDURE — 84443 ASSAY THYROID STIM HORMONE: CPT | Performed by: FAMILY MEDICINE

## 2023-07-31 PROCEDURE — 80053 COMPREHEN METABOLIC PANEL: CPT | Performed by: FAMILY MEDICINE

## 2023-07-31 PROCEDURE — 36415 COLL VENOUS BLD VENIPUNCTURE: CPT | Performed by: FAMILY MEDICINE

## 2023-07-31 RX ORDER — INSULIN GLARGINE 100 [IU]/ML
60 INJECTION, SOLUTION SUBCUTANEOUS DAILY
Qty: 15 ML | Refills: 6 | Status: SHIPPED | OUTPATIENT
Start: 2023-07-31

## 2023-08-07 ENCOUNTER — TELEPHONE (OUTPATIENT)
Dept: FAMILY MEDICINE | Facility: CLINIC | Age: 53
End: 2023-08-07

## 2023-08-07 ENCOUNTER — OFFICE VISIT (OUTPATIENT)
Dept: FAMILY MEDICINE | Facility: CLINIC | Age: 53
End: 2023-08-07
Payer: MEDICAID

## 2023-08-07 VITALS
HEIGHT: 64 IN | OXYGEN SATURATION: 97 % | WEIGHT: 293 LBS | BODY MASS INDEX: 50.02 KG/M2 | SYSTOLIC BLOOD PRESSURE: 136 MMHG | RESPIRATION RATE: 14 BRPM | DIASTOLIC BLOOD PRESSURE: 77 MMHG | HEART RATE: 74 BPM

## 2023-08-07 DIAGNOSIS — B49 MYCOSIS: ICD-10-CM

## 2023-08-07 DIAGNOSIS — E11.9 TYPE 2 DIABETES MELLITUS WITHOUT COMPLICATION, WITH LONG-TERM CURRENT USE OF INSULIN: Primary | ICD-10-CM

## 2023-08-07 DIAGNOSIS — R60.9 EDEMA, UNSPECIFIED TYPE: ICD-10-CM

## 2023-08-07 DIAGNOSIS — Z79.4 TYPE 2 DIABETES MELLITUS WITHOUT COMPLICATION, WITH LONG-TERM CURRENT USE OF INSULIN: Primary | ICD-10-CM

## 2023-08-07 PROCEDURE — 1160F RVW MEDS BY RX/DR IN RCRD: CPT | Mod: CPTII,,, | Performed by: FAMILY MEDICINE

## 2023-08-07 PROCEDURE — 3044F HG A1C LEVEL LT 7.0%: CPT | Mod: CPTII,,, | Performed by: FAMILY MEDICINE

## 2023-08-07 PROCEDURE — 99215 OFFICE O/P EST HI 40 MIN: CPT | Mod: PBBFAC,PN | Performed by: FAMILY MEDICINE

## 2023-08-07 PROCEDURE — 3075F PR MOST RECENT SYSTOLIC BLOOD PRESS GE 130-139MM HG: ICD-10-PCS | Mod: CPTII,,, | Performed by: FAMILY MEDICINE

## 2023-08-07 PROCEDURE — 99999 PR PBB SHADOW E&M-EST. PATIENT-LVL V: CPT | Mod: PBBFAC,,, | Performed by: FAMILY MEDICINE

## 2023-08-07 PROCEDURE — 1159F MED LIST DOCD IN RCRD: CPT | Mod: CPTII,,, | Performed by: FAMILY MEDICINE

## 2023-08-07 PROCEDURE — 99214 PR OFFICE/OUTPT VISIT, EST, LEVL IV, 30-39 MIN: ICD-10-PCS | Mod: S$PBB,,, | Performed by: FAMILY MEDICINE

## 2023-08-07 PROCEDURE — 3044F PR MOST RECENT HEMOGLOBIN A1C LEVEL <7.0%: ICD-10-PCS | Mod: CPTII,,, | Performed by: FAMILY MEDICINE

## 2023-08-07 PROCEDURE — 4010F PR ACE/ARB THEARPY RXD/TAKEN: ICD-10-PCS | Mod: CPTII,,, | Performed by: FAMILY MEDICINE

## 2023-08-07 PROCEDURE — 3066F NEPHROPATHY DOC TX: CPT | Mod: CPTII,,, | Performed by: FAMILY MEDICINE

## 2023-08-07 PROCEDURE — 3066F PR DOCUMENTATION OF TREATMENT FOR NEPHROPATHY: ICD-10-PCS | Mod: CPTII,,, | Performed by: FAMILY MEDICINE

## 2023-08-07 PROCEDURE — 3061F PR NEG MICROALBUMINURIA RESULT DOCUMENTED/REVIEW: ICD-10-PCS | Mod: CPTII,,, | Performed by: FAMILY MEDICINE

## 2023-08-07 PROCEDURE — 99214 OFFICE O/P EST MOD 30 MIN: CPT | Mod: S$PBB,,, | Performed by: FAMILY MEDICINE

## 2023-08-07 PROCEDURE — 99999 PR PBB SHADOW E&M-EST. PATIENT-LVL V: ICD-10-PCS | Mod: PBBFAC,,, | Performed by: FAMILY MEDICINE

## 2023-08-07 PROCEDURE — 3008F PR BODY MASS INDEX (BMI) DOCUMENTED: ICD-10-PCS | Mod: CPTII,,, | Performed by: FAMILY MEDICINE

## 2023-08-07 PROCEDURE — 3078F DIAST BP <80 MM HG: CPT | Mod: CPTII,,, | Performed by: FAMILY MEDICINE

## 2023-08-07 PROCEDURE — 1159F PR MEDICATION LIST DOCUMENTED IN MEDICAL RECORD: ICD-10-PCS | Mod: CPTII,,, | Performed by: FAMILY MEDICINE

## 2023-08-07 PROCEDURE — 3078F PR MOST RECENT DIASTOLIC BLOOD PRESSURE < 80 MM HG: ICD-10-PCS | Mod: CPTII,,, | Performed by: FAMILY MEDICINE

## 2023-08-07 PROCEDURE — 3075F SYST BP GE 130 - 139MM HG: CPT | Mod: CPTII,,, | Performed by: FAMILY MEDICINE

## 2023-08-07 PROCEDURE — 3008F BODY MASS INDEX DOCD: CPT | Mod: CPTII,,, | Performed by: FAMILY MEDICINE

## 2023-08-07 PROCEDURE — 3061F NEG MICROALBUMINURIA REV: CPT | Mod: CPTII,,, | Performed by: FAMILY MEDICINE

## 2023-08-07 PROCEDURE — 4010F ACE/ARB THERAPY RXD/TAKEN: CPT | Mod: CPTII,,, | Performed by: FAMILY MEDICINE

## 2023-08-07 PROCEDURE — 1160F PR REVIEW ALL MEDS BY PRESCRIBER/CLIN PHARMACIST DOCUMENTED: ICD-10-PCS | Mod: CPTII,,, | Performed by: FAMILY MEDICINE

## 2023-08-07 RX ORDER — SEMAGLUTIDE 0.68 MG/ML
0.5 INJECTION, SOLUTION SUBCUTANEOUS
Qty: 2 ML | Refills: 2 | Status: SHIPPED | OUTPATIENT
Start: 2023-08-07 | End: 2023-11-06

## 2023-08-07 RX ORDER — CLOTRIMAZOLE AND BETAMETHASONE DIPROPIONATE 10; .64 MG/G; MG/G
CREAM TOPICAL
Qty: 45 G | Refills: 2 | Status: SHIPPED | OUTPATIENT
Start: 2023-08-07

## 2023-08-07 RX ORDER — CLOTRIMAZOLE AND BETAMETHASONE DIPROPIONATE 10; .64 MG/G; MG/G
CREAM TOPICAL 2 TIMES DAILY
Qty: 45 G | Refills: 2 | Status: SHIPPED | OUTPATIENT
Start: 2023-08-07 | End: 2023-08-07 | Stop reason: SDUPTHER

## 2023-08-07 NOTE — PROGRESS NOTES
" Patient ID: Shweta Gupta is a 53 y.o. female.    Chief Complaint: Follow-up (BW review) and Rash      Reviewed family, medical, surgical, and social history.    No cp/sob  No change in mental status  No fever  No asymmetrical limb swelling    Objective:      /77 (BP Location: Left arm, Patient Position: Sitting, BP Method: Large (Manual))   Pulse 74   Resp 14   Ht 5' 4" (1.626 m)   Wt 134.4 kg (296 lb 4.8 oz)   LMP 06/10/2002   SpO2 97%   BMI 50.86 kg/m²   RRR, CTAB, s/nt/nd, no c/c/e, non-toxic appearing, no focal weakness  Assessment:       1. Type 2 diabetes mellitus without complication, with long-term current use of insulin    2. Mycosis    3. Edema, unspecified type        Plan:       Type 2 diabetes mellitus without complication, with long-term current use of insulin  Comments:  A1c 6.5  Orders:  -     semaglutide (OZEMPIC) 0.25 mg or 0.5 mg (2 mg/3 mL) pen injector; Inject 0.5 mg into the skin every 7 days.  Dispense: 2 mL; Refill: 2  -     CBC Auto Differential; Future; Expected date: 08/07/2023  -     Comprehensive Metabolic Panel; Future; Expected date: 08/07/2023  -     Hemoglobin A1C; Future; Expected date: 08/07/2023  -     Lipid Panel; Future; Expected date: 08/07/2023  -     TSH; Future; Expected date: 08/07/2023  -     T4, Free; Future; Expected date: 08/07/2023    Mycosis  -     clotrimazole-betamethasone 1-0.05% (LOTRISONE) cream; Apply topically 2 (two) times daily.  Dispense: 45 g; Refill: 2    Edema, unspecified type  -     BNP; Future; Expected date: 08/07/2023              Continue current medicines, any changes noted above  Blood work in 3 months      Risks, benefits, and side effects were discussed with the patient. All questions were answered to the fullest satisfaction of the patient, and pt verbalized understanding and agreement to treatment plan. Pt was to call with any new or worsening symptoms, or present to the ER.    "

## 2023-08-07 NOTE — TELEPHONE ENCOUNTER
----- Message from Giorgio Kim sent at 8/7/2023 11:38 AM CDT -----  Regarding: pharm question  Type:  Pharmacy Calling to Clarify an RX    Name of Caller:  Shweta    Pharmacy Name:    [facility] - [place], [place] - [address]  [address]  [place] [address]  Phone: [phone] Fax: [phone]    Prescription Name:    clotrimazole-betamethasone 1-0.05% (LOTRISONE) cream 45 g 2 8/7/2023   Sig - Route: Apply topically 2 (two) times daily. - Topical (Top)   Sent to pharmacy as: clotrimazole-betamethasone 1-0.05% (LOTRISONE) cream   E-Prescribing Status: Receipt confirmed by pharmacy (8/7/2023  8:43 AM CDT)     What do they need to clarify?:  specific dosing for each application    Best Call Back Number:  [phone]    Additional Information:

## 2023-08-08 DIAGNOSIS — K21.9 GASTROESOPHAGEAL REFLUX DISEASE: ICD-10-CM

## 2023-08-08 DIAGNOSIS — E11.65 TYPE 2 DIABETES MELLITUS WITH HYPERGLYCEMIA, WITH LONG-TERM CURRENT USE OF INSULIN: ICD-10-CM

## 2023-08-08 DIAGNOSIS — Z79.4 TYPE 2 DIABETES MELLITUS WITH HYPERGLYCEMIA, WITH LONG-TERM CURRENT USE OF INSULIN: ICD-10-CM

## 2023-08-08 RX ORDER — PANTOPRAZOLE SODIUM 40 MG/1
40 TABLET, DELAYED RELEASE ORAL DAILY
Qty: 90 TABLET | Refills: 3 | Status: SHIPPED | OUTPATIENT
Start: 2023-08-08

## 2023-08-08 RX ORDER — FUROSEMIDE 40 MG/1
40 TABLET ORAL 2 TIMES DAILY
Qty: 180 TABLET | Refills: 0 | Status: SHIPPED | OUTPATIENT
Start: 2023-08-08

## 2023-08-08 NOTE — TELEPHONE ENCOUNTER
Refill Routing Note   Medication(s) are not appropriate for processing by [facility] for the following reason(s):      Non-participating provider    [facility] action(s):  Route Care Due:  None identified            Appointments  past 12m or future 3m with PCP    Date Provider   Last Visit   8/7/2023 Devendra Toledo MD   Next Visit   Visit date not found Devendra Toledo MD   ED visits in past 90 days: 1        Note composed:12:19 PM 08/08/2023

## 2023-08-08 NOTE — TELEPHONE ENCOUNTER
No care due was identified.  [facility] Embedded Care Due Messages. Reference number: [id number].   8/08/2023 11:27:51 AM CDT

## 2023-08-16 DIAGNOSIS — E11.9 TYPE 2 DIABETES MELLITUS WITHOUT COMPLICATION, WITH LONG-TERM CURRENT USE OF INSULIN: Primary | ICD-10-CM

## 2023-08-16 DIAGNOSIS — Z79.4 TYPE 2 DIABETES MELLITUS WITHOUT COMPLICATION, WITH LONG-TERM CURRENT USE OF INSULIN: Primary | ICD-10-CM

## 2023-08-16 RX ORDER — DULAGLUTIDE 0.75 MG/.5ML
0.75 INJECTION, SOLUTION SUBCUTANEOUS
Qty: 4 PEN | Refills: 11 | Status: SHIPPED | OUTPATIENT
Start: 2023-08-16 | End: 2024-01-17

## 2023-09-28 DIAGNOSIS — G89.29 CHRONIC EPIGASTRIC PAIN: ICD-10-CM

## 2023-09-28 DIAGNOSIS — K31.84 GASTROPARESIS: ICD-10-CM

## 2023-09-28 DIAGNOSIS — R10.13 CHRONIC EPIGASTRIC PAIN: ICD-10-CM

## 2023-09-28 RX ORDER — METOCLOPRAMIDE 5 MG/1
TABLET ORAL
Qty: 120 TABLET | Refills: 0 | Status: SHIPPED | OUTPATIENT
Start: 2023-09-28 | End: 2023-10-31 | Stop reason: SDUPTHER

## 2023-10-03 ENCOUNTER — PATIENT MESSAGE (OUTPATIENT)
Dept: ADMINISTRATIVE | Facility: HOSPITAL | Age: 53
End: 2023-10-03
Payer: MEDICAID

## 2023-10-06 RX ORDER — ATORVASTATIN CALCIUM 40 MG/1
40 TABLET, FILM COATED ORAL DAILY
Qty: 90 TABLET | Refills: 0 | Status: SHIPPED | OUTPATIENT
Start: 2023-10-06 | End: 2024-01-05 | Stop reason: SDUPTHER

## 2023-10-13 ENCOUNTER — PATIENT MESSAGE (OUTPATIENT)
Dept: FAMILY MEDICINE | Facility: CLINIC | Age: 53
End: 2023-10-13
Payer: MEDICAID

## 2023-10-19 ENCOUNTER — PATIENT MESSAGE (OUTPATIENT)
Dept: FAMILY MEDICINE | Facility: CLINIC | Age: 53
End: 2023-10-19
Payer: MEDICAID

## 2023-10-19 DIAGNOSIS — I10 ESSENTIAL HYPERTENSION: ICD-10-CM

## 2023-10-20 ENCOUNTER — PATIENT MESSAGE (OUTPATIENT)
Dept: FAMILY MEDICINE | Facility: CLINIC | Age: 53
End: 2023-10-20
Payer: MEDICAID

## 2023-10-20 RX ORDER — POTASSIUM CHLORIDE 20 MEQ/1
20 TABLET, EXTENDED RELEASE ORAL 2 TIMES DAILY
Qty: 60 TABLET | Refills: 11 | Status: SHIPPED | OUTPATIENT
Start: 2023-10-20

## 2023-10-20 RX ORDER — RANOLAZINE 500 MG/1
500 TABLET, EXTENDED RELEASE ORAL 2 TIMES DAILY
Qty: 180 TABLET | Refills: 0 | Status: SHIPPED | OUTPATIENT
Start: 2023-10-20 | End: 2024-01-17 | Stop reason: SDUPTHER

## 2023-10-20 NOTE — TELEPHONE ENCOUNTER
Shweta Gupta  to [facility] Staff (supporting Akosua Osborne MD)      10/19/23  6:43 PM  Please send refills for my potassium prescription to [facility] in [place]. Thanks.

## 2023-10-23 DIAGNOSIS — B37.31 YEAST VAGINITIS: ICD-10-CM

## 2023-10-23 RX ORDER — NYSTATIN 100000 [USP'U]/G
POWDER TOPICAL
Qty: 120 G | Refills: 11 | Status: SHIPPED | OUTPATIENT
Start: 2023-10-23 | End: 2024-02-08 | Stop reason: SDUPTHER

## 2023-10-28 ENCOUNTER — PATIENT MESSAGE (OUTPATIENT)
Dept: FAMILY MEDICINE | Facility: CLINIC | Age: 53
End: 2023-10-28
Payer: MEDICAID

## 2023-10-30 DIAGNOSIS — G47.00 INSOMNIA, UNSPECIFIED TYPE: ICD-10-CM

## 2023-10-31 ENCOUNTER — PATIENT MESSAGE (OUTPATIENT)
Dept: FAMILY MEDICINE | Facility: CLINIC | Age: 53
End: 2023-10-31
Payer: MEDICAID

## 2023-10-31 DIAGNOSIS — K31.84 GASTROPARESIS: ICD-10-CM

## 2023-10-31 DIAGNOSIS — R10.13 CHRONIC EPIGASTRIC PAIN: ICD-10-CM

## 2023-10-31 DIAGNOSIS — G89.29 CHRONIC EPIGASTRIC PAIN: ICD-10-CM

## 2023-10-31 RX ORDER — METOCLOPRAMIDE 5 MG/1
TABLET ORAL
Qty: 120 TABLET | Refills: 0 | Status: SHIPPED | OUTPATIENT
Start: 2023-10-31 | End: 2023-12-07 | Stop reason: SDUPTHER

## 2023-10-31 RX ORDER — ZOLPIDEM TARTRATE 10 MG/1
10 TABLET ORAL NIGHTLY
Qty: 30 TABLET | Refills: 0 | Status: SHIPPED | OUTPATIENT
Start: 2023-10-31 | End: 2023-12-05 | Stop reason: SDUPTHER

## 2023-11-01 ENCOUNTER — LAB VISIT (OUTPATIENT)
Dept: LAB | Facility: HOSPITAL | Age: 53
End: 2023-11-01
Attending: FAMILY MEDICINE
Payer: MEDICAID

## 2023-11-01 DIAGNOSIS — E11.9 TYPE 2 DIABETES MELLITUS WITHOUT COMPLICATION, WITH LONG-TERM CURRENT USE OF INSULIN: ICD-10-CM

## 2023-11-01 DIAGNOSIS — R60.9 EDEMA, UNSPECIFIED TYPE: ICD-10-CM

## 2023-11-01 DIAGNOSIS — Z79.4 TYPE 2 DIABETES MELLITUS WITHOUT COMPLICATION, WITH LONG-TERM CURRENT USE OF INSULIN: ICD-10-CM

## 2023-11-01 LAB
ALBUMIN SERPL BCP-MCNC: 4.3 G/DL (ref 3.5–5.2)
ALP SERPL-CCNC: 61 U/L (ref 55–135)
ALT SERPL W/O P-5'-P-CCNC: 27 U/L (ref 10–44)
ANION GAP SERPL CALC-SCNC: 11 MMOL/L (ref 8–16)
AST SERPL-CCNC: 20 U/L (ref 10–40)
BASOPHILS # BLD AUTO: 0.05 K/UL (ref 0–0.2)
BASOPHILS NFR BLD: 0.8 % (ref 0–1.9)
BILIRUB SERPL-MCNC: 0.5 MG/DL (ref 0.1–1)
BNP SERPL-MCNC: 37 PG/ML (ref 0–99)
BUN SERPL-MCNC: 19 MG/DL (ref 6–20)
CALCIUM SERPL-MCNC: 9.8 MG/DL (ref 8.7–10.5)
CHLORIDE SERPL-SCNC: 107 MMOL/L (ref 95–110)
CHOLEST SERPL-MCNC: 160 MG/DL (ref 120–199)
CHOLEST/HDLC SERPL: 3.5 {RATIO} (ref 2–5)
CO2 SERPL-SCNC: 23 MMOL/L (ref 23–29)
CREAT SERPL-MCNC: 0.9 MG/DL (ref 0.5–1.4)
DIFFERENTIAL METHOD: ABNORMAL
EOSINOPHIL # BLD AUTO: 0.2 K/UL (ref 0–0.5)
EOSINOPHIL NFR BLD: 2.6 % (ref 0–8)
ERYTHROCYTE [DISTWIDTH] IN BLOOD BY AUTOMATED COUNT: 13.1 % (ref 11.5–14.5)
EST. GFR  (NO RACE VARIABLE): >60 ML/MIN/1.73 M^2
ESTIMATED AVG GLUCOSE: 114 MG/DL (ref 68–131)
GLUCOSE SERPL-MCNC: 115 MG/DL (ref 70–110)
HBA1C MFR BLD: 5.6 % (ref 4–5.6)
HCT VFR BLD AUTO: 34.9 % (ref 37–48.5)
HDLC SERPL-MCNC: 46 MG/DL (ref 40–75)
HDLC SERPL: 28.8 % (ref 20–50)
HGB BLD-MCNC: 11.1 G/DL (ref 12–16)
IMM GRANULOCYTES # BLD AUTO: 0.02 K/UL (ref 0–0.04)
IMM GRANULOCYTES NFR BLD AUTO: 0.3 % (ref 0–0.5)
LDLC SERPL CALC-MCNC: 88.8 MG/DL (ref 63–159)
LYMPHOCYTES # BLD AUTO: 2 K/UL (ref 1–4.8)
LYMPHOCYTES NFR BLD: 29.9 % (ref 18–48)
MCH RBC QN AUTO: 29.9 PG (ref 27–31)
MCHC RBC AUTO-ENTMCNC: 31.8 G/DL (ref 32–36)
MCV RBC AUTO: 94 FL (ref 82–98)
MONOCYTES # BLD AUTO: 0.5 K/UL (ref 0.3–1)
MONOCYTES NFR BLD: 8.3 % (ref 4–15)
NEUTROPHILS # BLD AUTO: 3.8 K/UL (ref 1.8–7.7)
NEUTROPHILS NFR BLD: 58.1 % (ref 38–73)
NONHDLC SERPL-MCNC: 114 MG/DL
NRBC BLD-RTO: 0 /100 WBC
PLATELET # BLD AUTO: 273 K/UL (ref 150–450)
PMV BLD AUTO: 10.2 FL (ref 9.2–12.9)
POTASSIUM SERPL-SCNC: 3.8 MMOL/L (ref 3.5–5.1)
PROT SERPL-MCNC: 7.7 G/DL (ref 6–8.4)
RBC # BLD AUTO: 3.71 M/UL (ref 4–5.4)
SODIUM SERPL-SCNC: 141 MMOL/L (ref 136–145)
T4 FREE SERPL-MCNC: 0.92 NG/DL (ref 0.71–1.51)
TRIGL SERPL-MCNC: 126 MG/DL (ref 30–150)
TSH SERPL DL<=0.005 MIU/L-ACNC: 4.54 UIU/ML (ref 0.4–4)
WBC # BLD AUTO: 6.53 K/UL (ref 3.9–12.7)

## 2023-11-01 PROCEDURE — 80061 LIPID PANEL: CPT | Performed by: FAMILY MEDICINE

## 2023-11-01 PROCEDURE — 83880 ASSAY OF NATRIURETIC PEPTIDE: CPT | Performed by: FAMILY MEDICINE

## 2023-11-01 PROCEDURE — 36415 COLL VENOUS BLD VENIPUNCTURE: CPT | Performed by: FAMILY MEDICINE

## 2023-11-01 PROCEDURE — 85025 COMPLETE CBC W/AUTO DIFF WBC: CPT | Performed by: FAMILY MEDICINE

## 2023-11-01 PROCEDURE — 84439 ASSAY OF FREE THYROXINE: CPT | Performed by: FAMILY MEDICINE

## 2023-11-01 PROCEDURE — 83036 HEMOGLOBIN GLYCOSYLATED A1C: CPT | Performed by: FAMILY MEDICINE

## 2023-11-01 PROCEDURE — 84443 ASSAY THYROID STIM HORMONE: CPT | Performed by: FAMILY MEDICINE

## 2023-11-01 PROCEDURE — 80053 COMPREHEN METABOLIC PANEL: CPT | Performed by: FAMILY MEDICINE

## 2023-11-01 RX ORDER — METOCLOPRAMIDE 5 MG/1
TABLET ORAL
Qty: 120 TABLET | Refills: 0 | OUTPATIENT
Start: 2023-11-01

## 2023-11-01 NOTE — TELEPHONE ENCOUNTER
Refill Decision Note   Shweta Gupta  is requesting a refill authorization.  Brief Assessment and Rationale for Refill:  Quick Discontinue     Medication Therapy Plan:  Receipt confirmed by pharmacy (10/31/2023  2:21 PM CDT)      Comments:     Note composed:9:03 AM 11/01/2023

## 2023-11-06 ENCOUNTER — OFFICE VISIT (OUTPATIENT)
Dept: FAMILY MEDICINE | Facility: CLINIC | Age: 53
End: 2023-11-06
Payer: MEDICAID

## 2023-11-06 VITALS
WEIGHT: 277 LBS | BODY MASS INDEX: 47.29 KG/M2 | SYSTOLIC BLOOD PRESSURE: 130 MMHG | HEIGHT: 64 IN | RESPIRATION RATE: 18 BRPM | DIASTOLIC BLOOD PRESSURE: 72 MMHG

## 2023-11-06 DIAGNOSIS — E11.65 TYPE 2 DIABETES MELLITUS WITH HYPERGLYCEMIA, WITH LONG-TERM CURRENT USE OF INSULIN: ICD-10-CM

## 2023-11-06 DIAGNOSIS — I10 ESSENTIAL HYPERTENSION: ICD-10-CM

## 2023-11-06 DIAGNOSIS — E03.8 SUBCLINICAL HYPOTHYROIDISM: ICD-10-CM

## 2023-11-06 DIAGNOSIS — Z79.4 TYPE 2 DIABETES MELLITUS WITH HYPERGLYCEMIA, WITH LONG-TERM CURRENT USE OF INSULIN: ICD-10-CM

## 2023-11-06 DIAGNOSIS — F32.5 DEPRESSION, MAJOR, IN REMISSION: Primary | ICD-10-CM

## 2023-11-06 DIAGNOSIS — E78.2 MIXED HYPERLIPIDEMIA: ICD-10-CM

## 2023-11-06 PROBLEM — M51.369 DEGENERATION OF INTERVERTEBRAL DISC OF LUMBAR REGION: Status: ACTIVE | Noted: 2023-11-06

## 2023-11-06 PROBLEM — K57.90 DIVERTICULOSIS: Status: RESOLVED | Noted: 2021-12-02 | Resolved: 2023-11-06

## 2023-11-06 PROBLEM — S91.302S WOUND, OPEN, FOOT, LEFT, SEQUELA: Status: RESOLVED | Noted: 2018-04-23 | Resolved: 2023-11-06

## 2023-11-06 PROBLEM — N20.1 LEFT URETERAL STONE: Status: RESOLVED | Noted: 2021-11-16 | Resolved: 2023-11-06

## 2023-11-06 PROBLEM — M76.62 ACHILLES TENDINITIS OF LEFT LOWER EXTREMITY: Status: RESOLVED | Noted: 2018-09-14 | Resolved: 2023-11-06

## 2023-11-06 PROBLEM — G54.3 THORACIC ROOT LESION: Status: ACTIVE | Noted: 2023-11-06

## 2023-11-06 PROBLEM — G91.9 HYDROCEPHALUS: Status: ACTIVE | Noted: 2023-11-06

## 2023-11-06 PROBLEM — M47.817 SPONDYLOSIS OF LUMBOSACRAL REGION: Status: ACTIVE | Noted: 2023-11-06

## 2023-11-06 PROBLEM — M72.2 PLANTAR FASCIITIS: Status: RESOLVED | Noted: 2018-09-14 | Resolved: 2023-11-06

## 2023-11-06 PROBLEM — M48.061 SPINAL STENOSIS OF LUMBAR REGION: Status: ACTIVE | Noted: 2023-11-06

## 2023-11-06 PROBLEM — S90.415A: Status: RESOLVED | Noted: 2018-09-14 | Resolved: 2023-11-06

## 2023-11-06 PROBLEM — M54.6 PAIN OF THORACIC FACET JOINT: Status: ACTIVE | Noted: 2023-11-06

## 2023-11-06 PROBLEM — M51.36 DEGENERATION OF INTERVERTEBRAL DISC OF LUMBAR REGION: Status: ACTIVE | Noted: 2023-11-06

## 2023-11-06 PROBLEM — Z98.2 PRESENCE OF VENTRICULOPLEURAL SHUNT: Status: ACTIVE | Noted: 2023-11-06

## 2023-11-06 PROBLEM — G56.00 CARPAL TUNNEL SYNDROME: Status: ACTIVE | Noted: 2023-11-06

## 2023-11-06 PROBLEM — G43.909 MIGRAINE HEADACHE: Status: ACTIVE | Noted: 2023-11-06

## 2023-11-06 PROBLEM — I05.9 MITRAL VALVE DISEASE: Status: ACTIVE | Noted: 2023-11-06

## 2023-11-06 PROCEDURE — 3044F HG A1C LEVEL LT 7.0%: CPT | Mod: CPTII,,, | Performed by: FAMILY MEDICINE

## 2023-11-06 PROCEDURE — 4010F PR ACE/ARB THEARPY RXD/TAKEN: ICD-10-PCS | Mod: CPTII,,, | Performed by: FAMILY MEDICINE

## 2023-11-06 PROCEDURE — 1159F MED LIST DOCD IN RCRD: CPT | Mod: CPTII,,, | Performed by: FAMILY MEDICINE

## 2023-11-06 PROCEDURE — 3061F PR NEG MICROALBUMINURIA RESULT DOCUMENTED/REVIEW: ICD-10-PCS | Mod: CPTII,,, | Performed by: FAMILY MEDICINE

## 2023-11-06 PROCEDURE — 99999 PR PBB SHADOW E&M-EST. PATIENT-LVL IV: ICD-10-PCS | Mod: PBBFAC,,, | Performed by: FAMILY MEDICINE

## 2023-11-06 PROCEDURE — 3044F PR MOST RECENT HEMOGLOBIN A1C LEVEL <7.0%: ICD-10-PCS | Mod: CPTII,,, | Performed by: FAMILY MEDICINE

## 2023-11-06 PROCEDURE — 3008F BODY MASS INDEX DOCD: CPT | Mod: CPTII,,, | Performed by: FAMILY MEDICINE

## 2023-11-06 PROCEDURE — 3075F SYST BP GE 130 - 139MM HG: CPT | Mod: CPTII,,, | Performed by: FAMILY MEDICINE

## 2023-11-06 PROCEDURE — 99214 OFFICE O/P EST MOD 30 MIN: CPT | Mod: S$PBB,,, | Performed by: FAMILY MEDICINE

## 2023-11-06 PROCEDURE — 3075F PR MOST RECENT SYSTOLIC BLOOD PRESS GE 130-139MM HG: ICD-10-PCS | Mod: CPTII,,, | Performed by: FAMILY MEDICINE

## 2023-11-06 PROCEDURE — 4010F ACE/ARB THERAPY RXD/TAKEN: CPT | Mod: CPTII,,, | Performed by: FAMILY MEDICINE

## 2023-11-06 PROCEDURE — 99214 PR OFFICE/OUTPT VISIT, EST, LEVL IV, 30-39 MIN: ICD-10-PCS | Mod: S$PBB,,, | Performed by: FAMILY MEDICINE

## 2023-11-06 PROCEDURE — 3078F PR MOST RECENT DIASTOLIC BLOOD PRESSURE < 80 MM HG: ICD-10-PCS | Mod: CPTII,,, | Performed by: FAMILY MEDICINE

## 2023-11-06 PROCEDURE — 99999 PR PBB SHADOW E&M-EST. PATIENT-LVL IV: CPT | Mod: PBBFAC,,, | Performed by: FAMILY MEDICINE

## 2023-11-06 PROCEDURE — 1159F PR MEDICATION LIST DOCUMENTED IN MEDICAL RECORD: ICD-10-PCS | Mod: CPTII,,, | Performed by: FAMILY MEDICINE

## 2023-11-06 PROCEDURE — 1160F RVW MEDS BY RX/DR IN RCRD: CPT | Mod: CPTII,,, | Performed by: FAMILY MEDICINE

## 2023-11-06 PROCEDURE — 3061F NEG MICROALBUMINURIA REV: CPT | Mod: CPTII,,, | Performed by: FAMILY MEDICINE

## 2023-11-06 PROCEDURE — 3008F PR BODY MASS INDEX (BMI) DOCUMENTED: ICD-10-PCS | Mod: CPTII,,, | Performed by: FAMILY MEDICINE

## 2023-11-06 PROCEDURE — 3078F DIAST BP <80 MM HG: CPT | Mod: CPTII,,, | Performed by: FAMILY MEDICINE

## 2023-11-06 PROCEDURE — 1160F PR REVIEW ALL MEDS BY PRESCRIBER/CLIN PHARMACIST DOCUMENTED: ICD-10-PCS | Mod: CPTII,,, | Performed by: FAMILY MEDICINE

## 2023-11-06 PROCEDURE — 3066F PR DOCUMENTATION OF TREATMENT FOR NEPHROPATHY: ICD-10-PCS | Mod: CPTII,,, | Performed by: FAMILY MEDICINE

## 2023-11-06 PROCEDURE — 3066F NEPHROPATHY DOC TX: CPT | Mod: CPTII,,, | Performed by: FAMILY MEDICINE

## 2023-11-06 PROCEDURE — 99214 OFFICE O/P EST MOD 30 MIN: CPT | Mod: PBBFAC,PN | Performed by: FAMILY MEDICINE

## 2023-11-06 NOTE — PROGRESS NOTES
"Subjective:       Patient ID: Shweta Gupta is a 53 y.o. female.    Chief Complaint: Follow-up (Patient is here to follow up. She states she has no questions or concerns today. )    Ms. Gupta presents today to establish care.   Former patient of Dr. Toledo.   Established with [facility].     Patient Active Problem List:     Depressive disorder     Diabetes mellitus     Essential hypertension     Insomnia     Obesity     Wound, open, foot, left, sequela     Achilles tendinitis of left lower extremity     Abrasion of third toe of left foot     Plantar fasciitis     SOB (shortness of breath)     Bronchiectasis with acute lower respiratory infection     Kidney stones     Hyperlipidemia     Anemia     Chronic superficial gastritis with bleeding     Esophageal polyp     Diverticulosis of colon     Epigastric pain     Left ureteral stone     Diverticulosis     S/P partial colectomy     Gastroesophageal reflux disease     History of diverticulitis     Obese abdomen     Gastroparesis     Subclinical hypothyroidism    Current Outpatient Medications:  albuterol (VENTOLIN HFA) 90 mcg/actuation inhaler, Inhale 2 puffs into the lungs every 6 (six) hours as needed for Wheezing. Rescue  alcohol swabs PadM, USE AS DIRECTEDC  aspirin (ECOTRIN) 81 MG EC tablet, aspirin 81 mg tablet,delayed release   Take 1 tablet every day by oral route.  atorvastatin (LIPITOR) 40 MG tablet, Take 1 tablet (40 mg total) by mouth once daily.  baclofen (LIORESAL) 10 MG tablet, TAKE 1 TABLET BY MOUTH THREE TIMES DAILY  blood sugar diagnostic Strp, Use as directed to check blood sugar TID and PRN. E 11.9  busPIRone (BUSPAR) 10 MG tablet, buspirone 10 mg tablet   TAKE ONE TABLET BY MOUTH five times a day AS NEEDED  cetirizine (ZYRTEC) 10 MG tablet, Take 10 mg by mouth once daily.  clotrimazole-betamethasone 1-0.05% (LOTRISONE) cream, Apply 1 gram twice a day  COMFORT EZ PEN NEEDLES 31 gauge x 3/16" Ndle,   docusate sodium (COLACE) 100 MG " "capsule, Stool Softener   1 tab 4 times a day  dulaglutide (TRULICITY) 0.75 mg/0.5 mL pen injector, Inject 0.75 mg into the skin every 7 days.  fenofibrate (TRICOR) 145 MG tablet, Take 1 tablet (145 mg total) by mouth once daily.  fluticasone (FLONASE) 50 mcg/actuation nasal spray, 1 spray by Each Nare route once daily.  fluticasone-salmeterol diskus inhaler 500-50 mcg, Inhale 1 puff into the lungs 2 (two) times daily. Controller  furosemide (LASIX) 40 MG tablet, Take 1 tablet by mouth twice daily  insulin (LANTUS SOLOSTAR U-100 INSULIN) glargine 100 units/mL SubQ pen, Inject 60 Units into the skin once daily.  ipratropium (ATROVENT) 0.02 % nebulizer solution, SMARTSI Vial(s) Via Nebulizer 4 Times Daily PRN  lancets (EASY TOUCH TWIST LANCETS) 30 gauge Misc, Use to check blood glucose three times daily and as needed  lisinopriL (PRINIVIL,ZESTRIL) 20 MG tablet, Take 1 tablet (20 mg total) by mouth once daily.  metoclopramide HCl (REGLAN) 5 MG tablet, TAKE ONE TABLET BY MOUTH FOUR TIMES DAILY "THANK YOU"  montelukast (SINGULAIR) 10 mg tablet, Take 1 tablet (10 mg total) by mouth once daily.  mupirocin (BACTROBAN) 2 % ointment, Apply topically 3 (three) times daily.  nystatin (NYSTOP) powder, APPLY  POWDER TOPICALLY TO AFFECTED AREA THREE TIMES DAILY  nystatin-triamcinolone (MYCOLOG II) cream, APPLY ON THE SKIN TWICE DAILY AS NEEDED  olopatadine (PATANASE) 0.6 % nasal spray, 1 spray by Each Nare route 2 (two) times daily.  omega 3-dha-epa-fish oil 1,000 mg (120 mg-180 mg) Cap, Fish Oil 1,000 mg (120 mg-180 mg) capsule   Take 1 capsule twice a day by oral route.  ondansetron (ZOFRAN-ODT) 4 MG TbDL, Take 1 tablet (4 mg total) by mouth every 8 (eight) hours as needed (nausea).  pantoprazole (PROTONIX) 40 MG tablet, Take 1 tablet (40 mg total) by mouth once daily.  pioglitazone (ACTOS) 45 MG tablet, Take 1 tablet (45 mg total) by mouth once daily.  potassium chloride SA (K-DUR,KLOR-CON) 20 MEQ tablet, Take 1 tablet (20 " "mEq total) by mouth 2 (two) times daily.  pregabalin (LYRICA) 200 MG Cap, Take 1 capsule (200 mg total) by mouth 3 (three) times daily.  promethazine (PHENERGAN) 25 MG tablet, Take 1 tablet (25 mg total) by mouth every 6 (six) hours as needed for Nausea.  ranolazine (RANEXA) 500 MG Tb12, Take 1 tablet (500 mg total) by mouth 2 (two) times daily.  sucralfate (CARAFATE) 1 gram tablet, TAKE ONE TABLET BY MOUTH FOUR TIMES DAILY "THANK YOU"  tamsulosin (FLOMAX) 0.4 mg Cap, Take 1 capsule (0.4 mg total) by mouth once daily.  valACYclovir (VALTREX) 1000 MG tablet, Take 1 tablet (1,000 mg total) by mouth every 12 (twelve) hours.  zolpidem (AMBIEN) 10 mg Tab, Take 1 tablet (10 mg total) by mouth every evening.  azelastine (ASTELIN) 137 mcg (0.1 %) nasal spray, 2 sprays by Nasal route.  azithromycin (Z-NAKUL) 250 MG tablet, Take two tablets on day 1, then 1 tablet on days 2-5. (Patient not taking: Reported on 11/6/2023)  blood-glucose meter kit, Use as instructed  budesonide-formoterol 160-4.5 mcg (SYMBICORT) 160-4.5 mcg/actuation HFAA, Inhale 2 puffs into the lungs.  buPROPion (WELLBUTRIN XL) 150 MG TB24 tablet, Take 1 tablet (150 mg total) by mouth once daily. (Patient not taking: Reported on 11/6/2023)  citalopram (CELEXA) 20 MG tablet, Take by mouth.  EPINEPHrine (EPIPEN 2-NAKUL) 0.3 mg/0.3 mL AtIn, Inject 0.3 mg into the muscle.   HYDROcodone-acetaminophen (NORCO)  mg per tablet, Take 1 tablet by mouth every 6 (six) hours as needed for Pain. (Patient not taking: Reported on 11/6/2023)  HYDROcodone-acetaminophen (NORCO) 5-325 mg per tablet, TAKE 1 TABLET BY MOUTH 4 TIMES DAILY AS NEEDED FOR PAIN  hydrocortisone 2.5 % cream, Apply topically 2 (two) times daily. (Patient not taking: Reported on 2/9/2023)  promethazine-dextromethorphan (PROMETHAZINE-DM) 6.25-15 mg/5 mL Syrp, Take 5 mLs by mouth every 6 (six) hours as needed (cough). (Patient not taking: Reported on 11/6/2023)  semaglutide (OZEMPIC) 0.25 mg or 0.5 mg (2 " mg/3 mL) pen injector, Inject 0.5 mg into the skin every 7 days. (Patient not taking: Reported on 11/6/2023)  VIIBRYD 40 mg Tab tablet, Take 1 tablet (40 mg total) by mouth once daily. (Patient not taking: Reported on 11/6/2023)    No current facility-administered medications for this visit.    Wt Readings from Last 6 Encounters:  11/06/23 : 125.6 kg (277 lb)  08/07/23 : 134.4 kg (296 lb 4.8 oz)  06/04/23 : 131.5 kg (290 lb)  05/12/23 : 133.4 kg (294 lb)  02/09/23 : 134.2 kg (295 lb 14.4 oz)  08/09/22 : 128.9 kg (284 lb 3.2 oz)        Follow-up  Pertinent negatives include no abdominal pain, fatigue, fever or rash.     Review of Systems   Constitutional:  Negative for activity change, appetite change, fatigue and fever.   Respiratory:  Negative for shortness of breath.    Gastrointestinal:  Negative for abdominal pain.   Integumentary:  Negative for rash.         Objective:      Physical Exam  Vitals and nursing note reviewed.   Constitutional:       General: She is not in acute distress.     Appearance: She is obese. She is not ill-appearing.   Eyes:      Pupils: Pupils are equal, round, and reactive to light.   Neck:      Vascular: No carotid bruit.   Cardiovascular:      Rate and Rhythm: Normal rate and regular rhythm.      Pulses:           Dorsalis pedis pulses are 2+ on the right side and 2+ on the left side.        Posterior tibial pulses are 2+ on the right side and 2+ on the left side.      Heart sounds: No murmur heard.  Pulmonary:      Effort: Pulmonary effort is normal. No respiratory distress.      Breath sounds: Normal breath sounds. No wheezing.   Abdominal:      General: There is no distension.      Palpations: Abdomen is soft.   Musculoskeletal:      Cervical back: Normal range of motion.      Right foot: Normal range of motion. No deformity.      Left foot: Normal range of motion. No deformity.        Feet:    Feet:      Right foot:      Protective Sensation: 6 sites tested.  3 sites sensed.       Skin integrity: Skin integrity normal. No erythema or callus.      Toenail Condition: Right toenails are normal.      Left foot:      Protective Sensation: 6 sites tested.  3 sites sensed.      Skin integrity: Skin integrity normal. No erythema or callus.      Toenail Condition: Left toenails are normal.      Comments: Decreased sensation in the circled area.  Lymphadenopathy:      Cervical: No cervical adenopathy.   Skin:     General: Skin is warm and dry.      Capillary Refill: Capillary refill takes less than 2 seconds.      Findings: No rash.   Neurological:      General: No focal deficit present.      Mental Status: She is alert and oriented to person, place, and time.   Psychiatric:         Mood and Affect: Mood normal.         Behavior: Behavior normal.         Thought Content: Thought content normal.         Judgment: Judgment normal.         Assessment:       1. Depression, major, in remission    2. Essential hypertension    3. Mixed hyperlipidemia    4. Type 2 diabetes mellitus with hyperglycemia, with long-term current use of insulin    5. Subclinical hypothyroidism        Plan:       Problem List Items Addressed This Visit          Psychiatric    Depression, major, in remission - Primary     Currently in remission. Not currently taking the vibryd or wellbutrin.             Cardiac/Vascular    Essential hypertension     Stable. Well controlled. Continue current medications.            Hyperlipidemia     Stable. Well controlled. Continue current medications.               Endocrine    Type 2 diabetes mellitus     Excellent control. Great improvement with trulicity. Continue current medications.          Subclinical hypothyroidism     Continue to monitor

## 2023-12-05 DIAGNOSIS — G47.00 INSOMNIA, UNSPECIFIED TYPE: ICD-10-CM

## 2023-12-05 NOTE — TELEPHONE ENCOUNTER
No care due was identified.  Health [facility] Embedded Care Due Messages. Reference number: [id number].   12/05/2023 12:01:59 PM CST

## 2023-12-06 RX ORDER — ZOLPIDEM TARTRATE 10 MG/1
10 TABLET ORAL NIGHTLY
Qty: 30 TABLET | Refills: 2 | Status: SHIPPED | OUTPATIENT
Start: 2023-12-06 | End: 2024-03-20

## 2023-12-07 DIAGNOSIS — G89.29 CHRONIC EPIGASTRIC PAIN: ICD-10-CM

## 2023-12-07 DIAGNOSIS — R10.13 CHRONIC EPIGASTRIC PAIN: ICD-10-CM

## 2023-12-07 DIAGNOSIS — K31.84 GASTROPARESIS: ICD-10-CM

## 2023-12-07 RX ORDER — METOCLOPRAMIDE 5 MG/1
TABLET ORAL
Qty: 120 TABLET | Refills: 0 | Status: SHIPPED | OUTPATIENT
Start: 2023-12-07 | End: 2024-01-11 | Stop reason: SDUPTHER

## 2024-01-05 NOTE — TELEPHONE ENCOUNTER
No care due was identified.  Health [facility] Embedded Care Due Messages. Reference number: [id number].   1/05/2024 9:27:19 AM CST

## 2024-01-06 RX ORDER — ATORVASTATIN CALCIUM 40 MG/1
40 TABLET, FILM COATED ORAL DAILY
Qty: 90 TABLET | Refills: 3 | Status: SHIPPED | OUTPATIENT
Start: 2024-01-06

## 2024-01-07 NOTE — TELEPHONE ENCOUNTER
Shweta Gupta  is requesting a refill authorization.  Brief Assessment and Rationale for Refill:  Approve     Medication Therapy Plan:         Comments:     Note composed:6:21 PM 01/06/2024

## 2024-01-11 DIAGNOSIS — G89.29 CHRONIC EPIGASTRIC PAIN: ICD-10-CM

## 2024-01-11 DIAGNOSIS — R10.13 CHRONIC EPIGASTRIC PAIN: ICD-10-CM

## 2024-01-11 DIAGNOSIS — K31.84 GASTROPARESIS: ICD-10-CM

## 2024-01-11 RX ORDER — METOCLOPRAMIDE 5 MG/1
TABLET ORAL
Qty: 120 TABLET | Refills: 0 | Status: SHIPPED | OUTPATIENT
Start: 2024-01-11 | End: 2024-02-08 | Stop reason: SDUPTHER

## 2024-01-11 NOTE — TELEPHONE ENCOUNTER
No care due was identified.  Health [facility] Embedded Care Due Messages. Reference number: [id number].   1/11/2024 12:07:04 PM CST

## 2024-01-15 ENCOUNTER — PATIENT MESSAGE (OUTPATIENT)
Dept: FAMILY MEDICINE | Facility: CLINIC | Age: 54
End: 2024-01-15
Payer: MEDICAID

## 2024-01-15 DIAGNOSIS — Z79.4 TYPE 2 DIABETES MELLITUS WITH HYPERGLYCEMIA, WITH LONG-TERM CURRENT USE OF INSULIN: ICD-10-CM

## 2024-01-15 DIAGNOSIS — E11.65 TYPE 2 DIABETES MELLITUS WITH HYPERGLYCEMIA, WITH LONG-TERM CURRENT USE OF INSULIN: ICD-10-CM

## 2024-01-15 DIAGNOSIS — E03.8 SUBCLINICAL HYPOTHYROIDISM: Primary | ICD-10-CM

## 2024-01-17 DIAGNOSIS — I10 ESSENTIAL HYPERTENSION: ICD-10-CM

## 2024-01-17 RX ORDER — DULAGLUTIDE 1.5 MG/.5ML
1.5 INJECTION, SOLUTION SUBCUTANEOUS
Qty: 4 PEN | Refills: 11 | Status: SHIPPED | OUTPATIENT
Start: 2024-01-17 | End: 2024-05-15

## 2024-01-18 ENCOUNTER — PATIENT MESSAGE (OUTPATIENT)
Dept: FAMILY MEDICINE | Facility: CLINIC | Age: 54
End: 2024-01-18
Payer: MEDICAID

## 2024-01-18 RX ORDER — RANOLAZINE 500 MG/1
500 TABLET, EXTENDED RELEASE ORAL 2 TIMES DAILY
Qty: 180 TABLET | Refills: 0 | Status: SHIPPED | OUTPATIENT
Start: 2024-01-18 | End: 2024-03-19 | Stop reason: SDUPTHER

## 2024-01-18 NOTE — TELEPHONE ENCOUNTER
Refill Routing Note   Medication(s) are not appropriate for processing by [facility] for the following reason(s):        Outside of protocol    [facility] action(s):  Route               Appointments  past 12m or future 3m with PCP    Date Provider   Last Visit   11/6/2023 Akosua Osborne MD   Next Visit   2/8/2024 Akosua Osborne MD   ED visits in past 90 days: 0        Note composed:11:14 AM 01/18/2024

## 2024-01-18 NOTE — TELEPHONE ENCOUNTER
Refill Routing Note   Medication(s) are not appropriate for processing by [facility] for the following reason(s):        Outside of protocol - refill center is not able to approve drugs outside protocol    [facility] action(s):  Route               Appointments  past 12m or future 3m with PCP    Date Provider   Last Visit   11/6/2023 Akosua Osborne MD   Next Visit   2/8/2024 Akosua Osborne MD   ED visits in past 90 days: 0        Note composed:1:19 PM 01/18/2024

## 2024-01-29 ENCOUNTER — PATIENT MESSAGE (OUTPATIENT)
Dept: FAMILY MEDICINE | Facility: CLINIC | Age: 54
End: 2024-01-29
Payer: MEDICAID

## 2024-01-29 DIAGNOSIS — Z76.0 MEDICATION REFILL: Primary | ICD-10-CM

## 2024-01-29 RX ORDER — FENOFIBRATE 145 MG/1
145 TABLET, FILM COATED ORAL DAILY
Qty: 90 TABLET | Refills: 3 | OUTPATIENT
Start: 2024-01-29

## 2024-01-29 RX ORDER — FENOFIBRATE 145 MG/1
TABLET, FILM COATED ORAL
Qty: 90 TABLET | Refills: 3 | Status: SHIPPED | OUTPATIENT
Start: 2024-01-29

## 2024-01-29 NOTE — TELEPHONE ENCOUNTER
No care due was identified.  Health [facility] Embedded Care Due Messages. Reference number: [id number].   1/29/2024 11:23:49 AM CST

## 2024-01-29 NOTE — TELEPHONE ENCOUNTER
Refill Decision Note   Shweta Gupta  is requesting a refill authorization.  Brief Assessment and Rationale for Refill:  Quick Discontinue     Medication Therapy Plan:  Script pending provider's review in separate request      Comments:     Note composed:12:37 PM 01/29/2024

## 2024-01-29 NOTE — TELEPHONE ENCOUNTER
Refill Routing Note   Medication(s) are not appropriate for processing by [facility] for the following reason(s):        No active prescription written by provider    [facility] action(s):  Defer               Appointments  past 12m or future 3m with PCP    Date Provider   Last Visit   11/6/2023 Akosua Osborne MD   Next Visit   1/29/2024 Akosua Osborne MD   ED visits in past 90 days: 0        Note composed:12:34 PM 01/29/2024

## 2024-01-29 NOTE — TELEPHONE ENCOUNTER
"Refill Routing Note   Medication(s) are not appropriate for processing by [facility] for the following reason(s):        No active prescription written by provider    [facility] action(s):  Defer             Medication reconciliation completed: Yes     Appointments  past 12m or future 3m with PCP    Date Provider   Last Visit   11/6/2023 Akosua Osborne MD   Next Visit   2/8/2024 Akosua Osborne MD   ED visits in past 90 days: 0        Note composed:12:37 PM 01/29/2024         Call Documentation    Person Called: Patient Call Time: 12:38PM   Spoke with: Shweta 01/29/2024        Reason for call: Clarify where script should be sent  Patient stated: she wanted her script sent to [facility]       Current Medication Requested:   Requested Prescriptions     Pending Prescriptions Disp Refills    fenofibrate (TRICOR) 145 MG tablet [Pharmacy Med Name: fenofibrate nanocrystallized 145 mg tablet] 90 tablet 3     Sig: TAKE ONE TABLET BY MOUTH EVERY DAY "THANK YOU"      [facility]   Note composed: 01/29/2024 12:37 PM       "

## 2024-01-29 NOTE — TELEPHONE ENCOUNTER
No care due was identified.  [facility] Embedded Care Due Messages. Reference number: [id number].   1/29/2024 11:22:06 AM CST

## 2024-01-31 ENCOUNTER — TELEPHONE (OUTPATIENT)
Dept: FAMILY MEDICINE | Facility: CLINIC | Age: 54
End: 2024-01-31
Payer: MEDICAID

## 2024-01-31 NOTE — TELEPHONE ENCOUNTER
Fax received from [facility], form placed in provider's inbox.    Brittanie YANEZ 01/31/2024 8:03 AM

## 2024-02-05 ENCOUNTER — TELEPHONE (OUTPATIENT)
Dept: PODIATRY | Facility: CLINIC | Age: 54
End: 2024-02-05
Payer: MEDICAID

## 2024-02-05 NOTE — TELEPHONE ENCOUNTER
----- Message from Mirta Hines sent at 2/5/2024  3:44 PM CST -----  Contact: PT  Type:  Patient Returning Call    Who Called:  PT  Who Left Message for Patient:  Rayna  Does the patient know what this is regarding?:  yes  Best Call Back Number:  [phone]  Additional Information:

## 2024-02-05 NOTE — TELEPHONE ENCOUNTER
----- Message from Norah Tong sent at 2/5/2024 12:21 PM CST -----  Regarding: appointment  Pt came in during lunch asking for an appointment. She is a diabetic and has a spot on her toe she needs looked at. Would only let me schedule patient for 2/22 and she would like to be seen sooner if possible. Please reach out to pt per her request

## 2024-02-08 ENCOUNTER — OFFICE VISIT (OUTPATIENT)
Dept: FAMILY MEDICINE | Facility: CLINIC | Age: 54
End: 2024-02-08
Payer: MEDICAID

## 2024-02-08 ENCOUNTER — PATIENT MESSAGE (OUTPATIENT)
Dept: FAMILY MEDICINE | Facility: CLINIC | Age: 54
End: 2024-02-08

## 2024-02-08 VITALS
HEART RATE: 85 BPM | WEIGHT: 285.13 LBS | HEIGHT: 64 IN | RESPIRATION RATE: 18 BRPM | DIASTOLIC BLOOD PRESSURE: 86 MMHG | SYSTOLIC BLOOD PRESSURE: 132 MMHG | BODY MASS INDEX: 48.68 KG/M2 | OXYGEN SATURATION: 96 %

## 2024-02-08 DIAGNOSIS — Z79.4 TYPE 2 DIABETES MELLITUS WITH HYPERGLYCEMIA, WITH LONG-TERM CURRENT USE OF INSULIN: ICD-10-CM

## 2024-02-08 DIAGNOSIS — K31.84 GASTROPARESIS: ICD-10-CM

## 2024-02-08 DIAGNOSIS — R10.13 CHRONIC EPIGASTRIC PAIN: ICD-10-CM

## 2024-02-08 DIAGNOSIS — Z76.0 MEDICATION REFILL: ICD-10-CM

## 2024-02-08 DIAGNOSIS — L30.4 INTERTRIGO: ICD-10-CM

## 2024-02-08 DIAGNOSIS — I10 ESSENTIAL HYPERTENSION: Primary | ICD-10-CM

## 2024-02-08 DIAGNOSIS — E11.65 TYPE 2 DIABETES MELLITUS WITH HYPERGLYCEMIA, WITH LONG-TERM CURRENT USE OF INSULIN: ICD-10-CM

## 2024-02-08 DIAGNOSIS — G89.29 CHRONIC EPIGASTRIC PAIN: ICD-10-CM

## 2024-02-08 PROCEDURE — 1159F MED LIST DOCD IN RCRD: CPT | Mod: CPTII,,, | Performed by: FAMILY MEDICINE

## 2024-02-08 PROCEDURE — 3075F SYST BP GE 130 - 139MM HG: CPT | Mod: CPTII,,, | Performed by: FAMILY MEDICINE

## 2024-02-08 PROCEDURE — 3008F BODY MASS INDEX DOCD: CPT | Mod: CPTII,,, | Performed by: FAMILY MEDICINE

## 2024-02-08 PROCEDURE — 99215 OFFICE O/P EST HI 40 MIN: CPT | Mod: PBBFAC,PN | Performed by: FAMILY MEDICINE

## 2024-02-08 PROCEDURE — 1160F RVW MEDS BY RX/DR IN RCRD: CPT | Mod: CPTII,,, | Performed by: FAMILY MEDICINE

## 2024-02-08 PROCEDURE — 99999 PR PBB SHADOW E&M-EST. PATIENT-LVL V: CPT | Mod: PBBFAC,,, | Performed by: FAMILY MEDICINE

## 2024-02-08 PROCEDURE — 99214 OFFICE O/P EST MOD 30 MIN: CPT | Mod: S$PBB,,, | Performed by: FAMILY MEDICINE

## 2024-02-08 PROCEDURE — 3079F DIAST BP 80-89 MM HG: CPT | Mod: CPTII,,, | Performed by: FAMILY MEDICINE

## 2024-02-08 RX ORDER — NYSTATIN 100000 [USP'U]/G
POWDER TOPICAL
Qty: 180 G | Refills: 11 | Status: SHIPPED | OUTPATIENT
Start: 2024-02-08

## 2024-02-08 RX ORDER — METOCLOPRAMIDE 5 MG/1
TABLET ORAL
Qty: 120 TABLET | Refills: 0 | Status: SHIPPED | OUTPATIENT
Start: 2024-02-08 | End: 2024-03-15 | Stop reason: SDUPTHER

## 2024-02-08 RX ORDER — NYSTATIN 100000 [USP'U]/G
POWDER TOPICAL
Qty: 120 G | Refills: 11 | Status: SHIPPED | OUTPATIENT
Start: 2024-02-08 | End: 2024-02-08

## 2024-02-08 RX ORDER — PIOGLITAZONEHYDROCHLORIDE 45 MG/1
45 TABLET ORAL DAILY
Qty: 90 TABLET | Refills: 3 | Status: SHIPPED | OUTPATIENT
Start: 2024-02-08

## 2024-02-08 RX ORDER — LISINOPRIL 20 MG/1
20 TABLET ORAL DAILY
Qty: 90 TABLET | Refills: 3 | Status: SHIPPED | OUTPATIENT
Start: 2024-02-08

## 2024-02-08 RX ORDER — MONTELUKAST SODIUM 10 MG/1
10 TABLET ORAL DAILY
Qty: 90 TABLET | Refills: 3 | Status: SHIPPED | OUTPATIENT
Start: 2024-02-08

## 2024-02-08 NOTE — ASSESSMENT & PLAN NOTE
Does not feel like the trulicity is making her gastroparesis worse. We will monitor VERY closely.

## 2024-02-08 NOTE — PROGRESS NOTES
"Subjective:       Patient ID: Shweta Gupta is a 53 y.o. female.    Chief Complaint: Follow-up (Attending appts. To pain management for her back. )    Ms. Gupta presents today to establish care.   Former patient of Dr. Toledo.   Established with [facility].     Patient Active Problem List:     Depressive disorder     Diabetes mellitus     Essential hypertension     Insomnia     Obesity     Wound, open, foot, left, sequela     Achilles tendinitis of left lower extremity     Abrasion of third toe of left foot     Plantar fasciitis     SOB (shortness of breath)     Bronchiectasis with acute lower respiratory infection     Kidney stones     Hyperlipidemia     Anemia     Chronic superficial gastritis with bleeding     Esophageal polyp     Diverticulosis of colon     Epigastric pain     Left ureteral stone     Diverticulosis     S/P partial colectomy     Gastroesophageal reflux disease     History of diverticulitis     Obese abdomen     Gastroparesis     Subclinical hypothyroidism    Current Outpatient Medications:  albuterol (VENTOLIN HFA) 90 mcg/actuation inhaler, Inhale 2 puffs into the lungs every 6 (six) hours as needed for Wheezing. Rescue  alcohol swabs PadM, USE AS DIRECTEDC  aspirin (ECOTRIN) 81 MG EC tablet, aspirin 81 mg tablet,delayed release   Take 1 tablet every day by oral route.  atorvastatin (LIPITOR) 40 MG tablet, Take 1 tablet (40 mg total) by mouth once daily.  baclofen (LIORESAL) 10 MG tablet, TAKE 1 TABLET BY MOUTH THREE TIMES DAILY  blood sugar diagnostic Strp, Use as directed to check blood sugar TID and PRN. E 11.9  busPIRone (BUSPAR) 10 MG tablet, buspirone 10 mg tablet   TAKE ONE TABLET BY MOUTH five times a day AS NEEDED  cetirizine (ZYRTEC) 10 MG tablet, Take 10 mg by mouth once daily.  clotrimazole-betamethasone 1-0.05% (LOTRISONE) cream, Apply 1 gram twice a day  COMFORT EZ PEN NEEDLES 31 gauge x 3/16" Ndle,   docusate sodium (COLACE) 100 MG capsule, Stool Softener   1 tab 4 " "times a day  dulaglutide (TRULICITY) 0.75 mg/0.5 mL pen injector, Inject 0.75 mg into the skin every 7 days.  fenofibrate (TRICOR) 145 MG tablet, Take 1 tablet (145 mg total) by mouth once daily.  fluticasone (FLONASE) 50 mcg/actuation nasal spray, 1 spray by Each Nare route once daily.  fluticasone-salmeterol diskus inhaler 500-50 mcg, Inhale 1 puff into the lungs 2 (two) times daily. Controller  furosemide (LASIX) 40 MG tablet, Take 1 tablet by mouth twice daily  insulin (LANTUS SOLOSTAR U-100 INSULIN) glargine 100 units/mL SubQ pen, Inject 60 Units into the skin once daily.  ipratropium (ATROVENT) 0.02 % nebulizer solution, SMARTSI Vial(s) Via Nebulizer 4 Times Daily PRN  lancets (EASY TOUCH TWIST LANCETS) 30 gauge Misc, Use to check blood glucose three times daily and as needed  lisinopriL (PRINIVIL,ZESTRIL) 20 MG tablet, Take 1 tablet (20 mg total) by mouth once daily.  metoclopramide HCl (REGLAN) 5 MG tablet, TAKE ONE TABLET BY MOUTH FOUR TIMES DAILY "THANK YOU"  montelukast (SINGULAIR) 10 mg tablet, Take 1 tablet (10 mg total) by mouth once daily.  mupirocin (BACTROBAN) 2 % ointment, Apply topically 3 (three) times daily.  nystatin (NYSTOP) powder, APPLY  POWDER TOPICALLY TO AFFECTED AREA THREE TIMES DAILY  nystatin-triamcinolone (MYCOLOG II) cream, APPLY ON THE SKIN TWICE DAILY AS NEEDED  olopatadine (PATANASE) 0.6 % nasal spray, 1 spray by Each Nare route 2 (two) times daily.  omega 3-dha-epa-fish oil 1,000 mg (120 mg-180 mg) Cap, Fish Oil 1,000 mg (120 mg-180 mg) capsule   Take 1 capsule twice a day by oral route.  ondansetron (ZOFRAN-ODT) 4 MG TbDL, Take 1 tablet (4 mg total) by mouth every 8 (eight) hours as needed (nausea).  pantoprazole (PROTONIX) 40 MG tablet, Take 1 tablet (40 mg total) by mouth once daily.  pioglitazone (ACTOS) 45 MG tablet, Take 1 tablet (45 mg total) by mouth once daily.  potassium chloride SA (K-DUR,KLOR-CON) 20 MEQ tablet, Take 1 tablet (20 mEq total) by mouth 2 (two) times " "daily.  pregabalin (LYRICA) 200 MG Cap, Take 1 capsule (200 mg total) by mouth 3 (three) times daily.  promethazine (PHENERGAN) 25 MG tablet, Take 1 tablet (25 mg total) by mouth every 6 (six) hours as needed for Nausea.  ranolazine (RANEXA) 500 MG Tb12, Take 1 tablet (500 mg total) by mouth 2 (two) times daily.  sucralfate (CARAFATE) 1 gram tablet, TAKE ONE TABLET BY MOUTH FOUR TIMES DAILY "THANK YOU"  tamsulosin (FLOMAX) 0.4 mg Cap, Take 1 capsule (0.4 mg total) by mouth once daily.  valACYclovir (VALTREX) 1000 MG tablet, Take 1 tablet (1,000 mg total) by mouth every 12 (twelve) hours.  zolpidem (AMBIEN) 10 mg Tab, Take 1 tablet (10 mg total) by mouth every evening.  azelastine (ASTELIN) 137 mcg (0.1 %) nasal spray, 2 sprays by Nasal route.  azithromycin (Z-NAKUL) 250 MG tablet, Take two tablets on day 1, then 1 tablet on days 2-5. (Patient not taking: Reported on 11/6/2023)  blood-glucose meter kit, Use as instructed  budesonide-formoterol 160-4.5 mcg (SYMBICORT) 160-4.5 mcg/actuation HFAA, Inhale 2 puffs into the lungs.  buPROPion (WELLBUTRIN XL) 150 MG TB24 tablet, Take 1 tablet (150 mg total) by mouth once daily. (Patient not taking: Reported on 11/6/2023)  citalopram (CELEXA) 20 MG tablet, Take by mouth.  EPINEPHrine (EPIPEN 2-NAKUL) 0.3 mg/0.3 mL AtIn, Inject 0.3 mg into the muscle.   HYDROcodone-acetaminophen (NORCO)  mg per tablet, Take 1 tablet by mouth every 6 (six) hours as needed for Pain. (Patient not taking: Reported on 11/6/2023)  HYDROcodone-acetaminophen (NORCO) 5-325 mg per tablet, TAKE 1 TABLET BY MOUTH 4 TIMES DAILY AS NEEDED FOR PAIN  hydrocortisone 2.5 % cream, Apply topically 2 (two) times daily. (Patient not taking: Reported on 2/9/2023)  promethazine-dextromethorphan (PROMETHAZINE-DM) 6.25-15 mg/5 mL Syrp, Take 5 mLs by mouth every 6 (six) hours as needed (cough). (Patient not taking: Reported on 11/6/2023)  semaglutide (OZEMPIC) 0.25 mg or 0.5 mg (2 mg/3 mL) pen injector, Inject 0.5 mg " into the skin every 7 days. (Patient not taking: Reported on 11/6/2023)  VIIBRYD 40 mg Tab tablet, Take 1 tablet (40 mg total) by mouth once daily. (Patient not taking: Reported on 11/6/2023)    No current facility-administered medications for this visit.    Wt Readings from Last 6 Encounters:  02/08/24 : 129.3 kg (285 lb 1.6 oz)  11/06/23 : 125.6 kg (277 lb)  08/07/23 : 134.4 kg (296 lb 4.8 oz)  06/04/23 : 131.5 kg (290 lb)  05/12/23 : 133.4 kg (294 lb)  02/09/23 : 134.2 kg (295 lb 14.4 oz)  Weight went up over the holiday.     We have increased the trulicity which will hopefully help.             Review of Systems   Constitutional:  Negative for activity change, appetite change, fatigue and fever.   Respiratory:  Negative for shortness of breath.    Gastrointestinal:  Negative for abdominal pain.   Integumentary:  Negative for rash.         Objective:      Physical Exam  Vitals and nursing note reviewed.   Constitutional:       General: She is not in acute distress.     Appearance: She is not ill-appearing.   Cardiovascular:      Rate and Rhythm: Normal rate and regular rhythm.      Heart sounds: No murmur heard.  Pulmonary:      Effort: Pulmonary effort is normal.      Breath sounds: Normal breath sounds. No wheezing.   Skin:     General: Skin is warm and dry.      Findings: No rash.      Comments: Intertrigo under breasts and abdomen.    Neurological:      Mental Status: She is alert.   Psychiatric:         Mood and Affect: Mood normal.         Behavior: Behavior normal.         Assessment:       1. Essential hypertension    2. Intertrigo    3. Type 2 diabetes mellitus with hyperglycemia, with long-term current use of insulin    4. Medication refill    5. Gastroparesis    6. Chronic epigastric pain        Plan:       Problem List Items Addressed This Visit          Cardiac/Vascular    Essential hypertension - Primary     Stable. Well controlled. Continue current medications.            Relevant Medications     lisinopriL (PRINIVIL,ZESTRIL) 20 MG tablet    Other Relevant Orders    CBC Auto Differential    Comprehensive Metabolic Panel    TSH       Endocrine    Type 2 diabetes mellitus     Stable. Well controlled. Continue current medications.            Relevant Medications    pioglitazone (ACTOS) 45 MG tablet    Other Relevant Orders    Hemoglobin A1C       GI    Gastroparesis     Does not feel like the trulicity is making her gastroparesis worse. We will monitor VERY closely.          Relevant Medications    metoclopramide HCl (REGLAN) 5 MG tablet     Other Visit Diagnoses       Intertrigo        Relevant Medications    nystatin (NYSTOP) powder    Medication refill        Chronic epigastric pain        Relevant Medications    metoclopramide HCl (REGLAN) 5 MG tablet

## 2024-02-09 ENCOUNTER — TELEPHONE (OUTPATIENT)
Dept: FAMILY MEDICINE | Facility: CLINIC | Age: 54
End: 2024-02-09
Payer: MEDICAID

## 2024-02-09 DIAGNOSIS — Z12.31 VISIT FOR SCREENING MAMMOGRAM: Primary | ICD-10-CM

## 2024-02-12 ENCOUNTER — OFFICE VISIT (OUTPATIENT)
Dept: PODIATRY | Facility: CLINIC | Age: 54
End: 2024-02-12
Payer: MEDICAID

## 2024-02-12 VITALS
HEART RATE: 80 BPM | BODY MASS INDEX: 49.05 KG/M2 | SYSTOLIC BLOOD PRESSURE: 118 MMHG | DIASTOLIC BLOOD PRESSURE: 68 MMHG | RESPIRATION RATE: 18 BRPM | WEIGHT: 287.31 LBS | HEIGHT: 64 IN

## 2024-02-12 DIAGNOSIS — E11.9 COMPREHENSIVE DIABETIC FOOT EXAMINATION, TYPE 2 DM, ENCOUNTER FOR: ICD-10-CM

## 2024-02-12 DIAGNOSIS — Z79.4 TYPE 2 DIABETES MELLITUS WITH HYPERGLYCEMIA, WITH LONG-TERM CURRENT USE OF INSULIN: ICD-10-CM

## 2024-02-12 DIAGNOSIS — B35.3 TINEA PEDIS OF RIGHT FOOT: Primary | ICD-10-CM

## 2024-02-12 DIAGNOSIS — E11.65 TYPE 2 DIABETES MELLITUS WITH HYPERGLYCEMIA, WITH LONG-TERM CURRENT USE OF INSULIN: ICD-10-CM

## 2024-02-12 PROCEDURE — 3074F SYST BP LT 130 MM HG: CPT | Mod: CPTII,,, | Performed by: PODIATRIST

## 2024-02-12 PROCEDURE — 99213 OFFICE O/P EST LOW 20 MIN: CPT | Mod: S$PBB,,, | Performed by: PODIATRIST

## 2024-02-12 PROCEDURE — 1159F MED LIST DOCD IN RCRD: CPT | Mod: CPTII,,, | Performed by: PODIATRIST

## 2024-02-12 PROCEDURE — 4010F ACE/ARB THERAPY RXD/TAKEN: CPT | Mod: CPTII,,, | Performed by: PODIATRIST

## 2024-02-12 PROCEDURE — 3078F DIAST BP <80 MM HG: CPT | Mod: CPTII,,, | Performed by: PODIATRIST

## 2024-02-12 PROCEDURE — 1160F RVW MEDS BY RX/DR IN RCRD: CPT | Mod: CPTII,,, | Performed by: PODIATRIST

## 2024-02-12 PROCEDURE — 99215 OFFICE O/P EST HI 40 MIN: CPT | Mod: PBBFAC | Performed by: PODIATRIST

## 2024-02-12 PROCEDURE — 3008F BODY MASS INDEX DOCD: CPT | Mod: CPTII,,, | Performed by: PODIATRIST

## 2024-02-12 PROCEDURE — 99999 PR PBB SHADOW E&M-EST. PATIENT-LVL V: CPT | Mod: PBBFAC,,, | Performed by: PODIATRIST

## 2024-02-12 RX ORDER — HYDROCODONE BITARTRATE AND ACETAMINOPHEN 5; 325 MG/1; MG/1
TABLET ORAL
COMMUNITY
Start: 2023-06-27

## 2024-02-13 PROBLEM — B35.3 TINEA PEDIS OF RIGHT FOOT: Status: ACTIVE | Noted: 2024-02-13

## 2024-02-14 NOTE — PROGRESS NOTES
Subjective:       Patient ID: Shweta Gupta is a 53 y.o. female.    Chief Complaint: Wound Check and Diabetes Mellitus     Patient presents today for diabetic evaluation she is concerned about a red area at the base of the toenail 3rd digit right that she 1st noticed about 3-4 weeks ago she states it has gotten larger.  Foot Pain  Associated symptoms include arthralgias and joint swelling.   Wound Check         Review of Systems   Musculoskeletal:  Positive for arthralgias and joint swelling.   All other systems reviewed and are negative.      Objective:      Physical Exam  Vitals and nursing note reviewed.   Constitutional:       Appearance: Normal appearance. She is well-developed.   Cardiovascular:      Pulses:           Dorsalis pedis pulses are 2+ on the right side and 2+ on the left side.        Posterior tibial pulses are 1+ on the right side and 1+ on the left side.   Pulmonary:      Effort: Pulmonary effort is normal.   Musculoskeletal:         General: Tenderness present.      Left foot: Swelling and tenderness present.        Feet:    Feet:      Right foot:      Protective Sensation: 2 sites tested.  2 sites sensed.      Skin integrity: Skin breakdown, erythema and warmth present.      Left foot:      Protective Sensation: 2 sites tested.  2 sites sensed.   Skin:     General: Skin is warm.      Capillary Refill: Capillary refill takes 2 to 3 seconds.      Findings: Erythema present.   Neurological:      General: No focal deficit present.      Mental Status: She is alert.   Psychiatric:         Mood and Affect: Mood normal.         Behavior: Behavior normal.         Thought Content: Thought content normal.         Judgment: Judgment normal.                     Assessment:       1. Tinea pedis of right foot    2. Type 2 diabetes mellitus with hyperglycemia, with long-term current use of insulin    3. Comprehensive diabetic foot examination, type 2 DM, encounter for        Plan:        Patient presents  today for diabetic evaluation she is concerned about a red area at the base of the toenail 3rd digit right that she 1st noticed about 3-4 weeks ago she states it has gotten larger.  On evaluation patient has a well-defined area of erythema at the proximal nail fold 3rd digit right this appears most consistent with a fungal infection of the skin.  There was no drainage noted I have advised the patient who has had a history of previous tinea pedis that this is likely due to excessive perspiration on her feet she states she has been using the right guard antiperspirant deodorant spray certainly this has helped to control the perspiration but once a fungal infection starts it has not going to eliminate the fungal involvement.  Patient will start to apply Betadine twice a day every day I showed the patient how to apply this I dispensed the patient Betadine advising the patient over the next 7-10 days this should resolve completely however I do want see her for a 2 week follow-up with her history of diabetes we need to monitor closely to prevent any complications.  This note was created using [employer] voice recognition software that occasionally misinterpreted phrases or words.

## 2024-02-26 ENCOUNTER — OFFICE VISIT (OUTPATIENT)
Dept: PODIATRY | Facility: CLINIC | Age: 54
End: 2024-02-26
Payer: MEDICAID

## 2024-02-26 VITALS
HEART RATE: 80 BPM | DIASTOLIC BLOOD PRESSURE: 79 MMHG | HEIGHT: 64 IN | SYSTOLIC BLOOD PRESSURE: 120 MMHG | BODY MASS INDEX: 49 KG/M2 | WEIGHT: 287 LBS

## 2024-02-26 DIAGNOSIS — E11.9 COMPREHENSIVE DIABETIC FOOT EXAMINATION, TYPE 2 DM, ENCOUNTER FOR: ICD-10-CM

## 2024-02-26 DIAGNOSIS — B35.3 TINEA PEDIS OF RIGHT FOOT: Primary | ICD-10-CM

## 2024-02-26 DIAGNOSIS — Z79.4 TYPE 2 DIABETES MELLITUS WITH HYPERGLYCEMIA, WITH LONG-TERM CURRENT USE OF INSULIN: ICD-10-CM

## 2024-02-26 DIAGNOSIS — M85.80 OSTEOPENIA, UNSPECIFIED LOCATION: Primary | ICD-10-CM

## 2024-02-26 DIAGNOSIS — E11.65 TYPE 2 DIABETES MELLITUS WITH HYPERGLYCEMIA, WITH LONG-TERM CURRENT USE OF INSULIN: ICD-10-CM

## 2024-02-26 PROCEDURE — 3074F SYST BP LT 130 MM HG: CPT | Mod: CPTII,,, | Performed by: PODIATRIST

## 2024-02-26 PROCEDURE — 4010F ACE/ARB THERAPY RXD/TAKEN: CPT | Mod: CPTII,,, | Performed by: PODIATRIST

## 2024-02-26 PROCEDURE — 1159F MED LIST DOCD IN RCRD: CPT | Mod: CPTII,,, | Performed by: PODIATRIST

## 2024-02-26 PROCEDURE — 99213 OFFICE O/P EST LOW 20 MIN: CPT | Mod: S$PBB,,, | Performed by: PODIATRIST

## 2024-02-26 PROCEDURE — 99215 OFFICE O/P EST HI 40 MIN: CPT | Mod: PBBFAC | Performed by: PODIATRIST

## 2024-02-26 PROCEDURE — 3078F DIAST BP <80 MM HG: CPT | Mod: CPTII,,, | Performed by: PODIATRIST

## 2024-02-26 PROCEDURE — 3008F BODY MASS INDEX DOCD: CPT | Mod: CPTII,,, | Performed by: PODIATRIST

## 2024-02-26 PROCEDURE — 1160F RVW MEDS BY RX/DR IN RCRD: CPT | Mod: CPTII,,, | Performed by: PODIATRIST

## 2024-02-26 PROCEDURE — 99999 PR PBB SHADOW E&M-EST. PATIENT-LVL V: CPT | Mod: PBBFAC,,, | Performed by: PODIATRIST

## 2024-02-27 PROBLEM — Z79.4 TYPE 2 DIABETES MELLITUS WITH HYPERGLYCEMIA, WITH LONG-TERM CURRENT USE OF INSULIN: Status: ACTIVE | Noted: 2018-04-18

## 2024-02-27 PROBLEM — E11.65 TYPE 2 DIABETES MELLITUS WITH HYPERGLYCEMIA, WITH LONG-TERM CURRENT USE OF INSULIN: Status: ACTIVE | Noted: 2018-04-18

## 2024-02-27 NOTE — PROGRESS NOTES
Subjective:       Patient ID: Shweta Gupta is a 53 y.o. female.    Chief Complaint: Tinea Pedis (Right foot)   Patient presents today for follow-up of an area of fungal involvement on the 3rd digit right foot.  Foot Pain  Associated symptoms include arthralgias and joint swelling.   Wound Check    Tinea Pedis  Associated symptoms include arthralgias and joint swelling.        Review of Systems   Musculoskeletal:  Positive for arthralgias and joint swelling.   All other systems reviewed and are negative.      Objective:      Physical Exam  Vitals and nursing note reviewed.   Constitutional:       Appearance: Normal appearance. She is well-developed.   Cardiovascular:      Pulses:           Dorsalis pedis pulses are 2+ on the right side and 2+ on the left side.        Posterior tibial pulses are 1+ on the right side and 1+ on the left side.   Pulmonary:      Effort: Pulmonary effort is normal.   Musculoskeletal:         General: Tenderness present.      Left foot: Swelling and tenderness present.        Feet:    Feet:      Right foot:      Protective Sensation: 2 sites tested.  2 sites sensed.      Skin integrity: Skin breakdown, erythema and warmth present.      Left foot:      Protective Sensation: 2 sites tested.  2 sites sensed.   Skin:     General: Skin is warm.      Capillary Refill: Capillary refill takes 2 to 3 seconds.      Findings: Erythema present.   Neurological:      General: No focal deficit present.      Mental Status: She is alert.   Psychiatric:         Mood and Affect: Mood normal.         Behavior: Behavior normal.         Thought Content: Thought content normal.         Judgment: Judgment normal.                               Assessment:       1. Tinea pedis of right foot    2. Type 2 diabetes mellitus with hyperglycemia, with long-term current use of insulin    3. Comprehensive diabetic foot examination, type 2 DM, encounter for        Plan:       Patient presents today for follow-up of an area  of fungal involvement on the 3rd digit right foot.  Patient states she thinks the areas looking a lot better she has been applying the Betadine twice a day every day there is considerable improvement there is now peeling of the skin where she previously had erythema and notable fungal infection at the base of the nail 3rd digit right.  Patient advised I would recommend she continue to apply the Betadine for the next 2 weeks twice a day a by that time it should be completely resolved she states the area that was sore and painful is no longer tender and feels a lot better.  Diabetic evaluation performed follow-up as needed.  This note was created using [employer] voice recognition software that occasionally misinterpreted phrases or words.

## 2024-03-05 LAB
LEFT EYE DM RETINOPATHY: NEGATIVE
RIGHT EYE DM RETINOPATHY: NEGATIVE

## 2024-03-13 ENCOUNTER — HOSPITAL ENCOUNTER (OUTPATIENT)
Dept: RADIOLOGY | Facility: HOSPITAL | Age: 54
Discharge: HOME | End: 2024-03-13
Attending: FAMILY MEDICINE
Payer: MEDICAID

## 2024-03-13 DIAGNOSIS — M85.80 OSTEOPENIA, UNSPECIFIED LOCATION: ICD-10-CM

## 2024-03-13 DIAGNOSIS — Z12.31 VISIT FOR SCREENING MAMMOGRAM: ICD-10-CM

## 2024-03-13 PROCEDURE — 77067 SCR MAMMO BI INCL CAD: CPT | Mod: TC

## 2024-03-13 PROCEDURE — 77067 SCR MAMMO BI INCL CAD: CPT | Mod: 26,,, | Performed by: RADIOLOGY

## 2024-03-13 PROCEDURE — 77080 DXA BONE DENSITY AXIAL: CPT | Mod: 26,,, | Performed by: RADIOLOGY

## 2024-03-13 PROCEDURE — 77063 BREAST TOMOSYNTHESIS BI: CPT | Mod: 26,,, | Performed by: RADIOLOGY

## 2024-03-13 PROCEDURE — 77080 DXA BONE DENSITY AXIAL: CPT | Mod: TC

## 2024-03-15 DIAGNOSIS — G89.29 CHRONIC EPIGASTRIC PAIN: ICD-10-CM

## 2024-03-15 DIAGNOSIS — K31.84 GASTROPARESIS: ICD-10-CM

## 2024-03-15 DIAGNOSIS — R10.13 CHRONIC EPIGASTRIC PAIN: ICD-10-CM

## 2024-03-15 NOTE — TELEPHONE ENCOUNTER
Care Due:                  Date            Visit Type   Department     Provider  --------------------------------------------------------------------------------                                EP -                              [facility] FAMILY  Last Visit: 02-      CARE ([facility])   MEDICINE       Akosua Osborne                              [facility]  Next Visit: 05-      CARE ([facility])   MEDICINE       Akosua Osborne                                                            Last  Test          Frequency    Reason                     Performed    Due Date  --------------------------------------------------------------------------------    HBA1C.......  6 months...  dulaglutide, pioglitazone  11- 04-    [facility] Embedded Care Due Messages. Reference number: [id number].   3/15/2024 3:18:57 PM CDT

## 2024-03-18 RX ORDER — METOCLOPRAMIDE 5 MG/1
TABLET ORAL
Qty: 120 TABLET | Refills: 0 | Status: SHIPPED | OUTPATIENT
Start: 2024-03-18 | End: 2024-04-12 | Stop reason: SDUPTHER

## 2024-03-19 ENCOUNTER — PATIENT MESSAGE (OUTPATIENT)
Dept: FAMILY MEDICINE | Facility: CLINIC | Age: 54
End: 2024-03-19
Payer: MEDICAID

## 2024-03-19 DIAGNOSIS — G47.00 INSOMNIA, UNSPECIFIED TYPE: ICD-10-CM

## 2024-03-19 DIAGNOSIS — I10 ESSENTIAL HYPERTENSION: ICD-10-CM

## 2024-03-19 RX ORDER — RANOLAZINE 500 MG/1
500 TABLET, EXTENDED RELEASE ORAL 2 TIMES DAILY
Qty: 180 TABLET | Refills: 0 | Status: SHIPPED | OUTPATIENT
Start: 2024-03-19 | End: 2024-05-15 | Stop reason: SDUPTHER

## 2024-03-19 NOTE — TELEPHONE ENCOUNTER
No care due was identified.  [facility] Embedded Care Due Messages. Reference number: [id number].   3/19/2024 5:22:28 PM CDT

## 2024-03-19 NOTE — TELEPHONE ENCOUNTER
Refill Routing Note   Medication(s) are not appropriate for processing by [facility] for the following reason(s):        Outside of protocol    [facility] action(s):  Route               Appointments  past 12m or future 3m with PCP    Date Provider   Last Visit   2/8/2024 Akosua Osborne MD   Next Visit   5/15/2024 Akosua Osborne MD   ED visits in past 90 days: 0        Note composed:5:16 PM 03/19/2024

## 2024-03-20 RX ORDER — ZOLPIDEM TARTRATE 10 MG/1
10 TABLET ORAL NIGHTLY
Qty: 30 TABLET | Refills: 0 | Status: SHIPPED | OUTPATIENT
Start: 2024-03-20 | End: 2024-04-12 | Stop reason: SDUPTHER

## 2024-03-27 ENCOUNTER — TELEPHONE (OUTPATIENT)
Dept: FAMILY MEDICINE | Facility: CLINIC | Age: 54
End: 2024-03-27
Payer: MEDICAID

## 2024-03-27 NOTE — TELEPHONE ENCOUNTER
I did not deny it. It was sent 3/20/24. It was a PA that was denied. That does not mean she cannot cash pay for it- just means not covered by insurance.

## 2024-03-27 NOTE — TELEPHONE ENCOUNTER
----- Message from Rajendra Richard sent at 3/27/2024 10:22 AM CDT -----  Contact: Self  Type: Needs Medical Advice  Who Called: PT    Best Call Back Number: [phone]    Additional Information: Would like to speak to nurse regarding   zolpidem (AMBIEN) 10 mg Tab

## 2024-03-27 NOTE — TELEPHONE ENCOUNTER
"Spoke with pt. Pt reports "Slick called me and said my ambien was denied. I pay cash for it. I do not want her to put me on 5 mg. Please do not full with my medication" message sent to MD.   "

## 2024-03-28 NOTE — TELEPHONE ENCOUNTER
"Correct. I explained that to the patient as well. Pt states that Slick called her and stated " I am sending Dr. Osborne message for her to call in the 5 mg instead" informed pt I would inform MD pt would like to stay on the 10 mg and she could pay cash.  "

## 2024-04-12 DIAGNOSIS — K31.84 GASTROPARESIS: ICD-10-CM

## 2024-04-12 DIAGNOSIS — R10.13 CHRONIC EPIGASTRIC PAIN: ICD-10-CM

## 2024-04-12 DIAGNOSIS — G47.00 INSOMNIA, UNSPECIFIED TYPE: ICD-10-CM

## 2024-04-12 DIAGNOSIS — G89.29 CHRONIC EPIGASTRIC PAIN: ICD-10-CM

## 2024-04-12 RX ORDER — ZOLPIDEM TARTRATE 10 MG/1
10 TABLET ORAL NIGHTLY
Qty: 30 TABLET | Refills: 0 | Status: SHIPPED | OUTPATIENT
Start: 2024-04-12 | End: 2024-05-15 | Stop reason: SDUPTHER

## 2024-04-12 RX ORDER — METOCLOPRAMIDE 5 MG/1
TABLET ORAL
Qty: 120 TABLET | Refills: 0 | Status: SHIPPED | OUTPATIENT
Start: 2024-04-12 | End: 2024-05-14 | Stop reason: SDUPTHER

## 2024-04-12 NOTE — TELEPHONE ENCOUNTER
No care due was identified.  [facility] Embedded Care Due Messages. Reference number: [id number].   4/12/2024 12:26:33 PM CDT

## 2024-04-12 NOTE — TELEPHONE ENCOUNTER
No care due was identified.  [facility] Embedded Care Due Messages. Reference number: [id number].   4/12/2024 12:27:07 PM CDT

## 2024-05-07 ENCOUNTER — LAB VISIT (OUTPATIENT)
Dept: LAB | Facility: HOSPITAL | Age: 54
End: 2024-05-07
Attending: FAMILY MEDICINE
Payer: MEDICAID

## 2024-05-07 DIAGNOSIS — Z79.4 TYPE 2 DIABETES MELLITUS WITH HYPERGLYCEMIA, WITH LONG-TERM CURRENT USE OF INSULIN: ICD-10-CM

## 2024-05-07 DIAGNOSIS — I10 ESSENTIAL HYPERTENSION: ICD-10-CM

## 2024-05-07 DIAGNOSIS — E11.65 TYPE 2 DIABETES MELLITUS WITH HYPERGLYCEMIA, WITH LONG-TERM CURRENT USE OF INSULIN: ICD-10-CM

## 2024-05-07 LAB
ALBUMIN SERPL BCP-MCNC: 4.1 G/DL (ref 3.5–5.2)
ALP SERPL-CCNC: 61 U/L (ref 55–135)
ALT SERPL W/O P-5'-P-CCNC: 27 U/L (ref 10–44)
ANION GAP SERPL CALC-SCNC: 9 MMOL/L (ref 8–16)
AST SERPL-CCNC: 22 U/L (ref 10–40)
BASOPHILS # BLD AUTO: 0.05 K/UL (ref 0–0.2)
BASOPHILS NFR BLD: 0.9 % (ref 0–1.9)
BILIRUB SERPL-MCNC: 0.5 MG/DL (ref 0.1–1)
BUN SERPL-MCNC: 15 MG/DL (ref 6–20)
CALCIUM SERPL-MCNC: 9.6 MG/DL (ref 8.7–10.5)
CHLORIDE SERPL-SCNC: 108 MMOL/L (ref 95–110)
CO2 SERPL-SCNC: 24 MMOL/L (ref 23–29)
CREAT SERPL-MCNC: 0.9 MG/DL (ref 0.5–1.4)
DIFFERENTIAL METHOD BLD: ABNORMAL
EOSINOPHIL # BLD AUTO: 0.2 K/UL (ref 0–0.5)
EOSINOPHIL NFR BLD: 2.9 % (ref 0–8)
ERYTHROCYTE [DISTWIDTH] IN BLOOD BY AUTOMATED COUNT: 13.2 % (ref 11.5–14.5)
EST. GFR  (NO RACE VARIABLE): >60 ML/MIN/1.73 M^2
ESTIMATED AVG GLUCOSE: 111 MG/DL (ref 68–131)
GLUCOSE SERPL-MCNC: 105 MG/DL (ref 70–110)
HBA1C MFR BLD: 5.5 % (ref 4–5.6)
HCT VFR BLD AUTO: 35.3 % (ref 37–48.5)
HGB BLD-MCNC: 11 G/DL (ref 12–16)
IMM GRANULOCYTES # BLD AUTO: 0.01 K/UL (ref 0–0.04)
IMM GRANULOCYTES NFR BLD AUTO: 0.2 % (ref 0–0.5)
LYMPHOCYTES # BLD AUTO: 2 K/UL (ref 1–4.8)
LYMPHOCYTES NFR BLD: 37.1 % (ref 18–48)
MCH RBC QN AUTO: 29.8 PG (ref 27–31)
MCHC RBC AUTO-ENTMCNC: 31.2 G/DL (ref 32–36)
MCV RBC AUTO: 96 FL (ref 82–98)
MONOCYTES # BLD AUTO: 0.5 K/UL (ref 0.3–1)
MONOCYTES NFR BLD: 9.9 % (ref 4–15)
NEUTROPHILS # BLD AUTO: 2.7 K/UL (ref 1.8–7.7)
NEUTROPHILS NFR BLD: 49 % (ref 38–73)
NRBC BLD-RTO: 0 /100 WBC
PLATELET # BLD AUTO: 252 K/UL (ref 150–450)
PMV BLD AUTO: 10.8 FL (ref 9.2–12.9)
POTASSIUM SERPL-SCNC: 3.8 MMOL/L (ref 3.5–5.1)
PROT SERPL-MCNC: 7.4 G/DL (ref 6–8.4)
RBC # BLD AUTO: 3.69 M/UL (ref 4–5.4)
SODIUM SERPL-SCNC: 141 MMOL/L (ref 136–145)
TSH SERPL DL<=0.005 MIU/L-ACNC: 3.41 UIU/ML (ref 0.4–4)
WBC # BLD AUTO: 5.47 K/UL (ref 3.9–12.7)

## 2024-05-07 PROCEDURE — 83036 HEMOGLOBIN GLYCOSYLATED A1C: CPT | Performed by: FAMILY MEDICINE

## 2024-05-07 PROCEDURE — 36415 COLL VENOUS BLD VENIPUNCTURE: CPT | Performed by: FAMILY MEDICINE

## 2024-05-07 PROCEDURE — 85025 COMPLETE CBC W/AUTO DIFF WBC: CPT | Performed by: FAMILY MEDICINE

## 2024-05-07 PROCEDURE — 80053 COMPREHEN METABOLIC PANEL: CPT | Performed by: FAMILY MEDICINE

## 2024-05-07 PROCEDURE — 84443 ASSAY THYROID STIM HORMONE: CPT | Performed by: FAMILY MEDICINE

## 2024-05-14 DIAGNOSIS — G89.29 CHRONIC EPIGASTRIC PAIN: ICD-10-CM

## 2024-05-14 DIAGNOSIS — R10.13 CHRONIC EPIGASTRIC PAIN: ICD-10-CM

## 2024-05-14 DIAGNOSIS — K31.84 GASTROPARESIS: ICD-10-CM

## 2024-05-14 NOTE — TELEPHONE ENCOUNTER
No care due was identified.  [facility] Embedded Care Due Messages. Reference number: [id number].   5/14/2024 10:20:59 AM CDT

## 2024-05-15 ENCOUNTER — OFFICE VISIT (OUTPATIENT)
Dept: FAMILY MEDICINE | Facility: CLINIC | Age: 54
End: 2024-05-15
Payer: MEDICAID

## 2024-05-15 VITALS
BODY MASS INDEX: 48.45 KG/M2 | HEART RATE: 75 BPM | HEIGHT: 64 IN | OXYGEN SATURATION: 94 % | DIASTOLIC BLOOD PRESSURE: 86 MMHG | SYSTOLIC BLOOD PRESSURE: 130 MMHG | WEIGHT: 283.81 LBS | RESPIRATION RATE: 18 BRPM

## 2024-05-15 DIAGNOSIS — Z79.4 TYPE 2 DIABETES MELLITUS WITH HYPERGLYCEMIA, WITH LONG-TERM CURRENT USE OF INSULIN: ICD-10-CM

## 2024-05-15 DIAGNOSIS — Z20.5 EXPOSURE TO HEPATITIS C: Primary | ICD-10-CM

## 2024-05-15 DIAGNOSIS — E11.65 TYPE 2 DIABETES MELLITUS WITH HYPERGLYCEMIA, WITH LONG-TERM CURRENT USE OF INSULIN: ICD-10-CM

## 2024-05-15 DIAGNOSIS — I10 ESSENTIAL HYPERTENSION: ICD-10-CM

## 2024-05-15 DIAGNOSIS — G47.00 INSOMNIA, UNSPECIFIED TYPE: ICD-10-CM

## 2024-05-15 PROCEDURE — 3008F BODY MASS INDEX DOCD: CPT | Mod: CPTII,,, | Performed by: FAMILY MEDICINE

## 2024-05-15 PROCEDURE — 3075F SYST BP GE 130 - 139MM HG: CPT | Mod: CPTII,,, | Performed by: FAMILY MEDICINE

## 2024-05-15 PROCEDURE — 3079F DIAST BP 80-89 MM HG: CPT | Mod: CPTII,,, | Performed by: FAMILY MEDICINE

## 2024-05-15 PROCEDURE — 99214 OFFICE O/P EST MOD 30 MIN: CPT | Mod: S$PBB,,, | Performed by: FAMILY MEDICINE

## 2024-05-15 PROCEDURE — 99215 OFFICE O/P EST HI 40 MIN: CPT | Mod: PBBFAC,PN | Performed by: FAMILY MEDICINE

## 2024-05-15 PROCEDURE — 99999 PR PBB SHADOW E&M-EST. PATIENT-LVL V: CPT | Mod: PBBFAC,,, | Performed by: FAMILY MEDICINE

## 2024-05-15 PROCEDURE — 1159F MED LIST DOCD IN RCRD: CPT | Mod: CPTII,,, | Performed by: FAMILY MEDICINE

## 2024-05-15 PROCEDURE — 3044F HG A1C LEVEL LT 7.0%: CPT | Mod: CPTII,,, | Performed by: FAMILY MEDICINE

## 2024-05-15 PROCEDURE — 1160F RVW MEDS BY RX/DR IN RCRD: CPT | Mod: CPTII,,, | Performed by: FAMILY MEDICINE

## 2024-05-15 PROCEDURE — 4010F ACE/ARB THERAPY RXD/TAKEN: CPT | Mod: CPTII,,, | Performed by: FAMILY MEDICINE

## 2024-05-15 RX ORDER — DULAGLUTIDE 3 MG/.5ML
3 INJECTION, SOLUTION SUBCUTANEOUS
Qty: 4 PEN | Refills: 0 | Status: SHIPPED | OUTPATIENT
Start: 2024-05-15 | End: 2024-06-14

## 2024-05-15 RX ORDER — METOCLOPRAMIDE 5 MG/1
TABLET ORAL
Qty: 120 TABLET | Refills: 0 | Status: SHIPPED | OUTPATIENT
Start: 2024-05-15 | End: 2024-06-18 | Stop reason: SDUPTHER

## 2024-05-15 RX ORDER — ZOLPIDEM TARTRATE 10 MG/1
10 TABLET ORAL NIGHTLY
Qty: 30 TABLET | Refills: 2 | Status: SHIPPED | OUTPATIENT
Start: 2024-05-15

## 2024-05-15 RX ORDER — RANOLAZINE 500 MG/1
500 TABLET, EXTENDED RELEASE ORAL 2 TIMES DAILY
Qty: 180 TABLET | Refills: 0 | Status: SHIPPED | OUTPATIENT
Start: 2024-05-15

## 2024-05-15 NOTE — ASSESSMENT & PLAN NOTE
Lab Results   Component Value Date    LABA1C 6.1 12/21/2020    HGBA1C 5.5 05/07/2024   Stable. Well controlled. Continue current medications.

## 2024-05-15 NOTE — PROGRESS NOTES
Subjective:       Patient ID: Shweta Gupta is a 53 y.o. female.    Chief Complaint: Follow-up (Discuss trucilty about upping the dose and test for hep C. )    Ms. Gupta presents today to follow up.     Wt Readings from Last 6 Encounters:  05/15/24 : 128.7 kg (283 lb 12.8 oz)  02/26/24 : 130.2 kg (287 lb)  02/12/24 : 130.3 kg (287 lb 4.8 oz)  02/08/24 : 129.3 kg (285 lb 1.6 oz)  11/06/23 : 125.6 kg (277 lb)  08/07/23 : 134.4 kg (296 lb 4.8 oz)    She is on the trulicity which will hopefully help- time to increase.         .  Patient Active Problem List   Diagnosis    Depression, major, in remission    Type 2 diabetes mellitus with hyperglycemia, with long-term current use of insulin    Essential hypertension    Insomnia    Obesity    SOB (shortness of breath)    Bronchiectasis with acute lower respiratory infection    Kidney stones    Hyperlipidemia    Anemia    Chronic superficial gastritis with bleeding    Esophageal polyp    Diverticulosis of colon    Epigastric pain    S/P partial colectomy    Gastroesophageal reflux disease    History of diverticulitis    Obese abdomen    Gastroparesis    Subclinical hypothyroidism    Thoracic root lesion    Spondylosis of lumbosacral region    Spinal stenosis of lumbar region    Presence of ventriculopleural shunt    Pain of thoracic facet joint    Mitral valve disease    Migraine headache    Hydrocephalus    Degeneration of intervertebral disc of lumbar region    Carpal tunnel syndrome    Tinea pedis of right foot     Shweta has a current medication list which includes the following prescription(s): albuterol, alcohol swabs, aspirin, atorvastatin, azelastine, baclofen, blood sugar diagnostic, blood-glucose meter, budesonide-formoterol 160-4.5 mcg, buspirone, cetirizine, citalopram, clotrimazole-betamethasone 1-0.05%, comfort ez pen needles, docusate sodium, epipen 2-margie, fenofibrate, fluticasone propionate, fluticasone-salmeterol 500-50 mcg/dose, furosemide,  hydrocodone-acetaminophen, insulin glargine u-100 (lantus), ipratropium, lancets, lisinopril, metoclopramide hcl, montelukast, mupirocin, nystatin, nystatin-triamcinolone, olopatadine, omega 3-dha-epa-fish oil, ondansetron, pantoprazole, pioglitazone, potassium chloride sa, pregabalin, promethazine, sucralfate, tamsulosin, valacyclovir, trulicity, ranolazine, and zolpidem.    Review of Systems   Constitutional:  Negative for activity change, appetite change, fatigue and fever.   Respiratory:  Negative for shortness of breath.    Gastrointestinal:  Negative for abdominal pain.   Integumentary:  Negative for rash.         Health Maintenance Due   Topic Date Due    Pneumococcal Vaccines (Age 0-64) (2 of 2 - PCV) 09/26/2017    Shingles Vaccine (2 of 2) 09/06/2022    Eye Exam  09/30/2022    COVID-19 Vaccine (8 - 2023-24 season) 09/01/2023      Health Maintenance reviewed and discussed- Eye exam up to date.   Objective:      Physical Exam  Vitals and nursing note reviewed.   Constitutional:       General: She is not in acute distress.     Appearance: She is obese. She is not ill-appearing.   Cardiovascular:      Rate and Rhythm: Normal rate and regular rhythm.      Heart sounds: No murmur heard.  Pulmonary:      Effort: Pulmonary effort is normal.      Breath sounds: Normal breath sounds. No wheezing.   Skin:     General: Skin is warm and dry.      Findings: No rash.   Neurological:      Mental Status: She is alert.   Psychiatric:         Mood and Affect: Mood normal.         Behavior: Behavior normal.         Assessment:       1. Exposure to hepatitis C    2. Insomnia, unspecified type    3. Essential hypertension    4. Type 2 diabetes mellitus with hyperglycemia, with long-term current use of insulin        Plan:       1. Exposure to hepatitis C  -     Hepatitis C antibody; Future; Expected date: 05/15/2024    2. Insomnia, unspecified type  -     zolpidem (AMBIEN) 10 mg Tab; Take 1 tablet (10 mg total) by mouth every  evening.  Dispense: 30 tablet; Refill: 2    3. Essential hypertension  Assessment & Plan:  Stable. Well controlled. Continue current medications.      Orders:  -     ranolazine (RANEXA) 500 MG Tb12; Take 1 tablet (500 mg total) by mouth 2 (two) times daily.  Dispense: 180 tablet; Refill: 0    4. Type 2 diabetes mellitus with hyperglycemia, with long-term current use of insulin  Assessment & Plan:  Lab Results   Component Value Date    LABA1C 6.1 12/21/2020    HGBA1C 5.5 05/07/2024   Stable. Well controlled. Continue current medications.       Orders:  -     dulaglutide (TRULICITY) 3 mg/0.5 mL pen injector; Inject 3 mg into the skin every 7 days.  Dispense: 4 Pen; Refill: 0         Very well controlled diabetes. Could use a better weight loss benefit from the GLP-1. Will increase dose.

## 2024-05-15 NOTE — PATIENT INSTRUCTIONS
Thank you for allowing me to participate in your care today. It is an honor to be a part of your healthcare team at [facility]. If you had labs ordered today, you will receive notification via [employer], phone call or mailed letter regarding your results within 7 days. If you have any questions or concerns regarding your visit today, please do not hesitate to contact us.  Sincerely,   Akosua Osborne M.D.

## 2024-05-16 ENCOUNTER — PATIENT OUTREACH (OUTPATIENT)
Dept: ADMINISTRATIVE | Facility: HOSPITAL | Age: 54
End: 2024-05-16
Payer: MEDICAID

## 2024-05-16 NOTE — PROGRESS NOTES
Population Health Chart Review & Patient Outreach Details      Additional [facility] Health Notes:               Updates Requested / Reviewed:      Updated Care Coordination Note, Care Everywhere, and Immunizations Reconciliation Completed or Queried: [facility] Topics Overdue:      [facility] Score: 1     Eye Exam                       Health Maintenance Topic(s) Outreach Outcomes & Actions Taken:    Eye Exam - Outreach Outcomes & Actions Taken  : External Records Requested & Care Team Updated if Applicable

## 2024-05-16 NOTE — LETTER
"       AUTHORIZATION FOR RELEASE OF   CONFIDENTIAL INFORMATION    Dear Dr Stephens,    We are seeing Shweta Gupta, date of birth 1970, in the clinic at [facility]. Akosua Osborne MD is the patient's PCP. Shweta Gupta has an outstanding lab/procedure at the time we reviewed her chart. In order to help keep her health information updated, she has authorized us to request the following medical record(s):                                               ( X )  EYE EXAM                  Please fax records to [facility], [place], Sarah E., MD, [phone].    Thank you  Gladys Mujica DENISE  Clinical Care Coordinator  [facility]           Patient Name: Shweta Gupta  : 1970  Patient Phone #: [phone]              A. Consent for Examination and Treatment: I hereby authorize the providers and employees of [facility] ("[facility] to provide medical treatment/services which includes, but is not limited to, performing and administering tests and diagnostic procedures that are deemed necessary, Including, but not limited to, imaging examinations, blood tests and other laboratory procedures as may be required by the hospital, clinic, or may be ordered by my physician(s) or persons working under the general and/or special instructions of my physician(s).                   1.      I understand and agree that this consent covers all authorized persons, including but not limited to physicians, residents, nurse practitioners, physicians' assistants, specialists, consultants, student nurses, and independently contracted physicians, who are called upon by the physician in charge, to carry out the diagnostic procedures and medical or surgical treatment.  2.      I hereby authorize [facility] to retain or dispose of any specimens or tissue, should there be such remaining from any test or procedure.  3.      I hereby authorize and give consent for [facility] providers and employees to take " photographs, images or videotapes of such diagnostic, surgical or treatment procedures of Patient as may be required by [facility] or as may be ordered by a physician.  I further acknowledge and agree that [facility] may use cameras or other devices for patient monitoring.  4.      I am aware that the practice of medicine is not an exact science, and I acknowledge that no guarantees have been made to me as to the outcome of any tests, procedures or treatment.                   B. Authorization for Release of Information:  I understand that my insurance company and/or their agents may need information necessary to make determinations about payment/reimbursement.  I hereby provide authorization to release to all insurance companies, their successors, assignees, other parties with whom they may have contracted, or others acting on their behalf, that are involved with payment for any hospital and/or clinic charges incurred by the patient, any information that they request and deem necessary for payment/reimbursement, and/or quality review.  I further authorize the release of my health information to physicians or other health care practitioners on staff who are involved in my health care now and in the future, and to other health care providers, entities, or institutions for the purpose of my continued care and treatment, including referrals.     C. Medicare Patient's Certification and Authorization to Release Information and Payment Request:  I certify that the information given by me in applying for payment under Title XVIII of the Social Security Act is correct.  I authorize any shi of medical or other information about me to release to the Social Security Administration, or its intermediaries or carriers, any information needed for this or a related Medicare claim.  I request that payment of authorized benefits be made on my behalf.     D. Assignment of Insurance Benefits:  I hereby authorize any and all insurance  companies, health plans, defined benefit plans, health insurers or any entity that is or may be responsible for payment of my medical expenses to pay all hospital and medical benefits now due, and to become due and payable to me under any hospital benefits, sick benefits, injury benefits or any other benefit for services rendered to me, including Major Medical Benefits, direct to [facility] and all independently contracted physicians.  I assign any and all rights that I may have against any and all insurance companies, health plans, defined benefit plans, health insurers or any entity that is or may be responsible for payment of my medical expenses, including, but not limited to any right to appeal a denial of a claim, any right to bring any action, lawsuit, administrative proceeding, or other cause of action on my behalf.  I specifically assign my right to pursue litigation against any and all insurance companies, health plans, defined benefit plans, health insurers or any entity that is or may be responsible for payment of medical expenses based upon a refusal to pay charges.              REGISTRATION  AUTHORIZATION                    E. Valuables:  It is understood and agreed that [facility] is not liable for the damage to or loss of any money, jewelry, documents, dentures, eye glasses, hearing aids, prosthetics, or other property of value.     F. Computer Equipment:  I understand and agree that should I choose to use computer equipment owned by [facility] or if I choose to access the Internet via [facility]'s network, I do so at my own risk.  [facility] is not responsible for any damage to my computer equipment or to any damages of any type that might arise from my loss of equipment or data.                   G. Acceptance of Financial Responsibility:  I agree that in consideration of the services and supplies that have been or will be furnished to the patient,  I am hereby obligated to pay all charges made for or on the  account of the patient according to the standard rates (in effect at the time the services and supplies are delivered) established by [facility], including its Patient Financial Assistance Policy to the extent it is applicable.  I understand that I am responsible for all charges, or portions thereof, not covered by insurance or other sources.  Patient refunds will be distributed only after balances at all [facility] facilities are paid.                   H. Communication Authorization:  I hereby authorize [facility] and its representatives, along with any billing service or  who may work on their behalf, to contact me on my cell phone and/or home phone using prerecorded messages, artificial voice messages, automatic telephone dialing devices or other computer assisted technology, or by electronic mail, text messaging, or by any other form of electronic communication.  This includes, but is not limited to appointment reminders, yearly physical exam reminders, preventive care reminders, patient campaigns, welcome calls, and calls about account balances on my account or any account on which I am listed as a guarantor.  I understand I have the right to opt out of these communications at any time.     I.  Relationship Between Facility and Physician:  I understand that some, but not all, providers furnishing services to the patient are not employees or agents of [facility].  The patient is under the care and supervision of his/her attending physician, and it is the responsibility of the facility and its nursing staff to carry out the instructions of such physicians.  It is the responsibility of the patient's physician/designee to obtain the patient's informed consent, when required, for medical or surgical treatment, special diagnostic or therapeutic procedures, or hospital services rendered for the patient under the special instructions of the physician/designee.     J. Notice of Private Practices:  I acknowledge I  have received a copy of [facility]'s Notice of Privacy Practices.     K. Facility Directory:  I have discussed with the organization my desire to be either included or excluded in the facility directory.  I understand that if my choice is to opt-out of being identified in the facility directory that the facility will not provide any information about me such as my condition (e.g. Fair, stable, etc.) or my location in the facility (e.g. Room number, department).     L. LINKS:  For [place] Residents:  [facility] is a LINKS ([place] [facility]) participating facility.  LINKS is a [facility]-sponsored confidential computer system that helps you and your doctor keep track of you and your child's immunization history.  I acknowledge that I am allowing [facility] to share this information with [facility].  For [place] Residents:  [facility] is a MIIX ([place] Immunization Information eXchange) participant.  MIBubbl is a [place] Department of Health-sponsored confidential computer system that helps you and your doctor keep track of you and your child's immunization history.  I acknowledge that I am allowing [facility] to share information with [employer].     M. Term:  This authorization is valid for this and subsequent care/treatment I receive at [facility] and will remain valid unless/until revoked in writing by me.      ACKNOWLEDGMENT OF POTENTIAL RISKS OF COVID-19 VACCINE  It is important that you, as the patient or patients legally authorized representative(s), understand and acknowledge the following, with regard to administration of the COVID-19 vaccine offered by [facility]:  The SARS-CoV-2 virus (COVID-19) has caused an unprecedented modern global pandemic that has mobilized scientists and drug manufacturers to work to create safe and effective vaccines to get the crisis under control.  No vaccine is released in the [place] without undergoing rigorous, multi-layered testing and approval by the  Food and Drug Administration.  During a public health emergency, however, vaccines can be released for patient administration by the FDA prior to completion of multi-phase clinical trials and approval. This is done by the FDAs granting of Emergency Use Authorization (EUA) when the vaccine meets reasonable thresholds for safety and effectiveness and people are in urgent need of care. Under an EUA, the FDA has found that known and potential benefits outweigh its known and potential risks.  The vaccine for which you are presenting to [facility] has been released under an EUA, which [facility] is honoring in its distribution of the vaccine to the public. While the FDAs authorization indicates its belief that usage is recommended over possible risks, there is still the possibility that unknown risks of the vaccine could exist.  By signing this document, you acknowledge and assume these risks. Further, you waive any and all claims of liability against and hold harmless any [facility] entity or provider for any harm caused to you by said possible unknown risks of the vaccine.        N. [facility]:  As used in this document, [facility] means all [facility] affiliated entities including all health centers, surgery centers, clinics, and hospitals.  It includes more specifically, the following entities: [facility], a not for profit [facility], and its subsidiaries and affiliates, including [facility], [facility], [facility], [facility] MAR[facility].d/b/a [facility].d/b/a [facility] As manager of Herb FREEMAN [facility], [facility] [place]  [facility], L.L[facility]C.d/b/a [facility], [facility] and [facility] as manager of [facility].                                                                Patient/Legal Guardian Signature                                                    This signature was collected at 05/07/2024      Shweta Gupta/Self                                                                            Printed Name/Relationship to Patient

## 2024-05-16 NOTE — LETTER
"       AUTHORIZATION FOR RELEASE OF   CONFIDENTIAL INFORMATION    Dear Dr Vizcarra,    We are seeing Shweta Gupta, date of birth 1970, in the clinic at [facility]. Akosua Osborne MD is the patient's PCP. Shweta Gupta has an outstanding lab/procedure at the time we reviewed her chart. In order to help keep her health information updated, she has authorized us to request the following medical record(s):                                               ( X )  EYE EXAM                Please fax records to [facility], [place], Sarah E., MD, [phone].    Thank you  Gladys Mujica LPN  Clinical Care Coordinator  [facility]           Patient Name: Shweta Gupta  : 1970  Patient Phone #: [phone]              A. Consent for Examination and Treatment: I hereby authorize the providers and employees of [facility] to provide medical treatment/services which includes, but is not limited to, performing and administering tests and diagnostic procedures that are deemed necessary, Including, but not limited to, imaging examinations, blood tests and other laboratory procedures as may be required by the hospital, clinic, or may be ordered by my physician(s) or persons working under the general and/or special instructions of my physician(s).                   1.      I understand and agree that this consent covers all authorized persons, including but not limited to physicians, residents, nurse practitioners, physicians' assistants, specialists, consultants, student nurses, and independently contracted physicians, who are called upon by the physician in charge, to carry out the diagnostic procedures and medical or surgical treatment.  2.      I hereby authorize [facility] to retain or dispose of any specimens or tissue, should there be such remaining from any test or procedure.  3.      I hereby authorize and give consent for [facility] providers and employees to take " photographs, images or videotapes of such diagnostic, surgical or treatment procedures of Patient as may be required by [facility] or as may be ordered by a physician.  I further acknowledge and agree that [facility] may use cameras or other devices for patient monitoring.  4.      I am aware that the practice of medicine is not an exact science, and I acknowledge that no guarantees have been made to me as to the outcome of any tests, procedures or treatment.                   B. Authorization for Release of Information:  I understand that my insurance company and/or their agents may need information necessary to make determinations about payment/reimbursement.  I hereby provide authorization to release to all insurance companies, their successors, assignees, other parties with whom they may have contracted, or others acting on their behalf, that are involved with payment for any hospital and/or clinic charges incurred by the patient, any information that they request and deem necessary for payment/reimbursement, and/or quality review.  I further authorize the release of my health information to physicians or other health care practitioners on staff who are involved in my health care now and in the future, and to other health care providers, entities, or institutions for the purpose of my continued care and treatment, including referrals.     C. Medicare Patient's Certification and Authorization to Release Information and Payment Request:  I certify that the information given by me in applying for payment under Title XVIII of the Social Security Act is correct.  I authorize any shi of medical or other information about me to release to the Social Security Administration, or its intermediaries or carriers, any information needed for this or a related Medicare claim.  I request that payment of authorized benefits be made on my behalf.     D. Assignment of Insurance Benefits:  I hereby authorize any and all insurance  companies, health plans, defined benefit plans, health insurers or any entity that is or may be responsible for payment of my medical expenses to pay all hospital and medical benefits now due, and to become due and payable to me under any hospital benefits, sick benefits, injury benefits or any other benefit for services rendered to me, including Major Medical Benefits, direct to [facility] and all independently contracted physicians.  I assign any and all rights that I may have against any and all insurance companies, health plans, defined benefit plans, health insurers or any entity that is or may be responsible for payment of my medical expenses, including, but not limited to any right to appeal a denial of a claim, any right to bring any action, lawsuit, administrative proceeding, or other cause of action on my behalf.  I specifically assign my right to pursue litigation against any and all insurance companies, health plans, defined benefit plans, health insurers or any entity that is or may be responsible for payment of medical expenses based upon a refusal to pay charges.              REGISTRATION  AUTHORIZATION                    E. Valuables:  It is understood and agreed that [facility] is not liable for the damage to or loss of any money, jewelry, documents, dentures, eye glasses, hearing aids, prosthetics, or other property of value.     F. Computer Equipment:  I understand and agree that should I choose to use computer equipment owned by [facility] or if I choose to access the Internet via [facility]'s network, I do so at my own risk.  [facility] is not responsible for any damage to my computer equipment or to any damages of any type that might arise from my loss of equipment or data.                   G. Acceptance of Financial Responsibility:  I agree that in consideration of the services and supplies that have been or will be furnished to the patient,  I am hereby obligated to pay all charges made for or on the  account of the patient according to the standard rates (in effect at the time the services and supplies are delivered) established by [facility], including its Patient Financial Assistance Policy to the extent it is applicable.  I understand that I am responsible for all charges, or portions thereof, not covered by insurance or other sources.  Patient refunds will be distributed only after balances at all [facility] facilities are paid.                   H. Communication Authorization:  I hereby authorize [facility] and its representatives, along with any billing service or  who may work on their behalf, to contact me on my cell phone and/or home phone using prerecorded messages, artificial voice messages, automatic telephone dialing devices or other computer assisted technology, or by electronic mail, text messaging, or by any other form of electronic communication.  This includes, but is not limited to appointment reminders, yearly physical exam reminders, preventive care reminders, patient campaigns, welcome calls, and calls about account balances on my account or any account on which I am listed as a guarantor.  I understand I have the right to opt out of these communications at any time.     I.  Relationship Between Facility and Physician:  I understand that some, but not all, providers furnishing services to the patient are not employees or agents of [facility].  The patient is under the care and supervision of his/her attending physician, and it is the responsibility of the facility and its nursing staff to carry out the instructions of such physicians.  It is the responsibility of the patient's physician/designee to obtain the patient's informed consent, when required, for medical or surgical treatment, special diagnostic or therapeutic procedures, or hospital services rendered for the patient under the special instructions of the physician/designee.     J. Notice of Private Practices:  I acknowledge I  have received a copy of [facility]'s Notice of Privacy Practices.     K. Facility Directory:  I have discussed with the organization my desire to be either included or excluded in the facility directory.  I understand that if my choice is to opt-out of being identified in the facility directory that the facility will not provide any information about me such as my condition (e.g. Fair, stable, etc.) or my location in the facility (e.g. Room number, department).     L. LINKS:  For [place] Residents:  [facility] is a LINKS ([place] [facility]) participating facility.  LINKS is a [facility]-sponsored confidential computer system that helps you and your doctor keep track of you and your child's immunization history.  I acknowledge that I am allowing [facility] to share this information with [facility].  For [place] Residents:  [facility] is a MIIX ([place] Immunization Information eXchange) participant.  [employer] is a [place] Department of Health-sponsored confidential computer system that helps you and your doctor keep track of you and your child's immunization history.  I acknowledge that I am allowing [facility] to share information with [employer].     M. Term:  This authorization is valid for this and subsequent care/treatment I receive at [facility] and will remain valid unless/until revoked in writing by me.      ACKNOWLEDGMENT OF POTENTIAL RISKS OF COVID-19 VACCINE  It is important that you, as the patient or patients legally authorized representative(s), understand and acknowledge the following, with regard to administration of the COVID-19 vaccine offered by [facility]:  The SARS-CoV-2 virus (COVID-19) has caused an unprecedented modern global pandemic that has mobilized scientists and drug manufacturers to work to create safe and effective vaccines to get the crisis under control.  No vaccine is released in the [place] without undergoing rigorous, multi-layered testing and approval by the  Food and Drug Administration.  During a public health emergency, however, vaccines can be released for patient administration by the FDA prior to completion of multi-phase clinical trials and approval. This is done by the FDAs granting of Emergency Use Authorization (EUA) when the vaccine meets reasonable thresholds for safety and effectiveness and people are in urgent need of care. Under an EUA, the FDA has found that known and potential benefits outweigh its known and potential risks.  The vaccine for which you are presenting to [facility] has been released under an EUA, which [facility] is honoring in its distribution of the vaccine to the public. While the FDAs authorization indicates its belief that usage is recommended over possible risks, there is still the possibility that unknown risks of the vaccine could exist.  By signing this document, you acknowledge and assume these risks. Further, you waive any and all claims of liability against and hold harmless any [facility] entity or provider for any harm caused to you by said possible unknown risks of the vaccine.        N. [facility]:  As used in this document, [facility] means all [facility] affiliated entities including all health centers, surgery centers, clinics, and hospitals.  It includes more specifically, the following entities: [facility], a not for profit [facility], and its subsidiaries and affiliates, including [facility], [facility], [facility], [facility], [facility]., [facility].d/b/a [facility], L.L.C.d/b/a [facility] [employer] [facility] As manager of Herb FREEMAN [facility], [facility] [place]  [facility], L.L.C.d/b/a [facility], [facility] and [facility] as manager of [facility].                                                                Patient/Legal Guardian Signature                                                    This signature was collected at 05/07/2024      Shweta Gupta/Self                                                                            Printed Name/Relationship to Patient

## 2024-05-22 ENCOUNTER — PATIENT OUTREACH (OUTPATIENT)
Dept: ADMINISTRATIVE | Facility: HOSPITAL | Age: 54
End: 2024-05-22
Payer: MEDICAID

## 2024-06-14 ENCOUNTER — PATIENT MESSAGE (OUTPATIENT)
Dept: FAMILY MEDICINE | Facility: CLINIC | Age: 54
End: 2024-06-14
Payer: MEDICAID

## 2024-06-14 DIAGNOSIS — Z79.4 TYPE 2 DIABETES MELLITUS WITH HYPERGLYCEMIA, WITH LONG-TERM CURRENT USE OF INSULIN: ICD-10-CM

## 2024-06-14 DIAGNOSIS — E11.65 TYPE 2 DIABETES MELLITUS WITH HYPERGLYCEMIA, WITH LONG-TERM CURRENT USE OF INSULIN: ICD-10-CM

## 2024-06-18 DIAGNOSIS — K31.84 GASTROPARESIS: ICD-10-CM

## 2024-06-18 DIAGNOSIS — G89.29 CHRONIC EPIGASTRIC PAIN: ICD-10-CM

## 2024-06-18 DIAGNOSIS — R10.13 CHRONIC EPIGASTRIC PAIN: ICD-10-CM

## 2024-06-18 RX ORDER — METOCLOPRAMIDE 5 MG/1
TABLET ORAL
Qty: 120 TABLET | Refills: 0 | Status: SHIPPED | OUTPATIENT
Start: 2024-06-18

## 2024-06-18 NOTE — TELEPHONE ENCOUNTER
No care due was identified.  Health [facility] Embedded Care Due Messages. Reference number: [id number].   6/18/2024 8:49:07 AM CDT

## 2024-06-19 ENCOUNTER — PATIENT MESSAGE (OUTPATIENT)
Dept: FAMILY MEDICINE | Facility: CLINIC | Age: 54
End: 2024-06-19
Payer: MEDICAID

## 2024-06-19 RX ORDER — SUCRALFATE 1 G/1
1 TABLET ORAL 4 TIMES DAILY
Qty: 360 TABLET | Refills: 3 | Status: SHIPPED | OUTPATIENT
Start: 2024-06-19

## 2024-06-19 NOTE — TELEPHONE ENCOUNTER
Refill Routing Note   Medication(s) are not appropriate for processing by [facility] for the following reason(s):        Outside of protocol    [facility] action(s):  Route               Appointments  past 12m or future 3m with PCP    Date Provider   Last Visit   5/15/2024 Akosua Osborne MD   Next Visit   11/14/2024 Akosua Osborne MD   ED visits in past 90 days: 0        Note composed:2:18 PM 06/19/2024

## 2024-06-27 ENCOUNTER — TELEPHONE (OUTPATIENT)
Dept: FAMILY MEDICINE | Facility: CLINIC | Age: 54
End: 2024-06-27
Payer: MEDICAID

## 2024-07-18 DIAGNOSIS — G89.29 CHRONIC EPIGASTRIC PAIN: ICD-10-CM

## 2024-07-18 DIAGNOSIS — K31.84 GASTROPARESIS: ICD-10-CM

## 2024-07-18 DIAGNOSIS — R10.13 CHRONIC EPIGASTRIC PAIN: ICD-10-CM

## 2024-07-18 RX ORDER — METOCLOPRAMIDE 5 MG/1
TABLET ORAL
Qty: 120 TABLET | Refills: 0 | Status: SHIPPED | OUTPATIENT
Start: 2024-07-18

## 2024-07-18 NOTE — TELEPHONE ENCOUNTER
No care due was identified.  Health [facility] Embedded Care Due Messages. Reference number: [id number].   7/18/2024 10:38:09 AM CDT

## 2024-08-12 ENCOUNTER — PATIENT MESSAGE (OUTPATIENT)
Dept: FAMILY MEDICINE | Facility: CLINIC | Age: 54
End: 2024-08-12
Payer: MEDICAID

## 2024-08-12 DIAGNOSIS — K21.9 GASTROESOPHAGEAL REFLUX DISEASE: ICD-10-CM

## 2024-08-12 DIAGNOSIS — K31.84 GASTROPARESIS: ICD-10-CM

## 2024-08-12 DIAGNOSIS — G89.29 CHRONIC EPIGASTRIC PAIN: ICD-10-CM

## 2024-08-12 DIAGNOSIS — I10 ESSENTIAL HYPERTENSION: ICD-10-CM

## 2024-08-12 DIAGNOSIS — R10.13 CHRONIC EPIGASTRIC PAIN: ICD-10-CM

## 2024-08-12 RX ORDER — RANOLAZINE 500 MG/1
500 TABLET, EXTENDED RELEASE ORAL 2 TIMES DAILY
Qty: 180 TABLET | Refills: 0 | Status: SHIPPED | OUTPATIENT
Start: 2024-08-12

## 2024-08-12 RX ORDER — METOCLOPRAMIDE 5 MG/1
TABLET ORAL
Qty: 120 TABLET | Refills: 0 | Status: SHIPPED | OUTPATIENT
Start: 2024-08-12

## 2024-08-12 RX ORDER — PANTOPRAZOLE SODIUM 40 MG/1
40 TABLET, DELAYED RELEASE ORAL DAILY
Qty: 90 TABLET | Refills: 3 | Status: SHIPPED | OUTPATIENT
Start: 2024-08-12

## 2024-08-12 NOTE — TELEPHONE ENCOUNTER
Care Due:                  Date            Visit Type   Department     Provider  --------------------------------------------------------------------------------                                EP -                              [facility]  Last Visit: 05-      CARE ([facility])   MEDICINE       Akosua Osborne                              [facility]  Next Visit: 11-      CARE ([facility])   MEDICINE       Akosua Osborne                                                            Last  Test          Frequency    Reason                     Performed    Due Date  --------------------------------------------------------------------------------    HBA1C.......  6 months...  dulaglutide, pioglitazone  05- 11-    Lipid Panel.  12 months..  atorvastatin, fenofibrate  11-   10-    Health [facility] Embedded Care Due Messages. Reference number: [id number].   8/12/2024 12:32:30 PM CDT

## 2024-08-12 NOTE — TELEPHONE ENCOUNTER
Refill Routing Note   Medication(s) are not appropriate for processing by [facility] for the following reason(s):        No active prescription written by provider    [facility] action(s):  Defer               Appointments  past 12m or future 3m with PCP    Date Provider   Last Visit   5/15/2024 Akosua Osborne MD   Next Visit   11/14/2024 Akosua Osborne MD   ED visits in past 90 days: 0        Note composed:3:16 PM 08/12/2024

## 2024-08-12 NOTE — TELEPHONE ENCOUNTER
Refill Routing Note   Medication(s) are not appropriate for processing by [facility] for the following reason(s):        Outside of protocol    [facility] action(s):  Route               Appointments  past 12m or future 3m with PCP    Date Provider   Last Visit   5/15/2024 Akosua Osborne MD   Next Visit   11/14/2024 Akosua Osborne MD   ED visits in past 90 days: 0        Note composed:3:14 PM 08/12/2024

## 2024-08-12 NOTE — TELEPHONE ENCOUNTER
No care due was identified.  [facility] Embedded Care Due Messages. Reference number: [id number].   8/12/2024 12:59:11 PM CDT

## 2024-08-14 DIAGNOSIS — E11.9 TYPE 2 DIABETES MELLITUS WITHOUT COMPLICATION: ICD-10-CM

## 2024-08-29 DIAGNOSIS — G47.00 INSOMNIA, UNSPECIFIED TYPE: ICD-10-CM

## 2024-08-30 ENCOUNTER — PATIENT MESSAGE (OUTPATIENT)
Dept: FAMILY MEDICINE | Facility: CLINIC | Age: 54
End: 2024-08-30
Payer: MEDICAID

## 2024-08-30 DIAGNOSIS — G47.00 INSOMNIA, UNSPECIFIED TYPE: ICD-10-CM

## 2024-08-30 RX ORDER — ZOLPIDEM TARTRATE 10 MG/1
10 TABLET ORAL NIGHTLY
Qty: 30 TABLET | Refills: 0 | Status: SHIPPED | OUTPATIENT
Start: 2024-08-30

## 2024-08-30 RX ORDER — ZOLPIDEM TARTRATE 10 MG/1
10 TABLET ORAL NIGHTLY
Qty: 30 TABLET | Refills: 2 | OUTPATIENT
Start: 2024-08-30

## 2024-08-30 NOTE — TELEPHONE ENCOUNTER
No care due was identified.  [facility] Embedded Care Due Messages. Reference number: [id number].   8/29/2024 7:21:30 PM CDT

## 2024-08-30 NOTE — TELEPHONE ENCOUNTER
No care due was identified.  Health [facility] Embedded Care Due Messages. Reference number: [id number].   8/30/2024 11:04:59 AM CDT

## 2024-09-18 DIAGNOSIS — K31.84 GASTROPARESIS: ICD-10-CM

## 2024-09-18 DIAGNOSIS — G89.29 CHRONIC EPIGASTRIC PAIN: ICD-10-CM

## 2024-09-18 DIAGNOSIS — R10.13 CHRONIC EPIGASTRIC PAIN: ICD-10-CM

## 2024-09-18 RX ORDER — METOCLOPRAMIDE 5 MG/1
TABLET ORAL
Qty: 120 TABLET | Refills: 0 | Status: SHIPPED | OUTPATIENT
Start: 2024-09-18

## 2024-09-18 NOTE — TELEPHONE ENCOUNTER
No care due was identified.  Health [facility] Embedded Care Due Messages. Reference number: [id number].   9/18/2024 10:20:31 AM CDT

## 2024-09-22 DIAGNOSIS — G47.00 INSOMNIA, UNSPECIFIED TYPE: ICD-10-CM

## 2024-09-22 NOTE — TELEPHONE ENCOUNTER
No care due was identified.  [facility] Embedded Care Due Messages. Reference number: [id number].   9/22/2024 12:53:57 PM CDT

## 2024-09-23 NOTE — TELEPHONE ENCOUNTER
Refill Routing Note   Medication(s) are not appropriate for processing by [facility] for the following reason(s):        Outside of protocol    [facility] action(s):  Route               Appointments  past 12m or future 3m with PCP    Date Provider   Last Visit   5/15/2024 Akosua Osborne MD   Next Visit   11/14/2024 Akosua Osborne MD   ED visits in past 90 days: 0        Note composed:11:23 AM 09/23/2024

## 2024-09-25 RX ORDER — ZOLPIDEM TARTRATE 10 MG/1
10 TABLET ORAL NIGHTLY
Qty: 30 TABLET | Refills: 0 | Status: SHIPPED | OUTPATIENT
Start: 2024-09-25

## 2024-10-12 ENCOUNTER — PATIENT MESSAGE (OUTPATIENT)
Dept: FAMILY MEDICINE | Facility: CLINIC | Age: 54
End: 2024-10-12
Payer: MEDICAID

## 2024-10-18 DIAGNOSIS — R10.13 CHRONIC EPIGASTRIC PAIN: ICD-10-CM

## 2024-10-18 DIAGNOSIS — G89.29 CHRONIC EPIGASTRIC PAIN: ICD-10-CM

## 2024-10-18 DIAGNOSIS — K31.84 GASTROPARESIS: ICD-10-CM

## 2024-10-18 NOTE — TELEPHONE ENCOUNTER
Care Due:                  Date            Visit Type   Department     Provider  --------------------------------------------------------------------------------                                EP -                              [facility]  Last Visit: 05-      CARE ([facility])   MEDICINE       Akosua Osborne                              [facility]  Next Visit: 11-      CARE ([facility])   MEDICINE       Akosua Osborne                                                            Last  Test          Frequency    Reason                     Performed    Due Date  --------------------------------------------------------------------------------    HBA1C.......  6 months...  dulaglutide, pioglitazone  05- 11-    Lipid Panel.  12 months..  atorvastatin, fenofibrate  11-   10-    Health [facility] Embedded Care Due Messages. Reference number: [id number].   10/18/2024 1:16:58 PM CDT

## 2024-10-19 DIAGNOSIS — G89.29 CHRONIC EPIGASTRIC PAIN: ICD-10-CM

## 2024-10-19 DIAGNOSIS — R10.13 CHRONIC EPIGASTRIC PAIN: ICD-10-CM

## 2024-10-19 DIAGNOSIS — K31.84 GASTROPARESIS: ICD-10-CM

## 2024-10-19 NOTE — TELEPHONE ENCOUNTER
Refill Routing Note   Medication(s) are not appropriate for processing by [facility] for the following reason(s):        Outside of protocol    [facility] action(s):  Route        Medication Therapy Plan: FLOS      Appointments  past 12m or future 3m with PCP    Date Provider   Last Visit   5/15/2024 Akosua Osborne MD   Next Visit   10/19/2024 Akosua Osborne MD   ED visits in past 90 days: 0        Note composed:5:46 PM 10/19/2024

## 2024-10-19 NOTE — TELEPHONE ENCOUNTER
Refill Routing Note   Medication(s) are not appropriate for processing by [facility] for the following reason(s):        Outside of protocol    [facility] action(s):  Route             Appointments  past 12m or future 3m with PCP    Date Provider   Last Visit   5/15/2024 Akosua Osborne MD   Next Visit   11/14/2024 Akosua Osborne MD   ED visits in past 90 days: 0        Note composed:5:46 PM 10/19/2024

## 2024-10-19 NOTE — TELEPHONE ENCOUNTER
No care due was identified.  [facility] Embedded Care Due Messages. Reference number: [id number].   10/19/2024 12:58:40 PM CDT

## 2024-10-21 RX ORDER — METOCLOPRAMIDE 5 MG/1
TABLET ORAL
Qty: 120 TABLET | Refills: 0 | OUTPATIENT
Start: 2024-10-21

## 2024-10-21 RX ORDER — METOCLOPRAMIDE 5 MG/1
TABLET ORAL
Qty: 120 TABLET | Refills: 0 | Status: SHIPPED | OUTPATIENT
Start: 2024-10-21

## 2024-10-22 DIAGNOSIS — I10 ESSENTIAL HYPERTENSION: ICD-10-CM

## 2024-10-22 RX ORDER — POTASSIUM CHLORIDE 20 MEQ/1
TABLET, EXTENDED RELEASE ORAL 2 TIMES DAILY
Qty: 60 TABLET | Refills: 0 | OUTPATIENT
Start: 2024-10-22

## 2024-10-22 RX ORDER — POTASSIUM CHLORIDE 20 MEQ/1
20 TABLET, EXTENDED RELEASE ORAL 2 TIMES DAILY
Qty: 180 TABLET | Refills: 1 | Status: SHIPPED | OUTPATIENT
Start: 2024-10-22

## 2024-10-22 NOTE — TELEPHONE ENCOUNTER
No care due was identified.  [facility] Embedded Care Due Messages. Reference number: [id number].   10/22/2024 11:56:06 AM CDT

## 2024-10-22 NOTE — TELEPHONE ENCOUNTER
Refill Routing Note   Medication(s) are not appropriate for processing by [facility] for the following reason(s):        Drug-disease interaction    [facility] action(s):  Defer      Medication Therapy Plan: Gastroparesis    Pharmacist review requested: Yes     Appointments  past 12m or future 3m with PCP    Date Provider   Last Visit   5/15/2024 Akosua Osborne MD   Next Visit   10/22/2024 Akosua Osborne MD   ED visits in past 90 days: 0        Note composed:1:34 PM 10/22/2024

## 2024-10-22 NOTE — TELEPHONE ENCOUNTER
Refill Decision Note   Shweta Gupta  is requesting a refill authorization.  Brief Assessment and Rationale for Refill:  Approve     Medication Therapy Plan:         Pharmacist review requested: Yes   Extended chart review required: Yes   Comments:     Note composed:1:50 PM 10/22/2024

## 2024-10-22 NOTE — TELEPHONE ENCOUNTER
Refill Routing Note   Medication(s) are not appropriate for processing by [facility] for the following reason(s):        Drug-disease interaction    [facility] action(s):  Defer      Medication Therapy Plan: Gastroparesis    Pharmacist review requested: Yes     Appointments  past 12m or future 3m with PCP    Date Provider   Last Visit   5/15/2024 Akosua Osborne MD   Next Visit   10/22/2024 Akosua Osborne MD   ED visits in past 90 days: 0        Note composed:12:58 PM 10/22/2024

## 2024-10-22 NOTE — TELEPHONE ENCOUNTER
Refill Decision Note   Shweta Alonso  is requesting a refill authorization.  Brief Assessment and Rationale for Refill:  Quick Discontinue     Medication Therapy Plan: DUPLICATE      Comments:     Note composed:12:44 PM 10/22/2024

## 2024-10-22 NOTE — TELEPHONE ENCOUNTER
No care due was identified.  Health [facility] Embedded Care Due Messages. Reference number: [id number].   10/22/2024 12:02:18 PM CDT

## 2024-10-23 ENCOUNTER — CLINICAL SUPPORT (OUTPATIENT)
Dept: FAMILY MEDICINE | Facility: CLINIC | Age: 54
End: 2024-10-23
Payer: MEDICAID

## 2024-10-23 DIAGNOSIS — Z78.9 HEALTH MAINTENANCE ALTERATION: Primary | ICD-10-CM

## 2024-10-23 PROCEDURE — 90656 IIV3 VACC NO PRSV 0.5 ML IM: CPT | Mod: PBBFAC,PN

## 2024-10-23 PROCEDURE — 90471 IMMUNIZATION ADMIN: CPT | Mod: PBBFAC,PN

## 2024-10-23 PROCEDURE — 99999PBSHW PR PBB SHADOW TECHNICAL ONLY FILED TO HB: Mod: PBBFAC,,,

## 2024-10-23 RX ADMIN — INFLUENZA VIRUS VACCINE 0.5 ML: 15; 15; 15 SUSPENSION INTRAMUSCULAR at 01:10

## 2024-10-23 NOTE — PROGRESS NOTES
Shweta Gupta arrive to clinic awake and alert.  Pt verified name and .   Allergies reviewed with patient.  Pt given flu shot via Intramuscular Right deltoid per provider orders.    Well tolerated by patient.  denies further needs.    Patient instructed to wait 15 mins after injection.   Patient states no vaccines in the last 14 days.  Vaccine information sheet was given to patient. Patient voiced understanding.    Yaneth Clancy RN

## 2024-11-07 ENCOUNTER — LAB VISIT (OUTPATIENT)
Dept: LAB | Facility: HOSPITAL | Age: 54
End: 2024-11-07
Attending: FAMILY MEDICINE
Payer: MEDICAID

## 2024-11-07 DIAGNOSIS — Z20.5 EXPOSURE TO HEPATITIS C: ICD-10-CM

## 2024-11-07 DIAGNOSIS — G47.00 INSOMNIA, UNSPECIFIED TYPE: ICD-10-CM

## 2024-11-07 DIAGNOSIS — E11.65 TYPE 2 DIABETES MELLITUS WITH HYPERGLYCEMIA, WITH LONG-TERM CURRENT USE OF INSULIN: ICD-10-CM

## 2024-11-07 DIAGNOSIS — Z79.4 TYPE 2 DIABETES MELLITUS WITH HYPERGLYCEMIA, WITH LONG-TERM CURRENT USE OF INSULIN: ICD-10-CM

## 2024-11-07 DIAGNOSIS — I10 ESSENTIAL HYPERTENSION: ICD-10-CM

## 2024-11-07 DIAGNOSIS — E11.9 TYPE 2 DIABETES MELLITUS WITHOUT COMPLICATION: ICD-10-CM

## 2024-11-07 LAB
ALBUMIN SERPL BCP-MCNC: 4.2 G/DL (ref 3.5–5.2)
ALBUMIN/CREAT UR: 12.6 UG/MG (ref 0–30)
ALP SERPL-CCNC: 61 U/L (ref 40–150)
ALT SERPL W/O P-5'-P-CCNC: 29 U/L (ref 10–44)
ANION GAP SERPL CALC-SCNC: 7 MMOL/L (ref 8–16)
AST SERPL-CCNC: 25 U/L (ref 10–40)
BASOPHILS # BLD AUTO: 0.05 K/UL (ref 0–0.2)
BASOPHILS NFR BLD: 0.8 % (ref 0–1.9)
BILIRUB SERPL-MCNC: 0.4 MG/DL (ref 0.1–1)
BUN SERPL-MCNC: 21 MG/DL (ref 6–20)
CALCIUM SERPL-MCNC: 10.1 MG/DL (ref 8.7–10.5)
CHLORIDE SERPL-SCNC: 106 MMOL/L (ref 95–110)
CHOLEST SERPL-MCNC: 163 MG/DL (ref 120–199)
CHOLEST/HDLC SERPL: 3.3 {RATIO} (ref 2–5)
CO2 SERPL-SCNC: 25 MMOL/L (ref 23–29)
CREAT SERPL-MCNC: 0.8 MG/DL (ref 0.5–1.4)
CREAT UR-MCNC: 159 MG/DL (ref 15–325)
DIFFERENTIAL METHOD BLD: ABNORMAL
EOSINOPHIL # BLD AUTO: 0.2 K/UL (ref 0–0.5)
EOSINOPHIL NFR BLD: 2.4 % (ref 0–8)
ERYTHROCYTE [DISTWIDTH] IN BLOOD BY AUTOMATED COUNT: 13.2 % (ref 11.5–14.5)
EST. GFR  (NO RACE VARIABLE): >60 ML/MIN/1.73 M^2
ESTIMATED AVG GLUCOSE: 105 MG/DL (ref 68–131)
GLUCOSE SERPL-MCNC: 111 MG/DL (ref 70–110)
HBA1C MFR BLD: 5.3 % (ref 4–5.6)
HCT VFR BLD AUTO: 36.5 % (ref 37–48.5)
HCV AB SERPL QL IA: NORMAL
HDLC SERPL-MCNC: 49 MG/DL (ref 40–75)
HDLC SERPL: 30.1 % (ref 20–50)
HGB BLD-MCNC: 11.7 G/DL (ref 12–16)
IMM GRANULOCYTES # BLD AUTO: 0.01 K/UL (ref 0–0.04)
IMM GRANULOCYTES NFR BLD AUTO: 0.2 % (ref 0–0.5)
LDLC SERPL CALC-MCNC: 92.6 MG/DL (ref 63–159)
LYMPHOCYTES # BLD AUTO: 2.1 K/UL (ref 1–4.8)
LYMPHOCYTES NFR BLD: 31 % (ref 18–48)
MCH RBC QN AUTO: 30.1 PG (ref 27–31)
MCHC RBC AUTO-ENTMCNC: 32.1 G/DL (ref 32–36)
MCV RBC AUTO: 94 FL (ref 82–98)
MICROALBUMIN UR DL<=1MG/L-MCNC: 20 UG/ML
MONOCYTES # BLD AUTO: 0.6 K/UL (ref 0.3–1)
MONOCYTES NFR BLD: 8.6 % (ref 4–15)
NEUTROPHILS # BLD AUTO: 3.8 K/UL (ref 1.8–7.7)
NEUTROPHILS NFR BLD: 57 % (ref 38–73)
NONHDLC SERPL-MCNC: 114 MG/DL
NRBC BLD-RTO: 0 /100 WBC
PLATELET # BLD AUTO: 265 K/UL (ref 150–450)
PMV BLD AUTO: 10.5 FL (ref 9.2–12.9)
POTASSIUM SERPL-SCNC: 3.9 MMOL/L (ref 3.5–5.1)
PROT SERPL-MCNC: 7.6 G/DL (ref 6–8.4)
RBC # BLD AUTO: 3.89 M/UL (ref 4–5.4)
SODIUM SERPL-SCNC: 138 MMOL/L (ref 136–145)
TRIGL SERPL-MCNC: 107 MG/DL (ref 30–150)
WBC # BLD AUTO: 6.61 K/UL (ref 3.9–12.7)

## 2024-11-07 PROCEDURE — 82043 UR ALBUMIN QUANTITATIVE: CPT | Performed by: FAMILY MEDICINE

## 2024-11-07 PROCEDURE — 86803 HEPATITIS C AB TEST: CPT | Performed by: FAMILY MEDICINE

## 2024-11-07 PROCEDURE — 36415 COLL VENOUS BLD VENIPUNCTURE: CPT | Performed by: FAMILY MEDICINE

## 2024-11-07 PROCEDURE — 85025 COMPLETE CBC W/AUTO DIFF WBC: CPT | Performed by: FAMILY MEDICINE

## 2024-11-07 PROCEDURE — 83036 HEMOGLOBIN GLYCOSYLATED A1C: CPT | Performed by: FAMILY MEDICINE

## 2024-11-07 PROCEDURE — 80061 LIPID PANEL: CPT | Performed by: FAMILY MEDICINE

## 2024-11-07 PROCEDURE — 80053 COMPREHEN METABOLIC PANEL: CPT | Performed by: FAMILY MEDICINE

## 2024-11-07 NOTE — TELEPHONE ENCOUNTER
No care due was identified.  Health [facility] Embedded Care Due Messages. Reference number: [id number].   11/07/2024 12:18:30 PM CST

## 2024-11-07 NOTE — TELEPHONE ENCOUNTER
----- Message from Yakelin sent at 11/7/2024 12:07 PM CST -----  Contact: pt  Type:  RX Refill Request    PT IS OUT of medication    Who Called: pt    Refill or New Rx: refill    RX Name and Strength:zolpidem (AMBIEN) 10 mg Tab    How is the patient currently taking it? (ex. 1XDay): as directed    Is this a 30 day or 90 day RX: 30    Preferred Pharmacy with phone number:     [facility] - [place], [place] - [address]  [address]  [place] [address]  Phone: [phone] Fax: [phone]    Local or Mail Order:local    Ordering Provider: Dylon     Would the patient rather a call back or a response via [facility]?  Call back    Best Call Back Number:[phone]    Additional Information: script called in last Friday, still not filled.     Chart show denied    Please call to advise    Thanks

## 2024-11-08 ENCOUNTER — PATIENT MESSAGE (OUTPATIENT)
Dept: FAMILY MEDICINE | Facility: CLINIC | Age: 54
End: 2024-11-08
Payer: MEDICAID

## 2024-11-09 RX ORDER — ZOLPIDEM TARTRATE 10 MG/1
10 TABLET ORAL NIGHTLY
Qty: 30 TABLET | Refills: 0 | Status: SHIPPED | OUTPATIENT
Start: 2024-11-09

## 2024-11-14 ENCOUNTER — OFFICE VISIT (OUTPATIENT)
Dept: FAMILY MEDICINE | Facility: CLINIC | Age: 54
End: 2024-11-14
Payer: MEDICAID

## 2024-11-14 VITALS
HEIGHT: 64 IN | HEART RATE: 87 BPM | DIASTOLIC BLOOD PRESSURE: 82 MMHG | SYSTOLIC BLOOD PRESSURE: 134 MMHG | RESPIRATION RATE: 18 BRPM | WEIGHT: 276.31 LBS | BODY MASS INDEX: 47.17 KG/M2 | OXYGEN SATURATION: 93 %

## 2024-11-14 DIAGNOSIS — F32.5 DEPRESSION, MAJOR, IN REMISSION: ICD-10-CM

## 2024-11-14 DIAGNOSIS — Z79.4 TYPE 2 DIABETES MELLITUS WITH HYPERGLYCEMIA, WITH LONG-TERM CURRENT USE OF INSULIN: Primary | ICD-10-CM

## 2024-11-14 DIAGNOSIS — E03.8 SUBCLINICAL HYPOTHYROIDISM: ICD-10-CM

## 2024-11-14 DIAGNOSIS — Z00.00 HEALTHCARE MAINTENANCE: ICD-10-CM

## 2024-11-14 DIAGNOSIS — E66.813 CLASS 3 SEVERE OBESITY WITH BODY MASS INDEX (BMI) OF 45.0 TO 49.9 IN ADULT, UNSPECIFIED OBESITY TYPE, UNSPECIFIED WHETHER SERIOUS COMORBIDITY PRESENT: ICD-10-CM

## 2024-11-14 DIAGNOSIS — E11.65 TYPE 2 DIABETES MELLITUS WITH HYPERGLYCEMIA, WITH LONG-TERM CURRENT USE OF INSULIN: Primary | ICD-10-CM

## 2024-11-14 DIAGNOSIS — E66.01 CLASS 3 SEVERE OBESITY WITH BODY MASS INDEX (BMI) OF 45.0 TO 49.9 IN ADULT, UNSPECIFIED OBESITY TYPE, UNSPECIFIED WHETHER SERIOUS COMORBIDITY PRESENT: ICD-10-CM

## 2024-11-14 PROCEDURE — 99215 OFFICE O/P EST HI 40 MIN: CPT | Mod: PBBFAC,PN | Performed by: FAMILY MEDICINE

## 2024-11-14 PROCEDURE — 99999 PR PBB SHADOW E&M-EST. PATIENT-LVL V: CPT | Mod: PBBFAC,,, | Performed by: FAMILY MEDICINE

## 2024-11-14 RX ORDER — FLUOXETINE 10 MG/1
10 CAPSULE ORAL DAILY
Qty: 30 CAPSULE | Refills: 11 | Status: SHIPPED | OUTPATIENT
Start: 2024-11-14 | End: 2025-11-14

## 2024-11-14 NOTE — ASSESSMENT & PLAN NOTE
Stable. Well controlled. Continue current medications.   She is on no medications at this point. She tells me that she has anhedonia. She is doing great at watching what she is eating but she just does not have any motivation.

## 2024-11-14 NOTE — ASSESSMENT & PLAN NOTE
Very well controlled. Continue current medciations.   Lab Results   Component Value Date    LABA1C 6.1 12/21/2020    HGBA1C 5.3 11/07/2024     Diabetes Medications               dulaglutide (TRULICITY) 4.5 mg/0.5 mL pen injector Inject 4.5 mg into the skin every 7 days.    insulin (LANTUS SOLOSTAR U-100 INSULIN) glargine 100 units/mL SubQ pen Inject 60 Units into the skin once daily.    pioglitazone (ACTOS) 45 MG tablet Take 1 tablet (45 mg total) by mouth once daily.

## 2024-11-14 NOTE — PROGRESS NOTES
Subjective:       Patient ID: Shweta Gupta is a 54 y.o. female.    Chief Complaint: Diabetes    Ms. Gupta presents today to follow up.     Wt Readings from Last 6 Encounters:  11/14/24 : 125.3 kg (276 lb 4.8 oz)  05/15/24 : 128.7 kg (283 lb 12.8 oz)  02/26/24 : 130.2 kg (287 lb)  02/12/24 : 130.3 kg (287 lb 4.8 oz)  02/08/24 : 129.3 kg (285 lb 1.6 oz)  11/06/23 : 125.6 kg (277 lb)    Lab Results            HGBA1C                   5.3                 11/07/2024              BP Readings from Last 1 Encounters:  11/14/24 : 134/82    Wants a flu shot today.     Ms. Gupta tells me that she overall is feeling tired and low energy. She tells me that she does not want to do anything. She does not feel like it is her depression she just feels like she needs a kick in the butt or something to give her initiative. She tells me that she does not feel like she has the energy to do anything. She is sleeping and tells me that she is not tired and she does not have the energy to do anything.     She does not really have depressive symptoms.   She tells me that she is only bathing about twice a week because she does not want to do anything.     She has been on celexa for some time.           .  Patient Active Problem List   Diagnosis    Depression, major, in remission    Type 2 diabetes mellitus with hyperglycemia, with long-term current use of insulin    Essential hypertension    Insomnia    Obesity    SOB (shortness of breath)    Bronchiectasis with acute lower respiratory infection    Kidney stones    Hyperlipidemia    Anemia    Chronic superficial gastritis with bleeding    Esophageal polyp    Diverticulosis of colon    Epigastric pain    S/P partial colectomy    Gastroesophageal reflux disease    History of diverticulitis    Obese abdomen    Gastroparesis    Subclinical hypothyroidism    Thoracic root lesion    Spondylosis of lumbosacral region    Spinal stenosis of lumbar region    Presence of ventriculopleural shunt     Pain of thoracic facet joint    Mitral valve disease    Migraine headache    Hydrocephalus    Degeneration of intervertebral disc of lumbar region    Carpal tunnel syndrome    Tinea pedis of right foot     Shweta has a current medication list which includes the following prescription(s): albuterol, alcohol swabs, aspirin, atorvastatin, azelastine, baclofen, blood sugar diagnostic, blood-glucose meter, budesonide-formoterol 160-4.5 mcg, buspirone, cetirizine, clotrimazole-betamethasone 1-0.05%, comfort ez pen needles, docusate sodium, dulaglutide, epipen 2-margie, fenofibrate, fluticasone propionate, fluticasone-salmeterol 500-50 mcg/dose, furosemide, hydrocodone-acetaminophen, insulin glargine u-100 (lantus), ipratropium, lancets, lisinopril, metoclopramide hcl, montelukast, mupirocin, nystatin, nystatin-triamcinolone, olopatadine, omega 3-dha-epa-fish oil, ondansetron, pantoprazole, pioglitazone, potassium chloride sa, pregabalin, promethazine, ranolazine, sucralfate, tamsulosin, valacyclovir, zolpidem, and fluoxetine, and the following Facility-Administered Medications: pneumoc 20-crystal conj-dip cr(pf).    Review of Systems   Constitutional:  Negative for activity change, appetite change, fatigue and fever.   Respiratory:  Negative for shortness of breath.    Gastrointestinal:  Negative for abdominal pain.   Integumentary:  Negative for rash.         Health Maintenance Due   Topic Date Due    Pneumococcal Vaccines (Age 0-64) (2 of 2 - PCV) 09/26/2017    Shingles Vaccine (3 of 3) 09/06/2022    COVID-19 Vaccine (8 - 2024-25 season) 09/01/2024      Health Maintenance reviewed and discussed- flu shot today.   Objective:      Physical Exam  Vitals and nursing note reviewed.   Constitutional:       General: She is not in acute distress.     Appearance: She is obese. She is not ill-appearing.   Cardiovascular:      Rate and Rhythm: Normal rate and regular rhythm.      Heart sounds: No murmur heard.  Pulmonary:      Effort:  Pulmonary effort is normal.      Breath sounds: Normal breath sounds. No wheezing.   Skin:     General: Skin is warm and dry.      Findings: No rash.   Neurological:      Mental Status: She is alert.   Psychiatric:         Mood and Affect: Mood normal.         Behavior: Behavior normal.         Assessment:       1. Type 2 diabetes mellitus with hyperglycemia, with long-term current use of insulin    2. Class 3 severe obesity with body mass index (BMI) of 45.0 to 49.9 in adult, unspecified obesity type, unspecified whether serious comorbidity present    3. Subclinical hypothyroidism    4. Healthcare maintenance    5. Depression, major, in remission        Plan:       1. Type 2 diabetes mellitus with hyperglycemia, with long-term current use of insulin  Assessment & Plan:  Very well controlled. Continue current medciations.   Lab Results   Component Value Date    LABA1C 6.1 12/21/2020    HGBA1C 5.3 11/07/2024     Diabetes Medications               dulaglutide (TRULICITY) 4.5 mg/0.5 mL pen injector Inject 4.5 mg into the skin every 7 days.    insulin (LANTUS SOLOSTAR U-100 INSULIN) glargine 100 units/mL SubQ pen Inject 60 Units into the skin once daily.    pioglitazone (ACTOS) 45 MG tablet Take 1 tablet (45 mg total) by mouth once daily.                2. Class 3 severe obesity with body mass index (BMI) of 45.0 to 49.9 in adult, unspecified obesity type, unspecified whether serious comorbidity present    3. Subclinical hypothyroidism  Assessment & Plan:  Lab Results   Component Value Date    TSH 3.406 05/07/2024           4. Healthcare maintenance  -     pneumoc 20-crystal conj-dip cr(PF) (PREVNAR-20 (PF)) injection Syrg 0.5 mL    5. Depression, major, in remission  Assessment & Plan:  Stable. Well controlled. Continue current medications.   She is on no medications at this point. She tells me that she has anhedonia. She is doing great at watching what she is eating but she just does not have any motivation.      Orders:  -     FLUoxetine 10 MG capsule; Take 1 capsule (10 mg total) by mouth once daily.  Dispense: 30 capsule; Refill: 11

## 2024-11-23 DIAGNOSIS — K31.84 GASTROPARESIS: ICD-10-CM

## 2024-11-23 DIAGNOSIS — G89.29 CHRONIC EPIGASTRIC PAIN: ICD-10-CM

## 2024-11-23 DIAGNOSIS — R10.13 CHRONIC EPIGASTRIC PAIN: ICD-10-CM

## 2024-11-23 NOTE — TELEPHONE ENCOUNTER
No care due was identified.  [facility] Embedded Care Due Messages. Reference number: [id number].   11/23/2024 5:33:22 AM CST

## 2024-11-25 RX ORDER — METOCLOPRAMIDE 5 MG/1
TABLET ORAL
Qty: 120 TABLET | Refills: 0 | Status: SHIPPED | OUTPATIENT
Start: 2024-11-25

## 2024-11-25 NOTE — TELEPHONE ENCOUNTER
Refill Routing Note   Medication(s) are not appropriate for processing by [facility] for the following reason(s):        Outside of protocol  New or recently adjusted medication    [facility] action(s):  Route               Appointments  past 12m or future 3m with PCP    Date Provider   Last Visit   11/14/2024 Akosua Osborne MD   Next Visit   11/23/2024 Akosua Osborne MD   ED visits in past 90 days: 0        Note composed:8:14 AM 11/25/2024

## 2024-12-10 DIAGNOSIS — I10 ESSENTIAL HYPERTENSION: ICD-10-CM

## 2024-12-10 DIAGNOSIS — R10.13 CHRONIC EPIGASTRIC PAIN: ICD-10-CM

## 2024-12-10 DIAGNOSIS — G47.00 INSOMNIA, UNSPECIFIED TYPE: ICD-10-CM

## 2024-12-10 DIAGNOSIS — K31.84 GASTROPARESIS: ICD-10-CM

## 2024-12-10 DIAGNOSIS — G89.29 CHRONIC EPIGASTRIC PAIN: ICD-10-CM

## 2024-12-10 NOTE — TELEPHONE ENCOUNTER
No care due was identified.  [facility] Embedded Care Due Messages. Reference number: [id number].   12/10/2024 10:29:38 AM CST

## 2024-12-10 NOTE — TELEPHONE ENCOUNTER
No care due was identified.  [facility] Embedded Care Due Messages. Reference number: [id number].   12/10/2024 10:30:14 AM CST

## 2024-12-11 RX ORDER — METOCLOPRAMIDE 5 MG/1
TABLET ORAL
Qty: 120 TABLET | Refills: 2 | Status: SHIPPED | OUTPATIENT
Start: 2024-12-11

## 2024-12-11 RX ORDER — RANOLAZINE 500 MG/1
500 TABLET, EXTENDED RELEASE ORAL 2 TIMES DAILY
Qty: 180 TABLET | Refills: 1 | Status: SHIPPED | OUTPATIENT
Start: 2024-12-11

## 2024-12-11 RX ORDER — ZOLPIDEM TARTRATE 10 MG/1
10 TABLET ORAL NIGHTLY
Qty: 30 TABLET | Refills: 0 | Status: SHIPPED | OUTPATIENT
Start: 2024-12-11

## 2024-12-17 ENCOUNTER — PATIENT MESSAGE (OUTPATIENT)
Dept: FAMILY MEDICINE | Facility: CLINIC | Age: 54
End: 2024-12-17
Payer: MEDICAID

## 2024-12-17 RX ORDER — NYSTATIN AND TRIAMCINOLONE ACETONIDE 100000; 1 [USP'U]/G; MG/G
CREAM TOPICAL
Qty: 60 G | Refills: 11 | Status: SHIPPED | OUTPATIENT
Start: 2024-12-17

## 2025-01-13 ENCOUNTER — OFFICE VISIT (OUTPATIENT)
Dept: FAMILY MEDICINE | Facility: CLINIC | Age: 55
End: 2025-01-13
Payer: MEDICAID

## 2025-01-13 VITALS
OXYGEN SATURATION: 96 % | RESPIRATION RATE: 18 BRPM | HEIGHT: 64 IN | DIASTOLIC BLOOD PRESSURE: 82 MMHG | WEIGHT: 275.63 LBS | BODY MASS INDEX: 47.05 KG/M2 | SYSTOLIC BLOOD PRESSURE: 126 MMHG | HEART RATE: 74 BPM

## 2025-01-13 DIAGNOSIS — Z79.4 TYPE 2 DIABETES MELLITUS WITH HYPERGLYCEMIA, WITH LONG-TERM CURRENT USE OF INSULIN: ICD-10-CM

## 2025-01-13 DIAGNOSIS — J47.0 BRONCHIECTASIS WITH ACUTE LOWER RESPIRATORY INFECTION: ICD-10-CM

## 2025-01-13 DIAGNOSIS — F32.5 DEPRESSION, MAJOR, IN REMISSION: ICD-10-CM

## 2025-01-13 DIAGNOSIS — E11.65 TYPE 2 DIABETES MELLITUS WITH HYPERGLYCEMIA, WITH LONG-TERM CURRENT USE OF INSULIN: ICD-10-CM

## 2025-01-13 DIAGNOSIS — E78.2 MIXED HYPERLIPIDEMIA: Primary | ICD-10-CM

## 2025-01-13 DIAGNOSIS — G47.00 INSOMNIA, UNSPECIFIED TYPE: ICD-10-CM

## 2025-01-13 DIAGNOSIS — I10 ESSENTIAL HYPERTENSION: ICD-10-CM

## 2025-01-13 DIAGNOSIS — L30.4 INTERTRIGO: ICD-10-CM

## 2025-01-13 PROCEDURE — 3008F BODY MASS INDEX DOCD: CPT | Mod: CPTII,,, | Performed by: FAMILY MEDICINE

## 2025-01-13 PROCEDURE — 3074F SYST BP LT 130 MM HG: CPT | Mod: CPTII,,, | Performed by: FAMILY MEDICINE

## 2025-01-13 PROCEDURE — 1159F MED LIST DOCD IN RCRD: CPT | Mod: CPTII,,, | Performed by: FAMILY MEDICINE

## 2025-01-13 PROCEDURE — 99999 PR PBB SHADOW E&M-EST. PATIENT-LVL III: CPT | Mod: PBBFAC,,, | Performed by: FAMILY MEDICINE

## 2025-01-13 PROCEDURE — 99214 OFFICE O/P EST MOD 30 MIN: CPT | Mod: S$PBB,,, | Performed by: FAMILY MEDICINE

## 2025-01-13 PROCEDURE — 1160F RVW MEDS BY RX/DR IN RCRD: CPT | Mod: CPTII,,, | Performed by: FAMILY MEDICINE

## 2025-01-13 PROCEDURE — 99213 OFFICE O/P EST LOW 20 MIN: CPT | Mod: PBBFAC,PN | Performed by: FAMILY MEDICINE

## 2025-01-13 PROCEDURE — 3079F DIAST BP 80-89 MM HG: CPT | Mod: CPTII,,, | Performed by: FAMILY MEDICINE

## 2025-01-13 RX ORDER — POTASSIUM CHLORIDE 20 MEQ/1
20 TABLET, EXTENDED RELEASE ORAL 2 TIMES DAILY
Qty: 180 TABLET | Refills: 1 | Status: SHIPPED | OUTPATIENT
Start: 2025-01-13

## 2025-01-13 RX ORDER — FENOFIBRATE 145 MG/1
145 TABLET, FILM COATED ORAL DAILY
Qty: 90 TABLET | Refills: 3 | Status: SHIPPED | OUTPATIENT
Start: 2025-01-13

## 2025-01-13 RX ORDER — ZOLPIDEM TARTRATE 10 MG/1
10 TABLET ORAL NIGHTLY
Qty: 30 TABLET | Refills: 3 | Status: SHIPPED | OUTPATIENT
Start: 2025-01-13

## 2025-01-13 RX ORDER — LISINOPRIL 20 MG/1
20 TABLET ORAL DAILY
Qty: 90 TABLET | Refills: 3 | Status: SHIPPED | OUTPATIENT
Start: 2025-01-13

## 2025-01-13 RX ORDER — ATORVASTATIN CALCIUM 40 MG/1
40 TABLET, FILM COATED ORAL DAILY
Qty: 90 TABLET | Refills: 3 | Status: SHIPPED | OUTPATIENT
Start: 2025-01-13

## 2025-01-13 RX ORDER — RANOLAZINE 500 MG/1
500 TABLET, EXTENDED RELEASE ORAL 2 TIMES DAILY
Qty: 180 TABLET | Refills: 1 | Status: SHIPPED | OUTPATIENT
Start: 2025-01-13

## 2025-01-13 RX ORDER — MONTELUKAST SODIUM 10 MG/1
10 TABLET ORAL DAILY
Qty: 90 TABLET | Refills: 3 | Status: SHIPPED | OUTPATIENT
Start: 2025-01-13

## 2025-01-13 RX ORDER — NYSTATIN 100000 [USP'U]/G
POWDER TOPICAL
Qty: 180 G | Refills: 11 | Status: SHIPPED | OUTPATIENT
Start: 2025-01-13

## 2025-01-13 RX ORDER — FLUOXETINE HYDROCHLORIDE 20 MG/1
20 CAPSULE ORAL DAILY
Qty: 30 CAPSULE | Refills: 11 | Status: SHIPPED | OUTPATIENT
Start: 2025-01-13 | End: 2026-01-13

## 2025-01-13 RX ORDER — PIOGLITAZONEHYDROCHLORIDE 45 MG/1
45 TABLET ORAL DAILY
Qty: 90 TABLET | Refills: 3 | Status: SHIPPED | OUTPATIENT
Start: 2025-01-13

## 2025-01-13 NOTE — ASSESSMENT & PLAN NOTE
Overall doing well- would like to increase fluoxetine to 20mg to help with the anhedonia. Will make this change.

## 2025-01-13 NOTE — PROGRESS NOTES
Subjective:       Patient ID: Shweta Gupta is a 54 y.o. female.    Chief Complaint: No chief complaint on file.    History of Present Illness    CHIEF COMPLAINT:  Ms. Gupta presents today for follow-up on depressive symptoms.    DEPRESSION:  She reports fluoxetine is working fairly but not as effectively as desired. She continues Ambien 10 mg with good effect and no adverse reactions despite FDA warning.    WEIGHT MANAGEMENT:  She is actively working on portion control, which she identifies as her main dietary challenge. She acknowledges difficulty recognizing appropriate portion sizes and tendency to overeat.    CURRENT MEDICATIONS:  She currently takes Ambien, Lipitor, Tricor, Lisinopril, Singulair, Actos, Potassium, Ranexa, and Trulicity.      ROS:  General: +change in weight  Psychiatric: +depression             Patient Active Problem List   Diagnosis    Depression, major, in remission    Type 2 diabetes mellitus with hyperglycemia, with long-term current use of insulin    Essential hypertension    Insomnia    Obesity    SOB (shortness of breath)    Bronchiectasis with acute lower respiratory infection    Kidney stones    Hyperlipidemia    Anemia    Chronic superficial gastritis with bleeding    Esophageal polyp    Diverticulosis of colon    Epigastric pain    S/P partial colectomy    Gastroesophageal reflux disease    History of diverticulitis    Obese abdomen    Gastroparesis    Subclinical hypothyroidism    Thoracic root lesion    Spondylosis of lumbosacral region    Spinal stenosis of lumbar region    Presence of ventriculopleural shunt    Pain of thoracic facet joint    Mitral valve disease    Migraine headache    Hydrocephalus    Degeneration of intervertebral disc of lumbar region    Carpal tunnel syndrome    Tinea pedis of right foot     Shweta has a current medication list which includes the following prescription(s): albuterol, alcohol swabs, aspirin, azelastine, baclofen, blood sugar diagnostic,  "blood-glucose meter, budesonide-formoterol 160-4.5 mcg, cetirizine, clotrimazole-betamethasone 1-0.05%, comfort ez pen needles, docusate sodium, dulaglutide, epipen 2-margie, fluticasone propionate, fluticasone-salmeterol 500-50 mcg/dose, furosemide, hydrocodone-acetaminophen, insulin glargine u-100 (lantus), ipratropium, lancets, metoclopramide hcl, mupirocin, nystatin-triamcinolone, olopatadine, omega 3-dha-epa-fish oil, ondansetron, pantoprazole, pregabalin, promethazine, sucralfate, tamsulosin, valacyclovir, atorvastatin, fenofibrate, fluoxetine, lisinopril, montelukast, nystatin, pioglitazone, potassium chloride sa, ranolazine, and zolpidem, and the following Facility-Administered Medications: pneumoc 20-crystal conj-dip cr(pf).        Health Maintenance Due   Topic Date Due    Pneumococcal Vaccines (Age 50+) (2 of 2 - PCV) 09/26/2017    Shingles Vaccine (3 of 3) 09/06/2022    COVID-19 Vaccine (8 - 2024-25 season) 09/01/2024    Foot Exam  02/26/2025    Diabetic Eye Exam  03/05/2025    Mammogram  03/13/2025      Health Maintenance reviewed  Objective:      Vitals:    01/13/25 1332   BP: 126/82   Pulse: 74   Resp: 18   SpO2: 96%   Weight: 125 kg (275 lb 9.6 oz)   Height: 5' 4" (1.626 m)   PainSc: 0-No pain     Body mass index is 47.31 kg/m².  Physical Exam  Physical Exam    Constitutional: No acute distress. Well-appearing.  Cardiovascular: Regular rate. Regular rhythm. No murmurs.  Pulmonary: Normal respiratory effort. No wheezing. Normal breath sounds.  Skin: Warm. Dry. No rash.  Neurological: Alert.  Psychiatric: Normal mood. Normal behavior.         Assessment:       Assessment & Plan    1. Increased fluoxetine dose due to partial but suboptimal response for depressive symptoms  2. Continued Ambien 10mg despite FDA warning, as patient tolerates well without issues  3. Noted improvement in anemia compared to previous results  4. Acknowledged good glycemic control with A1C of 5.3, attributing to medication adherence " and lifestyle changes  5. Recognized weight loss progress, partly attributed to Trulicity's effectiveness in promoting satiety           Plan:       1. Mixed hyperlipidemia  Assessment & Plan:  Stable. Well controlled. Continue current medications.       Orders:  -     atorvastatin (LIPITOR) 40 MG tablet; Take 1 tablet (40 mg total) by mouth once daily.  Dispense: 90 tablet; Refill: 3  -     fenofibrate (TRICOR) 145 MG tablet; Take 1 tablet (145 mg total) by mouth once daily.  Dispense: 90 tablet; Refill: 3    2. Insomnia, unspecified type  Assessment & Plan:  Chronically on ambien 10mg. Aware of the FDA warning regarding this medication.     Orders:  -     zolpidem (AMBIEN) 10 mg Tab; Take 1 tablet (10 mg total) by mouth every evening.  Dispense: 30 tablet; Refill: 3    3. Essential hypertension  Assessment & Plan:  Stable. Well controlled. Continue current medications.         Orders:  -     lisinopriL (PRINIVIL,ZESTRIL) 20 MG tablet; Take 1 tablet (20 mg total) by mouth once daily.  Dispense: 90 tablet; Refill: 3  -     potassium chloride SA (K-DUR,KLOR-CON) 20 MEQ tablet; Take 1 tablet (20 mEq total) by mouth 2 (two) times daily.  Dispense: 180 tablet; Refill: 1  -     ranolazine (RANEXA) 500 MG Tb12; Take 1 tablet (500 mg total) by mouth 2 (two) times daily.  Dispense: 180 tablet; Refill: 1    4. Intertrigo  -     nystatin (NYSTOP) powder; APPLY  POWDER TOPICALLY TO AFFECTED AREA THREE TIMES DAILY  Dispense: 180 g; Refill: 11    5. Type 2 diabetes mellitus with hyperglycemia, with long-term current use of insulin  Assessment & Plan:  Lab Results   Component Value Date    LABA1C 6.1 12/21/2020    HGBA1C 5.3 11/07/2024         Orders:  -     pioglitazone (ACTOS) 45 MG tablet; Take 1 tablet (45 mg total) by mouth once daily.  Dispense: 90 tablet; Refill: 3    6. Depression, major, in remission  Assessment & Plan:  Overall doing well- would like to increase fluoxetine to 20mg to help with the anhedonia. Will make  this change.     Orders:  -     FLUoxetine 20 MG capsule; Take 1 capsule (20 mg total) by mouth once daily.  Dispense: 30 capsule; Refill: 11    7. Bronchiectasis with acute lower respiratory infection  Assessment & Plan:  Stable. Well controlled. Continue current medications.       Orders:  -     montelukast (SINGULAIR) 10 mg tablet; Take 1 tablet (10 mg total) by mouth once daily.  Dispense: 90 tablet; Refill: 3         This note was generated with the assistance of ambient listening technology. Verbal consent was obtained by the patient and accompanying visitor(s) for the recording of patient appointment to facilitate this note. I attest to having reviewed and edited the generated note for accuracy, though some syntax or spelling errors may persist. Please contact the author of this note for any clarification.

## 2025-01-22 ENCOUNTER — PATIENT MESSAGE (OUTPATIENT)
Dept: FAMILY MEDICINE | Facility: CLINIC | Age: 55
End: 2025-01-22
Payer: MEDICAID

## 2025-02-13 DIAGNOSIS — L30.4 INTERTRIGO: ICD-10-CM

## 2025-02-13 RX ORDER — NYSTATIN 100000 [USP'U]/G
POWDER TOPICAL
Qty: 180 G | Refills: 10 | OUTPATIENT
Start: 2025-02-13

## 2025-02-13 NOTE — TELEPHONE ENCOUNTER
----- Message from Mirta sent at 2/13/2025  2:38 PM CST -----  Contact: PT  Type:  RX Refill Request    Who Called:  PT  Refill or New Rx:  New RX  RX Name and Strength:  nystatin (NYSTOP) powder  How is the patient currently taking it?  APPLY  POWDER TOPICALLY TO AFFECTED AREA THREE TIMES DAILY  Is this a 30 day or 90 day RX: 180 g/  11 refills  Preferred Pharmacy with phone number:      [facility]. - [place], [place] - [address]  [place] [address]  Phone: [phone] Fax: [phone]    Best Call Back Number:  [phone]  Additional Information: Prescription was sent to [facility] pharmacy

## 2025-02-14 DIAGNOSIS — I10 ESSENTIAL HYPERTENSION: ICD-10-CM

## 2025-02-14 DIAGNOSIS — J47.0 BRONCHIECTASIS WITH ACUTE LOWER RESPIRATORY INFECTION: ICD-10-CM

## 2025-02-14 RX ORDER — NYSTATIN 100000 [USP'U]/G
POWDER TOPICAL
Qty: 180 G | Refills: 11 | Status: SHIPPED | OUTPATIENT
Start: 2025-02-14

## 2025-02-14 RX ORDER — LISINOPRIL 20 MG/1
20 TABLET ORAL DAILY
Qty: 90 TABLET | Refills: 2 | Status: SHIPPED | OUTPATIENT
Start: 2025-02-14

## 2025-02-14 NOTE — TELEPHONE ENCOUNTER
Care Due:                  Date            Visit Type   Department     Provider  --------------------------------------------------------------------------------                                EP -                              PRIMARY      [facility] FAM[facility]  Last Visit: 01-      CARE ([facility])   MEDICINE       Akosua Osborne  Next Visit: None Scheduled  None         None Found                                                            Last  Test          Frequency    Reason                     Performed    Due Date  --------------------------------------------------------------------------------    HBA1C.......  6 months...  dulaglutide, pioglitazone  11- 05-    [facility] Embedded Care Due Messages. Reference number: [id number].   2/14/2025 2:49:20 PM CST

## 2025-02-15 NOTE — TELEPHONE ENCOUNTER
Refill Routing Note   Medication(s) are not appropriate for processing by [facility] for the following reason(s):        Drug-disease interaction    [facility] action(s):  Defer  Approve     Requires labs : Yes      Medication Therapy Plan: Drug-Disease: montelukast and Depression, major, in remission      Appointments  past 12m or future 3m with PCP    Date Provider   Last Visit   1/13/2025 Akosua Osborne MD   Next Visit   5/12/2025 Akosua Osborne MD   ED visits in past 90 days: 0        Note composed:10:38 PM 02/14/2025

## 2025-02-16 RX ORDER — MONTELUKAST SODIUM 10 MG/1
10 TABLET ORAL DAILY
Qty: 90 TABLET | Refills: 3 | Status: SHIPPED | OUTPATIENT
Start: 2025-02-16

## 2025-03-13 DIAGNOSIS — R10.13 CHRONIC EPIGASTRIC PAIN: ICD-10-CM

## 2025-03-13 DIAGNOSIS — K31.84 GASTROPARESIS: ICD-10-CM

## 2025-03-13 DIAGNOSIS — G89.29 CHRONIC EPIGASTRIC PAIN: ICD-10-CM

## 2025-03-13 RX ORDER — METOCLOPRAMIDE 5 MG/1
TABLET ORAL
Qty: 120 TABLET | Refills: 2 | Status: SHIPPED | OUTPATIENT
Start: 2025-03-13

## 2025-03-13 NOTE — TELEPHONE ENCOUNTER
Refill Routing Note   Medication(s) are not appropriate for processing by [facility] for the following reason(s):        Outside of protocol    [facility] action(s):  Route               Appointments  past 12m or future 3m with PCP    Date Provider   Last Visit   1/13/2025 Akosua Osborne MD   Next Visit   5/12/2025 Akosua sOborne MD   ED visits in past 90 days: 0        Note composed:3:30 PM 03/13/2025

## 2025-03-13 NOTE — TELEPHONE ENCOUNTER
Please see the attached refill request.  Last Refill - 12/11/24  Last OV - 1/13/25  Next OV - 5/12/25

## 2025-03-13 NOTE — TELEPHONE ENCOUNTER
No care due was identified.  [facility] Embedded Care Due Messages. Reference number: [id number].   3/13/2025 2:41:26 PM CDT

## 2025-04-15 DIAGNOSIS — I10 ESSENTIAL HYPERTENSION: ICD-10-CM

## 2025-04-15 RX ORDER — POTASSIUM CHLORIDE 20 MEQ/1
20 TABLET, EXTENDED RELEASE ORAL 2 TIMES DAILY
Qty: 180 TABLET | Refills: 1 | Status: SHIPPED | OUTPATIENT
Start: 2025-04-15

## 2025-04-15 NOTE — TELEPHONE ENCOUNTER
Refill Decision Note   Shweta Gupta  is requesting a refill authorization.  Brief Assessment and Rationale for Refill:  Approve     Medication Therapy Plan:         Pharmacist review requested: Yes   Comments:     Note composed:12:20 PM 04/15/2025

## 2025-04-15 NOTE — TELEPHONE ENCOUNTER
No care due was identified.  [facility] Embedded Care Due Messages. Reference number: [id number].   4/15/2025 10:51:05 AM CDT

## 2025-04-15 NOTE — TELEPHONE ENCOUNTER
Refill Routing Note   Medication(s) are not appropriate for processing by [facility] for the following reason(s):        Drug-disease interaction: potassium chloride SA and Gastroparesis     [facility] action(s):  Defer             Pharmacist review requested: Yes     Appointments  past 12m or future 3m with PCP    Date Provider   Last Visit   1/13/2025 Akosua Osborne MD   Next Visit   5/12/2025 Akosua Osborne MD   ED visits in past 90 days: 0        Note composed:12:12 PM 04/15/2025

## 2025-05-07 DIAGNOSIS — E11.9 TYPE 2 DIABETES MELLITUS WITHOUT COMPLICATION: ICD-10-CM

## 2025-05-12 ENCOUNTER — OFFICE VISIT (OUTPATIENT)
Dept: FAMILY MEDICINE | Facility: CLINIC | Age: 55
End: 2025-05-12
Payer: MEDICAID

## 2025-05-12 ENCOUNTER — LAB VISIT (OUTPATIENT)
Dept: LAB | Facility: HOSPITAL | Age: 55
End: 2025-05-12
Payer: MEDICAID

## 2025-05-12 VITALS
RESPIRATION RATE: 18 BRPM | HEIGHT: 64 IN | BODY MASS INDEX: 45.58 KG/M2 | WEIGHT: 267 LBS | DIASTOLIC BLOOD PRESSURE: 78 MMHG | OXYGEN SATURATION: 95 % | HEART RATE: 80 BPM | SYSTOLIC BLOOD PRESSURE: 122 MMHG

## 2025-05-12 DIAGNOSIS — G47.00 INSOMNIA, UNSPECIFIED TYPE: ICD-10-CM

## 2025-05-12 DIAGNOSIS — Z79.4 TYPE 2 DIABETES MELLITUS WITH HYPERGLYCEMIA, WITH LONG-TERM CURRENT USE OF INSULIN: ICD-10-CM

## 2025-05-12 DIAGNOSIS — I10 ESSENTIAL HYPERTENSION: ICD-10-CM

## 2025-05-12 DIAGNOSIS — E78.2 MIXED HYPERLIPIDEMIA: Primary | ICD-10-CM

## 2025-05-12 DIAGNOSIS — K21.9 GASTROESOPHAGEAL REFLUX DISEASE: ICD-10-CM

## 2025-05-12 DIAGNOSIS — E11.65 TYPE 2 DIABETES MELLITUS WITH HYPERGLYCEMIA, WITH LONG-TERM CURRENT USE OF INSULIN: ICD-10-CM

## 2025-05-12 LAB
EAG (OHS): 108 MG/DL (ref 68–131)
HBA1C MFR BLD: 5.4 % (ref 4–5.6)

## 2025-05-12 PROCEDURE — 3074F SYST BP LT 130 MM HG: CPT | Mod: CPTII,,, | Performed by: FAMILY MEDICINE

## 2025-05-12 PROCEDURE — 99999 PR PBB SHADOW E&M-EST. PATIENT-LVL V: CPT | Mod: PBBFAC,,, | Performed by: FAMILY MEDICINE

## 2025-05-12 PROCEDURE — 99215 OFFICE O/P EST HI 40 MIN: CPT | Mod: PBBFAC,PN | Performed by: FAMILY MEDICINE

## 2025-05-12 PROCEDURE — 1159F MED LIST DOCD IN RCRD: CPT | Mod: CPTII,,, | Performed by: FAMILY MEDICINE

## 2025-05-12 PROCEDURE — 4010F ACE/ARB THERAPY RXD/TAKEN: CPT | Mod: CPTII,,, | Performed by: FAMILY MEDICINE

## 2025-05-12 PROCEDURE — 1160F RVW MEDS BY RX/DR IN RCRD: CPT | Mod: CPTII,,, | Performed by: FAMILY MEDICINE

## 2025-05-12 PROCEDURE — 3008F BODY MASS INDEX DOCD: CPT | Mod: CPTII,,, | Performed by: FAMILY MEDICINE

## 2025-05-12 PROCEDURE — 99214 OFFICE O/P EST MOD 30 MIN: CPT | Mod: S$PBB,,, | Performed by: FAMILY MEDICINE

## 2025-05-12 PROCEDURE — 36415 COLL VENOUS BLD VENIPUNCTURE: CPT

## 2025-05-12 PROCEDURE — 3078F DIAST BP <80 MM HG: CPT | Mod: CPTII,,, | Performed by: FAMILY MEDICINE

## 2025-05-12 PROCEDURE — 83036 HEMOGLOBIN GLYCOSYLATED A1C: CPT

## 2025-05-12 RX ORDER — INSULIN GLARGINE 100 [IU]/ML
60 INJECTION, SOLUTION SUBCUTANEOUS DAILY
Qty: 15 ML | Refills: 6 | Status: SHIPPED | OUTPATIENT
Start: 2025-05-12

## 2025-05-12 RX ORDER — PANTOPRAZOLE SODIUM 40 MG/1
40 TABLET, DELAYED RELEASE ORAL DAILY
Qty: 90 TABLET | Refills: 3 | Status: SHIPPED | OUTPATIENT
Start: 2025-05-12

## 2025-05-12 RX ORDER — PREGABALIN 200 MG/1
200 CAPSULE ORAL 3 TIMES DAILY
Qty: 90 CAPSULE | Refills: 1 | Status: CANCELLED | OUTPATIENT
Start: 2025-05-12

## 2025-05-12 RX ORDER — RANOLAZINE 500 MG/1
500 TABLET, EXTENDED RELEASE ORAL 2 TIMES DAILY
Qty: 180 TABLET | Refills: 1 | Status: SHIPPED | OUTPATIENT
Start: 2025-05-12

## 2025-05-12 RX ORDER — ZOLPIDEM TARTRATE 10 MG/1
10 TABLET ORAL NIGHTLY
Qty: 30 TABLET | Refills: 2 | Status: SHIPPED | OUTPATIENT
Start: 2025-05-12

## 2025-05-12 RX ORDER — SUCRALFATE 1 G/1
1 TABLET ORAL 4 TIMES DAILY
Qty: 360 TABLET | Refills: 3 | Status: SHIPPED | OUTPATIENT
Start: 2025-05-12

## 2025-05-12 NOTE — PROGRESS NOTES
Subjective:       Patient ID: Shweta Gupta is a 54 y.o. female.    Chief Complaint: No chief complaint on file.    History of Present Illness    CHIEF COMPLAINT:  Ms. Gupta presents today for follow up    DIABETES:  She reports a recent unusual blood sugar spike to 150 two hours postprandial. She denies consuming sugary foods and drinks only water, avoiding sodas. She maintains glycemic control with Trulicity, insulin, and Actose. She experiences symptoms of diabetic neuropathy, including numbness and frequent coldness in the bottom of her feet. She has history of foot issues requiring evaluation by Dr. Ramon, who advised to report any new foot problems.    SLEEP:  She continues Ambien for sleep with reported effectiveness.    GI:  She denies any current stomach problems.      ROS:  Cardiovascular: +cold extremities  Neurological: +numbness             Problem List[1]  Shweta has a current medication list which includes the following prescription(s): albuterol, alcohol swabs, aspirin, atorvastatin, azelastine, baclofen, blood sugar diagnostic, blood-glucose meter, budesonide-formoterol 160-4.5 mcg, cetirizine, clotrimazole-betamethasone 1-0.05%, comfort ez pen needles, docusate sodium, epipen 2-margie, fenofibrate, fluoxetine, fluticasone propionate, fluticasone-salmeterol 500-50 mcg/dose, furosemide, hydrocodone-acetaminophen, ipratropium, lisinopril, metoclopramide hcl, montelukast, mupirocin, nystatin, nystatin-triamcinolone, olopatadine, omega 3-dha-epa-fish oil, ondansetron, pioglitazone, potassium chloride sa, pregabalin, promethazine, tamsulosin, valacyclovir, dulaglutide, lantus solostar u-100 insulin, lancets, pantoprazole, ranolazine, sucralfate, and zolpidem, and the following Facility-Administered Medications: pneumoc 20-crystal conj-dip cr(pf).        Health Maintenance Due   Topic Date Due    Diabetic Eye Exam  03/05/2025    Mammogram  03/13/2025    Hemoglobin A1c  05/07/2025      Health Maintenance  "reviewed and discussed.   Objective:      Vitals:    05/12/25 1308   BP: 122/78   Pulse: 80   Resp: 18   SpO2: 95%   Weight: 121.1 kg (267 lb)   Height: 5' 4" (1.626 m)   PainSc: 0-No pain     Body mass index is 45.83 kg/m².  Physical Exam  Vitals and nursing note reviewed.   Constitutional:       General: She is not in acute distress.     Appearance: She is obese. She is not ill-appearing.   Eyes:      Pupils: Pupils are equal, round, and reactive to light.   Neck:      Vascular: No carotid bruit.   Cardiovascular:      Rate and Rhythm: Normal rate and regular rhythm.      Pulses:           Dorsalis pedis pulses are 2+ on the right side and 2+ on the left side.        Posterior tibial pulses are 2+ on the right side and 2+ on the left side.      Heart sounds: No murmur heard.  Pulmonary:      Effort: Pulmonary effort is normal. No respiratory distress.      Breath sounds: Normal breath sounds. No wheezing.   Abdominal:      General: There is no distension.      Palpations: Abdomen is soft.   Musculoskeletal:      Cervical back: Normal range of motion.      Right foot: Normal range of motion. No deformity.      Left foot: Normal range of motion. No deformity.        Feet:    Feet:      Right foot:      Protective Sensation: 4 sites tested.   1 site sensed.     Skin integrity: Skin integrity normal. No erythema or callus.      Toenail Condition: Right toenails are normal.      Left foot:      Protective Sensation: 4 sites tested.   1 site sensed.     Skin integrity: Skin integrity normal. No erythema or callus.      Toenail Condition: Left toenails are normal.      Comments: Numbness present across the highlighted area bilaterally  Lymphadenopathy:      Cervical: No cervical adenopathy.   Skin:     General: Skin is warm and dry.      Capillary Refill: Capillary refill takes less than 2 seconds.      Findings: No rash.   Neurological:      General: No focal deficit present.      Mental Status: She is alert and " oriented to person, place, and time.   Psychiatric:         Mood and Affect: Mood normal.         Behavior: Behavior normal.         Thought Content: Thought content normal.         Judgment: Judgment normal.               Assessment:       Assessment & Plan    1. Assessed diabetes management, noting recent glucose spike to 150 mg/dL.  2. Evaluated foot health and neuropathy, noting decreased sensation in the soles of the feet.  3. Confirmed patient is not on metformin.           Plan:       1. Mixed hyperlipidemia  Assessment & Plan:  Stable. Well controlled. Continue current medications.         2. Insomnia, unspecified type  -     zolpidem (AMBIEN) 10 mg Tab; Take 1 tablet (10 mg total) by mouth every evening.  Dispense: 30 tablet; Refill: 2    3. Type 2 diabetes mellitus with hyperglycemia, with long-term current use of insulin  Assessment & Plan:  Lab Results   Component Value Date    LABA1C 6.1 12/21/2020    HGBA1C 5.3 11/07/2024   Due for repeat A1C    Orders:  -     dulaglutide (TRULICITY) 4.5 mg/0.5 mL pen injector; Inject 4.5 mg into the skin every 7 days.  Dispense: 4 pen ; Refill: 11  -     insulin glargine U-100, Lantus, (LANTUS SOLOSTAR U-100 INSULIN) 100 unit/mL (3 mL) InPn pen; Inject 60 Units into the skin once daily.  Dispense: 15 mL; Refill: 6  -     Hemoglobin A1C; Future; Expected date: 05/12/2025    4. Gastroesophageal reflux disease  -     pantoprazole (PROTONIX) 40 MG tablet; Take 1 tablet (40 mg total) by mouth once daily.  Dispense: 90 tablet; Refill: 3  -     sucralfate (CARAFATE) 1 gram tablet; Take 1 tablet (1 g total) by mouth 4 (four) times daily.  Dispense: 360 tablet; Refill: 3    5. Essential hypertension  Assessment & Plan:  Stable. Well controlled. Continue current medications.       Orders:  -     ranolazine (RANEXA) 500 MG Tb12; Take 1 tablet (500 mg total) by mouth 2 (two) times daily.  Dispense: 180 tablet; Refill: 1         This note was generated with the assistance of  ambient listening technology. Verbal consent was obtained by the patient and accompanying visitor(s) for the recording of patient appointment to facilitate this note. I attest to having reviewed and edited the generated note for accuracy, though some syntax or spelling errors may persist. Please contact the author of this note for any clarification.         [1]   Patient Active Problem List  Diagnosis    Depression, major, in remission    Type 2 diabetes mellitus with hyperglycemia, with long-term current use of insulin    Essential hypertension    Insomnia    Obesity    SOB (shortness of breath)    Bronchiectasis with acute lower respiratory infection    Kidney stones    Hyperlipidemia    Anemia    Chronic superficial gastritis with bleeding    Esophageal polyp    Diverticulosis of colon    Epigastric pain    S/P partial colectomy    Gastroesophageal reflux disease    History of diverticulitis    Obese abdomen    Gastroparesis    Subclinical hypothyroidism    Thoracic root lesion    Spondylosis of lumbosacral region    Spinal stenosis of lumbar region    Presence of ventriculopleural shunt    Pain of thoracic facet joint    Mitral valve disease    Migraine headache    Hydrocephalus    Degeneration of intervertebral disc of lumbar region    Carpal tunnel syndrome    Tinea pedis of right foot

## 2025-05-12 NOTE — ASSESSMENT & PLAN NOTE
Lab Results   Component Value Date    LABA1C 6.1 12/21/2020    HGBA1C 5.3 11/07/2024   Due for repeat A1C

## 2025-05-16 ENCOUNTER — RESULTS FOLLOW-UP (OUTPATIENT)
Dept: FAMILY MEDICINE | Facility: CLINIC | Age: 55
End: 2025-05-16

## 2025-06-13 DIAGNOSIS — K31.84 GASTROPARESIS: ICD-10-CM

## 2025-06-13 DIAGNOSIS — G89.29 CHRONIC EPIGASTRIC PAIN: ICD-10-CM

## 2025-06-13 DIAGNOSIS — R10.13 CHRONIC EPIGASTRIC PAIN: ICD-10-CM

## 2025-06-13 RX ORDER — METOCLOPRAMIDE 5 MG/1
TABLET ORAL
Qty: 120 TABLET | Refills: 2 | Status: SHIPPED | OUTPATIENT
Start: 2025-06-13

## 2025-06-13 NOTE — TELEPHONE ENCOUNTER
No care due was identified.  [facility] Embedded Care Due Messages. Reference number: [id number].   6/13/2025 8:02:59 AM CDT

## 2025-06-13 NOTE — TELEPHONE ENCOUNTER
Refill Routing Note   Medication(s) are not appropriate for processing by [facility] for the following reason(s):        Outside of protocol    [facility] action(s):  Route               Appointments  past 12m or future 3m with PCP    Date Provider   Last Visit   5/12/2025 Akosua Osborne MD   Next Visit   9/12/2025 Akosua Osborne MD   ED visits in past 90 days: 0        Note composed:11:22 AM 06/13/2025

## 2025-06-16 DIAGNOSIS — I10 ESSENTIAL HYPERTENSION: ICD-10-CM

## 2025-06-16 RX ORDER — RANOLAZINE 500 MG/1
500 TABLET, EXTENDED RELEASE ORAL 2 TIMES DAILY
Qty: 180 TABLET | Refills: 1 | Status: SHIPPED | OUTPATIENT
Start: 2025-06-16

## 2025-06-16 NOTE — TELEPHONE ENCOUNTER
Refill Routing Note   Medication(s) are not appropriate for processing by [facility] for the following reason(s):        Outside of protocol    [facility] action(s):  Route             Appointments  past 12m or future 3m with PCP    Date Provider   Last Visit   5/12/2025 Akosua Osborne MD   Next Visit   9/12/2025 Akosua Osborne MD   ED visits in past 90 days: 0        Note composed:2:46 PM 06/16/2025

## 2025-07-24 DIAGNOSIS — K21.9 GASTROESOPHAGEAL REFLUX DISEASE: ICD-10-CM

## 2025-07-24 RX ORDER — SUCRALFATE 1 G/1
TABLET ORAL
Qty: 360 TABLET | Refills: 3 | Status: SHIPPED | OUTPATIENT
Start: 2025-07-24

## 2025-07-24 NOTE — TELEPHONE ENCOUNTER
Refill Routing Note   Medication(s) are not appropriate for processing by [facility] for the following reason(s):        Outside of protocol    [facility] action(s):  Route               Appointments  past 12m or future 3m with PCP    Date Provider   Last Visit   Visit date not found Pat Kirkpatrick MD   Next Visit   Visit date not found Pat Kirkpatrick MD   ED visits in past 90 days: 0        Note composed:6:02 PM 07/24/2025

## 2025-08-23 DIAGNOSIS — G47.00 INSOMNIA, UNSPECIFIED TYPE: ICD-10-CM

## 2025-08-26 ENCOUNTER — TELEPHONE (OUTPATIENT)
Dept: FAMILY MEDICINE | Facility: CLINIC | Age: 55
End: 2025-08-26
Payer: MEDICAID

## 2025-08-27 RX ORDER — ZOLPIDEM TARTRATE 10 MG/1
10 TABLET ORAL NIGHTLY
Qty: 30 TABLET | Refills: 0 | Status: SHIPPED | OUTPATIENT
Start: 2025-08-27

## 2025-08-28 ENCOUNTER — TELEPHONE (OUTPATIENT)
Dept: FAMILY MEDICINE | Facility: CLINIC | Age: 55
End: 2025-08-28
Payer: MEDICAID

## (undated) DEVICE — SEE MEDLINE ITEM 157117

## (undated) DEVICE — LOOP CUTTING BPLR 24/26F .30MM

## (undated) DEVICE — CATH ESOPH 7.5X15-18X5.5X240

## (undated) DEVICE — GAUZE SPONGE BULKEE 6X6.75IN

## (undated) DEVICE — DILATOR CRE 10-12MM

## (undated) DEVICE — SEE MEDLINE ITEM 157185

## (undated) DEVICE — KIT ENDO CARRY-ON PROC 100310

## (undated) DEVICE — GLOVE SURGEONS ULTRA TOUCH 6.5

## (undated) DEVICE — SOL WATER STRL IRR 1000ML

## (undated) DEVICE — SOL IRR NACL .9% 3000ML

## (undated) DEVICE — FORCEP BIOSY RJ 4 HOT 2.2X240

## (undated) DEVICE — BAG LINGEMAN DRAIN UROLOGY

## (undated) DEVICE — EXTRACTOR STONE WIRE TIPLESS

## (undated) DEVICE — SEE MEDLINE ITEM 157116

## (undated) DEVICE — SET CYSTO IRRIGATION UNIV SPIK

## (undated) DEVICE — SYR SLIP TIP 5CC

## (undated) DEVICE — KIT ENDO CARRY ON PROCEDURE

## (undated) DEVICE — SEE MEDLINE ITEM 157181

## (undated) DEVICE — UNDERGLOVES BIOGEL PI SZ 7 LF

## (undated) DEVICE — SCRUB HIBICLENS 4% CHG 4OZ

## (undated) DEVICE — SET IRR URLGY 2LINE UNIV SPIKE

## (undated) DEVICE — GOWN B1 X-LG X-LONG

## (undated) DEVICE — STENT URETERAL DBL J 6FX26CM

## (undated) DEVICE — SOL IRRI STRL WATER 1000ML

## (undated) DEVICE — SOL IRR WATER STRL 3000 ML

## (undated) DEVICE — GUIDE WIRE MOTION .035 X 150CM

## (undated) DEVICE — TRAY DRY SKIN SCRUB PREP

## (undated) DEVICE — GLOVE PI ULTRA TOUCH G SURGEON

## (undated) DEVICE — CANISTER SUCTION 3000CC

## (undated) DEVICE — BITE BLOCK ADULT LATEX FREE

## (undated) DEVICE — SLEEVE SCD EXPRESS CALF MEDIUM

## (undated) DEVICE — FIBER LASER HOLMIUM 273 MICRON

## (undated) DEVICE — GLOVE PROTEXIS PI SYN SURG 6.5

## (undated) DEVICE — SYR ONLY LUER LOCK 20CC

## (undated) DEVICE — KIT DEFENDO VLV  AIR WATER SUC